# Patient Record
Sex: FEMALE | Race: WHITE | Employment: OTHER | URBAN - METROPOLITAN AREA
[De-identification: names, ages, dates, MRNs, and addresses within clinical notes are randomized per-mention and may not be internally consistent; named-entity substitution may affect disease eponyms.]

---

## 2016-12-15 PROCEDURE — 93225 XTRNL ECG REC<48 HRS REC: CPT | Performed by: NURSE PRACTITIONER

## 2017-01-17 ENCOUNTER — OFFICE VISIT (OUTPATIENT)
Dept: CARDIOLOGY CLINIC | Age: 82
End: 2017-01-17

## 2017-01-17 VITALS
SYSTOLIC BLOOD PRESSURE: 116 MMHG | DIASTOLIC BLOOD PRESSURE: 74 MMHG | HEIGHT: 60 IN | BODY MASS INDEX: 31.61 KG/M2 | WEIGHT: 161 LBS | OXYGEN SATURATION: 96 % | HEART RATE: 82 BPM

## 2017-01-17 DIAGNOSIS — R06.09 DOE (DYSPNEA ON EXERTION): Primary | ICD-10-CM

## 2017-01-17 DIAGNOSIS — R55 SYNCOPE, UNSPECIFIED SYNCOPE TYPE: ICD-10-CM

## 2017-01-17 DIAGNOSIS — R09.89 CAROTID BRUIT, UNSPECIFIED LATERALITY: ICD-10-CM

## 2017-01-17 DIAGNOSIS — R06.02 SOB (SHORTNESS OF BREATH): ICD-10-CM

## 2017-01-17 DIAGNOSIS — E78.00 PURE HYPERCHOLESTEROLEMIA: ICD-10-CM

## 2017-01-17 DIAGNOSIS — I10 ESSENTIAL HYPERTENSION: ICD-10-CM

## 2017-01-17 DIAGNOSIS — I35.0 MODERATE AORTIC STENOSIS: ICD-10-CM

## 2017-01-17 PROCEDURE — 1123F ACP DISCUSS/DSCN MKR DOCD: CPT | Performed by: INTERNAL MEDICINE

## 2017-01-17 PROCEDURE — 99215 OFFICE O/P EST HI 40 MIN: CPT | Performed by: INTERNAL MEDICINE

## 2017-01-17 PROCEDURE — G8400 PT W/DXA NO RESULTS DOC: HCPCS | Performed by: INTERNAL MEDICINE

## 2017-01-17 PROCEDURE — 1090F PRES/ABSN URINE INCON ASSESS: CPT | Performed by: INTERNAL MEDICINE

## 2017-01-17 PROCEDURE — 4040F PNEUMOC VAC/ADMIN/RCVD: CPT | Performed by: INTERNAL MEDICINE

## 2017-01-17 PROCEDURE — G8484 FLU IMMUNIZE NO ADMIN: HCPCS | Performed by: INTERNAL MEDICINE

## 2017-01-17 PROCEDURE — G8427 DOCREV CUR MEDS BY ELIG CLIN: HCPCS | Performed by: INTERNAL MEDICINE

## 2017-01-17 PROCEDURE — 1036F TOBACCO NON-USER: CPT | Performed by: INTERNAL MEDICINE

## 2017-01-17 PROCEDURE — G8419 CALC BMI OUT NRM PARAM NOF/U: HCPCS | Performed by: INTERNAL MEDICINE

## 2017-01-19 PROCEDURE — 93227 XTRNL ECG REC<48 HR R&I: CPT | Performed by: NURSE PRACTITIONER

## 2017-01-20 ENCOUNTER — HOSPITAL ENCOUNTER (OUTPATIENT)
Dept: VASCULAR LAB | Age: 82
Discharge: HOME | End: 2017-01-20
Attending: INTERNAL MEDICINE | Admitting: INTERNAL MEDICINE

## 2017-01-20 ENCOUNTER — TELEPHONE (OUTPATIENT)
Dept: CARDIOLOGY CLINIC | Age: 82
End: 2017-01-20

## 2017-01-20 DIAGNOSIS — R06.02 SOB (SHORTNESS OF BREATH): ICD-10-CM

## 2017-01-20 DIAGNOSIS — R06.02 SHORTNESS OF BREATH: ICD-10-CM

## 2017-01-20 PROCEDURE — 94727 GAS DIL/WSHOT DETER LNG VOL: CPT | Performed by: INTERNAL MEDICINE

## 2017-01-20 PROCEDURE — 94010 BREATHING CAPACITY TEST: CPT | Performed by: INTERNAL MEDICINE

## 2017-01-20 PROCEDURE — 94729 DIFFUSING CAPACITY: CPT | Performed by: INTERNAL MEDICINE

## 2017-01-27 DIAGNOSIS — R55 SYNCOPE, UNSPECIFIED SYNCOPE TYPE: ICD-10-CM

## 2017-01-30 DIAGNOSIS — R06.02 SOB (SHORTNESS OF BREATH): ICD-10-CM

## 2017-01-30 DIAGNOSIS — I10 ESSENTIAL HYPERTENSION: ICD-10-CM

## 2017-01-30 LAB
ANION GAP SERPL CALCULATED.3IONS-SCNC: 16 MMOL/L (ref 3–16)
BUN BLDV-MCNC: 18 MG/DL (ref 7–20)
CALCIUM SERPL-MCNC: 9.4 MG/DL (ref 8.3–10.6)
CHLORIDE BLD-SCNC: 98 MMOL/L (ref 99–110)
CO2: 26 MMOL/L (ref 21–32)
CREAT SERPL-MCNC: 0.8 MG/DL (ref 0.6–1.2)
GFR AFRICAN AMERICAN: >60
GFR NON-AFRICAN AMERICAN: >60
GLUCOSE BLD-MCNC: 108 MG/DL (ref 70–99)
HCT VFR BLD CALC: 39.3 % (ref 36–48)
HEMOGLOBIN: 13 G/DL (ref 12–16)
INR BLD: 1.06 (ref 0.85–1.16)
MCH RBC QN AUTO: 29.8 PG (ref 26–34)
MCHC RBC AUTO-ENTMCNC: 33 G/DL (ref 31–36)
MCV RBC AUTO: 90.3 FL (ref 80–100)
PDW BLD-RTO: 12.7 % (ref 12.4–15.4)
PLATELET # BLD: 220 K/UL (ref 135–450)
PMV BLD AUTO: 10.5 FL (ref 5–10.5)
POTASSIUM SERPL-SCNC: 4.6 MMOL/L (ref 3.5–5.1)
PROTHROMBIN TIME: 12.1 SEC (ref 9.8–13)
RBC # BLD: 4.35 M/UL (ref 4–5.2)
SODIUM BLD-SCNC: 140 MMOL/L (ref 136–145)
WBC # BLD: 8.9 K/UL (ref 4–11)

## 2017-02-06 ENCOUNTER — HOSPITAL ENCOUNTER (OUTPATIENT)
Dept: CARDIAC CATH/INVASIVE PROCEDURES | Age: 82
Discharge: HOME | End: 2017-02-06
Attending: INTERNAL MEDICINE | Admitting: INTERNAL MEDICINE

## 2017-02-06 VITALS — WEIGHT: 163 LBS | BODY MASS INDEX: 32 KG/M2 | HEIGHT: 60 IN

## 2017-02-06 DIAGNOSIS — I35.0 NONRHEUMATIC AORTIC VALVE STENOSIS: ICD-10-CM

## 2017-02-06 PROCEDURE — 99205 OFFICE O/P NEW HI 60 MIN: CPT | Performed by: THORACIC SURGERY (CARDIOTHORACIC VASCULAR SURGERY)

## 2017-02-06 PROCEDURE — 93458 L HRT ARTERY/VENTRICLE ANGIO: CPT | Performed by: INTERNAL MEDICINE

## 2017-02-06 RX ORDER — SODIUM CHLORIDE 9 MG/ML
INJECTION, SOLUTION INTRAVENOUS CONTINUOUS
Status: DISCONTINUED | OUTPATIENT
Start: 2017-02-06 | End: 2017-02-07 | Stop reason: HOSPADM

## 2017-02-06 RX ORDER — SODIUM CHLORIDE 0.9 % (FLUSH) 0.9 %
10 SYRINGE (ML) INJECTION PRN
Status: DISCONTINUED | OUTPATIENT
Start: 2017-02-06 | End: 2017-02-07 | Stop reason: HOSPADM

## 2017-02-06 RX ORDER — SODIUM CHLORIDE 0.9 % (FLUSH) 0.9 %
10 SYRINGE (ML) INJECTION PRN
Status: DISCONTINUED | OUTPATIENT
Start: 2017-02-06 | End: 2017-02-06 | Stop reason: ALTCHOICE

## 2017-02-06 RX ORDER — ACETAMINOPHEN 325 MG/1
650 TABLET ORAL EVERY 4 HOURS PRN
Status: DISCONTINUED | OUTPATIENT
Start: 2017-02-06 | End: 2017-02-07 | Stop reason: HOSPADM

## 2017-02-06 RX ORDER — ASPIRIN 325 MG
325 TABLET ORAL ONCE
Status: DISCONTINUED | OUTPATIENT
Start: 2017-02-06 | End: 2017-02-06 | Stop reason: ALTCHOICE

## 2017-02-06 RX ORDER — SODIUM CHLORIDE 0.9 % (FLUSH) 0.9 %
10 SYRINGE (ML) INJECTION EVERY 12 HOURS SCHEDULED
Status: DISCONTINUED | OUTPATIENT
Start: 2017-02-06 | End: 2017-02-07 | Stop reason: HOSPADM

## 2017-02-06 RX ORDER — ONDANSETRON 2 MG/ML
4 INJECTION INTRAMUSCULAR; INTRAVENOUS EVERY 6 HOURS PRN
Status: DISCONTINUED | OUTPATIENT
Start: 2017-02-06 | End: 2017-02-07 | Stop reason: HOSPADM

## 2017-02-06 RX ORDER — SODIUM CHLORIDE 0.9 % (FLUSH) 0.9 %
10 SYRINGE (ML) INJECTION EVERY 12 HOURS SCHEDULED
Status: DISCONTINUED | OUTPATIENT
Start: 2017-02-06 | End: 2017-02-06 | Stop reason: ALTCHOICE

## 2017-02-07 LAB
EKG ATRIAL RATE: 72 BPM
EKG DIAGNOSIS: NORMAL
EKG P AXIS: 42 DEGREES
EKG P-R INTERVAL: 158 MS
EKG Q-T INTERVAL: 418 MS
EKG QRS DURATION: 100 MS
EKG QTC CALCULATION (BAZETT): 457 MS
EKG R AXIS: 21 DEGREES
EKG T AXIS: 20 DEGREES
EKG VENTRICULAR RATE: 72 BPM

## 2017-02-07 RX ORDER — ROSUVASTATIN CALCIUM 10 MG/1
10 TABLET, COATED ORAL DAILY
Qty: 90 TABLET | Refills: 1 | Status: SHIPPED | OUTPATIENT
Start: 2017-02-07 | End: 2017-08-10 | Stop reason: SDUPTHER

## 2017-03-03 ENCOUNTER — SURG/PROC ORDERS (OUTPATIENT)
Dept: CARDIOTHORACIC SURGERY | Age: 82
End: 2017-03-03

## 2017-03-03 ENCOUNTER — OFFICE VISIT (OUTPATIENT)
Dept: CARDIOTHORACIC SURGERY | Age: 82
End: 2017-03-03

## 2017-03-03 VITALS
DIASTOLIC BLOOD PRESSURE: 70 MMHG | WEIGHT: 163.8 LBS | HEIGHT: 60 IN | OXYGEN SATURATION: 96 % | TEMPERATURE: 98.5 F | SYSTOLIC BLOOD PRESSURE: 136 MMHG | HEART RATE: 77 BPM | BODY MASS INDEX: 32.16 KG/M2

## 2017-03-03 DIAGNOSIS — I35.0 NONRHEUMATIC AORTIC VALVE STENOSIS: Primary | ICD-10-CM

## 2017-03-03 PROCEDURE — 99214 OFFICE O/P EST MOD 30 MIN: CPT | Performed by: THORACIC SURGERY (CARDIOTHORACIC VASCULAR SURGERY)

## 2017-03-03 RX ORDER — LISINOPRIL 40 MG/1
TABLET ORAL
Qty: 90 TABLET | Refills: 1 | Status: SHIPPED | OUTPATIENT
Start: 2017-03-03 | End: 2017-09-25 | Stop reason: SDUPTHER

## 2017-03-07 ENCOUNTER — TELEPHONE (OUTPATIENT)
Dept: CARDIOTHORACIC SURGERY | Age: 82
End: 2017-03-07

## 2017-03-07 ENCOUNTER — PAT TELEPHONE (OUTPATIENT)
Dept: PREADMISSION TESTING | Age: 82
End: 2017-03-07

## 2017-03-07 ENCOUNTER — HOSPITAL ENCOUNTER (OUTPATIENT)
Dept: PREADMISSION TESTING | Age: 82
Discharge: HOME | End: 2017-03-07
Attending: THORACIC SURGERY (CARDIOTHORACIC VASCULAR SURGERY) | Admitting: THORACIC SURGERY (CARDIOTHORACIC VASCULAR SURGERY)

## 2017-03-07 ENCOUNTER — HOSPITAL ENCOUNTER (OUTPATIENT)
Dept: CT IMAGING | Age: 82
Discharge: HOME | End: 2017-03-07
Attending: THORACIC SURGERY (CARDIOTHORACIC VASCULAR SURGERY) | Admitting: THORACIC SURGERY (CARDIOTHORACIC VASCULAR SURGERY)

## 2017-03-07 VITALS — WEIGHT: 163 LBS | HEIGHT: 60 IN | BODY MASS INDEX: 32 KG/M2

## 2017-03-07 VITALS — RESPIRATION RATE: 18 BRPM

## 2017-03-07 DIAGNOSIS — I35.0 NONRHEUMATIC AORTIC VALVE STENOSIS: ICD-10-CM

## 2017-03-07 DIAGNOSIS — Z01.810 PRE-OPERATIVE CARDIOVASCULAR EXAMINATION: ICD-10-CM

## 2017-03-07 LAB
ABO/RH: NORMAL
ALBUMIN SERPL-MCNC: 4.5 G/DL (ref 3.4–5)
ALP BLD-CCNC: 45 U/L (ref 40–129)
ALT SERPL-CCNC: 48 U/L (ref 10–40)
ANION GAP SERPL CALCULATED.3IONS-SCNC: 13 MMOL/L (ref 3–16)
ANTIBODY SCREEN: NORMAL
APTT: 27.2 SEC (ref 21–31.8)
AST SERPL-CCNC: 29 U/L (ref 15–37)
BASE EXCESS ARTERIAL: 1.5 MMOL/L (ref -3–3)
BASOPHILS ABSOLUTE: 0.1 K/UL (ref 0–0.2)
BASOPHILS RELATIVE PERCENT: 1.5 %
BILIRUB SERPL-MCNC: 0.5 MG/DL (ref 0–1)
BILIRUBIN DIRECT: <0.2 MG/DL (ref 0–0.3)
BILIRUBIN URINE: NEGATIVE
BILIRUBIN URINE: NEGATIVE
BILIRUBIN, INDIRECT: ABNORMAL MG/DL (ref 0–1)
BLOOD, URINE: NEGATIVE
BLOOD, URINE: NEGATIVE
BUN BLDV-MCNC: 27 MG/DL (ref 7–20)
CALCIUM SERPL-MCNC: 9.9 MG/DL (ref 8.3–10.6)
CARBOXYHEMOGLOBIN ARTERIAL: 1.6 % (ref 0–1.5)
CHLORIDE BLD-SCNC: 98 MMOL/L (ref 99–110)
CHOLESTEROL, TOTAL: 124 MG/DL (ref 0–199)
CLARITY: ABNORMAL
CLARITY: CLEAR
CO2: 27 MMOL/L (ref 21–32)
COLOR: YELLOW
COLOR: YELLOW
CREAT SERPL-MCNC: 1.1 MG/DL (ref 0.6–1.2)
EKG ATRIAL RATE: 67 BPM
EKG DIAGNOSIS: NORMAL
EKG P AXIS: 46 DEGREES
EKG P-R INTERVAL: 156 MS
EKG Q-T INTERVAL: 434 MS
EKG QRS DURATION: 100 MS
EKG QTC CALCULATION (BAZETT): 458 MS
EKG R AXIS: 33 DEGREES
EKG T AXIS: 30 DEGREES
EKG VENTRICULAR RATE: 67 BPM
EOSINOPHILS ABSOLUTE: 0.2 K/UL (ref 0–0.6)
EOSINOPHILS RELATIVE PERCENT: 2.4 %
EPITHELIAL CELLS, UA: 11 /HPF (ref 0–5)
ESTIMATED AVERAGE GLUCOSE: 105.4 MG/DL
GFR AFRICAN AMERICAN: 58
GFR NON-AFRICAN AMERICAN: 48
GLUCOSE BLD-MCNC: 110 MG/DL (ref 70–99)
GLUCOSE URINE: NEGATIVE MG/DL
GLUCOSE URINE: NEGATIVE MG/DL
HBA1C MFR BLD: 5.3 %
HCO3 ARTERIAL: 24.5 MMOL/L (ref 21–29)
HCT VFR BLD CALC: 40.5 % (ref 36–48)
HDLC SERPL-MCNC: 45 MG/DL (ref 40–60)
HEMOGLOBIN, ART, EXTENDED: 13.3 G/DL (ref 12–16)
HEMOGLOBIN: 13.5 G/DL (ref 12–16)
HYALINE CASTS: 2 /HPF (ref 0–8)
INR BLD: 1.12 (ref 0.85–1.15)
KETONES, URINE: NEGATIVE MG/DL
KETONES, URINE: NEGATIVE MG/DL
LDL CHOLESTEROL CALCULATED: 64 MG/DL
LEUKOCYTE ESTERASE, URINE: ABNORMAL
LEUKOCYTE ESTERASE, URINE: NEGATIVE
LYMPHOCYTES ABSOLUTE: 1.7 K/UL (ref 1–5.1)
LYMPHOCYTES RELATIVE PERCENT: 21.2 %
MCH RBC QN AUTO: 29.9 PG (ref 26–34)
MCHC RBC AUTO-ENTMCNC: 33.4 G/DL (ref 31–36)
MCV RBC AUTO: 89.5 FL (ref 80–100)
METHEMOGLOBIN ARTERIAL: 1.3 %
MICROSCOPIC EXAMINATION: NORMAL
MICROSCOPIC EXAMINATION: YES
MONOCYTES ABSOLUTE: 0.5 K/UL (ref 0–1.3)
MONOCYTES RELATIVE PERCENT: 6 %
NEUTROPHILS ABSOLUTE: 5.7 K/UL (ref 1.7–7.7)
NEUTROPHILS RELATIVE PERCENT: 68.9 %
NITRITE, URINE: NEGATIVE
NITRITE, URINE: NEGATIVE
O2 CONTENT ARTERIAL: 16 ML/DL
O2 SAT, ARTERIAL: 89.1 %
O2 THERAPY: ABNORMAL
PCO2 ARTERIAL: 35 MMHG (ref 35–45)
PDW BLD-RTO: 12.1 % (ref 12.4–15.4)
PH ARTERIAL: 7.46 (ref 7.35–7.45)
PH UA: 6
PH UA: 6
PLATELET # BLD: 210 K/UL (ref 135–450)
PMV BLD AUTO: 9.8 FL (ref 5–10.5)
PO2 ARTERIAL: 52.2 MMHG (ref 75–108)
POTASSIUM SERPL-SCNC: 4.1 MMOL/L (ref 3.5–5.1)
PROTEIN UA: NEGATIVE MG/DL
PROTEIN UA: NEGATIVE MG/DL
PROTHROMBIN TIME: 12.6 SEC (ref 9.6–13)
RBC # BLD: 4.53 M/UL (ref 4–5.2)
RBC UA: 2 /HPF (ref 0–4)
SODIUM BLD-SCNC: 138 MMOL/L (ref 136–145)
SPECIFIC GRAVITY UA: 1.01
SPECIFIC GRAVITY UA: 1.01
TCO2 ARTERIAL: 25.6 MMOL/L
TOTAL PROTEIN: 7.5 G/DL (ref 6.4–8.2)
TRIGL SERPL-MCNC: 75 MG/DL (ref 0–150)
URINE REFLEX TO CULTURE: NORMAL
URINE REFLEX TO CULTURE: YES
URINE TYPE: ABNORMAL
URINE TYPE: NORMAL
UROBILINOGEN, URINE: 0.2 E.U./DL
UROBILINOGEN, URINE: 0.2 E.U./DL
VLDLC SERPL CALC-MCNC: 15 MG/DL
WBC # BLD: 8.2 K/UL (ref 4–11)
WBC UA: 6 /HPF (ref 0–5)

## 2017-03-07 PROCEDURE — 93010 ELECTROCARDIOGRAM REPORT: CPT | Performed by: INTERNAL MEDICINE

## 2017-03-07 RX ORDER — SODIUM CHLORIDE 9 MG/ML
INJECTION, SOLUTION INTRAVENOUS ONCE
Status: CANCELLED | OUTPATIENT
Start: 2017-03-09

## 2017-03-07 RX ORDER — ESOMEPRAZOLE SODIUM 40 MG/5ML
40 INJECTION INTRAVENOUS ONCE
Status: CANCELLED | OUTPATIENT
Start: 2017-03-09

## 2017-03-07 RX ORDER — 0.9 % SODIUM CHLORIDE 0.9 %
250 INTRAVENOUS SOLUTION INTRAVENOUS ONCE
Status: DISCONTINUED | OUTPATIENT
Start: 2017-03-07 | End: 2017-03-07 | Stop reason: ALTCHOICE

## 2017-03-08 LAB — URINE CULTURE, ROUTINE: NORMAL

## 2017-03-10 ENCOUNTER — CARE COORDINATION (OUTPATIENT)
Dept: FAMILY MEDICINE CLINIC | Age: 82
End: 2017-03-10

## 2017-03-15 ENCOUNTER — CARE COORDINATION (OUTPATIENT)
Dept: FAMILY MEDICINE CLINIC | Age: 82
End: 2017-03-15

## 2017-03-24 ENCOUNTER — OFFICE VISIT (OUTPATIENT)
Dept: CARDIOTHORACIC SURGERY | Age: 82
End: 2017-03-24

## 2017-03-24 VITALS
HEIGHT: 60 IN | HEART RATE: 65 BPM | DIASTOLIC BLOOD PRESSURE: 64 MMHG | SYSTOLIC BLOOD PRESSURE: 128 MMHG | BODY MASS INDEX: 31.61 KG/M2 | OXYGEN SATURATION: 96 % | TEMPERATURE: 97.8 F | WEIGHT: 161 LBS

## 2017-03-24 DIAGNOSIS — Z09 POSTOP CHECK: Primary | ICD-10-CM

## 2017-03-24 PROCEDURE — 99024 POSTOP FOLLOW-UP VISIT: CPT | Performed by: THORACIC SURGERY (CARDIOTHORACIC VASCULAR SURGERY)

## 2017-04-07 ENCOUNTER — OFFICE VISIT (OUTPATIENT)
Dept: CARDIOTHORACIC SURGERY | Age: 82
End: 2017-04-07

## 2017-04-07 VITALS
SYSTOLIC BLOOD PRESSURE: 136 MMHG | TEMPERATURE: 98 F | HEIGHT: 60 IN | BODY MASS INDEX: 30.9 KG/M2 | WEIGHT: 157.4 LBS | OXYGEN SATURATION: 96 % | HEART RATE: 75 BPM | DIASTOLIC BLOOD PRESSURE: 82 MMHG

## 2017-04-07 DIAGNOSIS — Z09 POSTOP CHECK: Primary | ICD-10-CM

## 2017-04-07 PROCEDURE — 99024 POSTOP FOLLOW-UP VISIT: CPT | Performed by: THORACIC SURGERY (CARDIOTHORACIC VASCULAR SURGERY)

## 2017-04-21 ENCOUNTER — OFFICE VISIT (OUTPATIENT)
Dept: CARDIOLOGY CLINIC | Age: 82
End: 2017-04-21

## 2017-04-21 VITALS
BODY MASS INDEX: 30.82 KG/M2 | HEART RATE: 68 BPM | DIASTOLIC BLOOD PRESSURE: 74 MMHG | WEIGHT: 157 LBS | HEIGHT: 60 IN | OXYGEN SATURATION: 96 % | SYSTOLIC BLOOD PRESSURE: 118 MMHG

## 2017-04-21 DIAGNOSIS — I10 BENIGN ESSENTIAL HTN: ICD-10-CM

## 2017-04-21 DIAGNOSIS — Z95.2 S/P AVR: Primary | ICD-10-CM

## 2017-04-21 DIAGNOSIS — E78.00 PURE HYPERCHOLESTEROLEMIA: ICD-10-CM

## 2017-04-21 DIAGNOSIS — I65.23 BILATERAL CAROTID ARTERY STENOSIS: ICD-10-CM

## 2017-04-21 DIAGNOSIS — I35.0 NONRHEUMATIC AORTIC VALVE STENOSIS: ICD-10-CM

## 2017-04-21 PROCEDURE — 1036F TOBACCO NON-USER: CPT | Performed by: INTERNAL MEDICINE

## 2017-04-21 PROCEDURE — G8598 ASA/ANTIPLAT THER USED: HCPCS | Performed by: INTERNAL MEDICINE

## 2017-04-21 PROCEDURE — 1111F DSCHRG MED/CURRENT MED MERGE: CPT | Performed by: INTERNAL MEDICINE

## 2017-04-21 PROCEDURE — 1090F PRES/ABSN URINE INCON ASSESS: CPT | Performed by: INTERNAL MEDICINE

## 2017-04-21 PROCEDURE — 99214 OFFICE O/P EST MOD 30 MIN: CPT | Performed by: INTERNAL MEDICINE

## 2017-04-21 PROCEDURE — G8427 DOCREV CUR MEDS BY ELIG CLIN: HCPCS | Performed by: INTERNAL MEDICINE

## 2017-04-21 PROCEDURE — G8400 PT W/DXA NO RESULTS DOC: HCPCS | Performed by: INTERNAL MEDICINE

## 2017-04-21 PROCEDURE — 4040F PNEUMOC VAC/ADMIN/RCVD: CPT | Performed by: INTERNAL MEDICINE

## 2017-04-21 PROCEDURE — G8417 CALC BMI ABV UP PARAM F/U: HCPCS | Performed by: INTERNAL MEDICINE

## 2017-04-21 PROCEDURE — 1123F ACP DISCUSS/DSCN MKR DOCD: CPT | Performed by: INTERNAL MEDICINE

## 2017-04-24 ENCOUNTER — HOSPITAL ENCOUNTER (OUTPATIENT)
Dept: CARDIAC REHAB | Age: 82
Discharge: HOME | End: 2017-04-30
Admitting: INTERNAL MEDICINE

## 2017-05-11 ENCOUNTER — TELEPHONE (OUTPATIENT)
Dept: CARDIOTHORACIC SURGERY | Age: 82
End: 2017-05-11

## 2017-06-02 RX ORDER — VERAPAMIL HYDROCHLORIDE 360 MG/1
CAPSULE, DELAYED RELEASE PELLETS ORAL
Qty: 90 CAPSULE | Refills: 1 | OUTPATIENT
Start: 2017-06-02

## 2017-06-13 ENCOUNTER — OFFICE VISIT (OUTPATIENT)
Dept: FAMILY MEDICINE CLINIC | Age: 82
End: 2017-06-13

## 2017-06-13 VITALS
HEIGHT: 60 IN | BODY MASS INDEX: 31.49 KG/M2 | HEART RATE: 64 BPM | DIASTOLIC BLOOD PRESSURE: 70 MMHG | SYSTOLIC BLOOD PRESSURE: 130 MMHG | WEIGHT: 160.4 LBS | TEMPERATURE: 97.7 F

## 2017-06-13 DIAGNOSIS — E78.00 PURE HYPERCHOLESTEROLEMIA: Primary | ICD-10-CM

## 2017-06-13 DIAGNOSIS — Z13.820 OSTEOPOROSIS SCREENING: ICD-10-CM

## 2017-06-13 DIAGNOSIS — I10 BENIGN ESSENTIAL HTN: ICD-10-CM

## 2017-06-13 PROCEDURE — G8400 PT W/DXA NO RESULTS DOC: HCPCS | Performed by: FAMILY MEDICINE

## 2017-06-13 PROCEDURE — G8417 CALC BMI ABV UP PARAM F/U: HCPCS | Performed by: FAMILY MEDICINE

## 2017-06-13 PROCEDURE — 1123F ACP DISCUSS/DSCN MKR DOCD: CPT | Performed by: FAMILY MEDICINE

## 2017-06-13 PROCEDURE — 1036F TOBACCO NON-USER: CPT | Performed by: FAMILY MEDICINE

## 2017-06-13 PROCEDURE — 1090F PRES/ABSN URINE INCON ASSESS: CPT | Performed by: FAMILY MEDICINE

## 2017-06-13 PROCEDURE — 99213 OFFICE O/P EST LOW 20 MIN: CPT | Performed by: FAMILY MEDICINE

## 2017-06-13 PROCEDURE — 4040F PNEUMOC VAC/ADMIN/RCVD: CPT | Performed by: FAMILY MEDICINE

## 2017-06-13 PROCEDURE — G8598 ASA/ANTIPLAT THER USED: HCPCS | Performed by: FAMILY MEDICINE

## 2017-06-13 PROCEDURE — G8427 DOCREV CUR MEDS BY ELIG CLIN: HCPCS | Performed by: FAMILY MEDICINE

## 2017-06-13 RX ORDER — VERAPAMIL HYDROCHLORIDE 360 MG/1
360 CAPSULE, DELAYED RELEASE PELLETS ORAL NIGHTLY
Qty: 90 CAPSULE | Refills: 1 | Status: SHIPPED | OUTPATIENT
Start: 2017-06-13 | End: 2017-12-15 | Stop reason: SDUPTHER

## 2017-06-13 ASSESSMENT — ENCOUNTER SYMPTOMS
CHEST TIGHTNESS: 0
CONSTIPATION: 0
NAUSEA: 0
DIARRHEA: 0
ABDOMINAL PAIN: 0
VOMITING: 0
ABDOMINAL DISTENTION: 0
SHORTNESS OF BREATH: 0
BLOOD IN STOOL: 0
COUGH: 0

## 2017-06-26 RX ORDER — TRIAMTERENE AND HYDROCHLOROTHIAZIDE 75; 50 MG/1; MG/1
TABLET ORAL
Qty: 90 TABLET | Refills: 1 | Status: SHIPPED | OUTPATIENT
Start: 2017-06-26 | End: 2018-10-03 | Stop reason: SDUPTHER

## 2017-07-07 ENCOUNTER — HOSPITAL ENCOUNTER (OUTPATIENT)
Dept: WOMENS IMAGING | Age: 82
Discharge: HOME | End: 2017-07-07
Attending: FAMILY MEDICINE | Admitting: FAMILY MEDICINE

## 2017-07-07 DIAGNOSIS — Z13.820 ENCOUNTER FOR SCREENING FOR OSTEOPOROSIS: ICD-10-CM

## 2017-07-07 DIAGNOSIS — Z13.820 OSTEOPOROSIS SCREENING: ICD-10-CM

## 2017-08-01 ENCOUNTER — OFFICE VISIT (OUTPATIENT)
Dept: CARDIOLOGY CLINIC | Age: 82
End: 2017-08-01

## 2017-08-01 VITALS
OXYGEN SATURATION: 95 % | WEIGHT: 162.4 LBS | HEART RATE: 56 BPM | BODY MASS INDEX: 31.88 KG/M2 | HEIGHT: 60 IN | DIASTOLIC BLOOD PRESSURE: 70 MMHG | SYSTOLIC BLOOD PRESSURE: 124 MMHG

## 2017-08-01 DIAGNOSIS — E78.00 PURE HYPERCHOLESTEROLEMIA: ICD-10-CM

## 2017-08-01 DIAGNOSIS — I10 BENIGN ESSENTIAL HTN: ICD-10-CM

## 2017-08-01 DIAGNOSIS — I35.0 NONRHEUMATIC AORTIC VALVE STENOSIS: Primary | ICD-10-CM

## 2017-08-01 PROCEDURE — 99214 OFFICE O/P EST MOD 30 MIN: CPT | Performed by: INTERNAL MEDICINE

## 2017-08-01 PROCEDURE — G8417 CALC BMI ABV UP PARAM F/U: HCPCS | Performed by: INTERNAL MEDICINE

## 2017-08-01 PROCEDURE — 1123F ACP DISCUSS/DSCN MKR DOCD: CPT | Performed by: INTERNAL MEDICINE

## 2017-08-01 PROCEDURE — G8427 DOCREV CUR MEDS BY ELIG CLIN: HCPCS | Performed by: INTERNAL MEDICINE

## 2017-08-01 PROCEDURE — 1036F TOBACCO NON-USER: CPT | Performed by: INTERNAL MEDICINE

## 2017-08-01 PROCEDURE — 4040F PNEUMOC VAC/ADMIN/RCVD: CPT | Performed by: INTERNAL MEDICINE

## 2017-08-01 PROCEDURE — 1090F PRES/ABSN URINE INCON ASSESS: CPT | Performed by: INTERNAL MEDICINE

## 2017-08-01 PROCEDURE — G8399 PT W/DXA RESULTS DOCUMENT: HCPCS | Performed by: INTERNAL MEDICINE

## 2017-08-01 PROCEDURE — G8598 ASA/ANTIPLAT THER USED: HCPCS | Performed by: INTERNAL MEDICINE

## 2017-08-11 RX ORDER — ROSUVASTATIN CALCIUM 10 MG/1
10 TABLET, COATED ORAL DAILY
Qty: 90 TABLET | Refills: 1 | Status: SHIPPED | OUTPATIENT
Start: 2017-08-11 | End: 2018-03-06 | Stop reason: SDUPTHER

## 2017-08-18 RX ORDER — METOPROLOL TARTRATE 50 MG/1
50 TABLET, FILM COATED ORAL 2 TIMES DAILY
Qty: 180 TABLET | Refills: 3 | Status: SHIPPED | OUTPATIENT
Start: 2017-08-18 | End: 2017-08-22 | Stop reason: SDUPTHER

## 2017-08-22 RX ORDER — METOPROLOL TARTRATE 50 MG/1
50 TABLET, FILM COATED ORAL 2 TIMES DAILY
Qty: 180 TABLET | Refills: 3 | Status: SHIPPED | OUTPATIENT
Start: 2017-08-22 | End: 2018-09-01 | Stop reason: SDUPTHER

## 2018-02-02 ENCOUNTER — OFFICE VISIT (OUTPATIENT)
Dept: CARDIOLOGY CLINIC | Age: 83
End: 2018-02-02

## 2018-02-02 VITALS
DIASTOLIC BLOOD PRESSURE: 74 MMHG | WEIGHT: 168 LBS | BODY MASS INDEX: 32.98 KG/M2 | HEART RATE: 71 BPM | OXYGEN SATURATION: 96 % | HEIGHT: 60 IN | SYSTOLIC BLOOD PRESSURE: 138 MMHG

## 2018-02-02 DIAGNOSIS — R06.09 DOE (DYSPNEA ON EXERTION): ICD-10-CM

## 2018-02-02 DIAGNOSIS — I65.23 BILATERAL CAROTID ARTERY STENOSIS: ICD-10-CM

## 2018-02-02 DIAGNOSIS — E78.2 MIXED HYPERLIPIDEMIA: ICD-10-CM

## 2018-02-02 DIAGNOSIS — I10 ESSENTIAL HYPERTENSION: ICD-10-CM

## 2018-02-02 DIAGNOSIS — I35.0 NONRHEUMATIC AORTIC VALVE STENOSIS: ICD-10-CM

## 2018-02-02 DIAGNOSIS — R79.89 OTHER SPECIFIED ABNORMAL FINDINGS OF BLOOD CHEMISTRY: ICD-10-CM

## 2018-02-02 DIAGNOSIS — I10 BENIGN ESSENTIAL HTN: ICD-10-CM

## 2018-02-02 DIAGNOSIS — Z95.2 S/P AVR: Primary | ICD-10-CM

## 2018-02-02 PROCEDURE — G8417 CALC BMI ABV UP PARAM F/U: HCPCS | Performed by: INTERNAL MEDICINE

## 2018-02-02 PROCEDURE — G8484 FLU IMMUNIZE NO ADMIN: HCPCS | Performed by: INTERNAL MEDICINE

## 2018-02-02 PROCEDURE — G8399 PT W/DXA RESULTS DOCUMENT: HCPCS | Performed by: INTERNAL MEDICINE

## 2018-02-02 PROCEDURE — G8427 DOCREV CUR MEDS BY ELIG CLIN: HCPCS | Performed by: INTERNAL MEDICINE

## 2018-02-02 PROCEDURE — 4040F PNEUMOC VAC/ADMIN/RCVD: CPT | Performed by: INTERNAL MEDICINE

## 2018-02-02 PROCEDURE — G8599 NO ASA/ANTIPLAT THER USE RNG: HCPCS | Performed by: INTERNAL MEDICINE

## 2018-02-02 PROCEDURE — 99214 OFFICE O/P EST MOD 30 MIN: CPT | Performed by: INTERNAL MEDICINE

## 2018-02-02 PROCEDURE — 1123F ACP DISCUSS/DSCN MKR DOCD: CPT | Performed by: INTERNAL MEDICINE

## 2018-02-02 PROCEDURE — 1036F TOBACCO NON-USER: CPT | Performed by: INTERNAL MEDICINE

## 2018-02-02 PROCEDURE — 1090F PRES/ABSN URINE INCON ASSESS: CPT | Performed by: INTERNAL MEDICINE

## 2018-02-02 NOTE — PATIENT INSTRUCTIONS
Patient Education        Transthoracic Echocardiogram: About This Test  What is it? An echocardiogram (also called an echo) uses sound waves to make an image of your heart. A device called a transducer sends sound waves that echo off your heart and back to the transducer. These echoes are turned into moving pictures of your heart that can be seen on a video screen. In a transthoracic echocardiogram (TTE), the transducer is moved across your chest or belly. A TTE is the most common type of echocardiogram.  Why is this test done? This test is done to check your heart health. It's used for many reasons. Your doctor may do an echocardiogram to:  · Check a heart murmur. · Look for the cause of shortness of breath or unexplained chest pain or pressure. · Check how well your heart is pumping blood. · Check to see how well your heart valves are working. · Look for blood clots inside your heart. What happens during the test?  · You will remove your clothes above your waist. You may be given a cloth or paper covering to use during the test.  · You will lie on your back or on your left side on a bed or table. · You may receive medicine through a vein (intravenously, or IV). The IV can be used to give you a contrast material, which helps your doctor get good views of your heart. · Small pads or patches (electrodes) will be taped to your arms and legs to record your heart rate during the test.  · A small amount of gel will be rubbed on the side of your chest to help  the sound waves. · The transducer will be pressed firmly against your chest and moved slowly back and forth. It is usually moved to different areas on your chest or belly to get specific views of your heart. · You will be asked to do several things, such as hold very still, breathe in and out very slowly, hold your breath, or lie on your left side. This test usually takes 30 to 60 minutes.   What else should you know about the test?  · You

## 2018-02-02 NOTE — PROGRESS NOTES
regurgitation  The aortic valve appears to be mildly sclerotic   Peak aortic gradient is 33 mmHg. Mean gradient is 18 mmHg. ANAY is 1.3 cm2 c/w moderate AS  RV systolic pressure is normal.    ECHO 10/16/2015:  Left ventricle size is normal.  Asymmetric left ventricular hypertrophy is present. Overall left ventricular function is normal.  Ejection fraction is visually estimated to be 65 to70%. There is reversal of E/A inflow velocities across the mitral valve  suggesting impaired left ventricular relaxation. There is hyperdynamic LV systolic function. Mitral annular calcification is present. Calcification of the mitral valve noted. Moderate aortic stenosis is present. The aortic valve is thickened with a peak gradient of 48 mm Hg, and mean gradient of 27 mmHg . ECHO:6/3/16  Summary   Normal left ventricular size. Basal septal hypertrophy. Normal left   ventricular systolic function with an estimated ejection fraction of 65% .   Hyperdynamic left ventricle, no regional wall motion abnormalities.   Mitral annular calcification is present.   The mitral valve leaflets are slightly thickened with normal leaflet  mobility.   Trivial mitral regurgitation is present.   The aortic valve appears calcified and restricted.   The aortic valve area is calculated at 1.1 cm2 with a maximum pressure   gradient of 47 mmHg and a mean pressure gradient of 31 mmHg. This is c/w   moderate aortic stenosis.   No evidence of aortic valve regurgitation.   Tricuspid valve is structurally normal.   There is mild tricuspid regurgitation with RVSP estimated at 27 mmHg.   The pulmonic valve is normal in structure and function.   Trivial pulmonic regurgitation present. Carotid Duplex:6/3/16  Right Impression   The right internal carotid artery appears to have a 16-49% diameter reducing   stenosis based on velocity criteria.       The right vertebral artery demonstrates normal antegrade flow.    Left Impression   The left internal

## 2018-02-09 DIAGNOSIS — E78.2 MIXED HYPERLIPIDEMIA: ICD-10-CM

## 2018-02-09 DIAGNOSIS — R79.89 OTHER SPECIFIED ABNORMAL FINDINGS OF BLOOD CHEMISTRY: ICD-10-CM

## 2018-02-09 DIAGNOSIS — I10 ESSENTIAL HYPERTENSION: ICD-10-CM

## 2018-02-09 DIAGNOSIS — Z95.2 S/P AVR: ICD-10-CM

## 2018-02-09 DIAGNOSIS — I35.0 NONRHEUMATIC AORTIC VALVE STENOSIS: ICD-10-CM

## 2018-02-09 DIAGNOSIS — R06.09 DOE (DYSPNEA ON EXERTION): ICD-10-CM

## 2018-02-09 LAB
A/G RATIO: 1.7 (ref 1.1–2.2)
ALBUMIN SERPL-MCNC: 4.3 G/DL (ref 3.4–5)
ALP BLD-CCNC: 53 U/L (ref 40–129)
ALT SERPL-CCNC: 34 U/L (ref 10–40)
ANION GAP SERPL CALCULATED.3IONS-SCNC: 15 MMOL/L (ref 3–16)
AST SERPL-CCNC: 25 U/L (ref 15–37)
BILIRUB SERPL-MCNC: 0.5 MG/DL (ref 0–1)
BUN BLDV-MCNC: 14 MG/DL (ref 7–20)
CALCIUM SERPL-MCNC: 9.7 MG/DL (ref 8.3–10.6)
CHLORIDE BLD-SCNC: 100 MMOL/L (ref 99–110)
CO2: 26 MMOL/L (ref 21–32)
CREAT SERPL-MCNC: 0.8 MG/DL (ref 0.6–1.2)
GFR AFRICAN AMERICAN: >60
GFR NON-AFRICAN AMERICAN: >60
GLOBULIN: 2.5 G/DL
GLUCOSE BLD-MCNC: 118 MG/DL (ref 70–99)
HCT VFR BLD CALC: 42.3 % (ref 36–48)
HEMOGLOBIN: 13.7 G/DL (ref 12–16)
MCH RBC QN AUTO: 29.1 PG (ref 26–34)
MCHC RBC AUTO-ENTMCNC: 32.5 G/DL (ref 31–36)
MCV RBC AUTO: 89.4 FL (ref 80–100)
PDW BLD-RTO: 13.6 % (ref 12.4–15.4)
PLATELET # BLD: 194 K/UL (ref 135–450)
PMV BLD AUTO: 10.7 FL (ref 5–10.5)
POTASSIUM SERPL-SCNC: 3.7 MMOL/L (ref 3.5–5.1)
PRO-BNP: 524 PG/ML (ref 0–449)
RBC # BLD: 4.73 M/UL (ref 4–5.2)
SODIUM BLD-SCNC: 141 MMOL/L (ref 136–145)
TOTAL PROTEIN: 6.8 G/DL (ref 6.4–8.2)
TSH REFLEX: 1.79 UIU/ML (ref 0.27–4.2)
WBC # BLD: 8.2 K/UL (ref 4–11)

## 2018-02-10 LAB
ESTIMATED AVERAGE GLUCOSE: 102.5 MG/DL
HBA1C MFR BLD: 5.2 %

## 2018-02-23 ENCOUNTER — HOSPITAL ENCOUNTER (OUTPATIENT)
Dept: CARDIOLOGY | Age: 83
Discharge: HOME | End: 2018-02-23
Attending: INTERNAL MEDICINE | Admitting: INTERNAL MEDICINE

## 2018-02-23 DIAGNOSIS — I35.0 NONRHEUMATIC AORTIC VALVE STENOSIS: ICD-10-CM

## 2018-02-23 DIAGNOSIS — Z95.2 PRESENCE OF PROSTHETIC HEART VALVE: ICD-10-CM

## 2018-02-23 DIAGNOSIS — Z95.2 S/P AVR: ICD-10-CM

## 2018-02-23 LAB
LV EF: 63 %
LVEF MODALITY: NORMAL

## 2018-02-26 ENCOUNTER — NURSE ONLY (OUTPATIENT)
Dept: CARDIOLOGY CLINIC | Age: 83
End: 2018-02-26

## 2018-02-26 VITALS — SYSTOLIC BLOOD PRESSURE: 124 MMHG | DIASTOLIC BLOOD PRESSURE: 68 MMHG

## 2018-03-07 RX ORDER — LISINOPRIL 40 MG/1
TABLET ORAL
Qty: 90 TABLET | Refills: 1 | Status: SHIPPED | OUTPATIENT
Start: 2018-03-07 | End: 2018-10-03 | Stop reason: SDUPTHER

## 2018-03-09 RX ORDER — ROSUVASTATIN CALCIUM 10 MG/1
TABLET, COATED ORAL
Qty: 90 TABLET | Refills: 1 | Status: SHIPPED | OUTPATIENT
Start: 2018-03-09 | End: 2018-09-18 | Stop reason: SDUPTHER

## 2018-03-19 ENCOUNTER — TELEPHONE (OUTPATIENT)
Dept: FAMILY MEDICINE CLINIC | Age: 83
End: 2018-03-19

## 2018-03-29 ENCOUNTER — TELEPHONE (OUTPATIENT)
Dept: CARDIOLOGY CLINIC | Age: 83
End: 2018-03-29

## 2018-03-29 NOTE — TELEPHONE ENCOUNTER
Lilliam Lopez MA   Has she called around locally to see if anyone has it in stock or does our retail pharmacy have it in stock.

## 2018-03-30 RX ORDER — DILTIAZEM HYDROCHLORIDE 360 MG/1
360 CAPSULE, EXTENDED RELEASE ORAL DAILY
COMMUNITY
End: 2018-03-30 | Stop reason: SDUPTHER

## 2018-03-30 RX ORDER — DILTIAZEM HYDROCHLORIDE 360 MG/1
360 CAPSULE, EXTENDED RELEASE ORAL DAILY
Qty: 90 CAPSULE | Refills: 3 | Status: SHIPPED | OUTPATIENT
Start: 2018-03-30 | End: 2019-03-05 | Stop reason: SDUPTHER

## 2018-08-03 ENCOUNTER — OFFICE VISIT (OUTPATIENT)
Dept: CARDIOLOGY CLINIC | Age: 83
End: 2018-08-03

## 2018-08-03 VITALS
HEART RATE: 76 BPM | HEIGHT: 60 IN | SYSTOLIC BLOOD PRESSURE: 122 MMHG | DIASTOLIC BLOOD PRESSURE: 68 MMHG | BODY MASS INDEX: 33.18 KG/M2 | WEIGHT: 169 LBS | OXYGEN SATURATION: 95 %

## 2018-08-03 DIAGNOSIS — I65.23 BILATERAL CAROTID ARTERY STENOSIS: ICD-10-CM

## 2018-08-03 DIAGNOSIS — E78.2 MIXED HYPERLIPIDEMIA: ICD-10-CM

## 2018-08-03 DIAGNOSIS — Z95.2 S/P AVR: ICD-10-CM

## 2018-08-03 DIAGNOSIS — I35.0 NONRHEUMATIC AORTIC VALVE STENOSIS: Primary | ICD-10-CM

## 2018-08-03 DIAGNOSIS — I10 ESSENTIAL HYPERTENSION: ICD-10-CM

## 2018-08-03 PROCEDURE — 1123F ACP DISCUSS/DSCN MKR DOCD: CPT | Performed by: INTERNAL MEDICINE

## 2018-08-03 PROCEDURE — 1101F PT FALLS ASSESS-DOCD LE1/YR: CPT | Performed by: INTERNAL MEDICINE

## 2018-08-03 PROCEDURE — G8399 PT W/DXA RESULTS DOCUMENT: HCPCS | Performed by: INTERNAL MEDICINE

## 2018-08-03 PROCEDURE — 1090F PRES/ABSN URINE INCON ASSESS: CPT | Performed by: INTERNAL MEDICINE

## 2018-08-03 PROCEDURE — 4040F PNEUMOC VAC/ADMIN/RCVD: CPT | Performed by: INTERNAL MEDICINE

## 2018-08-03 PROCEDURE — 93000 ELECTROCARDIOGRAM COMPLETE: CPT | Performed by: INTERNAL MEDICINE

## 2018-08-03 PROCEDURE — 99214 OFFICE O/P EST MOD 30 MIN: CPT | Performed by: INTERNAL MEDICINE

## 2018-08-03 PROCEDURE — 1036F TOBACCO NON-USER: CPT | Performed by: INTERNAL MEDICINE

## 2018-08-03 PROCEDURE — G8427 DOCREV CUR MEDS BY ELIG CLIN: HCPCS | Performed by: INTERNAL MEDICINE

## 2018-08-03 PROCEDURE — G8599 NO ASA/ANTIPLAT THER USE RNG: HCPCS | Performed by: INTERNAL MEDICINE

## 2018-08-03 PROCEDURE — G8417 CALC BMI ABV UP PARAM F/U: HCPCS | Performed by: INTERNAL MEDICINE

## 2018-08-03 NOTE — PROGRESS NOTES
Dispense Refill    diltiazem (CARDIZEM CD) 360 MG extended release capsule Take 1 capsule by mouth daily 90 capsule 3    rosuvastatin (CRESTOR) 10 MG tablet TAKE 1 TABLET DAILY 90 tablet 1    lisinopril (PRINIVIL;ZESTRIL) 40 MG tablet TAKE 1 TABLET DAILY 90 tablet 1    metoprolol tartrate (LOPRESSOR) 50 MG tablet Take 1 tablet by mouth 2 times daily 180 tablet 3    triamterene-hydrochlorothiazide (MAXZIDE) 75-50 MG per tablet TAKE 1 TABLET DAILY 90 tablet 1    aspirin 325 MG EC tablet Take 1 tablet by mouth daily 30 tablet 3    Omega-3 Fatty Acids (FISH OIL) 1000 MG CAPS Take 1,000 mg by mouth nightly       multivitamin (THERAGRAN) per tablet Take 1 tablet by mouth daily. No current facility-administered medications for this visit. Review of Systems:    Constitutional: negative  Eyes: negative  Ears, nose, mouth, throat, and face: negative  Respiratory: negative  Cardiovascular: negative  Gastrointestinal: negative  Genitourinary:negative  Integument/breast: negative  Hematologic/lymphatic: negative  Musculoskeletal:negative  Neurological: negative  Behavioral/Psych: negative  Endocrine: negative  Allergic/Immunologic: negative           Physical Exam:   Vitals:    08/03/18 1458   BP: 122/68   Site: Right Arm   Position: Sitting   Pulse: 76   SpO2: 95%   Weight: 169 lb (76.7 kg)   Height: 5' (1.524 m)     Wt Readings from Last 3 Encounters:   08/03/18 169 lb (76.7 kg)   02/02/18 168 lb (76.2 kg)   08/01/17 162 lb 6.4 oz (73.7 kg)       Constitutional: She is oriented to person, place, and time. She appears well-developed and well-nourished. In no acute distress. Head: Normocephalic and atraumatic. Pupils equal and round. Neck: Neck supple. No JVP or carotid bruit appreciated. No mass and no thyromegaly present. No lymphadenopathy present. Cardiovascular: Normal rate. Normal heart sounds. Exam reveals no gallop and no friction rub.  Mild Systolic ejection murmur heard with normal mitral valve  suggesting impaired left ventricular relaxation. There is hyperdynamic LV systolic function. Mitral annular calcification is present. Calcification of the mitral valve noted. Moderate aortic stenosis is present. The aortic valve is thickened with a peak gradient of 48 mm Hg, and mean gradient of 27 mmHg . ECHO:6/3/16  Summary   Normal left ventricular size. Basal septal hypertrophy. Normal left   ventricular systolic function with an estimated ejection fraction of 65% .   Hyperdynamic left ventricle, no regional wall motion abnormalities.   Mitral annular calcification is present.   The mitral valve leaflets are slightly thickened with normal leaflet  mobility.   Trivial mitral regurgitation is present.   The aortic valve appears calcified and restricted.   The aortic valve area is calculated at 1.1 cm2 with a maximum pressure   gradient of 47 mmHg and a mean pressure gradient of 31 mmHg. This is c/w   moderate aortic stenosis.   No evidence of aortic valve regurgitation.   Tricuspid valve is structurally normal.   There is mild tricuspid regurgitation with RVSP estimated at 27 mmHg.   The pulmonic valve is normal in structure and function.   Trivial pulmonic regurgitation present. Carotid Duplex:6/3/16  Right Impression   The right internal carotid artery appears to have a 16-49% diameter reducing   stenosis based on velocity criteria.       The right vertebral artery demonstrates normal antegrade flow. Left Impression   The left internal carotid artery reveals a <50% diameter reducing stenosis.       The left vertebral artery demonstrates normal antegrade flow. Echo:12/15/16   Summary   Normal left ventricle size and vigorous systolic function with an estimated   ejection fraction of 65-70%. No regional wall motion abnormalities are seen.  Lyndall Sabal increased left ventricular wall thickness.  Diastolic filling   parameters suggests grade I diastolic dysfunction.   Normal right ventricular size and function.   Moderate aortic stenosis (peak velocity of 3.8 m/s and mean gradient of 38   mmHg).  Trivial mitral, tricuspid, and pulmonic regurgitation. Cardionet 30 day event monitor     [facility] with [facility] study 2/6/17  ANGIOGRAPHIC FINDINGS:  1. Left main coronary comes from the left coronary cusp, giving rise to left  anterior descending artery, left circumflex artery. They all are normal.   JUAN PABLO 3 flow. No significant stenosis seen either in the left main coronary  artery, left anterior descending artery, left circumflex artery. The patient  is left dominant. 2.  The right coronary artery was normal.  JUAN PABLO 3 flow. It is a small  nondominant right coronary. 3.  Ejection fraction of 60%.  SUMMARY:  Severe aortic valve stenosis with normal coronary arteries. Echo 2/23/18  Summary   Left ventricular cavity size is normal. There is mild to moderate concentric   left ventricular hypertrophy. Overall left ventricular systolic function   appears normal. Ejection fraction is visually estimated to be 60-65%. No   regional wall motion abnormalities are noted. Diastolic filling parameters   suggest grade I diastolic dysfunction.   Mitral annular calcification is present.   Mild calcification of the anterior leaflet of the mitral valve.   Trivial mitral regurgitation is present.   The left atrium is mildly dilated.   A bioprosthetic artificial aortic valve appears well seated with a maximum   velocity of 290m/s and a mean gradient of 21mmHg.   Mild tricuspid regurgitation.     Assessment/Plan:      -Aortic valve stenosis: s/p AVR bovine valve, tissue 3/9/17 per Dr. Radha Cho  Normal coronary arteries 2/6/17   mg daily (for tissue valve)   No abnormal bruising or bleeding   On Lopressor, Lisinopril, Verapamil and Crestor  Last echo Feb 2018    -Carotid bruit/stenosis:   No s/s TIA/CVA  Carotid Duplex 1/20/17  <50% bilateral stenosis, tortuous distal ICA, right, Curve distal ICA, left

## 2018-09-04 RX ORDER — METOPROLOL TARTRATE 50 MG/1
50 TABLET, FILM COATED ORAL 2 TIMES DAILY
Qty: 180 TABLET | Refills: 3 | Status: SHIPPED | OUTPATIENT
Start: 2018-09-04 | End: 2019-03-08 | Stop reason: ALTCHOICE

## 2018-09-19 RX ORDER — ROSUVASTATIN CALCIUM 10 MG/1
TABLET, COATED ORAL
Qty: 90 TABLET | Refills: 2 | Status: SHIPPED | OUTPATIENT
Start: 2018-09-19 | End: 2019-07-15 | Stop reason: SDUPTHER

## 2018-09-19 RX ORDER — LISINOPRIL 40 MG/1
TABLET ORAL
Qty: 90 TABLET | Refills: 1 | OUTPATIENT
Start: 2018-09-19

## 2018-09-24 ENCOUNTER — OFFICE VISIT (OUTPATIENT)
Dept: FAMILY MEDICINE CLINIC | Age: 83
End: 2018-09-24
Payer: MEDICARE

## 2018-09-24 VITALS
BODY MASS INDEX: 32.2 KG/M2 | HEART RATE: 76 BPM | HEIGHT: 60 IN | TEMPERATURE: 98.1 F | SYSTOLIC BLOOD PRESSURE: 138 MMHG | WEIGHT: 164 LBS | DIASTOLIC BLOOD PRESSURE: 88 MMHG

## 2018-09-24 DIAGNOSIS — E78.00 PURE HYPERCHOLESTEROLEMIA: ICD-10-CM

## 2018-09-24 DIAGNOSIS — I10 BENIGN ESSENTIAL HTN: Primary | ICD-10-CM

## 2018-09-24 DIAGNOSIS — I10 BENIGN ESSENTIAL HTN: ICD-10-CM

## 2018-09-24 DIAGNOSIS — Z23 NEEDS FLU SHOT: ICD-10-CM

## 2018-09-24 LAB
A/G RATIO: 1.8 (ref 1.1–2.2)
ALBUMIN SERPL-MCNC: 4.8 G/DL (ref 3.4–5)
ALP BLD-CCNC: 55 U/L (ref 40–129)
ALT SERPL-CCNC: 54 U/L (ref 10–40)
ANION GAP SERPL CALCULATED.3IONS-SCNC: 13 MMOL/L (ref 3–16)
AST SERPL-CCNC: 48 U/L (ref 15–37)
BILIRUB SERPL-MCNC: 0.4 MG/DL (ref 0–1)
BUN BLDV-MCNC: 18 MG/DL (ref 7–20)
CALCIUM SERPL-MCNC: 10 MG/DL (ref 8.3–10.6)
CHLORIDE BLD-SCNC: 105 MMOL/L (ref 99–110)
CHOLESTEROL, TOTAL: 134 MG/DL (ref 0–199)
CO2: 28 MMOL/L (ref 21–32)
CREAT SERPL-MCNC: 0.9 MG/DL (ref 0.6–1.2)
GFR AFRICAN AMERICAN: >60
GFR NON-AFRICAN AMERICAN: 60
GLOBULIN: 2.7 G/DL
GLUCOSE BLD-MCNC: 116 MG/DL (ref 70–99)
HDLC SERPL-MCNC: 41 MG/DL (ref 40–60)
LDL CHOLESTEROL CALCULATED: 74 MG/DL
POTASSIUM SERPL-SCNC: 4.1 MMOL/L (ref 3.5–5.1)
SODIUM BLD-SCNC: 146 MMOL/L (ref 136–145)
TOTAL PROTEIN: 7.5 G/DL (ref 6.4–8.2)
TRIGL SERPL-MCNC: 96 MG/DL (ref 0–150)
VLDLC SERPL CALC-MCNC: 19 MG/DL

## 2018-09-24 PROCEDURE — G8510 SCR DEP NEG, NO PLAN REQD: HCPCS | Performed by: FAMILY MEDICINE

## 2018-09-24 PROCEDURE — 1101F PT FALLS ASSESS-DOCD LE1/YR: CPT | Performed by: FAMILY MEDICINE

## 2018-09-24 PROCEDURE — G8599 NO ASA/ANTIPLAT THER USE RNG: HCPCS | Performed by: FAMILY MEDICINE

## 2018-09-24 PROCEDURE — G0008 ADMIN INFLUENZA VIRUS VAC: HCPCS | Performed by: FAMILY MEDICINE

## 2018-09-24 PROCEDURE — 90662 IIV NO PRSV INCREASED AG IM: CPT | Performed by: FAMILY MEDICINE

## 2018-09-24 PROCEDURE — G8417 CALC BMI ABV UP PARAM F/U: HCPCS | Performed by: FAMILY MEDICINE

## 2018-09-24 PROCEDURE — 4040F PNEUMOC VAC/ADMIN/RCVD: CPT | Performed by: FAMILY MEDICINE

## 2018-09-24 PROCEDURE — 1123F ACP DISCUSS/DSCN MKR DOCD: CPT | Performed by: FAMILY MEDICINE

## 2018-09-24 PROCEDURE — 99213 OFFICE O/P EST LOW 20 MIN: CPT | Performed by: FAMILY MEDICINE

## 2018-09-24 PROCEDURE — G8399 PT W/DXA RESULTS DOCUMENT: HCPCS | Performed by: FAMILY MEDICINE

## 2018-09-24 PROCEDURE — 1090F PRES/ABSN URINE INCON ASSESS: CPT | Performed by: FAMILY MEDICINE

## 2018-09-24 PROCEDURE — G8427 DOCREV CUR MEDS BY ELIG CLIN: HCPCS | Performed by: FAMILY MEDICINE

## 2018-09-24 PROCEDURE — 1036F TOBACCO NON-USER: CPT | Performed by: FAMILY MEDICINE

## 2018-09-24 ASSESSMENT — PATIENT HEALTH QUESTIONNAIRE - PHQ9
SUM OF ALL RESPONSES TO PHQ QUESTIONS 1-9: 0
SUM OF ALL RESPONSES TO PHQ9 QUESTIONS 1 & 2: 0
SUM OF ALL RESPONSES TO PHQ QUESTIONS 1-9: 0
1. LITTLE INTEREST OR PLEASURE IN DOING THINGS: 0
2. FEELING DOWN, DEPRESSED OR HOPELESS: 0

## 2018-09-24 ASSESSMENT — ENCOUNTER SYMPTOMS
VOMITING: 0
ABDOMINAL DISTENTION: 0
NAUSEA: 0
SHORTNESS OF BREATH: 0
CONSTIPATION: 0
DIARRHEA: 0
CHEST TIGHTNESS: 0
ABDOMINAL PAIN: 0
BLOOD IN STOOL: 0

## 2018-09-24 NOTE — PROGRESS NOTES
Subjective:      Patient ID: Clotilde Doll is a 80 y.o. female. Patient presents with:  Check-Up: lipids, hypertension - pt is fasting    sheis well and no c/o  She is on the same medds    YOB: 1935    Date of Visit:  9/24/2018     -- Adhesive Tape -- Itching and Dermatitis   -- Oxycontin (Oxycodone Hcl) -- Other (See Comments)    --  \"seeing things\"    Current Outpatient Prescriptions:  rosuvastatin (CRESTOR) 10 MG tablet, TAKE 1 TABLET DAILY, Disp: 90 tablet, Rfl: 2  metoprolol tartrate (LOPRESSOR) 50 MG tablet, TAKE 1 TABLET BY MOUTH 2 TIMES DAILY, Disp: 180 tablet, Rfl: 3  diltiazem (CARDIZEM CD) 360 MG extended release capsule, Take 1 capsule by mouth daily, Disp: 90 capsule, Rfl: 3  lisinopril (PRINIVIL;ZESTRIL) 40 MG tablet, TAKE 1 TABLET DAILY, Disp: 90 tablet, Rfl: 1  triamterene-hydrochlorothiazide (MAXZIDE) 75-50 MG per tablet, TAKE 1 TABLET DAILY, Disp: 90 tablet, Rfl: 1  aspirin 325 MG EC tablet, Take 1 tablet by mouth daily, Disp: 30 tablet, Rfl: 3  Omega-3 Fatty Acids (FISH OIL) 1000 MG CAPS, Take 1,000 mg by mouth nightly , Disp: , Rfl:   multivitamin (THERAGRAN) per tablet, Take 1 tablet by mouth daily. , Disp: , Rfl:     No current facility-administered medications for this visit.       ---------------------------               09/24/18                      0855         ---------------------------   BP:          138/88         Site:    Left Upper Arm     Position:     Sitting        Cuff Size:   Large Adult      Pulse:         76           Temp:   98.1 °F (36.7 °C)   TempSrc:      Oral          Weight: 164 lb (74.4 kg)    Height:   5' (1.524 m)     ---------------------------  Body mass index is 32.03 kg/m².      Wt Readings from Last 3 Encounters:  09/24/18 : 164 lb (74.4 kg)  08/03/18 : 169 lb (76.7 kg)  02/02/18 : 168 lb (76.2 kg)    BP Readings from Last 3 Encounters:  09/24/18 : 138/88  08/03/18 : 122/68  02/26/18 : 124/68          Review

## 2018-09-24 NOTE — PATIENT INSTRUCTIONS
be removed. If you wear loose-fitting shoes because of calluses or corns, you can lose your balance and fall. · Talk to your doctor if you have numbness in your feet. Preventing falls at home  · Remove raised doorway thresholds, throw rugs, and clutter. Repair loose carpet or raised areas in the floor. · Move furniture and electrical cords to keep them out of walking paths. · Use nonskid floor wax, and wipe up spills right away, especially on ceramic tile floors. · If you use a walker or cane, put rubber tips on it. If you use crutches, clean the bottoms of them regularly with an abrasive pad, such as steel wool. · Keep your house well lit, especially [place], and outside walkways. Use night-lights in areas such as hallways and bathrooms. Add extra light switches or use remote switches (such as switches that go on or off when you clap your hands) to make it easier to turn lights on if you have to get up during the night. · Install sturdy handrails on stairways. · Move items in your cabinets so that the things you use a lot are on the lower shelves (about waist level). · Keep a cordless phone and a flashlight with new batteries by your bed. If possible, put a phone in each of the main rooms of your house, or carry a cell phone in case you fall and cannot reach a phone. Or, you can wear a device around your neck or wrist. You push a button that sends a signal for help. · Wear low-heeled shoes that fit well and give your feet good support. Use footwear with nonskid soles. Check the heels and soles of your shoes for wear. Repair or replace worn heels or soles. · Do not wear socks without shoes on wood floors. · Walk on the grass when the sidewalks are slippery. If you live in an area that gets snow and ice in the winter, sprinkle salt on slippery steps and sidewalks.   Preventing falls in the bath  · Install grab bars and nonskid mats inside and outside your shower or tub and near the toilet and sinks.  · Use shower chairs and bath benches. · Use a hand-held shower head that will allow you to sit while showering. · Get into a tub or shower by putting the weaker leg in first. Get out of a tub or shower with your strong side first.  · Repair loose toilet seats and consider installing a raised toilet seat to make getting on and off the toilet easier. · Keep your bathroom door unlocked while you are in the shower. Where can you learn more? Go to https://chpepiceweb.[employer] Networks. org and sign in to your ClassBadges account. Enter [id number] [id number] [phone] in the [facility] box to learn more about \"Preventing Falls: Care Instructions. \"     If you do not have an account, please click on the \"Sign Up Now\" link. Current as of: May 12, 2017  Content Version: 11.7  © 0841-9926 [email or web address]. Care instructions adapted under license by [facility] ([facility]). If you have questions about a medical condition or this instruction, always ask your healthcare professional. [place] [address] any warranty or liability for your use of this information. Patient Education        Preventing Outdoor Falls: Care Instructions  Your Care Instructions    Worries about falls don't need to keep you indoors. Outdoor activities like walking have big benefits for your health. You will need to watch your step and learn a few safety measures. If you are worried about having a fall outdoors, ask your doctor about exercises, classes, or physical therapy that may help. You can learn ways to gain strength, flexibility, and balance. Ask if it might help to use a cane or walker. You can make your time outdoors safer with a few simple measures. Follow-up care is a key part of your treatment and safety. Be sure to make and go to all appointments, and call your doctor if you are having problems. It's also a good idea to know your test results and keep a list of the medicines you take.   How can you prevent falls

## 2018-09-26 DIAGNOSIS — R74.8 ELEVATED LIVER ENZYMES: Primary | ICD-10-CM

## 2018-10-03 ENCOUNTER — TELEPHONE (OUTPATIENT)
Dept: FAMILY MEDICINE CLINIC | Age: 83
End: 2018-10-03

## 2018-10-03 RX ORDER — LISINOPRIL 40 MG/1
40 TABLET ORAL DAILY
Qty: 90 TABLET | Refills: 1 | Status: SHIPPED | OUTPATIENT
Start: 2018-10-03 | End: 2018-10-03 | Stop reason: SDUPTHER

## 2018-10-03 RX ORDER — TRIAMTERENE AND HYDROCHLOROTHIAZIDE 75; 50 MG/1; MG/1
1 TABLET ORAL DAILY
Qty: 90 TABLET | Refills: 1 | Status: SHIPPED | OUTPATIENT
Start: 2018-10-03 | End: 2018-10-03 | Stop reason: SDUPTHER

## 2018-10-03 RX ORDER — TRIAMTERENE AND HYDROCHLOROTHIAZIDE 75; 50 MG/1; MG/1
1 TABLET ORAL DAILY
Qty: 30 TABLET | Refills: 0 | Status: SHIPPED | OUTPATIENT
Start: 2018-10-03 | End: 2020-03-06

## 2018-10-03 RX ORDER — LISINOPRIL 40 MG/1
40 TABLET ORAL DAILY
Qty: 30 TABLET | Refills: 0 | Status: SHIPPED | OUTPATIENT
Start: 2018-10-03 | End: 2018-11-05 | Stop reason: SDUPTHER

## 2018-10-12 DIAGNOSIS — R74.8 ELEVATED LIVER ENZYMES: ICD-10-CM

## 2018-10-12 LAB
ALBUMIN SERPL-MCNC: 4.6 G/DL (ref 3.4–5)
ALP BLD-CCNC: 59 U/L (ref 40–129)
ALT SERPL-CCNC: 43 U/L (ref 10–40)
AST SERPL-CCNC: 23 U/L (ref 15–37)
BILIRUB SERPL-MCNC: 0.5 MG/DL (ref 0–1)
BILIRUBIN DIRECT: <0.2 MG/DL (ref 0–0.3)
BILIRUBIN, INDIRECT: ABNORMAL MG/DL (ref 0–1)
TOTAL PROTEIN: 7.2 G/DL (ref 6.4–8.2)

## 2018-11-06 RX ORDER — LISINOPRIL 40 MG/1
TABLET ORAL
Qty: 30 TABLET | Refills: 5 | Status: SHIPPED | OUTPATIENT
Start: 2018-11-06 | End: 2019-03-04 | Stop reason: SDUPTHER

## 2019-01-22 ENCOUNTER — OFFICE VISIT (OUTPATIENT)
Dept: FAMILY MEDICINE CLINIC | Age: 84
End: 2019-01-22
Payer: MEDICARE

## 2019-01-22 VITALS
HEIGHT: 60 IN | HEART RATE: 76 BPM | TEMPERATURE: 98.3 F | WEIGHT: 166.8 LBS | DIASTOLIC BLOOD PRESSURE: 90 MMHG | BODY MASS INDEX: 32.75 KG/M2 | SYSTOLIC BLOOD PRESSURE: 170 MMHG

## 2019-01-22 DIAGNOSIS — H26.9 CATARACT OF BOTH EYES, UNSPECIFIED CATARACT TYPE: ICD-10-CM

## 2019-01-22 DIAGNOSIS — Z95.2 S/P AVR: ICD-10-CM

## 2019-01-22 DIAGNOSIS — R73.01 IMPAIRED FASTING GLUCOSE: ICD-10-CM

## 2019-01-22 DIAGNOSIS — Z01.818 PREOP EXAMINATION: Primary | ICD-10-CM

## 2019-01-22 DIAGNOSIS — I35.0 NONRHEUMATIC AORTIC VALVE STENOSIS: ICD-10-CM

## 2019-01-22 DIAGNOSIS — I10 BENIGN ESSENTIAL HTN: ICD-10-CM

## 2019-01-22 PROCEDURE — 4040F PNEUMOC VAC/ADMIN/RCVD: CPT | Performed by: FAMILY MEDICINE

## 2019-01-22 PROCEDURE — 99214 OFFICE O/P EST MOD 30 MIN: CPT | Performed by: FAMILY MEDICINE

## 2019-01-22 PROCEDURE — G8399 PT W/DXA RESULTS DOCUMENT: HCPCS | Performed by: FAMILY MEDICINE

## 2019-01-22 PROCEDURE — G8482 FLU IMMUNIZE ORDER/ADMIN: HCPCS | Performed by: FAMILY MEDICINE

## 2019-01-22 PROCEDURE — 1090F PRES/ABSN URINE INCON ASSESS: CPT | Performed by: FAMILY MEDICINE

## 2019-01-22 PROCEDURE — 1101F PT FALLS ASSESS-DOCD LE1/YR: CPT | Performed by: FAMILY MEDICINE

## 2019-01-22 PROCEDURE — 1036F TOBACCO NON-USER: CPT | Performed by: FAMILY MEDICINE

## 2019-01-22 PROCEDURE — G8427 DOCREV CUR MEDS BY ELIG CLIN: HCPCS | Performed by: FAMILY MEDICINE

## 2019-01-22 PROCEDURE — G8417 CALC BMI ABV UP PARAM F/U: HCPCS | Performed by: FAMILY MEDICINE

## 2019-01-22 PROCEDURE — 1123F ACP DISCUSS/DSCN MKR DOCD: CPT | Performed by: FAMILY MEDICINE

## 2019-01-22 ASSESSMENT — ENCOUNTER SYMPTOMS
DIARRHEA: 0
ABDOMINAL DISTENTION: 0
EYE PAIN: 0
SORE THROAT: 0
SHORTNESS OF BREATH: 0
BLOOD IN STOOL: 0
ABDOMINAL PAIN: 0
CHEST TIGHTNESS: 0
CONSTIPATION: 0
COUGH: 0
TROUBLE SWALLOWING: 0
VOICE CHANGE: 0
VOMITING: 0
NAUSEA: 0

## 2019-01-25 ENCOUNTER — OFFICE VISIT (OUTPATIENT)
Dept: FAMILY MEDICINE CLINIC | Age: 84
End: 2019-01-25
Payer: MEDICARE

## 2019-01-25 ENCOUNTER — TELEPHONE (OUTPATIENT)
Dept: FAMILY MEDICINE CLINIC | Age: 84
End: 2019-01-25

## 2019-01-25 VITALS
DIASTOLIC BLOOD PRESSURE: 84 MMHG | SYSTOLIC BLOOD PRESSURE: 164 MMHG | HEART RATE: 72 BPM | HEIGHT: 60 IN | TEMPERATURE: 97.7 F | WEIGHT: 164 LBS | BODY MASS INDEX: 32.2 KG/M2

## 2019-01-25 DIAGNOSIS — R73.01 IMPAIRED FASTING GLUCOSE: ICD-10-CM

## 2019-01-25 DIAGNOSIS — Z01.818 PREOP EXAMINATION: ICD-10-CM

## 2019-01-25 DIAGNOSIS — I10 BENIGN ESSENTIAL HTN: ICD-10-CM

## 2019-01-25 DIAGNOSIS — I10 BENIGN ESSENTIAL HTN: Primary | ICD-10-CM

## 2019-01-25 LAB
A/G RATIO: 1.4 (ref 1.1–2.2)
ALBUMIN SERPL-MCNC: 4.3 G/DL (ref 3.4–5)
ALP BLD-CCNC: 49 U/L (ref 40–129)
ALT SERPL-CCNC: 55 U/L (ref 10–40)
ANION GAP SERPL CALCULATED.3IONS-SCNC: 14 MMOL/L (ref 3–16)
AST SERPL-CCNC: 52 U/L (ref 15–37)
BILIRUB SERPL-MCNC: 0.5 MG/DL (ref 0–1)
BUN BLDV-MCNC: 15 MG/DL (ref 7–20)
CALCIUM SERPL-MCNC: 9.9 MG/DL (ref 8.3–10.6)
CHLORIDE BLD-SCNC: 100 MMOL/L (ref 99–110)
CHOLESTEROL, TOTAL: 122 MG/DL (ref 0–199)
CO2: 27 MMOL/L (ref 21–32)
CREAT SERPL-MCNC: 0.9 MG/DL (ref 0.6–1.2)
GFR AFRICAN AMERICAN: >60
GFR NON-AFRICAN AMERICAN: 60
GLOBULIN: 3 G/DL
GLUCOSE BLD-MCNC: 111 MG/DL (ref 70–99)
HDLC SERPL-MCNC: 37 MG/DL (ref 40–60)
LDL CHOLESTEROL CALCULATED: 65 MG/DL
POTASSIUM SERPL-SCNC: 4.5 MMOL/L (ref 3.5–5.1)
SODIUM BLD-SCNC: 141 MMOL/L (ref 136–145)
TOTAL PROTEIN: 7.3 G/DL (ref 6.4–8.2)
TRIGL SERPL-MCNC: 99 MG/DL (ref 0–150)
VLDLC SERPL CALC-MCNC: 20 MG/DL

## 2019-01-25 PROCEDURE — 1090F PRES/ABSN URINE INCON ASSESS: CPT | Performed by: FAMILY MEDICINE

## 2019-01-25 PROCEDURE — 1036F TOBACCO NON-USER: CPT | Performed by: FAMILY MEDICINE

## 2019-01-25 PROCEDURE — G8417 CALC BMI ABV UP PARAM F/U: HCPCS | Performed by: FAMILY MEDICINE

## 2019-01-25 PROCEDURE — 1123F ACP DISCUSS/DSCN MKR DOCD: CPT | Performed by: FAMILY MEDICINE

## 2019-01-25 PROCEDURE — 99212 OFFICE O/P EST SF 10 MIN: CPT | Performed by: FAMILY MEDICINE

## 2019-01-25 PROCEDURE — G8399 PT W/DXA RESULTS DOCUMENT: HCPCS | Performed by: FAMILY MEDICINE

## 2019-01-25 PROCEDURE — 4040F PNEUMOC VAC/ADMIN/RCVD: CPT | Performed by: FAMILY MEDICINE

## 2019-01-25 PROCEDURE — G8427 DOCREV CUR MEDS BY ELIG CLIN: HCPCS | Performed by: FAMILY MEDICINE

## 2019-01-25 PROCEDURE — G8482 FLU IMMUNIZE ORDER/ADMIN: HCPCS | Performed by: FAMILY MEDICINE

## 2019-01-25 PROCEDURE — 1101F PT FALLS ASSESS-DOCD LE1/YR: CPT | Performed by: FAMILY MEDICINE

## 2019-01-26 LAB
ESTIMATED AVERAGE GLUCOSE: 114 MG/DL
HBA1C MFR BLD: 5.6 %

## 2019-03-04 RX ORDER — LISINOPRIL 40 MG/1
TABLET ORAL
Qty: 30 TABLET | Refills: 3 | Status: SHIPPED | OUTPATIENT
Start: 2019-03-04 | End: 2019-06-04 | Stop reason: SDUPTHER

## 2019-03-06 RX ORDER — DILTIAZEM HYDROCHLORIDE 360 MG/1
CAPSULE, EXTENDED RELEASE ORAL
Qty: 90 CAPSULE | Refills: 3 | Status: SHIPPED | OUTPATIENT
Start: 2019-03-06 | End: 2020-01-13 | Stop reason: SDUPTHER

## 2019-03-08 ENCOUNTER — OFFICE VISIT (OUTPATIENT)
Dept: FAMILY MEDICINE CLINIC | Age: 84
End: 2019-03-08
Payer: MEDICARE

## 2019-03-08 VITALS
HEIGHT: 60 IN | WEIGHT: 165 LBS | TEMPERATURE: 97.7 F | BODY MASS INDEX: 32.39 KG/M2 | SYSTOLIC BLOOD PRESSURE: 140 MMHG | HEART RATE: 80 BPM | DIASTOLIC BLOOD PRESSURE: 74 MMHG

## 2019-03-08 DIAGNOSIS — R73.01 IMPAIRED FASTING GLUCOSE: ICD-10-CM

## 2019-03-08 DIAGNOSIS — I10 BENIGN ESSENTIAL HTN: Primary | ICD-10-CM

## 2019-03-08 DIAGNOSIS — E78.5 HYPERLIPIDEMIA, UNSPECIFIED HYPERLIPIDEMIA TYPE: ICD-10-CM

## 2019-03-08 PROCEDURE — 1101F PT FALLS ASSESS-DOCD LE1/YR: CPT | Performed by: FAMILY MEDICINE

## 2019-03-08 PROCEDURE — G8417 CALC BMI ABV UP PARAM F/U: HCPCS | Performed by: FAMILY MEDICINE

## 2019-03-08 PROCEDURE — 1090F PRES/ABSN URINE INCON ASSESS: CPT | Performed by: FAMILY MEDICINE

## 2019-03-08 PROCEDURE — G8427 DOCREV CUR MEDS BY ELIG CLIN: HCPCS | Performed by: FAMILY MEDICINE

## 2019-03-08 PROCEDURE — G8482 FLU IMMUNIZE ORDER/ADMIN: HCPCS | Performed by: FAMILY MEDICINE

## 2019-03-08 PROCEDURE — 1123F ACP DISCUSS/DSCN MKR DOCD: CPT | Performed by: FAMILY MEDICINE

## 2019-03-08 PROCEDURE — 99213 OFFICE O/P EST LOW 20 MIN: CPT | Performed by: FAMILY MEDICINE

## 2019-03-08 PROCEDURE — 4040F PNEUMOC VAC/ADMIN/RCVD: CPT | Performed by: FAMILY MEDICINE

## 2019-03-08 PROCEDURE — 1036F TOBACCO NON-USER: CPT | Performed by: FAMILY MEDICINE

## 2019-03-08 PROCEDURE — G8399 PT W/DXA RESULTS DOCUMENT: HCPCS | Performed by: FAMILY MEDICINE

## 2019-03-08 RX ORDER — METOPROLOL SUCCINATE 50 MG/1
1 TABLET, EXTENDED RELEASE ORAL
COMMUNITY
End: 2019-09-23 | Stop reason: SDUPTHER

## 2019-03-08 ASSESSMENT — ENCOUNTER SYMPTOMS
ABDOMINAL PAIN: 0
DIARRHEA: 0
CONSTIPATION: 0
VOMITING: 0
SHORTNESS OF BREATH: 0
BLOOD IN STOOL: 0

## 2019-03-08 ASSESSMENT — PATIENT HEALTH QUESTIONNAIRE - PHQ9
SUM OF ALL RESPONSES TO PHQ QUESTIONS 1-9: 0
2. FEELING DOWN, DEPRESSED OR HOPELESS: 0
1. LITTLE INTEREST OR PLEASURE IN DOING THINGS: 0
SUM OF ALL RESPONSES TO PHQ QUESTIONS 1-9: 0
SUM OF ALL RESPONSES TO PHQ9 QUESTIONS 1 & 2: 0

## 2019-06-04 RX ORDER — LISINOPRIL 40 MG/1
TABLET ORAL
Qty: 30 TABLET | Refills: 0 | Status: SHIPPED | OUTPATIENT
Start: 2019-06-04 | End: 2019-07-05 | Stop reason: SDUPTHER

## 2019-07-03 ENCOUNTER — TELEPHONE (OUTPATIENT)
Dept: FAMILY MEDICINE CLINIC | Age: 84
End: 2019-07-03

## 2019-07-05 RX ORDER — LISINOPRIL 40 MG/1
40 TABLET ORAL DAILY
Qty: 30 TABLET | Refills: 0 | Status: SHIPPED | OUTPATIENT
Start: 2019-07-05 | End: 2019-08-05 | Stop reason: SDUPTHER

## 2019-07-05 RX ORDER — LISINOPRIL 40 MG/1
40 TABLET ORAL DAILY
Qty: 90 TABLET | Refills: 1 | Status: SHIPPED | OUTPATIENT
Start: 2019-07-05 | End: 2019-08-06

## 2019-07-15 RX ORDER — ROSUVASTATIN CALCIUM 10 MG/1
TABLET, COATED ORAL
Qty: 90 TABLET | Refills: 2 | Status: SHIPPED | OUTPATIENT
Start: 2019-07-15 | End: 2019-09-26 | Stop reason: SDUPTHER

## 2019-08-05 RX ORDER — LISINOPRIL 40 MG/1
TABLET ORAL
Qty: 30 TABLET | Refills: 1 | OUTPATIENT
Start: 2019-08-05

## 2019-08-06 RX ORDER — LISINOPRIL 40 MG/1
TABLET ORAL
Qty: 30 TABLET | Refills: 3 | Status: SHIPPED | OUTPATIENT
Start: 2019-08-06 | End: 2020-01-13 | Stop reason: SDUPTHER

## 2019-08-20 RX ORDER — METOPROLOL TARTRATE 50 MG/1
50 TABLET, FILM COATED ORAL 2 TIMES DAILY
Qty: 180 TABLET | Refills: 3 | OUTPATIENT
Start: 2019-08-20

## 2019-09-24 RX ORDER — METOPROLOL SUCCINATE 50 MG/1
50 TABLET, EXTENDED RELEASE ORAL DAILY
Qty: 30 TABLET | Refills: 1 | Status: SHIPPED | OUTPATIENT
Start: 2019-09-24 | End: 2019-09-25 | Stop reason: SDUPTHER

## 2019-09-25 RX ORDER — METOPROLOL SUCCINATE 50 MG/1
50 TABLET, EXTENDED RELEASE ORAL DAILY
Qty: 30 TABLET | Refills: 1 | Status: SHIPPED | OUTPATIENT
Start: 2019-09-25 | End: 2020-07-09 | Stop reason: SDUPTHER

## 2019-09-26 RX ORDER — ROSUVASTATIN CALCIUM 10 MG/1
TABLET, COATED ORAL
Qty: 90 TABLET | Refills: 1 | Status: SHIPPED | OUTPATIENT
Start: 2019-09-26 | End: 2020-07-06

## 2019-10-07 RX ORDER — ROSUVASTATIN CALCIUM 10 MG/1
TABLET, COATED ORAL
Qty: 90 TABLET | Refills: 0 | Status: SHIPPED | OUTPATIENT
Start: 2019-10-07 | End: 2020-06-18

## 2020-01-12 RX ORDER — LISINOPRIL 40 MG/1
TABLET ORAL
Qty: 30 TABLET | Refills: 3 | OUTPATIENT
Start: 2020-01-12

## 2020-01-13 RX ORDER — LISINOPRIL 40 MG/1
TABLET ORAL
Qty: 30 TABLET | Refills: 0 | Status: SHIPPED | OUTPATIENT
Start: 2020-01-13 | End: 2020-03-06

## 2020-01-13 RX ORDER — DILTIAZEM HYDROCHLORIDE 360 MG/1
CAPSULE, EXTENDED RELEASE ORAL
Qty: 90 CAPSULE | Refills: 0 | Status: SHIPPED | OUTPATIENT
Start: 2020-01-13 | End: 2020-03-16

## 2020-03-16 RX ORDER — DILTIAZEM HYDROCHLORIDE 360 MG/1
CAPSULE, EXTENDED RELEASE ORAL
Qty: 30 CAPSULE | Refills: 0 | Status: SHIPPED | OUTPATIENT
Start: 2020-03-16 | End: 2020-07-21 | Stop reason: SDUPTHER

## 2020-05-28 ENCOUNTER — OFFICE VISIT (OUTPATIENT)
Dept: CARDIOLOGY CLINIC | Age: 85
End: 2020-05-28
Payer: MEDICARE

## 2020-05-28 VITALS
HEIGHT: 60 IN | BODY MASS INDEX: 31.22 KG/M2 | DIASTOLIC BLOOD PRESSURE: 80 MMHG | SYSTOLIC BLOOD PRESSURE: 124 MMHG | TEMPERATURE: 97.9 F | WEIGHT: 159 LBS | OXYGEN SATURATION: 97 % | HEART RATE: 98 BPM

## 2020-05-28 PROCEDURE — 1090F PRES/ABSN URINE INCON ASSESS: CPT | Performed by: INTERNAL MEDICINE

## 2020-05-28 PROCEDURE — 1036F TOBACCO NON-USER: CPT | Performed by: INTERNAL MEDICINE

## 2020-05-28 PROCEDURE — G8427 DOCREV CUR MEDS BY ELIG CLIN: HCPCS | Performed by: INTERNAL MEDICINE

## 2020-05-28 PROCEDURE — 4040F PNEUMOC VAC/ADMIN/RCVD: CPT | Performed by: INTERNAL MEDICINE

## 2020-05-28 PROCEDURE — 1123F ACP DISCUSS/DSCN MKR DOCD: CPT | Performed by: INTERNAL MEDICINE

## 2020-05-28 PROCEDURE — 99213 OFFICE O/P EST LOW 20 MIN: CPT | Performed by: INTERNAL MEDICINE

## 2020-05-28 PROCEDURE — G8417 CALC BMI ABV UP PARAM F/U: HCPCS | Performed by: INTERNAL MEDICINE

## 2020-05-28 PROCEDURE — G8399 PT W/DXA RESULTS DOCUMENT: HCPCS | Performed by: INTERNAL MEDICINE

## 2020-05-28 PROCEDURE — 93000 ELECTROCARDIOGRAM COMPLETE: CPT | Performed by: INTERNAL MEDICINE

## 2020-05-28 NOTE — PROGRESS NOTES
stenosis:   --s/p AVR bovine valve, tissue 3/9/17 per Dr. Marge Smart  --Normal coronary arteries 2/6/17  -asymptomatic  -staying active-Continues dancing at home during COVID-19 pandemic   - mg daily (for tissue valve)  -On Lopressor, Lisinopril, Verapamil and Crestor  -Last echo 2/2018, reviewed  -will repeat echo     Carotid bruit/stenosis:   -no s/s of TIA/CVA   -Carotid Duplex 1/20/17 <50% bilateral stenosis, tortuous distal ICA, right, Curve distal ICA, left   -continue medical therapy     Hypertension, essential:   -controlled on medical therapy   -On lisinopril, diltiazem, maxzide and lopressor   -Walking program     Hyperlipidemia, unspecified:   -Reviewed lipid profile from 1/25/19 wnl except low HDL  -remains on Crestor 10 mg daily    Routine labs with CMP, lipid and CBC    Return in follow up in 7-8 months     Thank you very much for allowing me to participate in the care of your patient. Please do not hesitate to contact me if you have any questions. Sincerely,    Gareth Hurt MD, MPH      [place] [address]  Ph: [phone]  Fax: [phone]    This note was scribed in the presence of Dr. Michelle Elliott MD by Westley Juárez RN. Physician Attestation:  The scribes documentation has been prepared under my direction and personally reviewed by me in its entirety. I confirm that the note above accurately reflects all work, treatment, procedures, and medical decision making performed by me.

## 2020-05-28 NOTE — PATIENT INSTRUCTIONS
safe  · Know your surroundings. Walk in a well-lighted, safe place. If it is dark, walk with a partner. Wear light-colored clothing. If you can, buy a vest or jacket that reflects light. · Carry a cell phone for emergencies. · Drink plenty of water. Take a water bottle with you when you walk. This is very important if it is hot out. · Be careful not to slip on wet or icy ground. You can buy \"grippers\" for your shoes to help keep you from slipping. · Pay attention to your walking surface. Use sidewalks and paths. · If you have breathing problems like asthma or COPD, ask your doctor when it is safe for you to walk outdoors. Cold, dry air, smog, pollen, or other things in the air could cause breathing problems. Where can you learn more? Go to https://Searchmetricspemiyaewteena.Cotap. org and sign in to your CoverItLive account. Enter R159 in the "[employer]" box to learn more about \"Walking for Exercise: Care Instructions. \"     If you do not have an account, please click on the \"Sign Up Now\" link. Current as of: January 16, 2020               Content Version: 12.5  © 9754-7143 [employer]ference. Care instructions adapted under license by [facility] ([facility]). If you have questions about a medical condition or this instruction, always ask your healthcare professional. [place] [address] any warranty or liability for your use of this information. Patient Education        Heart-Healthy Diet: Care Instructions  Your Care Instructions     A heart-healthy diet has lots of vegetables, fruits, nuts, beans, and whole grains, and is low in salt. It limits foods that are high in saturated fat, such as meats, cheeses, and fried foods. It may be hard to change your diet, but even small changes can lower your risk of heart attack and heart disease. Follow-up care is a key part of your treatment and safety.  Be sure to make and go to all appointments, and call your doctor if you are having Care Instructions     Too much sodium causes your body to hold on to extra water. This can raise your blood pressure and force your heart and kidneys to work harder. In very serious cases, this could cause you to be put in the hospital. It might even be life-threatening. By limiting sodium, you will feel better and lower your risk of serious problems. The most common source of sodium is salt. People get most of the salt in their diet from canned, prepared, and packaged foods. Fast food and restaurant meals also are very high in sodium. Your doctor will probably limit your sodium to less than 2,000 milligrams (mg) a day. This limit counts all the sodium in prepared and packaged foods and any salt you add to your food. Follow-up care is a key part of your treatment and safety. Be sure to make and go to all appointments, and call your doctor if you are having problems. It's also a good idea to know your test results and keep a list of the medicines you take. How can you care for yourself at home? Read food labels  · Read labels on cans and food packages. The labels tell you how much sodium is in each serving. Make sure that you look at the serving size. If you eat more than the serving size, you have eaten more sodium. · Food labels also tell you the Percent Daily Value for sodium. Choose products with low Percent Daily Values for sodium. · Be aware that sodium can come in forms other than salt, including monosodium glutamate (MSG), sodium citrate, and sodium bicarbonate (baking soda). MSG is often added to Asian food. When you eat out, you can sometimes ask for food without MSG or added salt. Buy low-sodium foods  · Buy foods that are labeled \"unsalted\" (no salt added), \"sodium-free\" (less than 5 mg of sodium per serving), or \"low-sodium\" (less than 140 mg of sodium per serving). Foods labeled \"reduced-sodium\" and \"light sodium\" may still have too much sodium.  Be sure to read the label to see how much sodium unless labeled sodium-free or low-sodium. Where can you learn more? Go to https://chpepiceweb.[employer]. org and sign in to your [employer] account. Enter D148 in the [facility] box to learn more about \"Low Sodium Diet (2,000 Milligram): Care Instructions. \"     If you do not have an account, please click on the \"Sign Up Now\" link. Current as of: August 22, 2019               Content Version: 12.5  © 5107-8890 Healthwise, Incorporated. Care instructions adapted under license by [facility] ([facility]). If you have questions about a medical condition or this instruction, always ask your healthcare professional. [address] any warranty or liability for your use of this information.

## 2020-06-01 ENCOUNTER — HOSPITAL ENCOUNTER (OUTPATIENT)
Dept: NON INVASIVE DIAGNOSTICS | Age: 85
Discharge: HOME | End: 2020-06-01
Payer: MEDICARE

## 2020-06-01 DIAGNOSIS — Z95.2 STATUS POST AORTIC VALVE REPLACEMENT: ICD-10-CM

## 2020-06-01 DIAGNOSIS — I65.23 BILATERAL CAROTID ARTERY STENOSIS: ICD-10-CM

## 2020-06-01 DIAGNOSIS — E78.5 HYPERLIPIDEMIA, UNSPECIFIED HYPERLIPIDEMIA TYPE: ICD-10-CM

## 2020-06-01 DIAGNOSIS — I35.0 NONRHEUMATIC AORTIC VALVE STENOSIS: ICD-10-CM

## 2020-06-01 DIAGNOSIS — I10 ESSENTIAL HYPERTENSION: ICD-10-CM

## 2020-06-01 LAB
A/G RATIO: 1.9 (ref 1.1–2.2)
ALBUMIN SERPL-MCNC: 5 G/DL (ref 3.4–5)
ALP BLD-CCNC: 55 U/L (ref 40–129)
ALT SERPL-CCNC: 35 U/L (ref 10–40)
ANION GAP SERPL CALCULATED.3IONS-SCNC: 13 MMOL/L (ref 3–16)
AST SERPL-CCNC: 34 U/L (ref 15–37)
BILIRUB SERPL-MCNC: 0.3 MG/DL (ref 0–1)
BUN BLDV-MCNC: 18 MG/DL (ref 7–20)
CALCIUM SERPL-MCNC: 10.2 MG/DL (ref 8.3–10.6)
CHLORIDE BLD-SCNC: 97 MMOL/L (ref 99–110)
CHOLESTEROL, TOTAL: 150 MG/DL (ref 0–199)
CO2: 27 MMOL/L (ref 21–32)
CREAT SERPL-MCNC: 0.9 MG/DL (ref 0.6–1.2)
GFR AFRICAN AMERICAN: >60
GFR NON-AFRICAN AMERICAN: 59
GLOBULIN: 2.6 G/DL
GLUCOSE BLD-MCNC: 111 MG/DL (ref 70–99)
HCT VFR BLD CALC: 43.3 % (ref 36–48)
HDLC SERPL-MCNC: 47 MG/DL (ref 40–60)
HEMOGLOBIN: 14.4 G/DL (ref 12–16)
LDL CHOLESTEROL CALCULATED: 81 MG/DL
LV EF: 68 %
LVEF MODALITY: NORMAL
MCH RBC QN AUTO: 30 PG (ref 26–34)
MCHC RBC AUTO-ENTMCNC: 33.3 G/DL (ref 31–36)
MCV RBC AUTO: 89.9 FL (ref 80–100)
PDW BLD-RTO: 12.8 % (ref 12.4–15.4)
PLATELET # BLD: 249 K/UL (ref 135–450)
PMV BLD AUTO: 10.9 FL (ref 5–10.5)
POTASSIUM SERPL-SCNC: 4.4 MMOL/L (ref 3.5–5.1)
RBC # BLD: 4.82 M/UL (ref 4–5.2)
SODIUM BLD-SCNC: 137 MMOL/L (ref 136–145)
TOTAL PROTEIN: 7.6 G/DL (ref 6.4–8.2)
TRIGL SERPL-MCNC: 108 MG/DL (ref 0–150)
VLDLC SERPL CALC-MCNC: 22 MG/DL
WBC # BLD: 10.7 K/UL (ref 4–11)

## 2020-06-01 PROCEDURE — 93306 TTE W/DOPPLER COMPLETE: CPT

## 2020-06-18 RX ORDER — ROSUVASTATIN CALCIUM 10 MG/1
TABLET, COATED ORAL
Qty: 90 TABLET | Refills: 0 | Status: SHIPPED | OUTPATIENT
Start: 2020-06-18 | End: 2020-08-18

## 2020-07-06 ENCOUNTER — OFFICE VISIT (OUTPATIENT)
Dept: FAMILY MEDICINE CLINIC | Age: 85
End: 2020-07-06
Payer: MEDICARE

## 2020-07-06 VITALS
HEART RATE: 88 BPM | TEMPERATURE: 97.9 F | HEIGHT: 60 IN | BODY MASS INDEX: 30.82 KG/M2 | WEIGHT: 157 LBS | DIASTOLIC BLOOD PRESSURE: 88 MMHG | SYSTOLIC BLOOD PRESSURE: 180 MMHG

## 2020-07-06 PROCEDURE — 3288F FALL RISK ASSESSMENT DOCD: CPT | Performed by: FAMILY MEDICINE

## 2020-07-06 PROCEDURE — G8417 CALC BMI ABV UP PARAM F/U: HCPCS | Performed by: FAMILY MEDICINE

## 2020-07-06 PROCEDURE — 4040F PNEUMOC VAC/ADMIN/RCVD: CPT | Performed by: FAMILY MEDICINE

## 2020-07-06 PROCEDURE — 1090F PRES/ABSN URINE INCON ASSESS: CPT | Performed by: FAMILY MEDICINE

## 2020-07-06 PROCEDURE — 99213 OFFICE O/P EST LOW 20 MIN: CPT | Performed by: FAMILY MEDICINE

## 2020-07-06 PROCEDURE — G8399 PT W/DXA RESULTS DOCUMENT: HCPCS | Performed by: FAMILY MEDICINE

## 2020-07-06 PROCEDURE — 1123F ACP DISCUSS/DSCN MKR DOCD: CPT | Performed by: FAMILY MEDICINE

## 2020-07-06 PROCEDURE — 1036F TOBACCO NON-USER: CPT | Performed by: FAMILY MEDICINE

## 2020-07-06 PROCEDURE — G8427 DOCREV CUR MEDS BY ELIG CLIN: HCPCS | Performed by: FAMILY MEDICINE

## 2020-07-06 PROCEDURE — G8510 SCR DEP NEG, NO PLAN REQD: HCPCS | Performed by: FAMILY MEDICINE

## 2020-07-06 ASSESSMENT — ENCOUNTER SYMPTOMS
SHORTNESS OF BREATH: 0
VOMITING: 0
CONSTIPATION: 0
BLOOD IN STOOL: 0
NAUSEA: 0
ABDOMINAL DISTENTION: 0
COUGH: 0
DIARRHEA: 0
ABDOMINAL PAIN: 0
CHEST TIGHTNESS: 0

## 2020-07-06 ASSESSMENT — PATIENT HEALTH QUESTIONNAIRE - PHQ9
SUM OF ALL RESPONSES TO PHQ9 QUESTIONS 1 & 2: 0
SUM OF ALL RESPONSES TO PHQ QUESTIONS 1-9: 0
2. FEELING DOWN, DEPRESSED OR HOPELESS: 0
1. LITTLE INTEREST OR PLEASURE IN DOING THINGS: 0
SUM OF ALL RESPONSES TO PHQ QUESTIONS 1-9: 0

## 2020-07-06 NOTE — PATIENT INSTRUCTIONS
Do take the medicines daily  See the medical assistant in 3 week to check the blood pressure  See me in 5 months

## 2020-07-06 NOTE — PROGRESS NOTES
Subjective:      Patient ID: Riley Hutchinson is a 80 y.o. female. Patient presents with: Follow-up: hypertension - pt is fasting    Here for check up   She is well and really no c/o  meds the same. But she tells me she is not taking the metoprolol     YOB: 1935    Date of Visit:  7/6/2020     -- Adhesive Tape -- Itching and Dermatitis   -- Oxycontin (Oxycodone Hcl) -- Other (See Comments)    --  \"seeing things\"    Current Outpatient Medications:  rosuvastatin (CRESTOR) 10 MG tablet, TAKE 1 TABLET BY MOUTH DAILY, Disp: 90 tablet, Rfl: 0  dilTIAZem (TIAZAC) 360 MG extended release capsule, TAKE 1 CAPSULE BY MOUTH EVERY DAY, Disp: 30 capsule, Rfl: 0  lisinopril (PRINIVIL;ZESTRIL) 40 MG tablet, TAKE 1 TABLET DAILY, Disp: 90 tablet, Rfl: 1  triamterene-hydrochlorothiazide (MAXZIDE) 75-50 MG per tablet, TAKE 1 TABLET DAILY, Disp: 90 tablet, Rfl: 1  metoprolol succinate (TOPROL XL) 50 MG extended release tablet, Take 1 tablet by mouth daily,  Not taking   aspirin 325 MG EC tablet, Take 1 tablet by mouth daily, Disp: 30 tablet, Rfl: 3  Omega-3 Fatty Acids (FISH OIL) 1000 MG CAPS, Take 1,000 mg by mouth nightly , Disp: , Rfl:   multivitamin (THERAGRAN) per tablet, Take 1 tablet by mouth daily.   , Disp: , Rfl:     No current facility-administered medications for this visit.       ---------------------------------------------------------               07/06/20 07/06/20 07/06/20                    0831            0832          0848       ---------------------------------------------------------   BP:        (!) 148/84      (!) 148/84    (!) 180/88     Site:    Left Upper Arm  Left Upper ArmLeft Upper Arm   Position:     Sitting        Sitting                     Cuff Size:  Medium Adult    Medium Adult  Medium Adult    Pulse:                                       88         Temp:   97.9 °F (36.6 °C)                               TempSrc:      Oral

## 2020-07-08 ENCOUNTER — TELEPHONE (OUTPATIENT)
Dept: FAMILY MEDICINE CLINIC | Age: 85
End: 2020-07-08

## 2020-07-08 NOTE — TELEPHONE ENCOUNTER
Patient needs a refill on metoprolol succinate 50 mg to [facility].     Please advise, [phone]

## 2020-07-09 RX ORDER — METOPROLOL SUCCINATE 50 MG/1
50 TABLET, EXTENDED RELEASE ORAL DAILY
Qty: 30 TABLET | Refills: 5 | Status: SHIPPED | OUTPATIENT
Start: 2020-07-09 | End: 2021-02-26

## 2020-07-21 RX ORDER — DILTIAZEM HYDROCHLORIDE 360 MG/1
CAPSULE, EXTENDED RELEASE ORAL
Qty: 90 CAPSULE | Refills: 3 | Status: SHIPPED | OUTPATIENT
Start: 2020-07-21 | End: 2020-07-23 | Stop reason: SDUPTHER

## 2020-07-23 RX ORDER — DILTIAZEM HYDROCHLORIDE 360 MG/1
CAPSULE, EXTENDED RELEASE ORAL
Qty: 90 CAPSULE | Refills: 3 | Status: SHIPPED | OUTPATIENT
Start: 2020-07-23 | End: 2021-03-08

## 2020-07-27 ENCOUNTER — NURSE ONLY (OUTPATIENT)
Dept: FAMILY MEDICINE CLINIC | Age: 85
End: 2020-07-27

## 2020-07-27 VITALS — SYSTOLIC BLOOD PRESSURE: 148 MMHG | DIASTOLIC BLOOD PRESSURE: 80 MMHG

## 2020-09-08 RX ORDER — LISINOPRIL 40 MG/1
TABLET ORAL
Qty: 90 TABLET | Refills: 1 | Status: SHIPPED | OUTPATIENT
Start: 2020-09-08 | End: 2021-03-08

## 2020-09-08 RX ORDER — TRIAMTERENE AND HYDROCHLOROTHIAZIDE 75; 50 MG/1; MG/1
TABLET ORAL
Qty: 90 TABLET | Refills: 1 | Status: SHIPPED | OUTPATIENT
Start: 2020-09-08 | End: 2021-03-08

## 2021-02-18 RX ORDER — ROSUVASTATIN CALCIUM 10 MG/1
TABLET, COATED ORAL
Qty: 90 TABLET | Refills: 1 | Status: SHIPPED | OUTPATIENT
Start: 2021-02-18 | End: 2021-08-18

## 2021-02-26 ENCOUNTER — OFFICE VISIT (OUTPATIENT)
Dept: CARDIOLOGY CLINIC | Age: 86
End: 2021-02-26
Payer: MEDICARE

## 2021-02-26 VITALS
HEART RATE: 92 BPM | HEIGHT: 60 IN | OXYGEN SATURATION: 97 % | DIASTOLIC BLOOD PRESSURE: 76 MMHG | BODY MASS INDEX: 31.18 KG/M2 | TEMPERATURE: 96.9 F | WEIGHT: 158.8 LBS | SYSTOLIC BLOOD PRESSURE: 128 MMHG

## 2021-02-26 DIAGNOSIS — E78.5 HYPERLIPIDEMIA, UNSPECIFIED HYPERLIPIDEMIA TYPE: ICD-10-CM

## 2021-02-26 DIAGNOSIS — I65.23 BILATERAL CAROTID ARTERY STENOSIS: ICD-10-CM

## 2021-02-26 DIAGNOSIS — Z95.2 STATUS POST AORTIC VALVE REPLACEMENT: Primary | ICD-10-CM

## 2021-02-26 DIAGNOSIS — I10 ESSENTIAL HYPERTENSION: ICD-10-CM

## 2021-02-26 PROCEDURE — 1090F PRES/ABSN URINE INCON ASSESS: CPT | Performed by: INTERNAL MEDICINE

## 2021-02-26 PROCEDURE — G8484 FLU IMMUNIZE NO ADMIN: HCPCS | Performed by: INTERNAL MEDICINE

## 2021-02-26 PROCEDURE — G8417 CALC BMI ABV UP PARAM F/U: HCPCS | Performed by: INTERNAL MEDICINE

## 2021-02-26 PROCEDURE — 99213 OFFICE O/P EST LOW 20 MIN: CPT | Performed by: INTERNAL MEDICINE

## 2021-02-26 PROCEDURE — 1036F TOBACCO NON-USER: CPT | Performed by: INTERNAL MEDICINE

## 2021-02-26 PROCEDURE — G8427 DOCREV CUR MEDS BY ELIG CLIN: HCPCS | Performed by: INTERNAL MEDICINE

## 2021-02-26 PROCEDURE — G8399 PT W/DXA RESULTS DOCUMENT: HCPCS | Performed by: INTERNAL MEDICINE

## 2021-02-26 PROCEDURE — 4040F PNEUMOC VAC/ADMIN/RCVD: CPT | Performed by: INTERNAL MEDICINE

## 2021-02-26 PROCEDURE — 1123F ACP DISCUSS/DSCN MKR DOCD: CPT | Performed by: INTERNAL MEDICINE

## 2021-02-26 NOTE — PATIENT INSTRUCTIONS
Patient Education        Heart-Healthy Diet: Care Instructions  Your Care Instructions     A heart-healthy diet has lots of vegetables, fruits, nuts, beans, and whole grains, and is low in salt. It limits foods that are high in saturated fat, such as meats, cheeses, and fried foods. It may be hard to change your diet, but even small changes can lower your risk of heart attack and heart disease. Follow-up care is a key part of your treatment and safety. Be sure to make and go to all appointments, and call your doctor if you are having problems. It's also a good idea to know your test results and keep a list of the medicines you take. How can you care for yourself at home? Watch your portions  · Learn what a serving is. A \"serving\" and a \"portion\" are not always the same thing. Make sure that you are not eating larger portions than are recommended. For example, a serving of pasta is ½ cup. A serving size of meat is 2 to 3 ounces. A 3-ounce serving is about the size of a deck of cards. Measure serving sizes until you are good at [place]" them. Keep in mind that restaurants often serve portions that are 2 or 3 times the size of one serving. · To keep your energy level up and keep you from feeling hungry, eat often but in smaller portions. · Eat only the number of calories you need to stay at a healthy weight. If you need to lose weight, eat fewer calories than your body burns (through exercise and other physical activity). Eat more fruits and vegetables  · Eat a variety of fruit and vegetables every day. Dark green, deep orange, red, or yellow fruits and vegetables are especially good for you. Examples include spinach, carrots, peaches, and berries. · Keep carrots, celery, and other veggies handy for snacks. Buy fruit that is in season and store it where you can see it so that you will be tempted to eat it. · Cook dishes that have a lot of veggies in them, such as stir-fries and soups. Limit saturated and trans fat  · Read food labels, and try to avoid saturated and trans fats. They increase your risk of heart disease. · Use olive or canola oil when you cook. · Bake, broil, grill, or steam foods instead of frying them. · Choose lean meats instead of high-fat meats such as hot dogs and sausages. Cut off all visible fat when you prepare meat. · Eat fish, skinless poultry, and meat alternatives such as soy products instead of high-fat meats. Soy products, such as tofu, may be especially good for your heart. · Choose low-fat or fat-free milk and dairy products. Eat foods high in fiber  · Eat a variety of grain products every day. Include whole-grain foods that have lots of fiber and nutrients. Examples of whole-grain foods include oats, whole wheat bread, and brown rice. · Buy whole-grain breads and cereals, instead of white bread or pastries. Limit salt and sodium  · Limit how much salt and sodium you eat to help lower your blood pressure. · Taste food before you salt it. Add only a little salt when you think you need it. With time, your taste buds will adjust to less salt. · Eat fewer snack items, fast foods, and other high-salt, processed foods. Check food labels for the amount of sodium in packaged foods. · Choose low-sodium versions of canned goods (such as soups, vegetables, and beans). Limit sugar  · Limit drinks and foods with added sugar. These include candy, desserts, and soda pop. Limit alcohol  · Limit alcohol to no more than 2 drinks a day for men and 1 drink a day for women. Too much alcohol can cause health problems. When should you call for help? Watch closely for changes in your health, and be sure to contact your doctor if:    · You would like help planning heart-healthy meals. Where can you learn more? Go to [email or web address] and sign in to your [employer] account. Enter V137 in the [employer] box to learn more about \"Heart-Healthy Diet: Care Instructions. \"     If you do not have an account, please click on the \"Sign Up Now\" link. Current as of: August 22, 2019               Content Version: 12.6  © 3315-1465 [employer]. Care instructions adapted under license by [facility] [facility]). If you have questions about a medical condition or this instruction, always ask your healthcare professional. [place] [address] any warranty or liability for your use of this information.

## 2021-02-26 NOTE — PROGRESS NOTES
[facility]      Cardiology Progress Note    Shelia Hayward  1935 February 26, 2021    CC: \"I am doing okay. \"     HPI:  The patient is 80 y.o. female with a past medical history significant for hypertension and hyperlipidemia, carotid stenosis, bilateral and heart murmur. She presents today in   follow up s/p s/p AVR, tissue 3/9/17 per Dr. Luz Marina Dubon. Today, she is here for routine follow up. She says that is getting lazy due to the pandemic. She cannot do her dancing anymore. She is trying to continue to exercise at home as much as she can. She has had her first covid vaccine. Patient denies exertional chest pain/pressure, dyspnea at rest, LUEVANO, PND, orthopnea, palpitations, lightheadedness, weight changes, changes in LE edema, and syncope. Past Medical History:   Diagnosis Date    Aortic stenosis     Arthritis     mild hip area    Cerebrovascular disease     Diaphragm, eventration     right    Fatty liver     on CT 2012    Hiatal hernia     on CT 2012    Hyperlipidemia     Hypertension     Syncope 2016    \"twice a year since puberty\"     Past Surgical History:   Procedure Laterality Date    AORTIC VALVE REPLACEMENT  03/09/2017    19mm [place] pericardial Magna Ease    PRE-MALIGNANT / BENIGN SKIN LESION EXCISION Right 2014    R scalp. cystadenoma. --done under local     Family History   Problem Relation Age of Onset    Diabetes Mother    Neeraj.Copas Cancer Mother         lung    Other Father         leukemia    Kidney Disease Brother     Parkinsonism Brother      Social History     Tobacco Use    Smoking status: Never Smoker    Smokeless tobacco: Never Used   Substance Use Topics    Alcohol use: Yes     Comment: socially once/week    Drug use: No     Comment: never       Allergies   Allergen Reactions    Adhesive Tape Itching and Dermatitis    Oxycontin [Oxycodone Hcl] Other (See Comments)     \"seeing things\"     Current Outpatient Medications   Medication Sig Dispense Refill  rosuvastatin (CRESTOR) 10 MG tablet TAKE 1 TABLET BY MOUTH EVERY DAY 90 tablet 1    lisinopril (PRINIVIL;ZESTRIL) 40 MG tablet TAKE 1 TABLET DAILY 90 tablet 1    triamterene-hydroCHLOROthiazide (MAXZIDE) 75-50 MG per tablet TAKE 1 TABLET DAILY 90 tablet 1    dilTIAZem (TIAZAC) 360 MG extended release capsule TAKE 1 CAPSULE BY MOUTH EVERY DAY 90 capsule 3    aspirin 325 MG EC tablet Take 1 tablet by mouth daily 30 tablet 3    Omega-3 Fatty Acids (FISH OIL) 1000 MG CAPS Take 1,000 mg by mouth nightly       multivitamin (THERAGRAN) per tablet Take 1 tablet by mouth daily. No current facility-administered medications for this visit. Review of Systems: all reviewed and refer to HPI   Constitutional: negative  Eyes: negative  Ears, nose, mouth, throat, and face: negative  Respiratory: negative  Cardiovascular: negative  Gastrointestinal: negative  Genitourinary:negative  Integument/breast: negative  Hematologic/lymphatic: negative  Musculoskeletal:negative  Neurological: negative  Behavioral/Psych: negative  Endocrine: negative  Allergic/Immunologic: negative      Physical Exam:   Vitals:    02/26/21 1009   BP: 128/76   Pulse: 92   Temp: 96.9 °F (36.1 °C)   SpO2: 97%   Weight: 158 lb 12.8 oz (72 kg)   Height: 5' (1.524 m)     Wt Readings from Last 3 Encounters:   02/26/21 158 lb 12.8 oz (72 kg)   07/06/20 157 lb (71.2 kg)   05/28/20 159 lb (72.1 kg)       Constitutional: She is oriented to person, place, and time. She appears well-developed and well-nourished. In no acute distress. Head: Normocephalic and atraumatic. Pupils equal and round. Neck: Neck supple. No JVP or carotid bruit appreciated. No mass and no thyromegaly present. No lymphadenopathy present. Cardiovascular: Normal rate. Normal heart sounds. Exam reveals no gallop and no friction rub. Mild Systolic ejection murmur heard with normal carotid upstroke. Midline sternal scar.      Pulmonary/Chest: Effort normal and breath inflow velocities across the mitral valve  suggesting impaired left ventricular relaxation. There is hyperdynamic LV systolic function. Mitral annular calcification is present. Calcification of the mitral valve noted. Moderate aortic stenosis is present. The aortic valve is thickened with a peak gradient of 48 mm Hg, and mean gradient of 27 mmHg . ECHO:6/3/16  Summary  Normal left ventricular size. Basal septal hypertrophy. Normal left  ventricular systolic function with an estimated ejection fraction of 65% . Hyperdynamic left ventricle, no regional wall motion abnormalities. Mitral annular calcification is present. The mitral valve leaflets are slightly thickened with normal leaflet  mobility. Trivial mitral regurgitation is present. The aortic valve appears calcified and restricted. The aortic valve area is calculated at 1.1 cm2 with a maximum pressure  gradient of 47 mmHg and a mean pressure gradient of 31 mmHg. This is c/w  moderate aortic stenosis. No evidence of aortic valve regurgitation. Tricuspid valve is structurally normal.  There is mild tricuspid regurgitation with RVSP estimated at 27 mmHg. The pulmonic valve is normal in structure and function. Trivial pulmonic regurgitation present. Carotid Duplex:6/3/16  Right Impression   The right internal carotid artery appears to have a 16-49% diameter reducing   stenosis based on velocity criteria.       The right vertebral artery demonstrates normal antegrade flow. Left Impression   The left internal carotid artery reveals a <50% diameter reducing stenosis.       The left vertebral artery demonstrates normal antegrade flow. Echo:12/15/16   Summary   Normal left ventricle size and vigorous systolic function with an estimated   ejection fraction of 65-70%. No regional wall motion abnormalities are seen.  Floydene Serum increased left ventricular wall thickness.  Diastolic filling   parameters suggests grade I diastolic dysfunction.   Normal right ventricular size and function.   Moderate aortic stenosis (peak velocity of 3.8 m/s and mean gradient of 38   mmHg).  Trivial mitral, tricuspid, and pulmonic regurgitation. Cardionet 30 day event monitor     [facility] with AV study 2/6/17  ANGIOGRAPHIC FINDINGS:  1. Left main coronary comes from the left coronary cusp, giving rise to left  anterior descending artery, left circumflex artery. They all are normal.   JUAN PABLO 3 flow. No significant stenosis seen either in the left main coronary  artery, left anterior descending artery, left circumflex artery. The patient  is left dominant. 2.  The right coronary artery was normal.  JUAN PABLO 3 flow. It is a small  nondominant right coronary. 3.  Ejection fraction of 60%.  SUMMARY:  Severe aortic valve stenosis with normal coronary arteries. Echo 2/23/18  Summary  Left ventricular cavity size is normal. There is mild to moderate concentric left ventricular hypertrophy. Overall left ventricular systolic function appears normal. Ejection fraction is visually estimated to be 60-65%. No regional wall motion abnormalities are noted. Diastolic filling parameters suggest grade I diastolic dysfunction. Mitral annular calcification is present. Mild calcification of the anterior leaflet of the mitral valve. Trivial mitral regurgitation is present. The left atrium is mildly dilated. A bioprosthetic artificial aortic valve appears well seated with a maximum velocity of 290m/s and a mean gradient of 21mmHg. Mild tricuspid regurgitation. ECHO 5/5/5037  Systolic pulmonary artery pressure (SPAP) is normal and estimated at 30 mmHg  (RA pressure 3 mmHg). Left ventricular size is decreased. There is moderate concentric left ventricular hypertrophy. Global left ventricular function is increased (hyperdynamic) with ejection fraction estimated from 65 % to 70 %. No segmental wall motion abnormalities noted.   Moderate to heavy calcification of the posterior leaflet of the mitral  valve. Mild calcification of the anterior leaflet of the mitral valve. mitral leaflet movement appear mildly restricted but no obvious mitral  stenosis noted. Trivial mitral regurgitation is present. Assessment/Plan: Aortic valve stenosis:   --s/p AVR bovine valve, tissue 3/9/17 per Dr. Lyudmila Carpenter  --Normal coronary arteries 2/6/17  staying active-Continues dancing at home during COVID-19 pandemic   Asymptomatic.  mg daily (for tissue valve). Repeat echocardiogram.     Carotid bruit/stenosis:   Asymptomatic. Continue Asa and statin therapy. Hypertension, essential:   Controlled. Continue current medical management. Repeat CMP. Hyperlipidemia, unspecified:   Continue statin therapy. Repeat lipid profile. Follow up in 6-7 months. Thank you very much for allowing me to participate in the care of your patient. Please do not hesitate to contact me if you have any questions. Sincerely,    Heather Cole MD, MPH      [facility], [address] [place] [address] [place]   [place], [place] [address]  Ph: [phone]  Fax: [phone]    This note was scribed in the presence of Dr Adriana Packer, by Yolanda Mattson RN  Physician Attestation:  The scribes documentation has been prepared under my direction and personally reviewed by me in its entirety. I confirm that the note above accurately reflects all work, treatment, procedures, and medical decision making performed by me.

## 2021-03-06 RX ORDER — LISINOPRIL 40 MG/1
TABLET ORAL
Qty: 30 TABLET | Refills: 0 | Status: CANCELLED | OUTPATIENT
Start: 2021-03-06

## 2021-03-08 RX ORDER — DILTIAZEM HYDROCHLORIDE 360 MG/1
CAPSULE, EXTENDED RELEASE ORAL
Qty: 90 CAPSULE | Refills: 0 | Status: SHIPPED | OUTPATIENT
Start: 2021-03-08 | End: 2021-09-22

## 2021-03-08 RX ORDER — LISINOPRIL 40 MG/1
TABLET ORAL
Qty: 90 TABLET | Refills: 1 | Status: SHIPPED | OUTPATIENT
Start: 2021-03-08 | End: 2021-09-21

## 2021-03-08 RX ORDER — TRIAMTERENE AND HYDROCHLOROTHIAZIDE 75; 50 MG/1; MG/1
TABLET ORAL
Qty: 90 TABLET | Refills: 1 | Status: SHIPPED | OUTPATIENT
Start: 2021-03-08 | End: 2021-05-25

## 2021-03-08 NOTE — TELEPHONE ENCOUNTER
Last OV: 02/26/2021  Next OV: 10/12/2021  Labs: cmp, lipid, cbc 06/01/2020  Last EKG (if needed):05/28/2020

## 2021-03-23 ENCOUNTER — OFFICE VISIT (OUTPATIENT)
Dept: FAMILY MEDICINE CLINIC | Age: 86
End: 2021-03-23
Payer: MEDICARE

## 2021-03-23 VITALS
HEART RATE: 76 BPM | DIASTOLIC BLOOD PRESSURE: 80 MMHG | TEMPERATURE: 97.8 F | WEIGHT: 156 LBS | HEIGHT: 60 IN | SYSTOLIC BLOOD PRESSURE: 150 MMHG | BODY MASS INDEX: 30.63 KG/M2

## 2021-03-23 DIAGNOSIS — I10 ESSENTIAL HYPERTENSION: ICD-10-CM

## 2021-03-23 DIAGNOSIS — E78.5 HYPERLIPIDEMIA, UNSPECIFIED HYPERLIPIDEMIA TYPE: ICD-10-CM

## 2021-03-23 DIAGNOSIS — I10 BENIGN ESSENTIAL HTN: Primary | ICD-10-CM

## 2021-03-23 DIAGNOSIS — J30.1 SEASONAL ALLERGIC RHINITIS DUE TO POLLEN: ICD-10-CM

## 2021-03-23 LAB
A/G RATIO: 1.8 (ref 1.1–2.2)
ALBUMIN SERPL-MCNC: 4.8 G/DL (ref 3.4–5)
ALP BLD-CCNC: 44 U/L (ref 40–129)
ALT SERPL-CCNC: 29 U/L (ref 10–40)
ANION GAP SERPL CALCULATED.3IONS-SCNC: 15 MMOL/L (ref 3–16)
AST SERPL-CCNC: 18 U/L (ref 15–37)
BILIRUB SERPL-MCNC: 0.3 MG/DL (ref 0–1)
BUN BLDV-MCNC: 28 MG/DL (ref 7–20)
CALCIUM SERPL-MCNC: 10.5 MG/DL (ref 8.3–10.6)
CHLORIDE BLD-SCNC: 100 MMOL/L (ref 99–110)
CHOLESTEROL, FASTING: 122 MG/DL (ref 0–199)
CO2: 26 MMOL/L (ref 21–32)
CREAT SERPL-MCNC: 1.1 MG/DL (ref 0.6–1.2)
GFR AFRICAN AMERICAN: 57
GFR NON-AFRICAN AMERICAN: 47
GLOBULIN: 2.7 G/DL
GLUCOSE BLD-MCNC: 102 MG/DL (ref 70–99)
HDLC SERPL-MCNC: 46 MG/DL (ref 40–60)
LDL CHOLESTEROL CALCULATED: 58 MG/DL
POTASSIUM SERPL-SCNC: 4.2 MMOL/L (ref 3.5–5.1)
SODIUM BLD-SCNC: 141 MMOL/L (ref 136–145)
TOTAL PROTEIN: 7.5 G/DL (ref 6.4–8.2)
TRIGLYCERIDE, FASTING: 88 MG/DL (ref 0–150)
VLDLC SERPL CALC-MCNC: 18 MG/DL

## 2021-03-23 PROCEDURE — G8417 CALC BMI ABV UP PARAM F/U: HCPCS | Performed by: FAMILY MEDICINE

## 2021-03-23 PROCEDURE — 99213 OFFICE O/P EST LOW 20 MIN: CPT | Performed by: FAMILY MEDICINE

## 2021-03-23 PROCEDURE — G8399 PT W/DXA RESULTS DOCUMENT: HCPCS | Performed by: FAMILY MEDICINE

## 2021-03-23 PROCEDURE — 1036F TOBACCO NON-USER: CPT | Performed by: FAMILY MEDICINE

## 2021-03-23 PROCEDURE — 1123F ACP DISCUSS/DSCN MKR DOCD: CPT | Performed by: FAMILY MEDICINE

## 2021-03-23 PROCEDURE — 4040F PNEUMOC VAC/ADMIN/RCVD: CPT | Performed by: FAMILY MEDICINE

## 2021-03-23 PROCEDURE — G8427 DOCREV CUR MEDS BY ELIG CLIN: HCPCS | Performed by: FAMILY MEDICINE

## 2021-03-23 PROCEDURE — 1090F PRES/ABSN URINE INCON ASSESS: CPT | Performed by: FAMILY MEDICINE

## 2021-03-23 PROCEDURE — G8484 FLU IMMUNIZE NO ADMIN: HCPCS | Performed by: FAMILY MEDICINE

## 2021-03-23 ASSESSMENT — PATIENT HEALTH QUESTIONNAIRE - PHQ9
1. LITTLE INTEREST OR PLEASURE IN DOING THINGS: 0
SUM OF ALL RESPONSES TO PHQ QUESTIONS 1-9: 0
SUM OF ALL RESPONSES TO PHQ9 QUESTIONS 1 & 2: 0
2. FEELING DOWN, DEPRESSED OR HOPELESS: 0

## 2021-03-23 ASSESSMENT — ENCOUNTER SYMPTOMS
NAUSEA: 0
BLOOD IN STOOL: 0
CHEST TIGHTNESS: 0
ABDOMINAL DISTENTION: 0
CONSTIPATION: 0
VOMITING: 0
ABDOMINAL PAIN: 0
COUGH: 0
SHORTNESS OF BREATH: 0
DIARRHEA: 0

## 2021-03-23 NOTE — PROGRESS NOTES
Subjective:      Patient ID: Clifford Velasquez is a 80 y.o. female. Chief Complaint   Patient presents with    Check-Up     hypertension, lipids - pt is fasting    Congestion     runny nose, itchy eyes x 1 week        Patient presents with:  Check-Up: hypertension, lipids - pt is fasting  Congestion: runny nose, itchy eyes x 1 week    Was in [facility] when the above started  Runny nose itchy eyes and pnd   No fever no cough   For a week and started when she was in [facility]    All else is well   She has no c/o  She did get the covid     She needs blood work    Family is well  They did have covid    YOB: 1935    Date of Visit:  3/23/2021     -- Adhesive Tape -- Itching and Dermatitis   -- Oxycontin (Oxycodone Hcl) -- Other (See Comments)    --  \"seeing things\"    Current Outpatient Medications:  dilTIAZem (TIAZAC) 360 MG extended release capsule, TAKE 1 CAPSULE DAILY (NEED APPOINTMENT FOR MORE       REFILLS), Disp: 90 capsule, Rfl: 0  lisinopril (PRINIVIL;ZESTRIL) 40 MG tablet, TAKE 1 TABLET DAILY, Disp: 90 tablet, Rfl: 1  triamterene-hydroCHLOROthiazide (MAXZIDE) 75-50 MG per tablet, TAKE 1 TABLET DAILY, Disp: 90 tablet, Rfl: 1  rosuvastatin (CRESTOR) 10 MG tablet, TAKE 1 TABLET BY MOUTH EVERY DAY, Disp: 90 tablet, Rfl: 1  aspirin 325 MG EC tablet, Take 1 tablet by mouth daily, Disp: 30 tablet, Rfl: 3  Omega-3 Fatty Acids (FISH OIL) 1000 MG CAPS, Take 1,000 mg by mouth nightly , Disp: , Rfl:   multivitamin (THERAGRAN) per tablet, Take 1 tablet by mouth daily.   , Disp: , Rfl:     No current facility-administered medications for this visit.       ---------------------------------------------------------               03/23/21 03/23/21 03/23/21                    0901            0902          0916       ---------------------------------------------------------   BP:        (!) 158/82      (!) 158/82    (!) 150/80     Site:    Left Upper Arm  Left Upper ArmLeft Upper Arm Position:     Sitting        Sitting                     Cuff Size:   Large Adult    Large Adult   Large Adult     Pulse:                                       76         Temp:   97.8 °F (36.6 °C)                               TempSrc:    Temporal                                    Weight: 156 lb (70.8 kg)                                Height:   5' (1.524 m)                                 ---------------------------------------------------------  Body mass index is 30.47 kg/m². Wt Readings from Last 3 Encounters:  03/23/21 : 156 lb (70.8 kg)  02/26/21 : 158 lb 12.8 oz (72 kg)  07/06/20 : 157 lb (71.2 kg)    BP Readings from Last 3 Encounters:  03/23/21 : (!) 150/80  02/26/21 : 128/76  07/27/20 : (!) 148/80            Review of Systems   Constitutional: Negative for appetite change, chills, fever and unexpected weight change. Respiratory: Negative for cough, chest tightness and shortness of breath. Cardiovascular: Negative for chest pain, palpitations and leg swelling. Gastrointestinal: Negative for abdominal distention, abdominal pain, blood in stool, constipation, diarrhea, nausea and vomiting. Neurological: Negative for headaches. Objective:   Physical Exam  Constitutional:       General: She is not in acute distress. Appearance: Normal appearance. She is well-developed. She is not ill-appearing or diaphoretic. HENT:      Head: Normocephalic and atraumatic. Nose: Nose normal.      Mouth/Throat:      Lips: Pink. Mouth: Mucous membranes are moist.      Pharynx: Oropharynx is clear. Eyes:      General: No scleral icterus. Neck:      Musculoskeletal: Neck supple. Thyroid: No thyroid mass or thyromegaly. Cardiovascular:      Rate and Rhythm: Normal rate and regular rhythm. Heart sounds: Murmur present. Systolic murmur present with a grade of 2/6. No friction rub. No gallop.        Comments: Soft serge   Pulmonary:      Effort: Pulmonary effort is normal. No tachypnea, accessory muscle usage or respiratory distress. Breath sounds: Normal breath sounds. No decreased breath sounds, wheezing, rhonchi or rales. Lymphadenopathy:      Cervical: No cervical adenopathy. Upper Body:      Right upper body: No supraclavicular adenopathy. Left upper body: No supraclavicular adenopathy. Skin:     General: Skin is warm and dry. Coloration: Skin is not pale. Neurological:      General: No focal deficit present. Mental Status: She is alert. Assessment:        Diagnosis Orders   1. Benign essential HTN     2. Hyperlipidemia, unspecified hyperlipidemia type     3. Seasonal allergic rhinitis due to pollen       Reprinted labs for her to get  She will use otc   Overall well   bp could be better. She did have a great bp at the cardiology office of 128/76.  She tells me rushed today   Since last visit she has seen her cardiology on 2/26/21   Note reviewed and stable and he made no change  Discussed the shingle vaccine   Can get this at the pharmacy      Plan:      Do get some fasting blood work   Take zyrtec over the counter for the allergies for the next 2-4 weeks  If need to you can also add over the counter flonase nasal spray once a day for 2-4 weeks  Continue the diet and the current medicine  See in 6 months           Ole Young MD

## 2021-03-23 NOTE — PATIENT INSTRUCTIONS
Do get some fasting blood work   Take zyrtec over the counter for the allergies for the next 2-4 weeks  If need to you can also add over the counter flonase nasal spray once a day for 2-4 weeks  Continue the diet and the current medicine  See in 6 months

## 2021-03-30 ENCOUNTER — HOSPITAL ENCOUNTER (OUTPATIENT)
Dept: NON INVASIVE DIAGNOSTICS | Age: 86
Discharge: HOME | End: 2021-03-30
Payer: MEDICARE

## 2021-03-30 DIAGNOSIS — Z95.2 STATUS POST AORTIC VALVE REPLACEMENT: ICD-10-CM

## 2021-03-30 LAB
LV EF: 68 %
LVEF MODALITY: NORMAL

## 2021-03-30 PROCEDURE — 93306 TTE W/DOPPLER COMPLETE: CPT

## 2021-05-14 ENCOUNTER — TELEPHONE (OUTPATIENT)
Dept: CARDIOLOGY CLINIC | Age: 86
End: 2021-05-14

## 2021-05-14 NOTE — TELEPHONE ENCOUNTER
Dr. Jennifer Dupree, please make preop recommendations. Pt is s/p bovine AVR with normal coronaries. Thanks.

## 2021-05-14 NOTE — TELEPHONE ENCOUNTER
Spoke to patient, gave instructions as indicated by Dr Miller Postin, letter faxed to Dr Daquan Wooten regarding holding the ASA

## 2021-05-14 NOTE — TELEPHONE ENCOUNTER
CARDIAC CLEARANCE REQUEST    What type of procedure are you having:  BILATERAL UPPER LIDS BLEPHAROPLASTY    Are you taking any blood thinners:  Aspirin 325 mg/ needs to hold 2 weeks prior    When is your procedure scheduled for: 6/7/21    What physician is performing your procedure: Sims Najjar, MD    Phone [phone]    Fax number to send the letter: [phone]

## 2021-05-24 ENCOUNTER — OFFICE VISIT (OUTPATIENT)
Dept: FAMILY MEDICINE CLINIC | Age: 86
End: 2021-05-24
Payer: MEDICARE

## 2021-05-24 VITALS
DIASTOLIC BLOOD PRESSURE: 74 MMHG | HEART RATE: 80 BPM | WEIGHT: 159 LBS | BODY MASS INDEX: 31.22 KG/M2 | SYSTOLIC BLOOD PRESSURE: 138 MMHG | TEMPERATURE: 98.1 F | HEIGHT: 60 IN

## 2021-05-24 DIAGNOSIS — Z01.818 PREOP EXAMINATION: Primary | ICD-10-CM

## 2021-05-24 DIAGNOSIS — Z95.2 S/P AVR: ICD-10-CM

## 2021-05-24 DIAGNOSIS — R73.01 IMPAIRED FASTING GLUCOSE: ICD-10-CM

## 2021-05-24 DIAGNOSIS — I10 BENIGN ESSENTIAL HTN: ICD-10-CM

## 2021-05-24 DIAGNOSIS — Z01.818 PREOP EXAMINATION: ICD-10-CM

## 2021-05-24 DIAGNOSIS — H02.403 PTOSIS OF BOTH EYELIDS: ICD-10-CM

## 2021-05-24 PROCEDURE — G8417 CALC BMI ABV UP PARAM F/U: HCPCS | Performed by: FAMILY MEDICINE

## 2021-05-24 PROCEDURE — 4040F PNEUMOC VAC/ADMIN/RCVD: CPT | Performed by: FAMILY MEDICINE

## 2021-05-24 PROCEDURE — 1036F TOBACCO NON-USER: CPT | Performed by: FAMILY MEDICINE

## 2021-05-24 PROCEDURE — G8427 DOCREV CUR MEDS BY ELIG CLIN: HCPCS | Performed by: FAMILY MEDICINE

## 2021-05-24 PROCEDURE — 99214 OFFICE O/P EST MOD 30 MIN: CPT | Performed by: FAMILY MEDICINE

## 2021-05-24 PROCEDURE — 1090F PRES/ABSN URINE INCON ASSESS: CPT | Performed by: FAMILY MEDICINE

## 2021-05-24 PROCEDURE — 1123F ACP DISCUSS/DSCN MKR DOCD: CPT | Performed by: FAMILY MEDICINE

## 2021-05-24 ASSESSMENT — ENCOUNTER SYMPTOMS
CONSTIPATION: 0
VOMITING: 0
CHEST TIGHTNESS: 0
VOICE CHANGE: 0
TROUBLE SWALLOWING: 0
DIARRHEA: 0
ABDOMINAL PAIN: 0
SORE THROAT: 0
SHORTNESS OF BREATH: 0
COUGH: 0
BLOOD IN STOOL: 0
ABDOMINAL DISTENTION: 0
NAUSEA: 0
EYE PAIN: 0

## 2021-05-24 NOTE — PATIENT INSTRUCTIONS
Take the diltiazem with just enough water to get the pill down as soon as you wake up on the morning of the surgery

## 2021-05-24 NOTE — PROGRESS NOTES
Subjective:      Patient ID: Modesta Moe is a 80 y.o. female. Chief Complaint   Patient presents with    Pre-op Exam     BILATERAL UPPER LIDS BLEPHAROPLASTY on 6-7-2021 by Dr. Best Knowles at [facility].        Patient presents with:  Pre-op Exam: BILATERAL UPPER LIDS BLEPHAROPLASTY on 6-7-2021 by Dr. Best Knowles at [facility].    She is here for the above procedure  She did get clearance from her cardiologist   She is well and no c/o  She had her covid vaccines    Allergies:   -- Adhesive Tape -- Itching and Dermatitis   -- Oxycontin (Oxycodone Hcl) -- Other (See Comments)    --  \"seeing things\"     height is 5' (1.524 m) and weight is 159 lb (72.1 kg). Her temporal temperature is 98.1 °F (36.7 °C). Her blood pressure is 138/74 and her pulse is 80. No tobacco history. ETOH 2-3 drinks a week.     Immunization History  Administered            Date(s) Administered    COVID-19, Moderna, PF, 100mcg/0.5mL                          02/11/2021 03/11/2021      Influenza             11/17/2011      Influenza Virus Vaccine                          09/11/2009 09/16/2010      Influenza, High Dose (Fluzone 65 yrs and older)                          01/10/2013  10/28/2013  10/22/2014                            11/28/2017  09/24/2018      Influenza, Kathlen Ing, IM, PF (6 mo and older Fluzone, Flulaval, Fluarix, and 3 yrs and older Afluria)                          12/15/2016      Pneumococcal Conjugate 13-valent ([id number])                          08/27/2015      Pneumococcal Polysaccharide ([id number])                          11/17/2011      Current Outpatient Medications:     dilTIAZem (TIAZAC) 360 MG extended release capsule, TAKE 1 CAPSULE DAILY (NEED APPOINTMENT FOR MORE       REFILLS)    lisinopril (PRINIVIL;ZESTRIL) 40 MG tablet, TAKE 1 TABLET DAILY    triamterene-hydroCHLOROthiazide (MAXZIDE) 75-50 MG per tablet, TAKE 1 TABLET DAILY    rosuvastatin (CRESTOR) 10 MG tablet, TAKE 1 TABLET BY MOUTH EVERY DAY appearance. She is well-developed. She is not ill-appearing or diaphoretic. HENT:      Head: Normocephalic and atraumatic. Right Ear: Tympanic membrane and ear canal normal.      Left Ear: Tympanic membrane and ear canal normal.      Nose: Nose normal.      Mouth/Throat:      Lips: Pink. Mouth: Mucous membranes are moist. No oral lesions. Pharynx: Oropharynx is clear. Uvula midline. Eyes:      General: No scleral icterus. Neck:      Thyroid: No thyroid mass or thyromegaly. Cardiovascular:      Rate and Rhythm: Normal rate and regular rhythm. Heart sounds: Murmur heard. Systolic murmur is present with a grade of 2/6. No friction rub. No gallop. Comments:   Trace edema at the ankles   Pulmonary:      Effort: Pulmonary effort is normal. No tachypnea, accessory muscle usage or respiratory distress. Breath sounds: Normal breath sounds. No decreased breath sounds, wheezing, rhonchi or rales. Abdominal:      General: Bowel sounds are normal. There is no distension or abdominal bruit. Palpations: Abdomen is soft. There is no hepatomegaly, splenomegaly, mass or pulsatile mass. Tenderness: There is no abdominal tenderness. There is no guarding. Musculoskeletal:      Cervical back: Neck supple. Lymphadenopathy:      Head:      Right side of head: No submental, submandibular, preauricular or posterior auricular adenopathy. Left side of head: No submental, submandibular, preauricular or posterior auricular adenopathy. Cervical: No cervical adenopathy. Upper Body:      Right upper body: No supraclavicular adenopathy. Left upper body: No supraclavicular adenopathy. Skin:     General: Skin is warm and dry. Coloration: Skin is not pale. Nails: There is no clubbing. Neurological:      General: No focal deficit present. Mental Status: She is alert.    Psychiatric:         Attention and Perception: Attention normal.         Mood and Affect: Mood normal.         Speech: Speech normal.         Assessment:        Diagnosis Orders   1. Preop examination  Basic Metabolic Panel   2. Ptosis of both eyelids     3. Benign essential HTN  Basic Metabolic Panel   4. S/P AVR     5.  Impaired fasting glucose         Ok for the surgery   Reviewed the cardiac note for the preop   Recent cardiac echo on 3/30/21       Plan:      Take the diltiazem with just enough water to get the pill down as soon as you wake up on the morning of the surgery         Virgil Schirmer, MD

## 2021-05-25 DIAGNOSIS — E87.8 ELECTROLYTE ABNORMALITY: Primary | ICD-10-CM

## 2021-05-25 LAB
ANION GAP SERPL CALCULATED.3IONS-SCNC: 15 MMOL/L (ref 3–16)
BUN BLDV-MCNC: 27 MG/DL (ref 7–20)
CALCIUM SERPL-MCNC: 9.8 MG/DL (ref 8.3–10.6)
CHLORIDE BLD-SCNC: 93 MMOL/L (ref 99–110)
CO2: 25 MMOL/L (ref 21–32)
CREAT SERPL-MCNC: 1.4 MG/DL (ref 0.6–1.2)
GFR AFRICAN AMERICAN: 43
GFR NON-AFRICAN AMERICAN: 36
GLUCOSE BLD-MCNC: 104 MG/DL (ref 70–99)
POTASSIUM SERPL-SCNC: 4 MMOL/L (ref 3.5–5.1)
SODIUM BLD-SCNC: 133 MMOL/L (ref 136–145)

## 2021-06-01 DIAGNOSIS — E87.8 ELECTROLYTE ABNORMALITY: ICD-10-CM

## 2021-06-01 LAB
ANION GAP SERPL CALCULATED.3IONS-SCNC: 14 MMOL/L (ref 3–16)
BUN BLDV-MCNC: 15 MG/DL (ref 7–20)
CALCIUM SERPL-MCNC: 10.1 MG/DL (ref 8.3–10.6)
CHLORIDE BLD-SCNC: 95 MMOL/L (ref 99–110)
CO2: 26 MMOL/L (ref 21–32)
CREAT SERPL-MCNC: 1 MG/DL (ref 0.6–1.2)
GFR AFRICAN AMERICAN: >60
GFR NON-AFRICAN AMERICAN: 53
GLUCOSE BLD-MCNC: 96 MG/DL (ref 70–99)
POTASSIUM SERPL-SCNC: 4.2 MMOL/L (ref 3.5–5.1)
SODIUM BLD-SCNC: 135 MMOL/L (ref 136–145)

## 2021-06-01 RX ORDER — CETIRIZINE HYDROCHLORIDE 10 MG/1
10 TABLET ORAL DAILY PRN
COMMUNITY

## 2021-06-01 NOTE — PROGRESS NOTES
Preoperative Screening for Elective Surgery/Invasive Procedures While COVID-19 present in the community     Have you tested positive or have been told to self-isolate for COVID-19 like symptoms within the past 28 days? no   Do you currently have any of the following symptoms? no  o Fever >100.0 F or 99.9 F in immunocompromised patients? no  o New onset cough, shortness of breath or difficulty breathing? no  o New onset sore throat, myalgia (muscle aches and pains), headache, loss of taste/smell or diarrhea? no   Have you had a potential exposure to COVID-19 within the past 14 days by:  o Close contact with a confirmed case? no  o Close contact with a healthcare worker,  or essential infrastructure worker (grocery store, [employer], gas station, public utilities or transportation)? Yes md offices   o Do you reside in a congregate setting such as; skilled nursing facility, adult home, correctional facility, homeless shelter or other institutional setting? no  o Have you had recent travel to a known COVID-19 hotspot? no    Indicate if the patient has a positive screen by answering yes to one or more of the above questions. Patients who test positive or screen positive prior to surgery or on the day of surgery should be evaluated in conjunction with the surgeon/proceduralist/anesthesiologist to determine the urgency of the procedure. Received COVID 19 vaccine over 14 days ago.

## 2021-06-01 NOTE — PROGRESS NOTES
[address]  time__0640__________        Surgery time___0810_________    Take the following medications with a sip of water: per md instruction     Do not eat or drink anything after 12:00 midnight prior to your surgery. This includes water chewing gum, mints and ice chips. You may brush your teeth and gargle the morning of your surgery, but do not swallow the water     Please see your family doctor/pediatrician for a history and physical and/or concerning medications. H&P 5/24/21 MILTON Dwyer    Bring any test results/reports from your physicians office. If you are under the care of a heart doctor or specialist doctor, please be aware that you may be asked to them for clearance    You may be asked to stop blood thinners such as Coumadin, Plavix, Fragmin, Lovenox, etc., or any anti-inflammatories such as:  Aspirin, Ibuprofen, Advil, Naproxen prior to your surgery. We also ask that you stop any OTC medications such as fish oil, vitamin E, glucosamine, garlic, Multivitamins, COQ 10, etc.    We ask that you do not smoke 24 hours prior to surgery  We ask that you do not  drink any alcoholic beverages 24 hours prior to surgery     You must make arrangements for a responsible adult to take you home after your surgery. For your safety you will not be allowed to leave alone or drive yourself home. Your surgery will be cancelled if you do not have a ride home. Also for your safety, it is strongly suggested that someone stay with you the first 24 hours after your surgery. A parent or legal guardian must accompany a child scheduled for surgery and plan to stay at the hospital until the child is discharged. Please do not bring other children with you. For your comfort, please wear simple loose fitting clothing to the hospital.  Please do not bring valuables. Do not wear any make-up or nail polish on your fingers or toes.  Wear short sleeve button down shirt or loose fitting shirt. Bring eye drops or antibiotic ointment. For your safety, please do not wear any jewelry or body piercing's on the day of surgery. All jewelry must be removed. If you have dentures, they will be removed before going to operating room. For your convenience, we will provide you with a container. If you wear contact lenses or glasses, they will be removed, please bring a case for them. If you have a living will and a durable power of  for healthcare, please bring in a copy. As part of our patient safety program to minimize surgical site infections, we ask you to do the following:    · Please notify your surgeon if you develop any illness between         now and the  day of your surgery. · This includes a cough, cold, fever, sore throat, nausea,         or vomiting, and diarrhea, etc.  ·  Please notify your surgeon if you experience dizziness, shortness         of breath or blurred vision between now and the time of your surgery. Do not shave your operative site 96 hours prior to surgery. For face and neck surgery, men may use an electric razor 48 hours   prior to surgery. You may shower the night before surgery or the morning of   your surgery with an antibacterial soap. You will need to bring a photo ID and insurance card    [facility] has an onsite pharmacy, would you like to utilize our pharmacy     If you will be staying overnight and use a C-pap machine, please bring   your C-pap to hospital     Our goal is to provide you with excellent care, therefore, visitors will be limited to two(2) in the room at a time so that we may focus on providing this care for you. Please contact pre-admission testing if you have any further questions. [facility] phone number:  [phone]  Please note these are generalized instructions for all surgical cases, you may be provided with more specific instructions according to your surgery.

## 2021-06-04 ENCOUNTER — ANESTHESIA EVENT (OUTPATIENT)
Dept: SURGERY | Age: 86
End: 2021-06-04
Payer: MEDICARE

## 2021-06-04 NOTE — PROGRESS NOTES
Dr. Blank Camara reviewed patient's labs stated she was okay for surgery with Dr. Ramsey Singh on 6/7/21.

## 2021-06-04 NOTE — PROGRESS NOTES
[address]        Pre-Op Phone Call:     Patient Name: Maddi Carpio     Telephone Information:   Mobile [phone]     Home phone:  [phone]    Surgery Time:    8:10 AM     Arrival Time:  9123     Left extended Message:  NA     Message left with:     Recent change in health status: no     Advised of transportation/ policy:  Yes     NPO policy reviewed:  Yes pt     Advised to take morning heart/blood pressure medications with sips of water morning of surgery? Yes     Instructed to bring eye drops, photo identification, and insurance card day of surgery? Yes     Advised to wear short sleeved button down shirt (no T-shirt underneath):  Yes     Advised not to wear jewelry, hairpins, or pantyhose day of surgery? Yes     Advised not to wear make-up and to wash face day of surgery?   Yes    Remarks:        Electronically signed by:  Keiry Betancourt RN at 6/4/2021 1:45 PM

## 2021-06-07 ENCOUNTER — ANESTHESIA (OUTPATIENT)
Dept: SURGERY | Age: 86
End: 2021-06-07
Payer: MEDICARE

## 2021-06-07 ENCOUNTER — HOSPITAL ENCOUNTER (OUTPATIENT)
Age: 86
Setting detail: OUTPATIENT SURGERY
Discharge: HOME | End: 2021-06-07
Attending: OPHTHALMOLOGY | Admitting: OPHTHALMOLOGY
Payer: MEDICARE

## 2021-06-07 VITALS
OXYGEN SATURATION: 95 % | WEIGHT: 159 LBS | RESPIRATION RATE: 14 BRPM | TEMPERATURE: 97.6 F | BODY MASS INDEX: 31.22 KG/M2 | DIASTOLIC BLOOD PRESSURE: 62 MMHG | SYSTOLIC BLOOD PRESSURE: 186 MMHG | HEART RATE: 83 BPM | HEIGHT: 60 IN

## 2021-06-07 VITALS
OXYGEN SATURATION: 94 % | RESPIRATION RATE: 16 BRPM | SYSTOLIC BLOOD PRESSURE: 127 MMHG | DIASTOLIC BLOOD PRESSURE: 55 MMHG

## 2021-06-07 PROCEDURE — 3600000002 HC SURGERY LEVEL 2 BASE: Performed by: OPHTHALMOLOGY

## 2021-06-07 PROCEDURE — 3700000001 HC ADD 15 MINUTES (ANESTHESIA): Performed by: OPHTHALMOLOGY

## 2021-06-07 PROCEDURE — 2500000003 HC RX 250 WO HCPCS: Performed by: OPHTHALMOLOGY

## 2021-06-07 PROCEDURE — 2580000003 HC RX 258: Performed by: ANESTHESIOLOGY

## 2021-06-07 PROCEDURE — 3700000000 HC ANESTHESIA ATTENDED CARE: Performed by: OPHTHALMOLOGY

## 2021-06-07 PROCEDURE — 7100000011 HC PHASE II RECOVERY - ADDTL 15 MIN: Performed by: OPHTHALMOLOGY

## 2021-06-07 PROCEDURE — 7100000010 HC PHASE II RECOVERY - FIRST 15 MIN: Performed by: OPHTHALMOLOGY

## 2021-06-07 PROCEDURE — 6370000000 HC RX 637 (ALT 250 FOR IP): Performed by: OPHTHALMOLOGY

## 2021-06-07 PROCEDURE — 2709999900 HC NON-CHARGEABLE SUPPLY: Performed by: OPHTHALMOLOGY

## 2021-06-07 PROCEDURE — 6360000002 HC RX W HCPCS: Performed by: NURSE ANESTHETIST, CERTIFIED REGISTERED

## 2021-06-07 PROCEDURE — 3600000012 HC SURGERY LEVEL 2 ADDTL 15MIN: Performed by: OPHTHALMOLOGY

## 2021-06-07 RX ORDER — SODIUM CHLORIDE 9 MG/ML
INJECTION, SOLUTION INTRAVENOUS CONTINUOUS
Status: DISCONTINUED | OUTPATIENT
Start: 2021-06-07 | End: 2021-06-07 | Stop reason: HOSPADM

## 2021-06-07 RX ORDER — ERYTHROMYCIN 5 MG/G
OINTMENT OPHTHALMIC
Status: COMPLETED | OUTPATIENT
Start: 2021-06-07 | End: 2021-06-07

## 2021-06-07 RX ORDER — SODIUM CHLORIDE 9 MG/ML
25 INJECTION, SOLUTION INTRAVENOUS PRN
Status: DISCONTINUED | OUTPATIENT
Start: 2021-06-07 | End: 2021-06-07 | Stop reason: HOSPADM

## 2021-06-07 RX ORDER — SODIUM CHLORIDE 0.9 % (FLUSH) 0.9 %
10 SYRINGE (ML) INJECTION PRN
Status: DISCONTINUED | OUTPATIENT
Start: 2021-06-07 | End: 2021-06-07 | Stop reason: HOSPADM

## 2021-06-07 RX ORDER — FENTANYL CITRATE 50 UG/ML
INJECTION, SOLUTION INTRAMUSCULAR; INTRAVENOUS PRN
Status: DISCONTINUED | OUTPATIENT
Start: 2021-06-07 | End: 2021-06-07 | Stop reason: SDUPTHER

## 2021-06-07 RX ORDER — SODIUM CHLORIDE 0.9 % (FLUSH) 0.9 %
10 SYRINGE (ML) INJECTION EVERY 12 HOURS SCHEDULED
Status: DISCONTINUED | OUTPATIENT
Start: 2021-06-07 | End: 2021-06-07 | Stop reason: HOSPADM

## 2021-06-07 RX ORDER — PROPOFOL 10 MG/ML
INJECTION, EMULSION INTRAVENOUS PRN
Status: DISCONTINUED | OUTPATIENT
Start: 2021-06-07 | End: 2021-06-07 | Stop reason: SDUPTHER

## 2021-06-07 RX ORDER — PROMETHAZINE HYDROCHLORIDE 25 MG/ML
6.25 INJECTION, SOLUTION INTRAMUSCULAR; INTRAVENOUS
Status: DISCONTINUED | OUTPATIENT
Start: 2021-06-07 | End: 2021-06-07 | Stop reason: HOSPADM

## 2021-06-07 RX ORDER — TETRACAINE HYDROCHLORIDE 5 MG/ML
SOLUTION OPHTHALMIC
Status: COMPLETED | OUTPATIENT
Start: 2021-06-07 | End: 2021-06-07

## 2021-06-07 RX ORDER — ONDANSETRON 2 MG/ML
4 INJECTION INTRAMUSCULAR; INTRAVENOUS
Status: DISCONTINUED | OUTPATIENT
Start: 2021-06-07 | End: 2021-06-07 | Stop reason: HOSPADM

## 2021-06-07 RX ORDER — MIDAZOLAM HYDROCHLORIDE 1 MG/ML
INJECTION INTRAMUSCULAR; INTRAVENOUS PRN
Status: DISCONTINUED | OUTPATIENT
Start: 2021-06-07 | End: 2021-06-07 | Stop reason: SDUPTHER

## 2021-06-07 RX ORDER — LABETALOL HYDROCHLORIDE 5 MG/ML
5 INJECTION, SOLUTION INTRAVENOUS EVERY 10 MIN PRN
Status: DISCONTINUED | OUTPATIENT
Start: 2021-06-07 | End: 2021-06-07 | Stop reason: HOSPADM

## 2021-06-07 RX ADMIN — MIDAZOLAM 0.5 MG: 1 INJECTION INTRAMUSCULAR; INTRAVENOUS at 08:13

## 2021-06-07 RX ADMIN — FENTANYL CITRATE 25 MCG: 50 INJECTION INTRAMUSCULAR; INTRAVENOUS at 08:23

## 2021-06-07 RX ADMIN — PROPOFOL 10 MG: 10 INJECTION, EMULSION INTRAVENOUS at 08:24

## 2021-06-07 RX ADMIN — PROPOFOL 40 MG: 10 INJECTION, EMULSION INTRAVENOUS at 08:17

## 2021-06-07 RX ADMIN — SODIUM CHLORIDE: 9 INJECTION, SOLUTION INTRAVENOUS at 08:09

## 2021-06-07 RX ADMIN — FENTANYL CITRATE 25 MCG: 50 INJECTION INTRAMUSCULAR; INTRAVENOUS at 08:13

## 2021-06-07 RX ADMIN — MIDAZOLAM 0.5 MG: 1 INJECTION INTRAMUSCULAR; INTRAVENOUS at 08:23

## 2021-06-07 RX ADMIN — SODIUM CHLORIDE: 9 INJECTION, SOLUTION INTRAVENOUS at 07:08

## 2021-06-07 ASSESSMENT — PAIN - FUNCTIONAL ASSESSMENT: PAIN_FUNCTIONAL_ASSESSMENT: 0-10

## 2021-06-07 ASSESSMENT — PAIN SCALES - GENERAL: PAINLEVEL_OUTOF10: 0

## 2021-06-07 NOTE — OP NOTE
Title of Operation:  Bilateral upper lid functional blepharoplasty, CPT code 44440-18  Indications for Surgery:  80year-old female who presented visually significant bilateral upper lid dermatochalasis, for which medically necessary blepharoplasty was indicated. After benefits, risks and alternatives were discussed, the patient elected to proceed with surgical repair. Preoperative Diagnosis:  Bilateral upper lid dermatochalasis  Postoperative Diagnosis:  Bilateral upper lid dermatochalasis  Anesthesia:   Monitored anesthesia care (MAC) and Local.  Specimen (Bacteriological, Pathological or other):  None  Prosthetic Device/Implant:  None  Surgeon:  Greyson Andrade MD  Assistant:  None  Estimated blood loss:  Less than 5mL  Surgeons Narrative: The patient was greeted in the preoperative area. The correct place for surgery was identified and marked. The patient was taken back to the operating room and placed in supine position. Time-out for safety was held. The upper eyelid crease and an ellipse of upper lid skin were measured and marked on each side with a marking pen. Intravenous sedation was administered. A 50:50 mix of 2% lidocaine with 1:100,000 epinephrine and 0.75% marcaine was injected in these areas for local anesthesia. The patient was then prepped and draped in the usual sterile fashion. The right upper lid was addressed first. The skin was incised with a #15 blade. The strip of excess skin was removed with Anthony scissors. Careful hemostasis was achieved with bipolar cautery. The orbital septum was incised, the preaponeurotic fat pads were identified and trimmed as needed, with a clamp/cut/cautery technique. The skin was then closed with a running 7-0 Prolene suture. The exact same procedure was performed in the left upper lid. Antibiotic ointment was applied in the eyes and on the incision sites. The patient tolerated the procedure very well. There were no complications.

## 2021-06-07 NOTE — ANESTHESIA POSTPROCEDURE EVALUATION
[facility] Department of Anesthesiology  Post-Anesthesia Note       Name:  Usman Ramires                                  Age:  80 y.o. MRN:  [id number]     Last Vitals & Oxygen Saturation: BP (!) 186/62   Pulse 83   Temp 97.6 °F (36.4 °C) (Temporal)   Resp 14   Ht 5' (1.524 m)   Wt 159 lb (72.1 kg)   SpO2 95%   BMI 31.05 kg/m²   Patient Vitals for the past 4 hrs:   BP Temp Temp src Pulse Resp SpO2 Height Weight   06/07/21 0852 (!) 186/62   83 14 95 %     06/07/21 0847 (!) 156/71 97.6 °F (36.4 °C) Temporal 79 11 93 %     06/07/21 0658 (!) 178/87   87       06/07/21 0656 (!) 178/85 97.6 °F (36.4 °C) Temporal 87 15 97 % 5' (1.524 m) 159 lb (72.1 kg)       Level of consciousness:  Awake, alert    Respiratory: Respirations easy, no distress. Stable. Cardiovascular: Hemodynamically stable. Hydration: Adequate. PONV: Adequately managed. Post-op pain: Adequately controlled. Post-op assessment: Tolerated anesthetic well without complication. Complications:  None.     Ericka Multani MD  June 7, 2021   9:31 AM

## 2021-06-07 NOTE — ANESTHESIA PRE PROCEDURE
dilTIAZem (TIAZAC) 360 MG extended release capsule TAKE 1 CAPSULE DAILY (NEED APPOINTMENT FOR MORE       REFILLS) 90 capsule 0    lisinopril (PRINIVIL;ZESTRIL) 40 MG tablet TAKE 1 TABLET DAILY 90 tablet 1    rosuvastatin (CRESTOR) 10 MG tablet TAKE 1 TABLET BY MOUTH EVERY DAY 90 tablet 1    aspirin 325 MG EC tablet Take 1 tablet by mouth daily 30 tablet 3    Omega-3 Fatty Acids (FISH OIL) 1000 MG CAPS Take 1,000 mg by mouth nightly        multivitamin (THERAGRAN) per tablet Take 1 tablet by mouth daily         Current Facility-Administered Medications   Medication Dose Route Frequency Provider Last Rate Last Admin    0.9 % sodium chloride infusion   Intravenous Continuous Kirsten Dawson  mL/hr at 21 New Bag at 21    sodium chloride flush 0.9 % injection 10 mL  10 mL Intravenous 2 times per day Kirsten Dawson MD        sodium chloride flush 0.9 % injection 10 mL  10 mL Intravenous PRN Kirsten Dawson MD        0.9 % sodium chloride infusion  25 mL Intravenous PRN Kirsten Dawson MD         Vital Signs (Current)   Vitals:    21   BP: (!) 178/87   Pulse: 87   Resp:    Temp:    SpO2:      Vital Signs Statistics (for past 48 hrs)     Temp  Av.6 °F (36.4 °C)  Min: 97.6 °F (36.4 °C)   Min taken time: 21  Max: 97.6 °F (36.4 °C)   Max taken time: 21  Pulse  Av  Min: 87   Min taken time: 21  Max: 80   Max taken time: 21  Resp  Avg: 15  Min: 13   Min taken time: 21  Max: 13   Max taken time: 21  BP  Min: 178/87   Min taken time: 21  Max: 178/87   Max taken time: 21  SpO2  Av %  Min: 97 %   Min taken time: 21  Max: 97 %   Max taken time: 21    BP Readings from Last 3 Encounters:   21 (!) 178/87   21 138/74   03/23/21 (!) 150/80     BMI  Body mass index is 31.05 kg/m².   Estimated body mass index is 31.05 kg/m² as calculated from the following:    Height as of this encounter: 5' (1.524 m). Weight as of this encounter: 159 lb (72.1 kg). CBC   Lab Results   Component Value Date    WBC 10.7 06/01/2020    RBC 4.82 06/01/2020    HGB 14.4 06/01/2020    HCT 43.3 06/01/2020    MCV 89.9 06/01/2020    RDW 12.8 06/01/2020     06/01/2020     CMP    Lab Results   Component Value Date     06/01/2021    K 4.2 06/01/2021    CL 95 06/01/2021    CO2 26 06/01/2021    BUN 15 06/01/2021    CREATININE 1.0 06/01/2021    GFRAA >60 06/01/2021    GFRAA >60 02/05/2013    AGRATIO 1.8 03/23/2021    LABGLOM 53 06/01/2021    GLUCOSE 96 06/01/2021    PROT 7.5 03/23/2021    PROT 7.8 07/20/2012    CALCIUM 10.1 06/01/2021    BILITOT 0.3 03/23/2021    ALKPHOS 44 03/23/2021    AST 18 03/23/2021    ALT 29 03/23/2021     BMP    Lab Results   Component Value Date     06/01/2021    K 4.2 06/01/2021    CL 95 06/01/2021    CO2 26 06/01/2021    BUN 15 06/01/2021    CREATININE 1.0 06/01/2021    CALCIUM 10.1 06/01/2021    GFRAA >60 06/01/2021    GFRAA >60 02/05/2013    LABGLOM 53 06/01/2021    GLUCOSE 96 06/01/2021     POCGlucose  No results for input(s): GLUCOSE in the last 72 hours.    Coags    Lab Results   Component Value Date    PROTIME 13.5 04/15/2017    INR 1.19 04/15/2017    APTT 44.9 02/67/3000     HCG (If Applicable) No results found for: Jacolyn Shark, HCG, HCGQUANT   ABGs   Lab Results   Component Value Date    PHART 7.302 03/09/2017    PO2ART 152 03/09/2017    CNX5RFO 44 03/09/2017    LOS6OZR 21.9 03/09/2017    BEART -5 03/09/2017    I9DVXSOU 99 03/09/2017      Type & Screen (If Applicable)  No results found for: LABABO, LABRH                         BMI: Wt Readings from Last 3 Encounters:       NPO Status:   Date of last liquid consumption: 06/07/21   Time of last liquid consumption: 0615   Date of last solid food consumption: 06/06/21      Time of last solid consumption: 1700       Anesthesia Evaluation  Patient summary reviewed no history of anesthetic complications:   Airway: Mallampati: III  TM distance: >3 FB   Neck ROM: full   Dental:    (+) caps      Pulmonary:Negative Pulmonary ROS and normal exam                               Cardiovascular:  Exercise tolerance: good (>4 METS),   (+) hypertension (ef 65):, valvular problems/murmurs (s/p AVR ): AS,       ECG reviewed  Rhythm: regular  Rate: normal  Echocardiogram reviewed         Beta Blocker:  Dose within 24 Hrs         Neuro/Psych:   (+) neuromuscular disease:,             GI/Hepatic/Renal:   (+) hiatal hernia, liver disease (JARRETT):,           Endo/Other: Negative Endo/Other ROS                    Abdominal:   (+) obese,         Vascular: negative vascular ROS. Anesthesia Plan      MAC     ASA 3       Induction: intravenous. Anesthetic plan and risks discussed with patient. Plan discussed with CRNA. This pre-anesthesia assessment may be used as a history and physical.    DOS STAFF ADDENDUM:    Pt seen and examined, chart reviewed (including anesthesia, drug and allergy history). No interval changes to history and physical examination. Anesthetic plan, risks, benefits, alternatives, and personnel involved discussed with patient. Questions and concerns addressed. Patient(family) verbalized an understanding and agrees to proceed.       Delia Ricardo MD  June 7, 2021  7:45 AM

## 2021-06-07 NOTE — H&P
Date of Surgery Update: Lincoln Hedrick was seen, history and physical examination reviewed, and patient examined by me today. There have been no significant clinical changes since the completion of the previous history and physical. The surgical site was confirmed by the patient and me. The risk, benefits, and alternatives of the proposed procedure have been explained to the patient (or appropriate guardian) and understanding verbalized. All questions answered. Patient wishes to proceed.     Electronically signed by: Unruly Ohara MD,6/7/2021,7:27 AM

## 2021-07-13 ENCOUNTER — OFFICE VISIT (OUTPATIENT)
Dept: FAMILY MEDICINE CLINIC | Age: 86
End: 2021-07-13
Payer: MEDICARE

## 2021-07-13 VITALS
WEIGHT: 155 LBS | TEMPERATURE: 98.6 F | HEIGHT: 60 IN | BODY MASS INDEX: 30.43 KG/M2 | HEART RATE: 72 BPM | SYSTOLIC BLOOD PRESSURE: 138 MMHG | DIASTOLIC BLOOD PRESSURE: 88 MMHG

## 2021-07-13 DIAGNOSIS — I10 HYPERTENSION, ESSENTIAL, BENIGN: Primary | ICD-10-CM

## 2021-07-13 PROCEDURE — 1123F ACP DISCUSS/DSCN MKR DOCD: CPT | Performed by: FAMILY MEDICINE

## 2021-07-13 PROCEDURE — G8417 CALC BMI ABV UP PARAM F/U: HCPCS | Performed by: FAMILY MEDICINE

## 2021-07-13 PROCEDURE — 1090F PRES/ABSN URINE INCON ASSESS: CPT | Performed by: FAMILY MEDICINE

## 2021-07-13 PROCEDURE — G8427 DOCREV CUR MEDS BY ELIG CLIN: HCPCS | Performed by: FAMILY MEDICINE

## 2021-07-13 PROCEDURE — 4040F PNEUMOC VAC/ADMIN/RCVD: CPT | Performed by: FAMILY MEDICINE

## 2021-07-13 PROCEDURE — 1036F TOBACCO NON-USER: CPT | Performed by: FAMILY MEDICINE

## 2021-07-13 PROCEDURE — 99212 OFFICE O/P EST SF 10 MIN: CPT | Performed by: FAMILY MEDICINE

## 2021-07-13 ASSESSMENT — PATIENT HEALTH QUESTIONNAIRE - PHQ9
SUM OF ALL RESPONSES TO PHQ QUESTIONS 1-9: 0
1. LITTLE INTEREST OR PLEASURE IN DOING THINGS: 0
SUM OF ALL RESPONSES TO PHQ QUESTIONS 1-9: 0
SUM OF ALL RESPONSES TO PHQ9 QUESTIONS 1 & 2: 0
SUM OF ALL RESPONSES TO PHQ QUESTIONS 1-9: 0
2. FEELING DOWN, DEPRESSED OR HOPELESS: 0

## 2021-07-13 NOTE — PROGRESS NOTES
3 Encounters:  07/13/21 : 138/88  06/07/21 : (!) 127/55  06/07/21 : (!) 186/62                    Review of Systems    Objective:   Physical Exam  Constitutional:       General: She is not in acute distress. Appearance: Normal appearance. She is well-developed. She is not ill-appearing or diaphoretic. Cardiovascular:      Rate and Rhythm: Normal rate and regular rhythm. Heart sounds: Murmur heard. Systolic murmur is present with a grade of 2/6. No friction rub. No gallop. Comments: No edema  Pulmonary:      Effort: Pulmonary effort is normal. No tachypnea, accessory muscle usage or respiratory distress. Breath sounds: Normal breath sounds. No decreased breath sounds, wheezing, rhonchi or rales. Lymphadenopathy:      Cervical: No cervical adenopathy. Upper Body:      Right upper body: No supraclavicular adenopathy. Left upper body: No supraclavicular adenopathy. Skin:     General: Skin is warm and dry. Coloration: Skin is not pale. Neurological:      Mental Status: She is alert. Assessment:        Diagnosis Orders   1.  Hypertension, essential, benign         Doing well off the diuretic  Her labs essentially came to nml      Plan:      See me first week of November for a check         Chano Jay MD

## 2021-08-18 RX ORDER — ROSUVASTATIN CALCIUM 10 MG/1
TABLET, COATED ORAL
Qty: 90 TABLET | Refills: 1 | Status: SHIPPED | OUTPATIENT
Start: 2021-08-18 | End: 2022-05-19

## 2021-09-21 RX ORDER — LISINOPRIL 40 MG/1
TABLET ORAL
Qty: 90 TABLET | Refills: 1 | Status: SHIPPED | OUTPATIENT
Start: 2021-09-21 | End: 2022-03-04

## 2021-09-22 RX ORDER — DILTIAZEM HYDROCHLORIDE 360 MG/1
CAPSULE, EXTENDED RELEASE ORAL
Qty: 90 CAPSULE | Refills: 0 | Status: SHIPPED | OUTPATIENT
Start: 2021-09-22 | End: 2021-12-13

## 2021-10-07 NOTE — PROGRESS NOTES
[address]      Cardiology Progress Note    Humaira Otoole  1935 October 12, 2021    CC: \"I am doing okay. \"     HPI:  The patient is 80 y.o. female with a past medical history significant for hypertension and hyperlipidemia, carotid stenosis, bilateral and heart murmur. She presents today in follow up s/p s/p AVR, tissue 3/9/17 per Dr. Farhana Delcid. Today, she is here for follow up. She says that she is feeling well. She did have the Covid vaccine. She is due to see her PCP soon and will get her labs completed at that time. Patient denies exertional chest pain/pressure, dyspnea at rest, LUEVANO, PND, orthopnea, palpitations, lightheadedness, weight changes, changes in LE edema, and syncope. Patient reports compliance to her medications. Past Medical History:   Diagnosis Date    Aortic stenosis     Arthritis     mild hip area    Cerebrovascular disease     Diaphragm, eventration     right    Fatty liver     on CT 2012    Hiatal hernia     on CT 2012    Hyperlipidemia     Hypertension     Syncope 2016    \"twice a year since puberty\"     Past Surgical History:   Procedure Laterality Date    AORTIC VALVE REPLACEMENT  03/09/2017    19mm [place] pericardial Magna Ease    BLEPHAROPLASTY Bilateral 6/7/2021    BILATERAL UPPER LIDS BLEPHAROPLASTY performed by Jo Ann Radford MD at [address] Bilateral 01/2019    PRE-MALIGNANT / BENIGN SKIN LESION EXCISION Right 2014    R scalp. cystadenoma. --done under local     Family History   Problem Relation Age of Onset    Diabetes Mother     Cancer Mother         lung    Other Father         leukemia    Kidney Disease Brother     Parkinsonism Brother      Social History     Tobacco Use    Smoking status: Never Smoker    Smokeless tobacco: Never Used   Vaping Use    Vaping Use: Never used   Substance Use Topics    Alcohol use: Yes     Comment: socially once/week    Drug use: No     Comment: never       Allergies Allergen Reactions    Adhesive Tape Itching and Dermatitis    Oxycontin [Oxycodone Hcl] Other (See Comments)     \"seeing things\"     Current Outpatient Medications   Medication Sig Dispense Refill    dilTIAZem (TIAZAC) 360 MG extended release capsule TAKE 1 CAPSULE DAILY (NEED APPOINTMENT FOR MORE       REFILLS) 90 capsule 0    lisinopril (PRINIVIL;ZESTRIL) 40 MG tablet TAKE 1 TABLET DAILY 90 tablet 1    rosuvastatin (CRESTOR) 10 MG tablet TAKE 1 TABLET BY MOUTH EVERY DAY 90 tablet 1    cetirizine (ZYRTEC) 10 MG tablet Take 10 mg by mouth daily as needed for Allergies      aspirin 325 MG EC tablet Take 1 tablet by mouth daily 30 tablet 3    Omega-3 Fatty Acids (FISH OIL) 1000 MG CAPS Take 1,000 mg by mouth nightly        multivitamin (THERAGRAN) per tablet Take 1 tablet by mouth daily         No current facility-administered medications for this visit. Review of Systems: all reviewed and refer to HPI   Constitutional: negative  Eyes: negative  Ears, nose, mouth, throat, and face: negative  Respiratory: negative  Cardiovascular: negative  Gastrointestinal: negative  Genitourinary:negative  Integument/breast: negative  Hematologic/lymphatic: negative  Musculoskeletal:negative  Neurological: negative  Behavioral/Psych: negative  Endocrine: negative  Allergic/Immunologic: negative      Physical Exam:   Vitals:    10/12/21 1242   BP: 136/88   Pulse: 88   SpO2: 96%   Weight: 155 lb (70.3 kg)   Height: 5' (1.524 m)     Wt Readings from Last 3 Encounters:   10/12/21 155 lb (70.3 kg)   07/13/21 155 lb (70.3 kg)   06/07/21 159 lb (72.1 kg)       Constitutional: She is oriented to person, place, and time. She appears well-developed and well-nourished. In no acute distress. Head: Normocephalic and atraumatic. Pupils equal and round. Neck: Neck supple. No JVP or carotid bruit appreciated. No mass and no thyromegaly present. No lymphadenopathy present. Cardiovascular: Normal rate. Normal heart sounds. Exam reveals no gallop and no friction rub. Mild Systolic ejection murmur heard with normal carotid upstroke. Midline sternal scar. Pulmonary/Chest: Effort normal and breath sounds normal. No respiratory distress. She has no wheezes, rhonchi or rales. Abdominal: Soft, non-tender. Bowel sounds are normal. She exhibits no organomegaly, mass or bruit. Extremities: No edema. No cyanosis, or clubbing. Pulses are 2+ radial/dorsalis pedis/posterior tibial/carotid bilaterally. Neurological: Awake  alert and orientedNo gross cranial nerve deficit. Coordination normal.     Skin: Skin is warm and dry. There is no rash or diaphoresis. Psychiatric: She has a normal mood and affect. Her speech is normal and behavior is normal.     Lab Review:   Lab Results   Component Value Date    TRIG 108 06/01/2020    HDL 46 03/23/2021    HDL 37 11/02/2011    LDLCALC 58 03/23/2021    LABVLDL 18 03/23/2021      Lab Results   Component Value Date    HGB 14.4 06/01/2020    HCT 43.3 06/01/2020      Lab Results   Component Value Date     06/01/2021     Lab Results   Component Value Date    K 4.2 06/01/2021     Lab Results   Component Value Date    BUN 15 06/01/2021    CREATININE 1.0 06/01/2021    No components found for: HGBA1C  Lab Results   Component Value Date    TSH 2.32 07/20/2012       EKG Interpretation:   8/3/18 SB with ST/T wave abnormalities   5/28/20  Sinus  Rhythm with old anteroseptal infarct and nonspecific ST depression  -Nondiagnostic.   10/12/2021 Normal sinus rhythm     Image Review: all reviewed     ECHO 1/8/2013: The LV is normal in size  The LV is hyperdynamic  LVEF is 65-70%  There is moderate mitral annular calcification  There is trace mitral regurgitation  There is mild tricuspid regurgitation  The aortic valve appears to be mildly sclerotic   Peak aortic gradient is 33 mmHg. Mean gradient is 18 mmHg.   ANAY is 1.3 cm2 c/w moderate AS  RV systolic pressure is normal.    ECHO 10/16/2015:  Left ventricle size is normal.  Asymmetric left ventricular hypertrophy is present. Overall left ventricular function is normal.  Ejection fraction is visually estimated to be 65 to70%. There is reversal of E/A inflow velocities across the mitral valve  suggesting impaired left ventricular relaxation. There is hyperdynamic LV systolic function. Mitral annular calcification is present. Calcification of the mitral valve noted. Moderate aortic stenosis is present. The aortic valve is thickened with a peak gradient of 48 mm Hg, and mean gradient of 27 mmHg . ECHO:6/3/16  Summary  Normal left ventricular size. Basal septal hypertrophy. Normal left  ventricular systolic function with an estimated ejection fraction of 65% . Hyperdynamic left ventricle, no regional wall motion abnormalities. Mitral annular calcification is present. The mitral valve leaflets are slightly thickened with normal leaflet  mobility. Trivial mitral regurgitation is present. The aortic valve appears calcified and restricted. The aortic valve area is calculated at 1.1 cm2 with a maximum pressure  gradient of 47 mmHg and a mean pressure gradient of 31 mmHg. This is c/w  moderate aortic stenosis. No evidence of aortic valve regurgitation. Tricuspid valve is structurally normal.  There is mild tricuspid regurgitation with RVSP estimated at 27 mmHg. The pulmonic valve is normal in structure and function. Trivial pulmonic regurgitation present. Carotid Duplex:6/3/16  Right Impression   The right internal carotid artery appears to have a 16-49% diameter reducing   stenosis based on velocity criteria.       The right vertebral artery demonstrates normal antegrade flow. Left Impression   The left internal carotid artery reveals a <50% diameter reducing stenosis.       The left vertebral artery demonstrates normal antegrade flow.      Echo:12/15/16  Summary  Normal left ventricle size and vigorous systolic function with an estimated  ejection fraction of 65-70%. No regional wall motion abnormalities are seen. Mildly increased left ventricular wall thickness. Diastolic filling  parameters suggests grade I diastolic dysfunction. Normal right ventricular size and function. Moderate aortic stenosis (peak velocity of 3.8 m/s and mean gradient of 38  mmHg). Trivial mitral, tricuspid, and pulmonic regurgitation. Cardionet 30 day event monitor     [facility] with AV study 2/6/17  ANGIOGRAPHIC FINDINGS:  1. Left main coronary comes from the left coronary cusp, giving rise to left  anterior descending artery, left circumflex artery. They all are normal.   JUAN PABLO 3 flow. No significant stenosis seen either in the left main coronary  artery, left anterior descending artery, left circumflex artery. The patient  is left dominant. 2.  The right coronary artery was normal.  JUAN PABLO 3 flow. It is a small  nondominant right coronary. 3.  Ejection fraction of 60%.  SUMMARY:  Severe aortic valve stenosis with normal coronary arteries. Echo 2/23/18  Summary  Left ventricular cavity size is normal. There is mild to moderate concentric left ventricular hypertrophy. Overall left ventricular systolic function appears normal. Ejection fraction is visually estimated to be 60-65%. No regional wall motion abnormalities are noted. Diastolic filling parameters suggest grade I diastolic dysfunction. Mitral annular calcification is present. Mild calcification of the anterior leaflet of the mitral valve. Trivial mitral regurgitation is present. The left atrium is mildly dilated. A bioprosthetic artificial aortic valve appears well seated with a maximum velocity of 290m/s and a mean gradient of 21mmHg. Mild tricuspid regurgitation. ECHO 2/9/0744  Systolic pulmonary artery pressure (SPAP) is normal and estimated at 30 mmHg  (RA pressure 3 mmHg). Left ventricular size is decreased. There is moderate concentric left ventricular hypertrophy.   Global left ventricular function is increased (hyperdynamic) with ejection fraction estimated from 65 % to 70 %. No segmental wall motion abnormalities noted. Moderate to heavy calcification of the posterior leaflet of the mitral  valve. Mild calcification of the anterior leaflet of the mitral valve. mitral leaflet movement appear mildly restricted but no obvious mitral  stenosis noted. Trivial mitral regurgitation is present. ECHO 3/30/2021  There is mild concentric left ventricular hypertrophy. Ejection fraction is visually estimated to be 65-70%. Diastolic filling parameters suggest grade II diastolic dysfunction. Moderate calcification of the posterior leaflet of the mitral valve. Mild thickening of anterior leaflet of mitral valve. Mild mitral regurgitation. No evidence of mitral stenosis. The left atrium is moderately dilated. 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 The right ventricle is mildly dilated. Right ventricular systolic function is normal.    Assessment/Plan: Aortic valve stenosis:   --s/p AVR bovine valve, tissue 3/9/17 per Dr. Grabiel Wynne  --Normal coronary arteries 2/6/17  staying active-Continues dancing at home during COVID-19 pandemic   Asymptomatic.  mg daily (for tissue valve). Repeat echocardiogram.     Carotid bruit/stenosis:   Asymptomatic. Continue Asa and statin therapy. Hypertension, essential:   Controlled. Continue current medical management. Hyperlipidemia, unspecified:   Continue statin therapy. Follow up in 1 year. Thank you very much for allowing me to participate in the care of your patient. Please do not hesitate to contact me if you have any questions.     Sincerely,    Jahaira Titus MD, MPH      [facility], [address] [place], [place] [address]Aleksandra Abran Ojeda [facility]  Ph: [phone]  Fax: [phone]    This note was scribed in the presence of Dr Eulogio Mcadams, by Boy Crane RN. Physician Attestation:  The scribes documentation has been prepared under my direction and personally reviewed by me in its entirety. I confirm that the note above accurately reflects all work, treatment, procedures, and medical decision making performed by me.

## 2021-10-12 ENCOUNTER — OFFICE VISIT (OUTPATIENT)
Dept: CARDIOLOGY CLINIC | Age: 86
End: 2021-10-12
Payer: MEDICARE

## 2021-10-12 ENCOUNTER — TELEPHONE (OUTPATIENT)
Dept: CARDIOLOGY CLINIC | Age: 86
End: 2021-10-12

## 2021-10-12 VITALS
HEIGHT: 60 IN | OXYGEN SATURATION: 96 % | WEIGHT: 155 LBS | DIASTOLIC BLOOD PRESSURE: 88 MMHG | SYSTOLIC BLOOD PRESSURE: 136 MMHG | BODY MASS INDEX: 30.43 KG/M2 | HEART RATE: 88 BPM

## 2021-10-12 DIAGNOSIS — Z95.2 STATUS POST AORTIC VALVE REPLACEMENT: Primary | ICD-10-CM

## 2021-10-12 DIAGNOSIS — E78.2 MIXED HYPERLIPIDEMIA: ICD-10-CM

## 2021-10-12 DIAGNOSIS — I10 ESSENTIAL HYPERTENSION: ICD-10-CM

## 2021-10-12 DIAGNOSIS — I65.23 BILATERAL CAROTID ARTERY STENOSIS: ICD-10-CM

## 2021-10-12 PROCEDURE — 1090F PRES/ABSN URINE INCON ASSESS: CPT | Performed by: INTERNAL MEDICINE

## 2021-10-12 PROCEDURE — 4040F PNEUMOC VAC/ADMIN/RCVD: CPT | Performed by: INTERNAL MEDICINE

## 2021-10-12 PROCEDURE — 99213 OFFICE O/P EST LOW 20 MIN: CPT | Performed by: INTERNAL MEDICINE

## 2021-10-12 PROCEDURE — 93000 ELECTROCARDIOGRAM COMPLETE: CPT | Performed by: INTERNAL MEDICINE

## 2021-10-12 PROCEDURE — 1036F TOBACCO NON-USER: CPT | Performed by: INTERNAL MEDICINE

## 2021-10-12 PROCEDURE — G8484 FLU IMMUNIZE NO ADMIN: HCPCS | Performed by: INTERNAL MEDICINE

## 2021-10-12 PROCEDURE — 1123F ACP DISCUSS/DSCN MKR DOCD: CPT | Performed by: INTERNAL MEDICINE

## 2021-10-12 PROCEDURE — G8417 CALC BMI ABV UP PARAM F/U: HCPCS | Performed by: INTERNAL MEDICINE

## 2021-10-12 PROCEDURE — G8427 DOCREV CUR MEDS BY ELIG CLIN: HCPCS | Performed by: INTERNAL MEDICINE

## 2021-10-12 NOTE — PATIENT INSTRUCTIONS
Patient Education        High Blood Pressure: Care Instructions  Overview     It's normal for blood pressure to go up and down throughout the day. But if it stays up, you have high blood pressure. Another name for high blood pressure is hypertension. Despite what a lot of people think, high blood pressure usually doesn't cause headaches or make you feel dizzy or lightheaded. It usually has no symptoms. But it does increase your risk of stroke, heart attack, and other problems. You and your doctor will talk about your risks of these problems based on your blood pressure. Your doctor will give you a goal for your blood pressure. Your goal will be based on your health and your age. Lifestyle changes, such as eating healthy and being active, are always important to help lower blood pressure. You might also take medicine to reach your blood pressure goal.  Follow-up care is a key part of your treatment and safety. Be sure to make and go to all appointments, and call your doctor if you are having problems. It's also a good idea to know your test results and keep a list of the medicines you take. How can you care for yourself at home? Medical treatment  · If you stop taking your medicine, your blood pressure will go back up. You may take one or more types of medicine to lower your blood pressure. Be safe with medicines. Take your medicine exactly as prescribed. Call your doctor if you think you are having a problem with your medicine. · Talk to your doctor before you start taking aspirin every day. Aspirin can help certain people lower their risk of a heart attack or stroke. But taking aspirin isn't right for everyone, because it can cause serious bleeding. · See your doctor regularly. You may need to see the doctor more often at first or until your blood pressure comes down.   · If you are taking blood pressure medicine, talk to your doctor before you take decongestants or anti-inflammatory medicine, such as ibuprofen. Some of these medicines can raise blood pressure. · Learn how to check your blood pressure at home. Lifestyle changes  · Stay at a healthy weight. This is especially important if you put on weight around the waist. Losing even 10 pounds can help you lower your blood pressure. · If your doctor recommends it, get more exercise. Walking is a good choice. Bit by bit, increase the amount you walk every day. Try for at least 30 minutes on most days of the week. You also may want to swim, bike, or do other activities. · Avoid or limit alcohol. Talk to your doctor about whether you can drink any alcohol. · Try to limit how much sodium you eat to less than 2,300 milligrams (mg) a day. Your doctor may ask you to try to eat less than 1,500 mg a day. · Eat plenty of fruits (such as bananas and oranges), vegetables, legumes, whole grains, and low-fat dairy products. · Lower the amount of saturated fat in your diet. Saturated fat is found in animal products such as milk, cheese, and meat. Limiting these foods may help you lose weight and also lower your risk for heart disease. · Do not smoke. Smoking increases your risk for heart attack and stroke. If you need help quitting, talk to your doctor about stop-smoking programs and medicines. These can increase your chances of quitting for good. When should you call for help? Call 911  anytime you think you may need emergency care. This may mean having symptoms that suggest that your blood pressure is causing a serious heart or blood vessel problem. Your blood pressure may be over 180/120. For example, call 911 if:    · You have symptoms of a heart attack. These may include:  ? Chest pain or pressure, or a strange feeling in the chest.  ? Sweating. ? Shortness of breath. ? Nausea or vomiting. ? Pain, pressure, or a strange feeling in the back, neck, jaw, or upper belly or in one or both shoulders or arms. ? Lightheadedness or sudden weakness.   ? A fast or

## 2021-10-13 NOTE — TELEPHONE ENCOUNTER
[facility] called back this afternoon. I have her ECHO scheduled for Wednesday 3/16 at 10:00 at [facility].

## 2021-12-16 RX ORDER — DILTIAZEM HYDROCHLORIDE 360 MG/1
CAPSULE, EXTENDED RELEASE ORAL
Qty: 90 CAPSULE | Refills: 2 | Status: SHIPPED | OUTPATIENT
Start: 2021-12-16 | End: 2021-12-21 | Stop reason: SDUPTHER

## 2021-12-21 RX ORDER — DILTIAZEM HYDROCHLORIDE 360 MG/1
CAPSULE, EXTENDED RELEASE ORAL
Qty: 90 CAPSULE | Refills: 1 | Status: SHIPPED | OUTPATIENT
Start: 2021-12-21 | End: 2022-09-27

## 2021-12-21 NOTE — TELEPHONE ENCOUNTER
----- Message from Tru Mcmahon sent at 12/21/2021  4:15 PM EST -----  Subject: Refill Request    QUESTIONS  Name of Medication? dilTIAZem (TIAZAC) 360 MG extended release capsule  Patient-reported dosage and instructions? 1 capsule daily  How many days do you have left? 4  Preferred Pharmacy? [id number]  Pharmacy phone number (if available)? [phone]  ---------------------------------------------------------------------------  --------------  Jonathan SANDY  What is the best way for the office to contact you? OK to leave message on   voicemail  Preferred Call Back Phone Number?  [phone]

## 2021-12-28 ENCOUNTER — OFFICE VISIT (OUTPATIENT)
Dept: FAMILY MEDICINE CLINIC | Age: 86
End: 2021-12-28
Payer: MEDICARE

## 2021-12-28 VITALS
HEART RATE: 80 BPM | TEMPERATURE: 97.8 F | DIASTOLIC BLOOD PRESSURE: 80 MMHG | HEIGHT: 60 IN | WEIGHT: 155 LBS | SYSTOLIC BLOOD PRESSURE: 160 MMHG | BODY MASS INDEX: 30.43 KG/M2

## 2021-12-28 DIAGNOSIS — E78.5 HYPERLIPIDEMIA, UNSPECIFIED HYPERLIPIDEMIA TYPE: ICD-10-CM

## 2021-12-28 DIAGNOSIS — I10 BENIGN ESSENTIAL HTN: ICD-10-CM

## 2021-12-28 DIAGNOSIS — I10 BENIGN ESSENTIAL HTN: Primary | ICD-10-CM

## 2021-12-28 LAB
A/G RATIO: 1.6 (ref 1.1–2.2)
ALBUMIN SERPL-MCNC: 4.5 G/DL (ref 3.4–5)
ALP BLD-CCNC: 63 U/L (ref 40–129)
ALT SERPL-CCNC: 21 U/L (ref 10–40)
ANION GAP SERPL CALCULATED.3IONS-SCNC: 14 MMOL/L (ref 3–16)
AST SERPL-CCNC: 16 U/L (ref 15–37)
BILIRUB SERPL-MCNC: 0.3 MG/DL (ref 0–1)
BUN BLDV-MCNC: 20 MG/DL (ref 7–20)
CALCIUM SERPL-MCNC: 9.7 MG/DL (ref 8.3–10.6)
CHLORIDE BLD-SCNC: 103 MMOL/L (ref 99–110)
CHOLESTEROL, TOTAL: 118 MG/DL (ref 0–199)
CO2: 24 MMOL/L (ref 21–32)
CREAT SERPL-MCNC: 0.9 MG/DL (ref 0.6–1.2)
GFR AFRICAN AMERICAN: >60
GFR NON-AFRICAN AMERICAN: 59
GLUCOSE BLD-MCNC: 109 MG/DL (ref 70–99)
HDLC SERPL-MCNC: 42 MG/DL (ref 40–60)
LDL CHOLESTEROL CALCULATED: 57 MG/DL
POTASSIUM SERPL-SCNC: 4.1 MMOL/L (ref 3.5–5.1)
SODIUM BLD-SCNC: 141 MMOL/L (ref 136–145)
TOTAL PROTEIN: 7.3 G/DL (ref 6.4–8.2)
TRIGL SERPL-MCNC: 94 MG/DL (ref 0–150)
VLDLC SERPL CALC-MCNC: 19 MG/DL

## 2021-12-28 PROCEDURE — 1036F TOBACCO NON-USER: CPT | Performed by: FAMILY MEDICINE

## 2021-12-28 PROCEDURE — G8484 FLU IMMUNIZE NO ADMIN: HCPCS | Performed by: FAMILY MEDICINE

## 2021-12-28 PROCEDURE — 1090F PRES/ABSN URINE INCON ASSESS: CPT | Performed by: FAMILY MEDICINE

## 2021-12-28 PROCEDURE — 4040F PNEUMOC VAC/ADMIN/RCVD: CPT | Performed by: FAMILY MEDICINE

## 2021-12-28 PROCEDURE — 99214 OFFICE O/P EST MOD 30 MIN: CPT | Performed by: FAMILY MEDICINE

## 2021-12-28 PROCEDURE — 1123F ACP DISCUSS/DSCN MKR DOCD: CPT | Performed by: FAMILY MEDICINE

## 2021-12-28 PROCEDURE — G8417 CALC BMI ABV UP PARAM F/U: HCPCS | Performed by: FAMILY MEDICINE

## 2021-12-28 PROCEDURE — G8427 DOCREV CUR MEDS BY ELIG CLIN: HCPCS | Performed by: FAMILY MEDICINE

## 2021-12-28 RX ORDER — HYDROCHLOROTHIAZIDE 12.5 MG/1
12.5 CAPSULE, GELATIN COATED ORAL EVERY MORNING
Qty: 90 CAPSULE | Refills: 1 | Status: SHIPPED | OUTPATIENT
Start: 2021-12-28 | End: 2022-01-31

## 2021-12-28 ASSESSMENT — ENCOUNTER SYMPTOMS
VOMITING: 0
ABDOMINAL DISTENTION: 0
CONSTIPATION: 0
CHEST TIGHTNESS: 0
BLOOD IN STOOL: 0
NAUSEA: 0
DIARRHEA: 0
ABDOMINAL PAIN: 0
SHORTNESS OF BREATH: 0

## 2021-12-28 NOTE — PROGRESS NOTES
body: No supraclavicular adenopathy. Skin:     General: Skin is warm and dry. Coloration: Skin is not pale. Neurological:      Mental Status: She is alert. Assessment:        Diagnosis Orders   1. Benign essential HTN  Comprehensive Metabolic Panel    Lipid Panel   2. Hyperlipidemia, unspecified hyperlipidemia type  Comprehensive Metabolic Panel    Lipid Panel     Doing well  bp could be under better control     Since last visit   Cardiology on 10/12/21. Stable.  Reviewed  Did review a prior ct she had done and likely incidental findings  No hx of tobacco   Orders Placed This Encounter   Medications    hydroCHLOROthiazide (MICROZIDE) 12.5 MG capsule     Sig: Take 1 capsule by mouth every morning     Dispense:  90 capsule     Refill:  1           Plan:      Stay with a low fat diet   Continue the medicines  I am re-adding the water pill but at a smaller dose   See us in 2 months         Davie Huffman MD

## 2021-12-29 DIAGNOSIS — R73.09 ELEVATED GLUCOSE: Primary | ICD-10-CM

## 2022-01-04 DIAGNOSIS — R73.09 ELEVATED GLUCOSE: ICD-10-CM

## 2022-01-05 LAB
ESTIMATED AVERAGE GLUCOSE: 119.8 MG/DL
HBA1C MFR BLD: 5.8 %

## 2022-01-31 ENCOUNTER — TELEPHONE (OUTPATIENT)
Dept: FAMILY MEDICINE CLINIC | Age: 87
End: 2022-01-31

## 2022-01-31 RX ORDER — HYDROCHLOROTHIAZIDE 12.5 MG/1
12.5 CAPSULE, GELATIN COATED ORAL EVERY MORNING
Qty: 90 CAPSULE | Refills: 1 | Status: SHIPPED | OUTPATIENT
Start: 2022-01-31 | End: 2022-07-07

## 2022-01-31 NOTE — TELEPHONE ENCOUNTER
Patient states she has never received hydrochlorothiazide 12.5 gm from [facility] when she was last here 12/28/21. She received an email from them awhile ago that they were reaching out to our office for more information.         Please advise, [phone]

## 2022-03-04 RX ORDER — LISINOPRIL 40 MG/1
TABLET ORAL
Qty: 90 TABLET | Refills: 1 | Status: SHIPPED | OUTPATIENT
Start: 2022-03-04 | End: 2022-07-17

## 2022-03-04 RX ORDER — TRIAMTERENE AND HYDROCHLOROTHIAZIDE 75; 50 MG/1; MG/1
TABLET ORAL
Qty: 90 TABLET | Refills: 1 | Status: SHIPPED | OUTPATIENT
Start: 2022-03-04 | End: 2022-07-17

## 2022-03-16 ENCOUNTER — HOSPITAL ENCOUNTER (OUTPATIENT)
Dept: CARDIOLOGY | Age: 87
Discharge: HOME | End: 2022-03-16
Payer: MEDICARE

## 2022-03-16 DIAGNOSIS — Z95.2 STATUS POST AORTIC VALVE REPLACEMENT: ICD-10-CM

## 2022-03-16 LAB
LV EF: 65 %
LVEF MODALITY: NORMAL

## 2022-03-16 PROCEDURE — 93306 TTE W/DOPPLER COMPLETE: CPT

## 2022-05-19 RX ORDER — ROSUVASTATIN CALCIUM 10 MG/1
TABLET, COATED ORAL
Qty: 90 TABLET | Refills: 1 | Status: SHIPPED | OUTPATIENT
Start: 2022-05-19 | End: 2022-09-02

## 2022-07-07 RX ORDER — HYDROCHLOROTHIAZIDE 12.5 MG/1
CAPSULE, GELATIN COATED ORAL
Qty: 90 CAPSULE | Refills: 1 | Status: SHIPPED | OUTPATIENT
Start: 2022-07-07

## 2022-07-17 RX ORDER — TRIAMTERENE AND HYDROCHLOROTHIAZIDE 75; 50 MG/1; MG/1
TABLET ORAL
Qty: 90 TABLET | Refills: 1 | Status: SHIPPED | OUTPATIENT
Start: 2022-07-17 | End: 2022-11-03 | Stop reason: SDUPTHER

## 2022-07-17 RX ORDER — LISINOPRIL 40 MG/1
TABLET ORAL
Qty: 90 TABLET | Refills: 1 | Status: SHIPPED | OUTPATIENT
Start: 2022-07-17

## 2022-09-02 RX ORDER — ROSUVASTATIN CALCIUM 10 MG/1
TABLET, COATED ORAL
Qty: 90 TABLET | Refills: 1 | Status: SHIPPED | OUTPATIENT
Start: 2022-09-02

## 2022-09-27 RX ORDER — DILTIAZEM HYDROCHLORIDE 360 MG/1
CAPSULE, EXTENDED RELEASE ORAL
Qty: 90 CAPSULE | Refills: 2 | Status: SHIPPED | OUTPATIENT
Start: 2022-09-27

## 2022-11-02 NOTE — TELEPHONE ENCOUNTER
Spoke with pt on the phone she is scheduled for a check up 12-29-22 and would like her triamterene-hydroCHLOROthiazide (MAXZIDE) 75-50 MG per tablet refilled.

## 2022-11-03 RX ORDER — TRIAMTERENE AND HYDROCHLOROTHIAZIDE 75; 50 MG/1; MG/1
1 TABLET ORAL DAILY
Qty: 90 TABLET | Refills: 0 | Status: SHIPPED | OUTPATIENT
Start: 2022-11-03

## 2022-12-05 RX ORDER — TRIAMTERENE AND HYDROCHLOROTHIAZIDE 75; 50 MG/1; MG/1
1 TABLET ORAL DAILY
Qty: 90 TABLET | Refills: 0 | OUTPATIENT
Start: 2022-12-05

## 2022-12-05 NOTE — TELEPHONE ENCOUNTER
Pt said that she has been w/out this medication for about a month now , I told her that we sent a rx to mail order for a 90 day supply on Nov. 3rd., pt never received it. Told her to call [facility]. Pt said that she has been feeling fine, he bp is good but if Dr. Cecille Holcomb wants to call in a 30 day supply to [facility] in [place] then she will go get them.

## 2022-12-29 ENCOUNTER — OFFICE VISIT (OUTPATIENT)
Dept: FAMILY MEDICINE CLINIC | Age: 87
End: 2022-12-29

## 2022-12-29 VITALS
TEMPERATURE: 97.3 F | HEIGHT: 60 IN | HEART RATE: 88 BPM | BODY MASS INDEX: 30.43 KG/M2 | WEIGHT: 155 LBS | SYSTOLIC BLOOD PRESSURE: 128 MMHG | DIASTOLIC BLOOD PRESSURE: 82 MMHG

## 2022-12-29 DIAGNOSIS — I10 BENIGN ESSENTIAL HTN: Primary | ICD-10-CM

## 2022-12-29 DIAGNOSIS — R73.01 IMPAIRED FASTING GLUCOSE: ICD-10-CM

## 2022-12-29 DIAGNOSIS — I10 BENIGN ESSENTIAL HTN: ICD-10-CM

## 2022-12-29 DIAGNOSIS — E78.5 HYPERLIPIDEMIA, UNSPECIFIED HYPERLIPIDEMIA TYPE: ICD-10-CM

## 2022-12-29 LAB
A/G RATIO: 1.8 (ref 1.1–2.2)
ALBUMIN SERPL-MCNC: 4.6 G/DL (ref 3.4–5)
ALP BLD-CCNC: 49 U/L (ref 40–129)
ALT SERPL-CCNC: 27 U/L (ref 10–40)
ANION GAP SERPL CALCULATED.3IONS-SCNC: 14 MMOL/L (ref 3–16)
AST SERPL-CCNC: 20 U/L (ref 15–37)
BILIRUB SERPL-MCNC: 0.4 MG/DL (ref 0–1)
BUN BLDV-MCNC: 22 MG/DL (ref 7–20)
CALCIUM SERPL-MCNC: 10.5 MG/DL (ref 8.3–10.6)
CHLORIDE BLD-SCNC: 94 MMOL/L (ref 99–110)
CHOLESTEROL, TOTAL: 132 MG/DL (ref 0–199)
CO2: 24 MMOL/L (ref 21–32)
CREAT SERPL-MCNC: 0.9 MG/DL (ref 0.6–1.2)
GFR SERPL CREATININE-BSD FRML MDRD: >60 ML/MIN/{1.73_M2}
GLUCOSE BLD-MCNC: 109 MG/DL (ref 70–99)
HDLC SERPL-MCNC: 40 MG/DL (ref 40–60)
LDL CHOLESTEROL CALCULATED: 67 MG/DL
POTASSIUM SERPL-SCNC: 3.9 MMOL/L (ref 3.5–5.1)
SODIUM BLD-SCNC: 132 MMOL/L (ref 136–145)
TOTAL PROTEIN: 7.1 G/DL (ref 6.4–8.2)
TRIGL SERPL-MCNC: 125 MG/DL (ref 0–150)
VLDLC SERPL CALC-MCNC: 25 MG/DL

## 2022-12-29 SDOH — ECONOMIC STABILITY: FOOD INSECURITY: WITHIN THE PAST 12 MONTHS, YOU WORRIED THAT YOUR FOOD WOULD RUN OUT BEFORE YOU GOT MONEY TO BUY MORE.: NEVER TRUE

## 2022-12-29 SDOH — ECONOMIC STABILITY: FOOD INSECURITY: WITHIN THE PAST 12 MONTHS, THE FOOD YOU BOUGHT JUST DIDN'T LAST AND YOU DIDN'T HAVE MONEY TO GET MORE.: NEVER TRUE

## 2022-12-29 ASSESSMENT — ENCOUNTER SYMPTOMS
VOMITING: 0
CHEST TIGHTNESS: 0
NAUSEA: 0
ABDOMINAL DISTENTION: 0
SHORTNESS OF BREATH: 0
CONSTIPATION: 0
BLOOD IN STOOL: 0
ABDOMINAL PAIN: 0
DIARRHEA: 0

## 2022-12-29 ASSESSMENT — PATIENT HEALTH QUESTIONNAIRE - PHQ9
1. LITTLE INTEREST OR PLEASURE IN DOING THINGS: 0
SUM OF ALL RESPONSES TO PHQ QUESTIONS 1-9: 0
SUM OF ALL RESPONSES TO PHQ9 QUESTIONS 1 & 2: 0
2. FEELING DOWN, DEPRESSED OR HOPELESS: 0
SUM OF ALL RESPONSES TO PHQ QUESTIONS 1-9: 0

## 2022-12-29 ASSESSMENT — SOCIAL DETERMINANTS OF HEALTH (SDOH): HOW HARD IS IT FOR YOU TO PAY FOR THE VERY BASICS LIKE FOOD, HOUSING, MEDICAL CARE, AND HEATING?: NOT HARD AT ALL

## 2022-12-29 NOTE — PATIENT INSTRUCTIONS
Continue the medicines  It is ok to stay off the triamterene medication  Consider the shingle vaccine  See us in about 6 months

## 2022-12-30 DIAGNOSIS — E87.8 ELECTROLYTE ABNORMALITY: Primary | ICD-10-CM

## 2022-12-30 LAB
ESTIMATED AVERAGE GLUCOSE: 114 MG/DL
HBA1C MFR BLD: 5.6 %

## 2023-01-05 RX ORDER — LISINOPRIL 40 MG/1
TABLET ORAL
Qty: 90 TABLET | Refills: 1 | Status: SHIPPED | OUTPATIENT
Start: 2023-01-05

## 2023-01-10 ENCOUNTER — TELEPHONE (OUTPATIENT)
Dept: CARDIOLOGY CLINIC | Age: 88
End: 2023-01-10

## 2023-01-10 RX ORDER — HYDROCHLOROTHIAZIDE 12.5 MG/1
CAPSULE, GELATIN COATED ORAL
Qty: 90 CAPSULE | Refills: 1 | Status: SHIPPED | OUTPATIENT
Start: 2023-01-10

## 2023-01-10 NOTE — TELEPHONE ENCOUNTER
Edgardom shannan Fritz to call the office back to schedule follow up with Dr. Mary Colbert in [place] first available.

## 2023-04-21 ENCOUNTER — OFFICE VISIT (OUTPATIENT)
Dept: CARDIOLOGY CLINIC | Age: 88
End: 2023-04-21
Payer: MEDICARE

## 2023-04-21 VITALS
OXYGEN SATURATION: 97 % | SYSTOLIC BLOOD PRESSURE: 120 MMHG | HEART RATE: 93 BPM | HEIGHT: 60 IN | WEIGHT: 156 LBS | DIASTOLIC BLOOD PRESSURE: 72 MMHG | BODY MASS INDEX: 30.63 KG/M2

## 2023-04-21 DIAGNOSIS — E78.2 MIXED HYPERLIPIDEMIA: ICD-10-CM

## 2023-04-21 DIAGNOSIS — I35.0 NONRHEUMATIC AORTIC (VALVE) STENOSIS: Primary | ICD-10-CM

## 2023-04-21 DIAGNOSIS — R53.83 OTHER FATIGUE: ICD-10-CM

## 2023-04-21 DIAGNOSIS — I10 ESSENTIAL HYPERTENSION: ICD-10-CM

## 2023-04-21 DIAGNOSIS — I65.23 BILATERAL CAROTID ARTERY STENOSIS: ICD-10-CM

## 2023-04-21 PROCEDURE — 1123F ACP DISCUSS/DSCN MKR DOCD: CPT | Performed by: INTERNAL MEDICINE

## 2023-04-21 PROCEDURE — 93000 ELECTROCARDIOGRAM COMPLETE: CPT | Performed by: INTERNAL MEDICINE

## 2023-04-21 PROCEDURE — 99214 OFFICE O/P EST MOD 30 MIN: CPT | Performed by: INTERNAL MEDICINE

## 2023-04-21 NOTE — PROGRESS NOTES
[address]      Cardiology Progress Note    Eleanor Moon  1935 April 21, 2023    CC: \"I have no energy. \"     HPI:  The patient is 80 y.o. female with a past medical history significant for hypertension and hyperlipidemia, carotid stenosis, bilateral and heart murmur. She presents today in follow up s/p s/p AVR, tissue 3/9/17 per Dr. Ash Mensah. Today, she is here for follow up. She has not had any energy lately. She is unsure why. She is trying to stay as active as she can. She is going to see his PCP. Patient denies exertional chest pain/pressure, dyspnea at rest, LUEVANO, PND, orthopnea, palpitations, lightheadedness, weight changes, changes in LE edema, and syncope. Past Medical History:   Diagnosis Date    Aortic stenosis     Arthritis     mild hip area    Cerebrovascular disease     Diaphragm, eventration     right    Fatty liver     on CT 2012    Hiatal hernia     on CT 2012    Hyperlipidemia     Hypertension     Syncope 2016    \"twice a year since puberty\"     Past Surgical History:   Procedure Laterality Date    AORTIC VALVE REPLACEMENT  03/09/2017    19mm [place] pericardial Magna Ease    BLEPHAROPLASTY Bilateral 6/7/2021    BILATERAL UPPER LIDS BLEPHAROPLASTY performed by Sawyer Riddle MD at [address] Bilateral 01/2019    PRE-MALIGNANT / BENIGN SKIN LESION EXCISION Right 2014    R scalp. cystadenoma. --done under local     Family History   Problem Relation Age of Onset    Diabetes Mother     Cancer Mother         lung    Other Father         leukemia    Kidney Disease Brother     Parkinsonism Brother      Social History     Tobacco Use    Smoking status: Never    Smokeless tobacco: Never   Vaping Use    Vaping Use: Never used   Substance Use Topics    Alcohol use: Yes     Comment: socially once/week    Drug use: No     Comment: never       Allergies   Allergen Reactions    Adhesive Tape Itching and Dermatitis    Oxycontin [Oxycodone Hcl] Other (See

## 2023-04-29 ENCOUNTER — APPOINTMENT (OUTPATIENT)
Dept: GENERAL RADIOLOGY | Age: 88
DRG: 872 | End: 2023-04-29
Payer: MEDICARE

## 2023-04-29 ENCOUNTER — APPOINTMENT (OUTPATIENT)
Dept: CT IMAGING | Age: 88
DRG: 872 | End: 2023-04-29
Payer: MEDICARE

## 2023-04-29 ENCOUNTER — HOSPITAL ENCOUNTER (INPATIENT)
Age: 88
LOS: 5 days | Discharge: HOME | DRG: 872 | End: 2023-05-04
Attending: EMERGENCY MEDICINE | Admitting: INTERNAL MEDICINE
Payer: MEDICARE

## 2023-04-29 DIAGNOSIS — R50.9 FEVER IN ADULT: ICD-10-CM

## 2023-04-29 DIAGNOSIS — N17.9 AKI (ACUTE KIDNEY INJURY) (HCC): ICD-10-CM

## 2023-04-29 DIAGNOSIS — R55 SYNCOPE AND COLLAPSE: Primary | ICD-10-CM

## 2023-04-29 DIAGNOSIS — N39.0 URINARY TRACT INFECTION IN FEMALE: ICD-10-CM

## 2023-04-29 DIAGNOSIS — E87.1 HYPONATREMIA: ICD-10-CM

## 2023-04-29 PROBLEM — A41.9 SEPSIS (HCC): Status: ACTIVE | Noted: 2023-04-29

## 2023-04-29 LAB
ABO + RH BLD: NORMAL
ALBUMIN SERPL-MCNC: 3 G/DL (ref 3.4–5)
ALBUMIN SERPL-MCNC: 3.6 G/DL (ref 3.4–5)
ALBUMIN/GLOB SERPL: 1.2 {RATIO} (ref 1.1–2.2)
ALBUMIN/GLOB SERPL: 1.2 {RATIO} (ref 1.1–2.2)
ALP SERPL-CCNC: 48 U/L (ref 40–129)
ALP SERPL-CCNC: 68 U/L (ref 40–129)
ALT SERPL-CCNC: 14 U/L (ref 10–40)
ALT SERPL-CCNC: 17 U/L (ref 10–40)
ANION GAP SERPL CALCULATED.3IONS-SCNC: 15 MMOL/L (ref 3–16)
ANION GAP SERPL CALCULATED.3IONS-SCNC: 15 MMOL/L (ref 3–16)
AST SERPL-CCNC: 24 U/L (ref 15–37)
AST SERPL-CCNC: 26 U/L (ref 15–37)
BACTERIA URNS QL MICRO: ABNORMAL /HPF
BASOPHILS # BLD: 0 K/UL (ref 0–0.2)
BASOPHILS NFR BLD: 0.1 %
BILIRUB SERPL-MCNC: 0.4 MG/DL (ref 0–1)
BILIRUB SERPL-MCNC: 0.5 MG/DL (ref 0–1)
BILIRUB UR QL STRIP.AUTO: NEGATIVE
BLD GP AB SCN SERPL QL: NORMAL
BUN SERPL-MCNC: 50 MG/DL (ref 7–20)
BUN SERPL-MCNC: 51 MG/DL (ref 7–20)
CALCIUM SERPL-MCNC: 8 MG/DL (ref 8.3–10.6)
CALCIUM SERPL-MCNC: 9.3 MG/DL (ref 8.3–10.6)
CHLORIDE SERPL-SCNC: 89 MMOL/L (ref 99–110)
CHLORIDE SERPL-SCNC: 93 MMOL/L (ref 99–110)
CK SERPL-CCNC: 520 U/L (ref 26–192)
CK SERPL-CCNC: 536 U/L (ref 26–192)
CLARITY UR: ABNORMAL
CO2 SERPL-SCNC: 19 MMOL/L (ref 21–32)
CO2 SERPL-SCNC: 21 MMOL/L (ref 21–32)
COLOR UR: YELLOW
CREAT SERPL-MCNC: 2.5 MG/DL (ref 0.6–1.2)
CREAT SERPL-MCNC: 2.5 MG/DL (ref 0.6–1.2)
DEPRECATED RDW RBC AUTO: 13.1 % (ref 12.4–15.4)
EOSINOPHIL # BLD: 0 K/UL (ref 0–0.6)
EOSINOPHIL NFR BLD: 0 %
EPI CELLS #/AREA URNS AUTO: 9 /HPF (ref 0–5)
FLUAV RNA UPPER RESP QL NAA+PROBE: NEGATIVE
FLUBV AG NPH QL: NEGATIVE
GFR SERPLBLD CREATININE-BSD FMLA CKD-EPI: 18 ML/MIN/{1.73_M2}
GFR SERPLBLD CREATININE-BSD FMLA CKD-EPI: 18 ML/MIN/{1.73_M2}
GLUCOSE BLD-MCNC: 127 MG/DL (ref 70–99)
GLUCOSE SERPL-MCNC: 116 MG/DL (ref 70–99)
GLUCOSE SERPL-MCNC: 155 MG/DL (ref 70–99)
GLUCOSE UR STRIP.AUTO-MCNC: NEGATIVE MG/DL
HCT VFR BLD AUTO: 36.1 % (ref 36–48)
HGB BLD-MCNC: 12 G/DL (ref 12–16)
HGB UR QL STRIP.AUTO: ABNORMAL
HYALINE CASTS #/AREA URNS AUTO: 5 /LPF (ref 0–8)
KETONES UR STRIP.AUTO-MCNC: NEGATIVE MG/DL
LACTATE BLDV-SCNC: 1.6 MMOL/L (ref 0.4–1.9)
LACTATE BLDV-SCNC: 2.3 MMOL/L (ref 0.4–1.9)
LEUKOCYTE ESTERASE UR QL STRIP.AUTO: ABNORMAL
LYMPHOCYTES # BLD: 0.9 K/UL (ref 1–5.1)
LYMPHOCYTES NFR BLD: 4.3 %
MAGNESIUM SERPL-MCNC: 2.1 MG/DL (ref 1.8–2.4)
MAGNESIUM SERPL-MCNC: 2.2 MG/DL (ref 1.8–2.4)
MCH RBC QN AUTO: 28.5 PG (ref 26–34)
MCHC RBC AUTO-ENTMCNC: 33.2 G/DL (ref 31–36)
MCV RBC AUTO: 85.6 FL (ref 80–100)
MONOCYTES # BLD: 0.9 K/UL (ref 0–1.3)
MONOCYTES NFR BLD: 4.2 %
NEUTROPHILS # BLD: 20.2 K/UL (ref 1.7–7.7)
NEUTROPHILS NFR BLD: 91.4 %
NITRITE UR QL STRIP.AUTO: POSITIVE
PERFORMED ON: ABNORMAL
PH UR STRIP.AUTO: 5.5 [PH] (ref 5–8)
PHOSPHATE SERPL-MCNC: 3.4 MG/DL (ref 2.5–4.9)
PLATELET # BLD AUTO: 181 K/UL (ref 135–450)
PMV BLD AUTO: 9.9 FL (ref 5–10.5)
POTASSIUM SERPL-SCNC: 3.1 MMOL/L (ref 3.5–5.1)
POTASSIUM SERPL-SCNC: 4.1 MMOL/L (ref 3.5–5.1)
PROCALCITONIN SERPL IA-MCNC: 16.79 NG/ML (ref 0–0.15)
PROCALCITONIN SERPL IA-MCNC: 16.85 NG/ML (ref 0–0.15)
PROT SERPL-MCNC: 5.6 G/DL (ref 6.4–8.2)
PROT SERPL-MCNC: 6.7 G/DL (ref 6.4–8.2)
PROT UR STRIP.AUTO-MCNC: 100 MG/DL
RBC # BLD AUTO: 4.21 M/UL (ref 4–5.2)
RBC CLUMPS #/AREA URNS AUTO: 23 /HPF (ref 0–4)
SARS-COV-2 RDRP RESP QL NAA+PROBE: NOT DETECTED
SODIUM SERPL-SCNC: 125 MMOL/L (ref 136–145)
SODIUM SERPL-SCNC: 127 MMOL/L (ref 136–145)
SP GR UR STRIP.AUTO: 1.01 (ref 1–1.03)
TROPONIN, HIGH SENSITIVITY: 51 NG/L (ref 0–14)
TROPONIN, HIGH SENSITIVITY: 55 NG/L (ref 0–14)
UA DIPSTICK W REFLEX MICRO PNL UR: YES
URN SPEC COLLECT METH UR: ABNORMAL
UROBILINOGEN UR STRIP-ACNC: 0.2 E.U./DL
WBC # BLD AUTO: 22.2 K/UL (ref 4–11)
WBC #/AREA URNS AUTO: 124 /HPF (ref 0–5)

## 2023-04-29 PROCEDURE — 86901 BLOOD TYPING SEROLOGIC RH(D): CPT

## 2023-04-29 PROCEDURE — 94150 VITAL CAPACITY TEST: CPT

## 2023-04-29 PROCEDURE — 2500000003 HC RX 250 WO HCPCS: Performed by: INTERNAL MEDICINE

## 2023-04-29 PROCEDURE — 87040 BLOOD CULTURE FOR BACTERIA: CPT

## 2023-04-29 PROCEDURE — 81001 URINALYSIS AUTO W/SCOPE: CPT

## 2023-04-29 PROCEDURE — 83735 ASSAY OF MAGNESIUM: CPT

## 2023-04-29 PROCEDURE — 70450 CT HEAD/BRAIN W/O DYE: CPT

## 2023-04-29 PROCEDURE — 99285 EMERGENCY DEPT VISIT HI MDM: CPT

## 2023-04-29 PROCEDURE — 6370000000 HC RX 637 (ALT 250 FOR IP): Performed by: INTERNAL MEDICINE

## 2023-04-29 PROCEDURE — 87186 SC STD MICRODIL/AGAR DIL: CPT

## 2023-04-29 PROCEDURE — 71045 X-RAY EXAM CHEST 1 VIEW: CPT

## 2023-04-29 PROCEDURE — 2700000000 HC OXYGEN THERAPY PER DAY

## 2023-04-29 PROCEDURE — 72170 X-RAY EXAM OF PELVIS: CPT

## 2023-04-29 PROCEDURE — 87150 DNA/RNA AMPLIFIED PROBE: CPT

## 2023-04-29 PROCEDURE — 84145 PROCALCITONIN (PCT): CPT

## 2023-04-29 PROCEDURE — 86850 RBC ANTIBODY SCREEN: CPT

## 2023-04-29 PROCEDURE — 72125 CT NECK SPINE W/O DYE: CPT

## 2023-04-29 PROCEDURE — 86900 BLOOD TYPING SEROLOGIC ABO: CPT

## 2023-04-29 PROCEDURE — 6370000000 HC RX 637 (ALT 250 FOR IP): Performed by: EMERGENCY MEDICINE

## 2023-04-29 PROCEDURE — 87804 INFLUENZA ASSAY W/OPTIC: CPT

## 2023-04-29 PROCEDURE — 85025 COMPLETE CBC W/AUTO DIFF WBC: CPT

## 2023-04-29 PROCEDURE — 94761 N-INVAS EAR/PLS OXIMETRY MLT: CPT

## 2023-04-29 PROCEDURE — 2060000000 HC ICU INTERMEDIATE R&B

## 2023-04-29 PROCEDURE — 36415 COLL VENOUS BLD VENIPUNCTURE: CPT

## 2023-04-29 PROCEDURE — 2580000003 HC RX 258: Performed by: INTERNAL MEDICINE

## 2023-04-29 PROCEDURE — 84484 ASSAY OF TROPONIN QUANT: CPT

## 2023-04-29 PROCEDURE — 96361 HYDRATE IV INFUSION ADD-ON: CPT

## 2023-04-29 PROCEDURE — 6360000002 HC RX W HCPCS: Performed by: INTERNAL MEDICINE

## 2023-04-29 PROCEDURE — 82550 ASSAY OF CK (CPK): CPT

## 2023-04-29 PROCEDURE — 96365 THER/PROPH/DIAG IV INF INIT: CPT

## 2023-04-29 PROCEDURE — 87635 SARS-COV-2 COVID-19 AMP PRB: CPT

## 2023-04-29 PROCEDURE — 83605 ASSAY OF LACTIC ACID: CPT

## 2023-04-29 PROCEDURE — 2580000003 HC RX 258: Performed by: EMERGENCY MEDICINE

## 2023-04-29 PROCEDURE — 80053 COMPREHEN METABOLIC PANEL: CPT

## 2023-04-29 PROCEDURE — 84100 ASSAY OF PHOSPHORUS: CPT

## 2023-04-29 PROCEDURE — 93005 ELECTROCARDIOGRAM TRACING: CPT | Performed by: EMERGENCY MEDICINE

## 2023-04-29 PROCEDURE — 6360000002 HC RX W HCPCS: Performed by: EMERGENCY MEDICINE

## 2023-04-29 PROCEDURE — 94640 AIRWAY INHALATION TREATMENT: CPT

## 2023-04-29 RX ORDER — ACETAMINOPHEN 500 MG
1000 TABLET ORAL ONCE
Status: COMPLETED | OUTPATIENT
Start: 2023-04-29 | End: 2023-04-29

## 2023-04-29 RX ORDER — METRONIDAZOLE 500 MG/100ML
500 INJECTION, SOLUTION INTRAVENOUS EVERY 8 HOURS
Status: DISCONTINUED | OUTPATIENT
Start: 2023-04-29 | End: 2023-04-30 | Stop reason: ALTCHOICE

## 2023-04-29 RX ORDER — IPRATROPIUM BROMIDE AND ALBUTEROL SULFATE 2.5; .5 MG/3ML; MG/3ML
1 SOLUTION RESPIRATORY (INHALATION) ONCE
Status: COMPLETED | OUTPATIENT
Start: 2023-04-29 | End: 2023-04-29

## 2023-04-29 RX ORDER — SODIUM CHLORIDE 0.9 % (FLUSH) 0.9 %
5-40 SYRINGE (ML) INJECTION EVERY 12 HOURS SCHEDULED
Status: DISCONTINUED | OUTPATIENT
Start: 2023-04-29 | End: 2023-05-04 | Stop reason: HOSPADM

## 2023-04-29 RX ORDER — SODIUM CHLORIDE 0.9 % (FLUSH) 0.9 %
5-40 SYRINGE (ML) INJECTION PRN
Status: DISCONTINUED | OUTPATIENT
Start: 2023-04-29 | End: 2023-05-04 | Stop reason: HOSPADM

## 2023-04-29 RX ORDER — SODIUM CHLORIDE 9 MG/ML
INJECTION, SOLUTION INTRAVENOUS PRN
Status: DISCONTINUED | OUTPATIENT
Start: 2023-04-29 | End: 2023-05-04 | Stop reason: HOSPADM

## 2023-04-29 RX ORDER — ACETAMINOPHEN 650 MG/1
650 SUPPOSITORY RECTAL EVERY 6 HOURS PRN
Status: DISCONTINUED | OUTPATIENT
Start: 2023-04-29 | End: 2023-05-04 | Stop reason: HOSPADM

## 2023-04-29 RX ORDER — ROSUVASTATIN CALCIUM 10 MG/1
10 TABLET, COATED ORAL NIGHTLY
Status: DISCONTINUED | OUTPATIENT
Start: 2023-04-29 | End: 2023-05-04 | Stop reason: HOSPADM

## 2023-04-29 RX ORDER — 0.9 % SODIUM CHLORIDE 0.9 %
1000 INTRAVENOUS SOLUTION INTRAVENOUS ONCE
Status: COMPLETED | OUTPATIENT
Start: 2023-04-29 | End: 2023-04-29

## 2023-04-29 RX ORDER — DOPAMINE HYDROCHLORIDE 160 MG/100ML
1-20 INJECTION, SOLUTION INTRAVENOUS CONTINUOUS
Status: DISCONTINUED | OUTPATIENT
Start: 2023-04-29 | End: 2023-05-03

## 2023-04-29 RX ORDER — SODIUM CHLORIDE, SODIUM LACTATE, POTASSIUM CHLORIDE, AND CALCIUM CHLORIDE .6; .31; .03; .02 G/100ML; G/100ML; G/100ML; G/100ML
1000 INJECTION, SOLUTION INTRAVENOUS ONCE
Status: COMPLETED | OUTPATIENT
Start: 2023-04-29 | End: 2023-04-29

## 2023-04-29 RX ORDER — ACETAMINOPHEN 325 MG/1
650 TABLET ORAL EVERY 6 HOURS PRN
Status: DISCONTINUED | OUTPATIENT
Start: 2023-04-29 | End: 2023-05-04 | Stop reason: HOSPADM

## 2023-04-29 RX ORDER — HEPARIN SODIUM 5000 [USP'U]/ML
5000 INJECTION, SOLUTION INTRAVENOUS; SUBCUTANEOUS EVERY 8 HOURS SCHEDULED
Status: DISCONTINUED | OUTPATIENT
Start: 2023-04-29 | End: 2023-05-04 | Stop reason: HOSPADM

## 2023-04-29 RX ORDER — METRONIDAZOLE 500 MG/1
500 TABLET ORAL ONCE
Status: COMPLETED | OUTPATIENT
Start: 2023-04-29 | End: 2023-04-29

## 2023-04-29 RX ORDER — SODIUM CHLORIDE, SODIUM LACTATE, POTASSIUM CHLORIDE, AND CALCIUM CHLORIDE .6; .31; .03; .02 G/100ML; G/100ML; G/100ML; G/100ML
30 INJECTION, SOLUTION INTRAVENOUS ONCE
Status: COMPLETED | OUTPATIENT
Start: 2023-04-29 | End: 2023-04-29

## 2023-04-29 RX ORDER — SODIUM CHLORIDE 9 MG/ML
INJECTION, SOLUTION INTRAVENOUS CONTINUOUS
Status: DISCONTINUED | OUTPATIENT
Start: 2023-04-29 | End: 2023-05-01

## 2023-04-29 RX ORDER — ALBUTEROL SULFATE 2.5 MG/3ML
2.5 SOLUTION RESPIRATORY (INHALATION) ONCE
Status: DISCONTINUED | OUTPATIENT
Start: 2023-04-29 | End: 2023-04-29

## 2023-04-29 RX ORDER — MIDODRINE HYDROCHLORIDE 5 MG/1
5 TABLET ORAL
Status: DISCONTINUED | OUTPATIENT
Start: 2023-04-29 | End: 2023-05-01

## 2023-04-29 RX ADMIN — DOPAMINE HYDROCHLORIDE IN DEXTROSE 5 MCG/KG/MIN: 1.6 INJECTION, SOLUTION INTRAVENOUS at 19:59

## 2023-04-29 RX ADMIN — ASPIRIN 325 MG: 325 TABLET, COATED ORAL at 18:38

## 2023-04-29 RX ADMIN — SODIUM CHLORIDE, POTASSIUM CHLORIDE, SODIUM LACTATE AND CALCIUM CHLORIDE 1000 ML: 600; 310; 30; 20 INJECTION, SOLUTION INTRAVENOUS at 12:08

## 2023-04-29 RX ADMIN — HEPARIN SODIUM 5000 UNITS: 5000 INJECTION INTRAVENOUS; SUBCUTANEOUS at 22:23

## 2023-04-29 RX ADMIN — METRONIDAZOLE 500 MG: 500 TABLET ORAL at 12:37

## 2023-04-29 RX ADMIN — SODIUM CHLORIDE 1000 ML: 9 INJECTION, SOLUTION INTRAVENOUS at 13:27

## 2023-04-29 RX ADMIN — CEFEPIME 2000 MG: 2 INJECTION, POWDER, FOR SOLUTION INTRAVENOUS at 12:34

## 2023-04-29 RX ADMIN — IPRATROPIUM BROMIDE AND ALBUTEROL SULFATE 1 AMPULE: .5; 2.5 SOLUTION RESPIRATORY (INHALATION) at 15:08

## 2023-04-29 RX ADMIN — CEFTRIAXONE 1000 MG: 1 INJECTION, POWDER, FOR SOLUTION INTRAMUSCULAR; INTRAVENOUS at 18:39

## 2023-04-29 RX ADMIN — SODIUM CHLORIDE, POTASSIUM CHLORIDE, SODIUM LACTATE AND CALCIUM CHLORIDE 500 ML: 600; 310; 30; 20 INJECTION, SOLUTION INTRAVENOUS at 18:20

## 2023-04-29 RX ADMIN — SODIUM CHLORIDE: 9 INJECTION, SOLUTION INTRAVENOUS at 18:18

## 2023-04-29 RX ADMIN — IPRATROPIUM BROMIDE AND ALBUTEROL SULFATE 1 AMPULE: .5; 2.5 SOLUTION RESPIRATORY (INHALATION) at 18:16

## 2023-04-29 RX ADMIN — METRONIDAZOLE 500 MG: 500 INJECTION, SOLUTION INTRAVENOUS at 22:29

## 2023-04-29 RX ADMIN — ROSUVASTATIN CALCIUM 10 MG: 10 TABLET, FILM COATED ORAL at 22:23

## 2023-04-29 RX ADMIN — MIDODRINE HYDROCHLORIDE 5 MG: 5 TABLET ORAL at 18:08

## 2023-04-29 RX ADMIN — ACETAMINOPHEN 1000 MG: 500 TABLET ORAL at 14:40

## 2023-04-29 ASSESSMENT — ENCOUNTER SYMPTOMS
VOMITING: 0
DIARRHEA: 1
ABDOMINAL PAIN: 0
COUGH: 0
NAUSEA: 1
SHORTNESS OF BREATH: 0

## 2023-04-29 ASSESSMENT — PAIN - FUNCTIONAL ASSESSMENT: PAIN_FUNCTIONAL_ASSESSMENT: NONE - DENIES PAIN

## 2023-04-29 ASSESSMENT — PAIN SCALES - GENERAL: PAINLEVEL_OUTOF10: 0

## 2023-04-30 PROBLEM — E87.1 HYPONATREMIA: Status: ACTIVE | Noted: 2023-04-30

## 2023-04-30 PROBLEM — N39.0 URINARY TRACT INFECTION IN FEMALE: Status: ACTIVE | Noted: 2023-04-30

## 2023-04-30 LAB
ANION GAP SERPL CALCULATED.3IONS-SCNC: 16 MMOL/L (ref 3–16)
BASOPHILS # BLD: 0 K/UL (ref 0–0.2)
BASOPHILS NFR BLD: 0.1 %
BUN SERPL-MCNC: 43 MG/DL (ref 7–20)
CALCIUM SERPL-MCNC: 7.9 MG/DL (ref 8.3–10.6)
CHLORIDE SERPL-SCNC: 92 MMOL/L (ref 99–110)
CO2 SERPL-SCNC: 18 MMOL/L (ref 21–32)
CREAT SERPL-MCNC: 1.8 MG/DL (ref 0.6–1.2)
DEPRECATED RDW RBC AUTO: 13.1 % (ref 12.4–15.4)
EKG ATRIAL RATE: 95 BPM
EKG DIAGNOSIS: NORMAL
EKG P AXIS: 48 DEGREES
EKG P-R INTERVAL: 160 MS
EKG Q-T INTERVAL: 368 MS
EKG QRS DURATION: 100 MS
EKG QTC CALCULATION (BAZETT): 462 MS
EKG R AXIS: 71 DEGREES
EKG T AXIS: 4 DEGREES
EKG VENTRICULAR RATE: 95 BPM
EOSINOPHIL # BLD: 0 K/UL (ref 0–0.6)
EOSINOPHIL NFR BLD: 0.1 %
GFR SERPLBLD CREATININE-BSD FMLA CKD-EPI: 27 ML/MIN/{1.73_M2}
GLUCOSE BLD-MCNC: 110 MG/DL (ref 70–99)
GLUCOSE BLD-MCNC: 126 MG/DL (ref 70–99)
GLUCOSE BLD-MCNC: 135 MG/DL (ref 70–99)
GLUCOSE BLD-MCNC: 146 MG/DL (ref 70–99)
GLUCOSE SERPL-MCNC: 134 MG/DL (ref 70–99)
HCT VFR BLD AUTO: 34.3 % (ref 36–48)
HGB BLD-MCNC: 11.6 G/DL (ref 12–16)
LYMPHOCYTES # BLD: 0.5 K/UL (ref 1–5.1)
LYMPHOCYTES NFR BLD: 2.2 %
MCH RBC QN AUTO: 28.5 PG (ref 26–34)
MCHC RBC AUTO-ENTMCNC: 33.7 G/DL (ref 31–36)
MCV RBC AUTO: 84.5 FL (ref 80–100)
MONOCYTES # BLD: 0.6 K/UL (ref 0–1.3)
MONOCYTES NFR BLD: 2.7 %
NEUTROPHILS # BLD: 22 K/UL (ref 1.7–7.7)
NEUTROPHILS NFR BLD: 94.9 %
OSMOLALITY UR: 303 MOSM/KG (ref 390–1070)
PERFORMED ON: ABNORMAL
PLATELET # BLD AUTO: 175 K/UL (ref 135–450)
PMV BLD AUTO: 10.2 FL (ref 5–10.5)
POTASSIUM SERPL-SCNC: 3.5 MMOL/L (ref 3.5–5.1)
RBC # BLD AUTO: 4.06 M/UL (ref 4–5.2)
REPORT: NORMAL
SODIUM SERPL-SCNC: 126 MMOL/L (ref 136–145)
SODIUM SERPL-SCNC: 126 MMOL/L (ref 136–145)
SODIUM UR-SCNC: 35 MMOL/L
URATE SERPL-MCNC: 8.8 MG/DL (ref 2.6–6)
WBC # BLD AUTO: 23.1 K/UL (ref 4–11)

## 2023-04-30 PROCEDURE — 84300 ASSAY OF URINE SODIUM: CPT

## 2023-04-30 PROCEDURE — 2580000003 HC RX 258: Performed by: INTERNAL MEDICINE

## 2023-04-30 PROCEDURE — 80048 BASIC METABOLIC PNL TOTAL CA: CPT

## 2023-04-30 PROCEDURE — 94761 N-INVAS EAR/PLS OXIMETRY MLT: CPT

## 2023-04-30 PROCEDURE — 36415 COLL VENOUS BLD VENIPUNCTURE: CPT

## 2023-04-30 PROCEDURE — 87086 URINE CULTURE/COLONY COUNT: CPT

## 2023-04-30 PROCEDURE — 85025 COMPLETE CBC W/AUTO DIFF WBC: CPT

## 2023-04-30 PROCEDURE — 93010 ELECTROCARDIOGRAM REPORT: CPT | Performed by: INTERNAL MEDICINE

## 2023-04-30 PROCEDURE — 99223 1ST HOSP IP/OBS HIGH 75: CPT | Performed by: INTERNAL MEDICINE

## 2023-04-30 PROCEDURE — 84550 ASSAY OF BLOOD/URIC ACID: CPT

## 2023-04-30 PROCEDURE — 83935 ASSAY OF URINE OSMOLALITY: CPT

## 2023-04-30 PROCEDURE — 84295 ASSAY OF SERUM SODIUM: CPT

## 2023-04-30 PROCEDURE — 6360000002 HC RX W HCPCS: Performed by: INTERNAL MEDICINE

## 2023-04-30 PROCEDURE — 2060000000 HC ICU INTERMEDIATE R&B

## 2023-04-30 PROCEDURE — 6370000000 HC RX 637 (ALT 250 FOR IP): Performed by: INTERNAL MEDICINE

## 2023-04-30 PROCEDURE — 2500000003 HC RX 250 WO HCPCS: Performed by: INTERNAL MEDICINE

## 2023-04-30 PROCEDURE — 87040 BLOOD CULTURE FOR BACTERIA: CPT

## 2023-04-30 RX ORDER — TRIAMTERENE AND HYDROCHLOROTHIAZIDE 75; 50 MG/1; MG/1
1 TABLET ORAL DAILY
Status: ON HOLD | COMMUNITY
End: 2023-05-04 | Stop reason: HOSPADM

## 2023-04-30 RX ADMIN — ROSUVASTATIN CALCIUM 10 MG: 10 TABLET, FILM COATED ORAL at 21:08

## 2023-04-30 RX ADMIN — ACETAMINOPHEN 650 MG: 325 TABLET ORAL at 04:14

## 2023-04-30 RX ADMIN — ACETAMINOPHEN 650 MG: 325 TABLET ORAL at 21:18

## 2023-04-30 RX ADMIN — HEPARIN SODIUM 5000 UNITS: 5000 INJECTION INTRAVENOUS; SUBCUTANEOUS at 04:14

## 2023-04-30 RX ADMIN — CEFTRIAXONE SODIUM 2000 MG: 2 INJECTION, POWDER, FOR SOLUTION INTRAMUSCULAR; INTRAVENOUS at 06:17

## 2023-04-30 RX ADMIN — HEPARIN SODIUM 5000 UNITS: 5000 INJECTION INTRAVENOUS; SUBCUTANEOUS at 21:08

## 2023-04-30 RX ADMIN — SODIUM CHLORIDE: 9 INJECTION, SOLUTION INTRAVENOUS at 04:13

## 2023-04-30 RX ADMIN — HEPARIN SODIUM 5000 UNITS: 5000 INJECTION INTRAVENOUS; SUBCUTANEOUS at 15:08

## 2023-04-30 RX ADMIN — ASPIRIN 325 MG: 325 TABLET, COATED ORAL at 08:34

## 2023-04-30 RX ADMIN — METRONIDAZOLE 500 MG: 500 INJECTION, SOLUTION INTRAVENOUS at 04:14

## 2023-04-30 ASSESSMENT — PAIN DESCRIPTION - LOCATION: LOCATION: HEAD

## 2023-04-30 ASSESSMENT — PAIN DESCRIPTION - ORIENTATION: ORIENTATION: RIGHT;LEFT

## 2023-04-30 ASSESSMENT — PAIN - FUNCTIONAL ASSESSMENT: PAIN_FUNCTIONAL_ASSESSMENT: ACTIVITIES ARE NOT PREVENTED

## 2023-04-30 ASSESSMENT — PAIN DESCRIPTION - DESCRIPTORS: DESCRIPTORS: DISCOMFORT

## 2023-04-30 ASSESSMENT — PAIN SCALES - GENERAL: PAINLEVEL_OUTOF10: 3

## 2023-05-01 ENCOUNTER — APPOINTMENT (OUTPATIENT)
Dept: ULTRASOUND IMAGING | Age: 88
DRG: 872 | End: 2023-05-01
Payer: MEDICARE

## 2023-05-01 LAB
ANION GAP SERPL CALCULATED.3IONS-SCNC: 17 MMOL/L (ref 3–16)
BACTERIA UR CULT: NORMAL
BASOPHILS # BLD: 0 K/UL (ref 0–0.2)
BASOPHILS NFR BLD: 0.1 %
BUN SERPL-MCNC: 38 MG/DL (ref 7–20)
C DIFF TOX A+B STL QL IA: NORMAL
CALCIUM SERPL-MCNC: 7.9 MG/DL (ref 8.3–10.6)
CHLORIDE SERPL-SCNC: 96 MMOL/L (ref 99–110)
CO2 SERPL-SCNC: 17 MMOL/L (ref 21–32)
CORTIS AM PEAK SERPL-MCNC: 30.9 UG/DL (ref 4.3–22.4)
CREAT SERPL-MCNC: 1.7 MG/DL (ref 0.6–1.2)
DEPRECATED RDW RBC AUTO: 13.4 % (ref 12.4–15.4)
EOSINOPHIL # BLD: 0.3 K/UL (ref 0–0.6)
EOSINOPHIL NFR BLD: 1.4 %
GFR SERPLBLD CREATININE-BSD FMLA CKD-EPI: 29 ML/MIN/{1.73_M2}
GLUCOSE BLD-MCNC: 95 MG/DL (ref 70–99)
GLUCOSE BLD-MCNC: 97 MG/DL (ref 70–99)
GLUCOSE SERPL-MCNC: 93 MG/DL (ref 70–99)
HCT VFR BLD AUTO: 33.7 % (ref 36–48)
HGB BLD-MCNC: 11.2 G/DL (ref 12–16)
LV EF: 60 %
LVEF MODALITY: NORMAL
LYMPHOCYTES # BLD: 0.6 K/UL (ref 1–5.1)
LYMPHOCYTES NFR BLD: 3 %
MCH RBC QN AUTO: 28.1 PG (ref 26–34)
MCHC RBC AUTO-ENTMCNC: 33.3 G/DL (ref 31–36)
MCV RBC AUTO: 84.5 FL (ref 80–100)
MONOCYTES # BLD: 0.7 K/UL (ref 0–1.3)
MONOCYTES NFR BLD: 3.9 %
NEUTROPHILS # BLD: 17 K/UL (ref 1.7–7.7)
NEUTROPHILS NFR BLD: 91.6 %
OSMOLALITY UR: 361 MOSM/KG (ref 390–1070)
PERFORMED ON: NORMAL
PERFORMED ON: NORMAL
PLATELET # BLD AUTO: 175 K/UL (ref 135–450)
PMV BLD AUTO: 10.2 FL (ref 5–10.5)
POTASSIUM SERPL-SCNC: 2.9 MMOL/L (ref 3.5–5.1)
POTASSIUM SERPL-SCNC: 3 MMOL/L (ref 3.5–5.1)
RBC # BLD AUTO: 3.99 M/UL (ref 4–5.2)
SODIUM SERPL-SCNC: 128 MMOL/L (ref 136–145)
SODIUM SERPL-SCNC: 130 MMOL/L (ref 136–145)
SODIUM UR-SCNC: 30 MMOL/L
WBC # BLD AUTO: 18.6 K/UL (ref 4–11)

## 2023-05-01 PROCEDURE — 6360000002 HC RX W HCPCS: Performed by: NURSE PRACTITIONER

## 2023-05-01 PROCEDURE — 6370000000 HC RX 637 (ALT 250 FOR IP): Performed by: INTERNAL MEDICINE

## 2023-05-01 PROCEDURE — 83935 ASSAY OF URINE OSMOLALITY: CPT

## 2023-05-01 PROCEDURE — 2580000003 HC RX 258: Performed by: INTERNAL MEDICINE

## 2023-05-01 PROCEDURE — 6360000002 HC RX W HCPCS: Performed by: INTERNAL MEDICINE

## 2023-05-01 PROCEDURE — 36415 COLL VENOUS BLD VENIPUNCTURE: CPT

## 2023-05-01 PROCEDURE — 84132 ASSAY OF SERUM POTASSIUM: CPT

## 2023-05-01 PROCEDURE — 93306 TTE W/DOPPLER COMPLETE: CPT

## 2023-05-01 PROCEDURE — 80048 BASIC METABOLIC PNL TOTAL CA: CPT

## 2023-05-01 PROCEDURE — 99233 SBSQ HOSP IP/OBS HIGH 50: CPT | Performed by: STUDENT IN AN ORGANIZED HEALTH CARE EDUCATION/TRAINING PROGRAM

## 2023-05-01 PROCEDURE — 97530 THERAPEUTIC ACTIVITIES: CPT | Performed by: PHYSICAL THERAPIST

## 2023-05-01 PROCEDURE — 97166 OT EVAL MOD COMPLEX 45 MIN: CPT

## 2023-05-01 PROCEDURE — 87324 CLOSTRIDIUM AG IA: CPT

## 2023-05-01 PROCEDURE — 97162 PT EVAL MOD COMPLEX 30 MIN: CPT | Performed by: PHYSICAL THERAPIST

## 2023-05-01 PROCEDURE — 92610 EVALUATE SWALLOWING FUNCTION: CPT

## 2023-05-01 PROCEDURE — 87449 NOS EACH ORGANISM AG IA: CPT

## 2023-05-01 PROCEDURE — 97530 THERAPEUTIC ACTIVITIES: CPT

## 2023-05-01 PROCEDURE — 84295 ASSAY OF SERUM SODIUM: CPT

## 2023-05-01 PROCEDURE — 84300 ASSAY OF URINE SODIUM: CPT

## 2023-05-01 PROCEDURE — 6370000000 HC RX 637 (ALT 250 FOR IP): Performed by: STUDENT IN AN ORGANIZED HEALTH CARE EDUCATION/TRAINING PROGRAM

## 2023-05-01 PROCEDURE — 97535 SELF CARE MNGMENT TRAINING: CPT

## 2023-05-01 PROCEDURE — 97116 GAIT TRAINING THERAPY: CPT | Performed by: PHYSICAL THERAPIST

## 2023-05-01 PROCEDURE — 76770 US EXAM ABDO BACK WALL COMP: CPT

## 2023-05-01 PROCEDURE — 82533 TOTAL CORTISOL: CPT

## 2023-05-01 PROCEDURE — 2060000000 HC ICU INTERMEDIATE R&B

## 2023-05-01 PROCEDURE — 94760 N-INVAS EAR/PLS OXIMETRY 1: CPT

## 2023-05-01 PROCEDURE — 85025 COMPLETE CBC W/AUTO DIFF WBC: CPT

## 2023-05-01 RX ORDER — POTASSIUM CHLORIDE 750 MG/1
40 TABLET, FILM COATED, EXTENDED RELEASE ORAL 3 TIMES DAILY
Status: DISCONTINUED | OUTPATIENT
Start: 2023-05-01 | End: 2023-05-02

## 2023-05-01 RX ORDER — TRISODIUM CITRATE DIHYDRATE AND CITRIC ACID MONOHYDRATE 500; 334 MG/5ML; MG/5ML
30 SOLUTION ORAL ONCE
Status: COMPLETED | OUTPATIENT
Start: 2023-05-01 | End: 2023-05-01

## 2023-05-01 RX ORDER — HYDRALAZINE HYDROCHLORIDE 20 MG/ML
5 INJECTION INTRAMUSCULAR; INTRAVENOUS ONCE
Status: COMPLETED | OUTPATIENT
Start: 2023-05-01 | End: 2023-05-01

## 2023-05-01 RX ORDER — CETIRIZINE HYDROCHLORIDE 10 MG/1
5 TABLET ORAL DAILY
Status: DISCONTINUED | OUTPATIENT
Start: 2023-05-01 | End: 2023-05-04 | Stop reason: HOSPADM

## 2023-05-01 RX ORDER — SODIUM CHLORIDE AND POTASSIUM CHLORIDE 150; 450 MG/100ML; MG/100ML
INJECTION, SOLUTION INTRAVENOUS CONTINUOUS
Status: DISCONTINUED | OUTPATIENT
Start: 2023-05-01 | End: 2023-05-01

## 2023-05-01 RX ADMIN — ASPIRIN 325 MG: 325 TABLET, COATED ORAL at 09:18

## 2023-05-01 RX ADMIN — Medication 10 ML: at 09:25

## 2023-05-01 RX ADMIN — HEPARIN SODIUM 5000 UNITS: 5000 INJECTION INTRAVENOUS; SUBCUTANEOUS at 20:27

## 2023-05-01 RX ADMIN — CETIRIZINE HYDROCHLORIDE 5 MG: 10 TABLET, FILM COATED ORAL at 09:18

## 2023-05-01 RX ADMIN — POTASSIUM CHLORIDE: 2 INJECTION, SOLUTION, CONCENTRATE INTRAVENOUS at 12:57

## 2023-05-01 RX ADMIN — HEPARIN SODIUM 5000 UNITS: 5000 INJECTION INTRAVENOUS; SUBCUTANEOUS at 05:25

## 2023-05-01 RX ADMIN — HEPARIN SODIUM 5000 UNITS: 5000 INJECTION INTRAVENOUS; SUBCUTANEOUS at 14:00

## 2023-05-01 RX ADMIN — ROSUVASTATIN CALCIUM 10 MG: 10 TABLET, FILM COATED ORAL at 20:27

## 2023-05-01 RX ADMIN — POTASSIUM CHLORIDE 40 MEQ: 750 TABLET, FILM COATED, EXTENDED RELEASE ORAL at 09:18

## 2023-05-01 RX ADMIN — SODIUM CITRATE AND CITRIC ACID MONOHYDRATE 30 ML: 500; 334 SOLUTION ORAL at 12:52

## 2023-05-01 RX ADMIN — CEFTRIAXONE SODIUM 2000 MG: 2 INJECTION, POWDER, FOR SOLUTION INTRAMUSCULAR; INTRAVENOUS at 05:29

## 2023-05-01 RX ADMIN — Medication 10 ML: at 20:28

## 2023-05-01 RX ADMIN — HYDRALAZINE HYDROCHLORIDE 5 MG: 20 INJECTION INTRAMUSCULAR; INTRAVENOUS at 22:42

## 2023-05-01 RX ADMIN — POTASSIUM CHLORIDE 40 MEQ: 750 TABLET, FILM COATED, EXTENDED RELEASE ORAL at 15:22

## 2023-05-01 RX ADMIN — POTASSIUM CHLORIDE 40 MEQ: 750 TABLET, FILM COATED, EXTENDED RELEASE ORAL at 20:27

## 2023-05-01 NOTE — CARE COORDINATION
Case Management Assessment  Initial Evaluation    Date/Time of Evaluation: 5/1/2023 2:59 PM  Assessment Completed by: KARLEE Chanel    If patient is discharged prior to next notation, then this note serves as note for discharge by case management. Case Management Notes: met with pt at bedside with sons Aftab Castañeda and Tanmay Cain and dtr Diane Sulema and PO Whiteside- to Aftab Castañeda. They are very supportive and helpful to pt if needed but she is very independent at home, uses no DME, drives, and takes care of self. Pt reports she is not homebound and plans to return to driving and going to [employer] daily. Therapy recommended home care and home care list was provided to family for review. Lif[facility] resources info provided as well. Will need to f/u at time of dc to see if home care is needed or not. [facility] is out of network with insurance    Patient Name: Rosi Guillen                   YOB: 1935  Diagnosis: Syncope and collapse [R55]  Hyponatremia [E87.1]  LYNDA (acute kidney injury) ([facility] [address] [N17.9]  Fever in adult [R50.9]  Sepsis ([facility] [address] [A41.9]  Urinary tract infection in female [N39.0]                   Date / Time: 4/29/2023  9:48 AM    Patient Admission Status: Inpatient   Readmission Risk (Low < 19, Mod (19-27), High > 27): Readmission Risk Score: 12.4    Current PCP: Tucker Godoy MD  PCP verified by CM? Yes    Chart Reviewed: Yes      History Provided by: Patient  Patient Orientation: Alert and Oriented    Patient Cognition: Alert    Hospitalization in the last 30 days (Readmission):  No    If yes, Readmission Assessment in  Navigator will be completed.     Advance Directives:      Code Status: DNR-CCA   Patient's Primary Decision Maker is: Named in [address] [place] [address]    [facility] - [phone]    Primary Decision Maker: Jessica Samson - Child - [phone]    Discharge Planning:    Patient lives with: Alone Type of Home: Apartment  Primary Care Giver: Self  Patient Support

## 2023-05-02 ENCOUNTER — APPOINTMENT (OUTPATIENT)
Dept: GENERAL RADIOLOGY | Age: 88
DRG: 872 | End: 2023-05-02
Payer: MEDICARE

## 2023-05-02 ENCOUNTER — HOSPITAL ENCOUNTER (INPATIENT)
Dept: CARDIAC CATH/INVASIVE PROCEDURES | Age: 88
Discharge: HOME | DRG: 872 | End: 2023-05-02
Payer: MEDICARE

## 2023-05-02 LAB
ANION GAP SERPL CALCULATED.3IONS-SCNC: 11 MMOL/L (ref 3–16)
BACTERIA BLD CULT ORG #2: ABNORMAL
BACTERIA BLD CULT ORG #2: ABNORMAL
BASOPHILS # BLD: 0 K/UL (ref 0–0.2)
BASOPHILS NFR BLD: 0.2 %
BUN SERPL-MCNC: 38 MG/DL (ref 7–20)
CALCIUM SERPL-MCNC: 7.9 MG/DL (ref 8.3–10.6)
CHLORIDE SERPL-SCNC: 101 MMOL/L (ref 99–110)
CO2 SERPL-SCNC: 19 MMOL/L (ref 21–32)
CREAT SERPL-MCNC: 1.5 MG/DL (ref 0.6–1.2)
DEPRECATED RDW RBC AUTO: 13.4 % (ref 12.4–15.4)
EOSINOPHIL # BLD: 0.2 K/UL (ref 0–0.6)
EOSINOPHIL NFR BLD: 1.6 %
GFR SERPLBLD CREATININE-BSD FMLA CKD-EPI: 33 ML/MIN/{1.73_M2}
GLUCOSE SERPL-MCNC: 101 MG/DL (ref 70–99)
HCT VFR BLD AUTO: 32.9 % (ref 36–48)
HGB BLD-MCNC: 10.6 G/DL (ref 12–16)
LYMPHOCYTES # BLD: 1.1 K/UL (ref 1–5.1)
LYMPHOCYTES NFR BLD: 7.1 %
MCH RBC QN AUTO: 27.4 PG (ref 26–34)
MCHC RBC AUTO-ENTMCNC: 32.3 G/DL (ref 31–36)
MCV RBC AUTO: 84.9 FL (ref 80–100)
MONOCYTES # BLD: 0.9 K/UL (ref 0–1.3)
MONOCYTES NFR BLD: 6.1 %
NEUTROPHILS # BLD: 12.9 K/UL (ref 1.7–7.7)
NEUTROPHILS NFR BLD: 85 %
ORGANISM: ABNORMAL
ORGANISM: ABNORMAL
PLATELET # BLD AUTO: 199 K/UL (ref 135–450)
PMV BLD AUTO: 10.1 FL (ref 5–10.5)
POTASSIUM SERPL-SCNC: 4.1 MMOL/L (ref 3.5–5.1)
RBC # BLD AUTO: 3.88 M/UL (ref 4–5.2)
SODIUM SERPL-SCNC: 131 MMOL/L (ref 136–145)
WBC # BLD AUTO: 15.2 K/UL (ref 4–11)

## 2023-05-02 PROCEDURE — 74230 X-RAY XM SWLNG FUNCJ C+: CPT

## 2023-05-02 PROCEDURE — 36415 COLL VENOUS BLD VENIPUNCTURE: CPT

## 2023-05-02 PROCEDURE — 97116 GAIT TRAINING THERAPY: CPT | Performed by: PHYSICAL THERAPIST

## 2023-05-02 PROCEDURE — 99152 MOD SED SAME PHYS/QHP 5/>YRS: CPT | Performed by: STUDENT IN AN ORGANIZED HEALTH CARE EDUCATION/TRAINING PROGRAM

## 2023-05-02 PROCEDURE — 6360000002 HC RX W HCPCS

## 2023-05-02 PROCEDURE — 6370000000 HC RX 637 (ALT 250 FOR IP): Performed by: INTERNAL MEDICINE

## 2023-05-02 PROCEDURE — 99233 SBSQ HOSP IP/OBS HIGH 50: CPT | Performed by: STUDENT IN AN ORGANIZED HEALTH CARE EDUCATION/TRAINING PROGRAM

## 2023-05-02 PROCEDURE — 6360000002 HC RX W HCPCS: Performed by: INTERNAL MEDICINE

## 2023-05-02 PROCEDURE — 2580000003 HC RX 258: Performed by: INTERNAL MEDICINE

## 2023-05-02 PROCEDURE — 93325 DOPPLER ECHO COLOR FLOW MAPG: CPT

## 2023-05-02 PROCEDURE — 2580000003 HC RX 258

## 2023-05-02 PROCEDURE — 85025 COMPLETE CBC W/AUTO DIFF WBC: CPT

## 2023-05-02 PROCEDURE — 80048 BASIC METABOLIC PNL TOTAL CA: CPT

## 2023-05-02 PROCEDURE — 97535 SELF CARE MNGMENT TRAINING: CPT

## 2023-05-02 PROCEDURE — 93312 ECHO TRANSESOPHAGEAL: CPT | Performed by: STUDENT IN AN ORGANIZED HEALTH CARE EDUCATION/TRAINING PROGRAM

## 2023-05-02 PROCEDURE — 93312 ECHO TRANSESOPHAGEAL: CPT

## 2023-05-02 PROCEDURE — 97530 THERAPEUTIC ACTIVITIES: CPT | Performed by: PHYSICAL THERAPIST

## 2023-05-02 PROCEDURE — 93321 DOPPLER ECHO F-UP/LMTD STD: CPT

## 2023-05-02 PROCEDURE — B24BZZ4 ULTRASONOGRAPHY OF HEART WITH AORTA, TRANSESOPHAGEAL: ICD-10-PCS | Performed by: STUDENT IN AN ORGANIZED HEALTH CARE EDUCATION/TRAINING PROGRAM

## 2023-05-02 PROCEDURE — 92611 MOTION FLUOROSCOPY/SWALLOW: CPT

## 2023-05-02 PROCEDURE — 6370000000 HC RX 637 (ALT 250 FOR IP)

## 2023-05-02 PROCEDURE — 2060000000 HC ICU INTERMEDIATE R&B

## 2023-05-02 PROCEDURE — 97530 THERAPEUTIC ACTIVITIES: CPT

## 2023-05-02 PROCEDURE — 99152 MOD SED SAME PHYS/QHP 5/>YRS: CPT

## 2023-05-02 PROCEDURE — 97129 THER IVNTJ 1ST 15 MIN: CPT

## 2023-05-02 RX ADMIN — CEFTRIAXONE SODIUM 2000 MG: 2 INJECTION, POWDER, FOR SOLUTION INTRAMUSCULAR; INTRAVENOUS at 05:55

## 2023-05-02 RX ADMIN — HEPARIN SODIUM 5000 UNITS: 5000 INJECTION INTRAVENOUS; SUBCUTANEOUS at 05:59

## 2023-05-02 RX ADMIN — HEPARIN SODIUM 5000 UNITS: 5000 INJECTION INTRAVENOUS; SUBCUTANEOUS at 20:39

## 2023-05-02 RX ADMIN — HEPARIN SODIUM 5000 UNITS: 5000 INJECTION INTRAVENOUS; SUBCUTANEOUS at 14:00

## 2023-05-02 RX ADMIN — POTASSIUM CHLORIDE: 2 INJECTION, SOLUTION, CONCENTRATE INTRAVENOUS at 00:45

## 2023-05-02 RX ADMIN — ROSUVASTATIN CALCIUM 10 MG: 10 TABLET, FILM COATED ORAL at 20:38

## 2023-05-02 RX ADMIN — Medication 10 ML: at 20:39

## 2023-05-02 RX ADMIN — ACETAMINOPHEN 650 MG: 325 TABLET ORAL at 00:40

## 2023-05-02 NOTE — CARE COORDINATION
[facility] [place].    Referral received from  to follow for home care services. I will follow for needs, and speak with patient to verify demos.     Denia Dalal RN, BSN CTN  [facility] [phone]

## 2023-05-02 NOTE — CONSULTS
GASTROENTEROLOGY INPATIENT CONSULTATION          IDENTIFYING DATA/REASON FOR CONSULTATION   PATIENT:  Trevor Sanders  MRN:  [id number]  ADMIT DATE: 4/29/2023  TIME OF EVALUATION: 5/2/2023 8:35 AM  HOSPITAL STAY:   LOS: 3 days     REASON FOR CONSULTATION:  dysphagia    HISTORY OF PRESENT ILLNESS   Trevor Sanders is a 80 y.o. female with a PMH of HTN, HLD, bioprosthetic AVR who presented on 4/29/2023 with recurrent falls, diarrhea, burning micturition. She was admitted with UTI sepsis, E coli bacteremia, LYNDA, hyponatremia. GI consulted regarding dysphagia. Patient describes intermittently feeling food getting stuck in her upper esophagus. It occurs when she is anxious. She often has to vomit the food back up. It has been occurring for years. She denies history of heartburn symptoms. She has never had an EGD procedure. She has been evaluated by speech therapy and bedside swallow eval noted mild oral stage and pharyngeal stage dysphagia. She is scheduled for modified barium swallow study today. PAST MEDICAL, SURGICAL, FAMILY, and SOCIAL HISTORY     Past Medical History:   Diagnosis Date    Aortic stenosis     Cerebrovascular disease     Class 1 obesity     Hepatic steatosis     Hiatal hernia     Hyperlipidemia     Hypertension     Osteoarthritis      Past Surgical History:   Procedure Laterality Date    AORTIC VALVE REPLACEMENT  03/09/2017    19mm [place] pericardial Magna Ease    BLEPHAROPLASTY Bilateral 6/7/2021    BILATERAL UPPER LIDS BLEPHAROPLASTY performed by Richard Lees MD at [address] [place] [address] Av Bilateral 01/2019    PRE-MALIGNANT / BENIGN SKIN LESION EXCISION Right 2014    R scalp. cystadenoma. --done under local     Family History   Problem Relation Age of Onset    Diabetes Mother     Cancer Mother         lung    Other Father         leukemia    Kidney Disease Brother     Parkinsonism Brother      Social History     Socioeconomic History    Marital status:       Spouse

## 2023-05-02 NOTE — CARE COORDINATION
05/02/23 1317   IMM Letter   IMM Letter given to Patient/Family/Significant other/Guardian/POA/by: Provided to patient at bedside by Altagracia Tee RN. Education provided to patient, patient reported no questions and verbalized understanding. Patient aware of 4 hours allotted time to determine if they choose to pursue Medicare appeal process.    IMM Letter date given: 05/02/23   IMM Letter time given: 1225     #[phone]  Electronically signed by Altagracia Tee RN on 5/2/2023 at 1:17 PM

## 2023-05-02 NOTE — PROCEDURES
Patient: Henrry Nuñez   :   1935    A pre-sedation re-evaluation was performed immediately prior to beginning of  the procedure. Procedure: ERA    Moderate Sedation:  Start time: 1030  Stop time: 1100  3 mg versed   75 mcg fentanyl   An independent trained observer pushed medications at my direction. We monitored the patient's level of consciousness and vital signs/physiologic status throughout the procedure duration (see start and start times above).  ]    Complications: None  Estimated Blood Loss: none  Specimens: Were not obtained    Preliminary ERA findings:  No evidence of significant prosthetic aortic valve stenosis  No evidence of valvular vegetation  No evidence of TIM thrombus    Gillian Salgado MD

## 2023-05-02 NOTE — PROCEDURES
INSTRUMENTAL SWALLOW REPORT  MODIFIED BARIUM SWALLOW    NAME: Mynor Gerardo   : 1935  MRN: [id number]       Date of Eval: 2023     Ordering Physician: Joseph Donsi  Radiologist: Kelley Rangel     Referring Diagnosis(es): Referring Diagnosis: arpan-pharyngeal dysphagia    Past Medical History:  has a past medical history of Aortic stenosis, Cerebrovascular disease, Class 1 obesity, Hepatic steatosis, Hiatal hernia, Hyperlipidemia, Hypertension, and Osteoarthritis. Past Surgical History:  has a past surgical history that includes pre-malignant / benign skin lesion excision (Right, ); Aortic valve replacement (2017); Cataract removal (Bilateral, 2019); and blepharoplasty (Bilateral, 2021). Chart Review:   H&P:   80 y.o. female with pmh of ischemic stroke, obesity, hepatic steatosis, hyperlipidemia, hypertension, aortic stenosis status post AVR bovine valve in  was brought to the family to the ER due to recurrent falls and ongoing diarrhea, burning micturition. Patient reports she had diarrhea started 3 days prior to presentation, stool is yellow watery nonbloody 4-6 times a day. Does not remember eating outside food. Reports having poor p.o. intake since then. Yesterday at [facility], patient fell on the floor when she tried to stand up from a chair and hit her face on the floor. No documented seizure-like activity or loss of consciousness. She had another episode at home when she stood up from chair and landed on the floor. She was too weak to stand up, EMS was called and patient was transferred to the hospital.  Patient also reports having urinary frequency and dysuria for last few days. He was seen by the cardiologist last week who wanted to do an echo. Patient denies any chest pain, respiratory symptoms or extremity weakness.      Current Diet Level (prior to evaluation): Regular texture diet  Thin liquids    Respiratory Status:   [x]Room Air              []O2 via nasal cannula

## 2023-05-03 LAB
ANION GAP SERPL CALCULATED.3IONS-SCNC: 11 MMOL/L (ref 3–16)
BACTERIA BLD CULT: ABNORMAL
BASOPHILS # BLD: 0 K/UL (ref 0–0.2)
BASOPHILS NFR BLD: 0.3 %
BUN SERPL-MCNC: 27 MG/DL (ref 7–20)
CALCIUM SERPL-MCNC: 8.1 MG/DL (ref 8.3–10.6)
CHLORIDE SERPL-SCNC: 103 MMOL/L (ref 99–110)
CO2 SERPL-SCNC: 17 MMOL/L (ref 21–32)
CREAT SERPL-MCNC: 1.2 MG/DL (ref 0.6–1.2)
DEPRECATED RDW RBC AUTO: 13.6 % (ref 12.4–15.4)
EOSINOPHIL # BLD: 0.5 K/UL (ref 0–0.6)
EOSINOPHIL NFR BLD: 3.6 %
GFR SERPLBLD CREATININE-BSD FMLA CKD-EPI: 44 ML/MIN/{1.73_M2}
GLUCOSE SERPL-MCNC: 93 MG/DL (ref 70–99)
HCT VFR BLD AUTO: 32 % (ref 36–48)
HGB BLD-MCNC: 10.6 G/DL (ref 12–16)
LYMPHOCYTES # BLD: 1.4 K/UL (ref 1–5.1)
LYMPHOCYTES NFR BLD: 10.2 %
MCH RBC QN AUTO: 28.4 PG (ref 26–34)
MCHC RBC AUTO-ENTMCNC: 33.1 G/DL (ref 31–36)
MCV RBC AUTO: 85.9 FL (ref 80–100)
MONOCYTES # BLD: 1.2 K/UL (ref 0–1.3)
MONOCYTES NFR BLD: 8.6 %
NEUTROPHILS # BLD: 10.6 K/UL (ref 1.7–7.7)
NEUTROPHILS NFR BLD: 77.3 %
ORGANISM: ABNORMAL
PLATELET # BLD AUTO: 212 K/UL (ref 135–450)
PMV BLD AUTO: 9.8 FL (ref 5–10.5)
POTASSIUM SERPL-SCNC: 4.1 MMOL/L (ref 3.5–5.1)
RBC # BLD AUTO: 3.72 M/UL (ref 4–5.2)
SODIUM SERPL-SCNC: 131 MMOL/L (ref 136–145)
WBC # BLD AUTO: 13.8 K/UL (ref 4–11)

## 2023-05-03 PROCEDURE — 6370000000 HC RX 637 (ALT 250 FOR IP): Performed by: INTERNAL MEDICINE

## 2023-05-03 PROCEDURE — 6360000002 HC RX W HCPCS: Performed by: INTERNAL MEDICINE

## 2023-05-03 PROCEDURE — 2580000003 HC RX 258: Performed by: INTERNAL MEDICINE

## 2023-05-03 PROCEDURE — 6370000000 HC RX 637 (ALT 250 FOR IP): Performed by: STUDENT IN AN ORGANIZED HEALTH CARE EDUCATION/TRAINING PROGRAM

## 2023-05-03 PROCEDURE — 92526 ORAL FUNCTION THERAPY: CPT

## 2023-05-03 PROCEDURE — 97535 SELF CARE MNGMENT TRAINING: CPT

## 2023-05-03 PROCEDURE — 97530 THERAPEUTIC ACTIVITIES: CPT

## 2023-05-03 PROCEDURE — 85025 COMPLETE CBC W/AUTO DIFF WBC: CPT

## 2023-05-03 PROCEDURE — 2060000000 HC ICU INTERMEDIATE R&B

## 2023-05-03 PROCEDURE — 97129 THER IVNTJ 1ST 15 MIN: CPT

## 2023-05-03 PROCEDURE — 36415 COLL VENOUS BLD VENIPUNCTURE: CPT

## 2023-05-03 PROCEDURE — 80048 BASIC METABOLIC PNL TOTAL CA: CPT

## 2023-05-03 PROCEDURE — 94760 N-INVAS EAR/PLS OXIMETRY 1: CPT

## 2023-05-03 RX ORDER — AMLODIPINE BESYLATE 10 MG/1
10 TABLET ORAL DAILY
Status: DISCONTINUED | OUTPATIENT
Start: 2023-05-03 | End: 2023-05-04 | Stop reason: HOSPADM

## 2023-05-03 RX ORDER — SODIUM BICARBONATE 650 MG/1
650 TABLET ORAL 3 TIMES DAILY
Status: DISCONTINUED | OUTPATIENT
Start: 2023-05-03 | End: 2023-05-04 | Stop reason: HOSPADM

## 2023-05-03 RX ORDER — PANTOPRAZOLE SODIUM 40 MG/1
40 TABLET, DELAYED RELEASE ORAL
Status: DISCONTINUED | OUTPATIENT
Start: 2023-05-03 | End: 2023-05-04 | Stop reason: HOSPADM

## 2023-05-03 RX ADMIN — Medication 10 ML: at 20:53

## 2023-05-03 RX ADMIN — PANTOPRAZOLE SODIUM 40 MG: 40 TABLET, DELAYED RELEASE ORAL at 13:47

## 2023-05-03 RX ADMIN — SODIUM BICARBONATE 650 MG: 650 TABLET ORAL at 13:47

## 2023-05-03 RX ADMIN — SODIUM BICARBONATE 650 MG: 650 TABLET ORAL at 20:52

## 2023-05-03 RX ADMIN — HEPARIN SODIUM 5000 UNITS: 5000 INJECTION INTRAVENOUS; SUBCUTANEOUS at 06:14

## 2023-05-03 RX ADMIN — AMLODIPINE BESYLATE 10 MG: 10 TABLET ORAL at 13:47

## 2023-05-03 RX ADMIN — HEPARIN SODIUM 5000 UNITS: 5000 INJECTION INTRAVENOUS; SUBCUTANEOUS at 13:49

## 2023-05-03 RX ADMIN — Medication 10 ML: at 08:11

## 2023-05-03 RX ADMIN — CEFTRIAXONE SODIUM 2000 MG: 2 INJECTION, POWDER, FOR SOLUTION INTRAMUSCULAR; INTRAVENOUS at 06:24

## 2023-05-03 RX ADMIN — HEPARIN SODIUM 5000 UNITS: 5000 INJECTION INTRAVENOUS; SUBCUTANEOUS at 20:52

## 2023-05-03 RX ADMIN — ASPIRIN 325 MG: 325 TABLET, COATED ORAL at 08:10

## 2023-05-03 RX ADMIN — ROSUVASTATIN CALCIUM 10 MG: 10 TABLET, FILM COATED ORAL at 20:52

## 2023-05-03 RX ADMIN — CETIRIZINE HYDROCHLORIDE 5 MG: 10 TABLET, FILM COATED ORAL at 08:10

## 2023-05-03 ASSESSMENT — PAIN SCALES - GENERAL
PAINLEVEL_OUTOF10: 0

## 2023-05-04 VITALS
WEIGHT: 161.6 LBS | DIASTOLIC BLOOD PRESSURE: 67 MMHG | HEART RATE: 87 BPM | SYSTOLIC BLOOD PRESSURE: 133 MMHG | RESPIRATION RATE: 19 BRPM | OXYGEN SATURATION: 96 % | HEIGHT: 60 IN | TEMPERATURE: 98.8 F | BODY MASS INDEX: 31.73 KG/M2

## 2023-05-04 LAB
ANION GAP SERPL CALCULATED.3IONS-SCNC: 7 MMOL/L (ref 3–16)
BACTERIA BLD CULT ORG #2: NORMAL
BACTERIA BLD CULT: NORMAL
BASOPHILS # BLD: 0 K/UL (ref 0–0.2)
BASOPHILS NFR BLD: 0 %
BUN SERPL-MCNC: 21 MG/DL (ref 7–20)
CALCIUM SERPL-MCNC: 8.2 MG/DL (ref 8.3–10.6)
CHLORIDE SERPL-SCNC: 102 MMOL/L (ref 99–110)
CO2 SERPL-SCNC: 24 MMOL/L (ref 21–32)
CREAT SERPL-MCNC: 1 MG/DL (ref 0.6–1.2)
DEPRECATED RDW RBC AUTO: 13.4 % (ref 12.4–15.4)
EOSINOPHIL # BLD: 0.5 K/UL (ref 0–0.6)
EOSINOPHIL NFR BLD: 4 %
GFR SERPLBLD CREATININE-BSD FMLA CKD-EPI: 54 ML/MIN/{1.73_M2}
GLUCOSE SERPL-MCNC: 115 MG/DL (ref 70–99)
HCT VFR BLD AUTO: 31.9 % (ref 36–48)
HGB BLD-MCNC: 10.6 G/DL (ref 12–16)
LYMPHOCYTES # BLD: 1.1 K/UL (ref 1–5.1)
LYMPHOCYTES NFR BLD: 8 %
MCH RBC QN AUTO: 28.7 PG (ref 26–34)
MCHC RBC AUTO-ENTMCNC: 33.4 G/DL (ref 31–36)
MCV RBC AUTO: 86 FL (ref 80–100)
MONOCYTES # BLD: 0.7 K/UL (ref 0–1.3)
MONOCYTES NFR BLD: 5 %
NEUTROPHILS # BLD: 11.3 K/UL (ref 1.7–7.7)
NEUTROPHILS NFR BLD: 76 %
NEUTS BAND NFR BLD MANUAL: 7 % (ref 0–7)
PLATELET # BLD AUTO: 243 K/UL (ref 135–450)
PLATELET BLD QL SMEAR: ADEQUATE
PMV BLD AUTO: 9.3 FL (ref 5–10.5)
POTASSIUM SERPL-SCNC: 3.9 MMOL/L (ref 3.5–5.1)
RBC # BLD AUTO: 3.71 M/UL (ref 4–5.2)
RBC MORPH BLD: NORMAL
SLIDE REVIEW: ABNORMAL
SODIUM SERPL-SCNC: 133 MMOL/L (ref 136–145)
WBC # BLD AUTO: 13.6 K/UL (ref 4–11)

## 2023-05-04 PROCEDURE — 94760 N-INVAS EAR/PLS OXIMETRY 1: CPT

## 2023-05-04 PROCEDURE — 6360000002 HC RX W HCPCS: Performed by: INTERNAL MEDICINE

## 2023-05-04 PROCEDURE — 36415 COLL VENOUS BLD VENIPUNCTURE: CPT

## 2023-05-04 PROCEDURE — 2580000003 HC RX 258: Performed by: INTERNAL MEDICINE

## 2023-05-04 PROCEDURE — 6370000000 HC RX 637 (ALT 250 FOR IP): Performed by: INTERNAL MEDICINE

## 2023-05-04 PROCEDURE — 97116 GAIT TRAINING THERAPY: CPT | Performed by: PHYSICAL THERAPIST

## 2023-05-04 PROCEDURE — 85025 COMPLETE CBC W/AUTO DIFF WBC: CPT

## 2023-05-04 PROCEDURE — 92526 ORAL FUNCTION THERAPY: CPT

## 2023-05-04 PROCEDURE — 80048 BASIC METABOLIC PNL TOTAL CA: CPT

## 2023-05-04 PROCEDURE — 97129 THER IVNTJ 1ST 15 MIN: CPT

## 2023-05-04 PROCEDURE — 6370000000 HC RX 637 (ALT 250 FOR IP): Performed by: STUDENT IN AN ORGANIZED HEALTH CARE EDUCATION/TRAINING PROGRAM

## 2023-05-04 PROCEDURE — 6370000000 HC RX 637 (ALT 250 FOR IP): Performed by: FAMILY MEDICINE

## 2023-05-04 PROCEDURE — 97530 THERAPEUTIC ACTIVITIES: CPT | Performed by: PHYSICAL THERAPIST

## 2023-05-04 RX ORDER — FUROSEMIDE 20 MG/1
20 TABLET ORAL DAILY PRN
Qty: 60 TABLET | Refills: 0 | Status: SHIPPED | OUTPATIENT
Start: 2023-05-04

## 2023-05-04 RX ORDER — CIPROFLOXACIN 500 MG/1
500 TABLET, FILM COATED ORAL EVERY 12 HOURS SCHEDULED
Qty: 20 TABLET | Refills: 0 | Status: SHIPPED | OUTPATIENT
Start: 2023-05-04 | End: 2023-05-14

## 2023-05-04 RX ORDER — CIPROFLOXACIN 500 MG/1
500 TABLET, FILM COATED ORAL EVERY 12 HOURS SCHEDULED
Status: DISCONTINUED | OUTPATIENT
Start: 2023-05-04 | End: 2023-05-04

## 2023-05-04 RX ORDER — CIPROFLOXACIN 500 MG/1
500 TABLET, FILM COATED ORAL EVERY 12 HOURS SCHEDULED
Status: DISCONTINUED | OUTPATIENT
Start: 2023-05-04 | End: 2023-05-04 | Stop reason: HOSPADM

## 2023-05-04 RX ORDER — PANTOPRAZOLE SODIUM 40 MG/1
40 TABLET, DELAYED RELEASE ORAL
Qty: 30 TABLET | Refills: 3 | Status: SHIPPED | OUTPATIENT
Start: 2023-05-05

## 2023-05-04 RX ORDER — SODIUM BICARBONATE 650 MG/1
650 TABLET ORAL 3 TIMES DAILY
Qty: 90 TABLET | Refills: 0 | Status: SHIPPED | OUTPATIENT
Start: 2023-05-04

## 2023-05-04 RX ORDER — GREEN TEA/HOODIA GORDONII 315-12.5MG
1 CAPSULE ORAL 2 TIMES DAILY
Qty: 60 TABLET | Refills: 0 | Status: SHIPPED | OUTPATIENT
Start: 2023-05-04 | End: 2023-06-03

## 2023-05-04 RX ADMIN — PANTOPRAZOLE SODIUM 40 MG: 40 TABLET, DELAYED RELEASE ORAL at 06:33

## 2023-05-04 RX ADMIN — CETIRIZINE HYDROCHLORIDE 5 MG: 10 TABLET, FILM COATED ORAL at 08:12

## 2023-05-04 RX ADMIN — HEPARIN SODIUM 5000 UNITS: 5000 INJECTION INTRAVENOUS; SUBCUTANEOUS at 06:32

## 2023-05-04 RX ADMIN — SODIUM BICARBONATE 650 MG: 650 TABLET ORAL at 08:13

## 2023-05-04 RX ADMIN — ASPIRIN 325 MG: 325 TABLET, COATED ORAL at 08:13

## 2023-05-04 RX ADMIN — Medication 10 ML: at 08:11

## 2023-05-04 RX ADMIN — CEFTRIAXONE SODIUM 2000 MG: 2 INJECTION, POWDER, FOR SOLUTION INTRAMUSCULAR; INTRAVENOUS at 06:35

## 2023-05-04 RX ADMIN — SODIUM BICARBONATE 650 MG: 650 TABLET ORAL at 13:44

## 2023-05-04 RX ADMIN — AMLODIPINE BESYLATE 10 MG: 10 TABLET ORAL at 08:13

## 2023-05-04 RX ADMIN — CIPROFLOXACIN 500 MG: 500 TABLET, FILM COATED ORAL at 13:44

## 2023-05-04 ASSESSMENT — PAIN SCALES - GENERAL: PAINLEVEL_OUTOF10: 0

## 2023-05-04 NOTE — DISCHARGE INSTRUCTIONS
Communication from Nephrology: You were seen by the kidney doctor for LYNDA and low sodium in the blood. Stop hydrochlorothiazide. Continue lisinopril. Check BP at home , goal 140/90 or less. Please call to make an appointment with Da Cherry MD in 2-4 weeks.      [facility]  [id number] [id number]  Phone: [phone]  Fax: [phone]

## 2023-05-04 NOTE — PROGRESS NOTES
[address] [place] [address] Nephrology   [facility]. [employer]  [phone]  Nephrology Note          Patient ID: Cong Esquivel  Referring/ Physician: Omaira Chavez MD      HPI/Summary:   Cong Esquivel is being seen by nephrology for LYNDA and hyponatremia. This is a 79 yo lady with h/o HTN, storke, heaptic steatosis, HLD, HTN, aortic stenosis with history of AVR. She passed out and hit her head at home on Friday. She was not feeling dizziness or lightheaded preceding the fall. No chest pain or SOB. Says she does not usually get leg swelling but has some now. Did have some dysuria. No hematuria or frothy urine. Has been taking two aleve twice daily for Uri symptoms this past week. Reports that she had diarrhea on Friday and also was taking HCTZ prior to admission as well as lisinopril. Her PO intake had been less on Friday. Interval Hx:   Seen and examined at bedside. She is awake and alert and sitting up in the chair. No complaints     /75 > 147/72  94% on room air   UO not recorded. Na 131 > 133  Bicarb 17 > 24  BUN 27 > 21  Cr 1.2 > 1      Plan:   - hyponatremia improved again. - cont sodium bicarb 650 mg TID for 30 days at dc- ordered. - Cr  at baseline.   - no volume overload on exam.   - resume lisinopril 40 mg daily at dc - ordered. - will not continue amlodipine at dc- ordered   - ok with dc, follow up 2-4 weeks. Med changes discussed with patient and family. Asked her to check BP at home since HCTZ/triamterene discontinued. #Hyponatremia   Acute on chronic   Here with bacteremia, UTI sepsis and syncope  On HCTZ prior to admission and had diarrhea as well. On NSAIDs as well. Na 125 on admission > 127 > 126   Na 132 in December 2022  Urinalysis 1.013 specific gravity   UA showed pyuria and + nitrite 100 mg /dl albumin so consistent with UTI. #LYNDA  Here with UTI and possible dehydration. Was also on ACEi, HCTZ and NSAIDS prior to admission. Cr 1.8 at time of consult.    
[address] [place] [address] Nephrology   [facility]. [facility]  [phone]  Nephrology Note          Patient ID: Cindy Alejandre  Referring/ Physician: Whitney Rodriguez MD      HPI/Summary:   Cindy Alejandre is being seen by nephrology for LYNDA and hyponatremia. This is a 81 yo lady with h/o HTN, storke, heaptic steatosis, HLD, HTN, aortic stenosis with history of AVR. She passed out and hit her head at home on Friday. She was not feeling dizziness or lightheaded preceding the fall. No chest pain or SOB. Says she does not usually get leg swelling but has some now. Did have some dysuria. No hematuria or frothy urine. Has been taking two aleve twice daily for Uri symptoms this past week. Reports that she had diarrhea on Friday and also was taking HCTZ prior to admission as well as lisinopril. Her PO intake had been less on Friday. Interval Hx:   Seen and examined on the elevator while in transport to [place]  She has no complaints  No chest pain or SOB  ERA was done to bacteremia and bioprosthetic valve. ERA was normal.     On room air   /68  UO not recorded. Na 131 K 4.1 bicarb 19   BUN 38 Cr 1.5   Ca 7.9    1/2 NS 40 meq at 75 cc/hr       Plan:   - hyponatremia improved. - hypokalemia resolved. - Cr trending down slowly. - no volume overload on exam.   - will dc IVFs     #Hyponatremia   Acute on chronic   Here with bacteremia, UTI sepsis and syncope  On HCTZ prior to admission and had diarrhea as well. On NSAIDs as well. Na 125 on admission > 127 > 126   Na 132 in December 2022  Urinalysis 1.013 specific gravity   UA showed pyuria and + nitrite 100 mg /dl albumin so consistent with UTI. #LYNDA  Here with UTI and possible dehydration. Was also on ACEi, HCTZ and NSAIDS prior to admission. Cr 1.8 at time of consult.    Urinalysis 1.013 specific gravity   UA showed pyuria and + nitrite 100 mg /dl albumin so consistent with UTI.        #UTI with bacteremia  Blood cxs 4/29 + E coli   Presenting with sepsis
[facility] Nephrology   [facility]. [facility]  [phone]  Nephrology Note          Patient ID: Enrique Deleon  Referring/ Physician: Juanita Maciel MD      HPI/Summary:   Enrique Deleon is being seen by nephrology for LYNDA and hyponatremia. This is a 81 yo lady with h/o HTN, storke, heaptic steatosis, HLD, HTN, aortic stenosis with history of AVR. She passed out and hit her head at home on Friday. She was not feeling dizziness or lightheaded preceding the fall. No chest pain or SOB. Says she does not usually get leg swelling but has some now. Did have some dysuria. No hematuria or frothy urine. Has been taking two aleve twice daily for Uri symptoms this past week. Reports that she had diarrhea on Friday and also was taking HCTZ prior to admission as well as lisinopril. Her PO intake had been less on Friday. Interval Hx:   Seen and examined at bedside. Awake and alert   Has been having ongoing diarrhea. Daughter at bedside. She is concerned about patient's swallowing and regurgitating food sometimes. /78  95% on room air    cc    Na 130 K 2.9   Bicarb 17   BUN 38 Cr 1.7  Ca 7.9      Plan:   - hyponatremia improved. - hypokalemia due to diarrhea and poor PO intake. Replacement was already ordered. Change IVFs to 1/2 NS with 40 meq KCL at 75 cc/hr   - bicitra 30 ml once for acidosis   - Cr trending down slowly. - no volume overload on exam.   - due to daughter's concerns about swallowing, ordered SLP    #Hyponatremia   Acute on chronic   Here with bacteremia, UTI sepsis and syncope  On HCTZ prior to admission and had diarrhea as well. On NSAIDs as well. Na 125 on admission > 127 > 126   Na 132 in December 2022  Urinalysis 1.013 specific gravity   UA showed pyuria and + nitrite 100 mg /dl albumin so consistent with UTI. #LYNDA  Here with UTI and possible dehydration. Was also on ACEi, HCTZ and NSAIDS prior to admission. Cr 1.8 at time of consult.       Urinalysis 1.013
[address] [place] [address] Nephrology   [facility]. [facility]  [phone]  Nephrology Note          Patient ID: Maribel Hensley  Referring/ Physician: Delgado Brand MD      HPI/Summary:   Maribel Hensley is being seen by nephrology for LYNDA and hyponatremia. This is a 79 yo lady with h/o HTN, storke, heaptic steatosis, HLD, HTN, aortic stenosis with history of AVR. She passed out and hit her head at home on Friday. She was not feeling dizziness or lightheaded preceding the fall. No chest pain or SOB. Says she does not usually get leg swelling but has some now. Did have some dysuria. No hematuria or frothy urine. Has been taking two aleve twice daily for Uri symptoms this past week. Reports that she had diarrhea on Friday and also was taking HCTZ prior to admission as well as lisinopril. Her PO intake had been less on Friday. Interval Hx:   Seen and examined at bedside. She is awake and alert and sitting up in the chair. Eating lunch, no complaints   Still having diarrhea. /75  98% on room air   UO not recorded. Na 131 k 4.1   Bicarb 17   BUN 27   Cr 1.2      Plan:   - hyponatremia stable today   - add sodium bicarb 650 mg TID. - Cr trending down   - no volume overload on exam.   - add amlodipine 10 mg daily for HTN  - if renal function stable tomorrow can discharge and resume lisinopril but hold HCTZ at dc    #Hyponatremia   Acute on chronic   Here with bacteremia, UTI sepsis and syncope  On HCTZ prior to admission and had diarrhea as well. On NSAIDs as well. Na 125 on admission > 127 > 126   Na 132 in December 2022  Urinalysis 1.013 specific gravity   UA showed pyuria and + nitrite 100 mg /dl albumin so consistent with UTI. #LYNDA  Here with UTI and possible dehydration. Was also on ACEi, HCTZ and NSAIDS prior to admission. Cr 1.8 at time of consult.    Urinalysis 1.013 specific gravity   UA showed pyuria and + nitrite 100 mg /dl albumin so consistent with UTI.        #UTI with
At 2015 pt B/P was 180/72. Notified hosp on call and received order for hydralazine X1.  /64 and at midnight B/P was 145/80. At midnight, pt temp 101.1. Gave PRN tylenol. Will monitor closely.
Call GI    Swallowing evaluation completed and reviewed  The patient stated that she will be discharged tomorrow  My office will call her to schedule an elective EGD with esophageal dilatation as an outpatient
Discharge instructions reviewed with pt and daughter. Spoke with them about medication changes, follow up appointments, Lasix-swelling. Both verbalized understanding. Pt awaiting wheelchair transport for discharge. Pt will  medications at the retail pharmacy.  Electronically signed by Mia Beckett RN on 5/4/2023 at 2:35 PM
Facility/Department: [address] PROGRESSIVE CARE  Initial Assessment  DYSPHAGIA BEDSIDE SWALLOW EVALUATION     Patient: Billye Sicard   : 1935   MRN: [id number]      Evaluation Date: 2023   Admitting Diagnosis: Syncope and collapse [R55]  Hyponatremia [E87.1]  LYNDA (acute kidney injury) ([facility] [N17.9]  Fever in adult [R50.9]  Sepsis ([facility] [A41.9]  Urinary tract infection in female [N39.0]  Pain: Did not state                                                       Chart Review:   H&P:   80 y.o. female with pmh of ischemic stroke, obesity, hepatic steatosis, hyperlipidemia, hypertension, aortic stenosis status post AVR bovine valve in 2017 was brought to the family to the ER due to recurrent falls and ongoing diarrhea, burning micturition. Patient reports she had diarrhea started 3 days prior to presentation, stool is yellow watery nonbloody 4-6 times a day. Does not remember eating outside food. Reports having poor p.o. intake since then. Yesterday at [facility], patient fell on the floor when she tried to stand up from a chair and hit her face on the floor. No documented seizure-like activity or loss of consciousness. She had another episode at home when she stood up from chair and landed on the floor. She was too weak to stand up, EMS was called and patient was transferred to the hospital.  Patient also reports having urinary frequency and dysuria for last few days. He was seen by the cardiologist last week who wanted to do an echo. Patient denies any chest pain, respiratory symptoms or extremity weakness. Current Diet Level (prior to evaluation): Regular texture diet  Thin liquids    Respiratory Status:   [x]Room Air   []O2 via nasal cannula   []Other:    Imaging:  Chest X-ray: 2023  FINDINGS:  The heart size is within normal limits. The pulmonary vasculature is also  within normal limits. No acute infiltrates are seen. No pneumothoraces are  noted. There are postsurgical changes of median
Hospitalist Progress Note      PCP: Vaughn Bruner MD    Date of Admission: 4/29/2023    Chief Complaint: recurrent falls, burning micturition, diarrhea. Hospital Course:     80 y.o. female with pmh of ischemic stroke, obesity, hepatic steatosis, hyperlipidemia, hypertension, aortic stenosis status post AVR bovine valve in 2017 was brought to the family to the ER due to recurrent falls and ongoing diarrhea, burning micturition. Patient reports she had diarrhea started 3 days prior to presentation, stool is yellow watery nonbloody 4-6 times a day. Does not remember eating outside food. Reports having poor p.o. intake since then. Yesterday at [facility], patient fell on the floor when she tried to stand up from a chair and hit her face on the floor. No documented seizure-like activity or loss of consciousness. She had another episode at home when she stood up from chair and landed on the floor. She was too weak to stand up, EMS was called and patient was transferred to the hospital.  Patient also reports having urinary frequency and dysuria for last few days. He was seen by the cardiologist last week who wanted to do an echo. Patient denies any chest pain, respiratory symptoms or extremity weakness. Admission course :  - Patient is febrile on presentation, blood cultures positive for E. coli. - was  found to have hyponatremia and LYNDA ( nephrology consulted). - ERA 5/3 with No evidence of significant prosthetic aortic valve stenosis  - dysphagia noted, GI consulted,  barium swallow done. - patient is planned for EGD, inpatinet vs outpatient. Subjective:  Very pleasant, denies any complaints this morning. Family at bedside, updated.      Medications:  Reviewed    Infusion Medications    sodium chloride       Scheduled Medications    cetirizine  5 mg Oral Daily    cefTRIAXone (ROCEPHIN) IV  2,000 mg IntraVENous Q24H    aspirin  325 mg Oral Daily    rosuvastatin  10 mg Oral Nightly    sodium
[facility]   Speech Therapy  Daily Dysphagia Treatment Note        Hemal Friend  AGE: 80 y.o. GENDER: female  : 1935  [id number]  EPISODE DATE:  2023  Patient Active Problem List   Diagnosis    Hyperlipidemia    Impaired fasting glucose    Lung nodule    Heart murmur, systolic    Fatty liver disease, nonalcoholic    Syncope and collapse    Abnormal EKG    Neoplasm of uncertain behavior    Benign essential HTN    Shortness of breath    Nonrheumatic aortic valve stenosis    History of aortic valve replacement with bioprosthetic valve    Sepsis ([address]    Hyponatremia    Urinary tract infection in female     Allergies   Allergen Reactions    Adhesive Tape Itching and Dermatitis    Oxycontin [Oxycodone Hcl] Other (See Comments)     \"seeing things\"     Treatment Diagnosis: Dysphagia     Chart review:   H&P:   80 y.o. female with pmh of ischemic stroke, obesity, hepatic steatosis, hyperlipidemia, hypertension, aortic stenosis status post AVR bovine valve in 2017 was brought to the family to the ER due to recurrent falls and ongoing diarrhea, burning micturition. Patient reports she had diarrhea started 3 days prior to presentation, stool is yellow watery nonbloody 4-6 times a day. Does not remember eating outside food. Reports having poor p.o. intake since then. Yesterday at [facility], patient fell on the floor when she tried to stand up from a chair and hit her face on the floor. No documented seizure-like activity or loss of consciousness. She had another episode at home when she stood up from chair and landed on the floor. She was too weak to stand up, EMS was called and patient was transferred to the hospital.  Patient also reports having urinary frequency and dysuria for last few days. He was seen by the cardiologist last week who wanted to do an echo. Patient denies any chest pain, respiratory symptoms or extremity weakness.      Current Diet Level (prior to evaluation): Regular
Occupational Therapy  Facility/Department: [address] PROGRESSIVE CARE  Occupational Daily Treatment Note      Name: Meagan Tolentino  : 1935  MRN: [id number]  Date of Service: 2023    Discharge Recommendations:  Home with assist PRN, Home with Home health OT, 2-3 sessions per week, S Level 1          Patient Diagnosis(es): The primary encounter diagnosis was Syncope and collapse. Diagnoses of Fever in adult, LYNDA (acute kidney injury) ([address] Hyponatremia, and Urinary tract infection in female were also pertinent to this visit. Past Medical History:  has a past medical history of Aortic stenosis, Cerebrovascular disease, Class 1 obesity, Hepatic steatosis, Hiatal hernia, Hyperlipidemia, Hypertension, and Osteoarthritis. Past Surgical History:  has a past surgical history that includes pre-malignant / benign skin lesion excision (Right, ); Aortic valve replacement (2017); Cataract removal (Bilateral, 2019); and blepharoplasty (Bilateral, 2021). Meagan Tolentino scored a 19/24 on the AM-PAC ADL Inpatient form. Current research shows that an AM-PAC score of 18 or greater is typically associated with a discharge to the patient's home setting. Based on the patient's AM-PAC score, and their current ADL deficits, it is recommended that the patient have 2-3 sessions per week of Occupational Therapy at d/c to increase the patient's independence. At this time, this patient demonstrates the endurance and safety to discharge home with home (home vs OP services) and a follow up treatment frequency of 2-3x/wk. Please see assessment section for further patient specific details. If patient discharges prior to next session this note will serve as a discharge summary. Please see below for the latest assessment towards goals.      HOME HEALTH CARE: LEVEL 1 STANDARD     -Initial home health evaluation to occur within 24-48 hours, in patient home    -Home health agency to establish plan of care for patient
Occupational Therapy  Facility/Department: [address] PROGRESSIVE CARE  Occupational Therapy Initial Assessment    Name: Hemal Friend  : 1935  MRN: [id number]  Date of Service: 2023    Discharge Recommendations:  Home with assist PRN  OT Equipment Recommendations  Equipment Needed: No     Hemal Friend scored a 19/24 on the AM-[facility] ADL Inpatient form. At this time, no further OT is recommended upon discharge due to family support available. Recommend patient returns to prior setting with prior services. Patient Diagnosis(es): The primary encounter diagnosis was Syncope and collapse. Diagnoses of Fever in adult, LYNDA (acute kidney injury) ([facility] [address] Hyponatremia, and Urinary tract infection in female were also pertinent to this visit. Past Medical History:  has a past medical history of Aortic stenosis, Cerebrovascular disease, Class 1 obesity, Hepatic steatosis, Hiatal hernia, Hyperlipidemia, Hypertension, and Osteoarthritis. Past Surgical History:  has a past surgical history that includes pre-malignant / benign skin lesion excision (Right, ); Aortic valve replacement (2017); Cataract removal (Bilateral, 2019); and blepharoplasty (Bilateral, 2021). Assessment   Performance deficits / Impairments: Decreased functional mobility ; Decreased safe awareness;Decreased balance;Decreased ADL status; Decreased cognition;Decreased endurance;Decreased high-level IADLs;Decreased strength  Assessment: 79 y/o female admitted 2023 with Sepsis,  E. coli bacteremia,  Acute cystitis, hyponatremia, diarrhea and syncopal episode PTA. PTA pt lives at home alone and was independent with ADLs and functional mobility without AD. Today, pt required CGA for transfers and functional mobility around room/bathroom without AD. Pt completed functional mobility in brady with RW to simulate household distances with SBA. Pt with functional UE ROM for self care and requries CGA for ADLs.  Pt will benefit from
[facility]   Speech Therapy  Daily Dysphagia Treatment Note        Cindy Alejandre  AGE: 80 y.o. GENDER: female  : 1935  [id number]  EPISODE DATE:  2023  Patient Active Problem List   Diagnosis    Hyperlipidemia    Impaired fasting glucose    Lung nodule    Heart murmur, systolic    Fatty liver disease, nonalcoholic    Syncope and collapse    Abnormal EKG    Neoplasm of uncertain behavior    Benign essential HTN    Shortness of breath    Nonrheumatic aortic valve stenosis    History of aortic valve replacement with bioprosthetic valve    Sepsis ([address]    Hyponatremia    Urinary tract infection in female     Allergies   Allergen Reactions    Adhesive Tape Itching and Dermatitis    Oxycontin [Oxycodone Hcl] Other (See Comments)     \"seeing things\"     Treatment Diagnosis: Dysphagia     Chart review:   H&P:   80 y.o. female with pmh of ischemic stroke, obesity, hepatic steatosis, hyperlipidemia, hypertension, aortic stenosis status post AVR bovine valve in 2017 was brought to the family to the ER due to recurrent falls and ongoing diarrhea, burning micturition. Patient reports she had diarrhea started 3 days prior to presentation, stool is yellow watery nonbloody 4-6 times a day. Does not remember eating outside food. Reports having poor p.o. intake since then. Yesterday at [employer], patient fell on the floor when she tried to stand up from a chair and hit her face on the floor. No documented seizure-like activity or loss of consciousness. She had another episode at home when she stood up from chair and landed on the floor. She was too weak to stand up, EMS was called and patient was transferred to the hospital.  Patient also reports having urinary frequency and dysuria for last few days. He was seen by the cardiologist last week who wanted to do an echo. Patient denies any chest pain, respiratory symptoms or extremity weakness.      Current Diet Level (prior to evaluation): Regular
Physical Therapy  Facility/Department: [facility] PROGRESSIVE CARE  Physical Therapy Initial Assessment    Name: Merlene Leggett  : 1935  MRN: [id number]  Date of Service: 2023    Discharge Recommendations:  24 hour supervision or assist, Home with Home health PT (daughter will initially stay with her)   PT Equipment Recommendations  Equipment Needed: No      Merlene Legegtt scored a 19/24 on the AM-PAC short mobility form. Current research shows that an AM-PAC score of 18 or greater is typically associated with a discharge to the patient's home setting. Based on the patient's AM-PAC score and their current functional mobility deficits, it is recommended that the patient have 2-3 sessions per week of Physical Therapy at d/c to increase the patient's independence. At this time, this patient demonstrates the endurance and safety to discharge home with Home PT and a follow up treatment frequency of 2-3x/wk. Please see assessment section for further patient specific details. If patient discharges prior to next session this note will serve as a discharge summary. Please see below for the latest assessment towards goals. Patient Diagnosis(es): The primary encounter diagnosis was Syncope and collapse. Diagnoses of Fever in adult, LYNDA (acute kidney injury) ([facility] [address] Hyponatremia, and Urinary tract infection in female were also pertinent to this visit. Past Medical History:  has a past medical history of Aortic stenosis, Cerebrovascular disease, Class 1 obesity, Hepatic steatosis, Hiatal hernia, Hyperlipidemia, Hypertension, and Osteoarthritis. Past Surgical History:  has a past surgical history that includes pre-malignant / benign skin lesion excision (Right, ); Aortic valve replacement (2017); Cataract removal (Bilateral, 2019); and blepharoplasty (Bilateral, 2021).     Assessment   Body Structures, Functions, Activity Limitations Requiring Skilled Therapeutic Intervention: Decreased functional
Physical Therapy  Facility/Department: [facility] PROGRESSIVE CARE  Physical Therapy Treatment Note    Name: Enrique Deleon  : 1935  MRN: [id number]  Date of Service: 2023    Discharge Recommendations:  24 hour supervision or assist, Home with Home health PT (pt may not want home PT)      Enrique Deelon scored a 19/24 on the AM-PAC short mobility form. Current research shows that an AM-PAC score of 18 or greater is typically associated with a discharge to the patient's home setting. Based on the patient's AM-PAC score and their current functional mobility deficits, it is recommended that the patient have 2-3 sessions per week of Physical Therapy at d/c to increase the patient's independence. At this time, this patient demonstrates the endurance and safety to discharge home with Home PT and a follow up treatment frequency of 2-3x/wk if pt agreeable. Please see assessment section for further patient specific details. If patient discharges prior to next session this note will serve as a discharge summary. Please see below for the latest assessment towards goals. Patient Diagnosis(es): The primary encounter diagnosis was Syncope and collapse. Diagnoses of Fever in adult, LYNDA (acute kidney injury) ([address] Hyponatremia, and Urinary tract infection in female were also pertinent to this visit. Past Medical History:  has a past medical history of Aortic stenosis, Cerebrovascular disease, Class 1 obesity, Hepatic steatosis, Hiatal hernia, Hyperlipidemia, Hypertension, and Osteoarthritis. Past Surgical History:  has a past surgical history that includes pre-malignant / benign skin lesion excision (Right, ); Aortic valve replacement (2017); Cataract removal (Bilateral, 2019); and blepharoplasty (Bilateral, 2021).     Assessment   Body Structures, Functions, Activity Limitations Requiring Skilled Therapeutic Intervention: Decreased functional mobility   Assessment: pt is an 79 yo female who was
SPEECH LANGUAGE PATHOLOGY    Evaluation attempted. Patient unavailable out of room at diagnostics. ST to re-attempt as schedule permits unless otherwise notified. If SLP eval/tx is deemed no longer indicated as medical work-up progresses, please discontinue SLP eval/tx order. Thank you.   Bettie Farris MA CCC-SLP #[id number]  Speech-Language Pathologist  05/01/23  12:33pm
Shift Summary    Admitting Dx:  Syncopal Episode - UTI     Shift Events:  Patient remains LUEVANO with a dry cough   Legs trace pitting edema     Vitals:  Vitals:    05/04/23 0000 05/04/23 0200 05/04/23 0340 05/04/23 0400   BP:   (!) 152/75    Pulse: 93 93 91 90   Resp:   22    Temp:   99.3 °F (37.4 °C)    TempSrc:   Oral    SpO2:   92%    Weight:   161 lb 9.6 oz (73.3 kg)    Height:             Tele:  SR    IV/Line:  L Hand PIV   R AC PIV     Drains/Mooney:  N/A     Neuro:   A&Ox4     I/O:   I/O last 3 completed shifts:   In: 240 [P.O.:240]  Out: -   I/O this shift:  In: 240 [P.O.:240]  Out: -
Tracie 81    Admit Date: 2023    PCP: Stacey Carbajal MD                  : 1935  MRN: [id number]    Subjective: Interval History:   Patient seen and examined. Clinical notes reviewed. Telemetry reviewed. No new complaint today. No major events overnight. Reports that she is feeling better, stools seems to be not as loose  Has not passed out again but not really ambulating    Review of System:  Pertinent positive and negatives are in the HPI and interval above, the rest are negative. Data:   Scheduled Meds:   potassium chloride  40 mEq Oral TID    cetirizine  5 mg Oral Daily    cefTRIAXone (ROCEPHIN) IV  2,000 mg IntraVENous Q24H    aspirin  325 mg Oral Daily    rosuvastatin  10 mg Oral Nightly    sodium chloride flush  5-40 mL IntraVENous 2 times per day    heparin (porcine)  5,000 Units SubCUTAneous 3 times per day    [Held by provider] midodrine  5 mg Oral TID WC     PRN Meds:sodium chloride flush, sodium chloride, acetaminophen **OR** acetaminophen  I/O last 3 completed shifts: In: 120 [P.O.:120]  Out: 725 [Urine:725]  No intake/output data recorded. No intake or output data in the 24 hours ending 23 1604        Objective:     Vitals: BP (!) 145/78   Pulse (!) 102   Temp 98.6 °F (37 °C) (Oral)   Resp 19   Ht 5' (1.524 m)   Wt 158 lb 15.2 oz (72.1 kg)   SpO2 97%   BMI 31.04 kg/m²     Physical Exam  Vitals and nursing note reviewed. Constitutional:       Appearance: Normal appearance. HENT:      Head: Normocephalic and atraumatic. Eyes:      Pupils: Pupils are equal, round, and reactive to light. Cardiovascular:      Rate and Rhythm: Normal rate and regular rhythm. Heart sounds: Murmur (3/6 RAMANA) heard. Pulmonary:      Effort: Pulmonary effort is normal. No respiratory distress. Abdominal:      General: Abdomen is flat. There is no distension. Musculoskeletal:      Right lower leg: No edema. Left lower leg: No edema.    Skin:     General: Skin
times per day    heparin (porcine)  5,000 Units SubCUTAneous 3 times per day    [Held by provider] midodrine  5 mg Oral TID WC     PRN Meds: sodium chloride flush, sodium chloride, acetaminophen **OR** acetaminophen      Intake/Output Summary (Last 24 hours) at 5/1/2023 1326  Last data filed at 4/30/2023 1539  Gross per 24 hour   Intake 120 ml   Output --   Net 120 ml         Physical Exam Performed:    BP (!) 145/78   Pulse (!) 102   Temp 98.6 °F (37 °C) (Oral)   Resp 19   Ht 5' (1.524 m)   Wt 158 lb 15.2 oz (72.1 kg)   SpO2 97%   BMI 31.04 kg/m²     General appearance: No apparent distress, appears stated age and cooperative. HEENT: Pupils equal, round, and reactive to light. Conjunctivae/corneas clear. Neck: Supple, with full range of motion. No jugular venous distention. Trachea midline. Respiratory:  Normal respiratory effort. Clear to auscultation, bilaterally without Rales/Wheezes/Rhonchi. Cardiovascular: Regular rate and rhythm with normal S1/S2 without murmurs, rubs or gallops. Abdomen: Soft, non-tender, non-distended with normal bowel sounds. Musculoskeletal: No clubbing, cyanosis or edema bilaterally. Full range of motion without deformity. Skin: Skin color, texture, turgor normal.  No rashes or lesions. Neurologic:  Neurovascularly intact without any focal sensory/motor deficits.  Cranial nerves: II-XII intact, grossly non-focal.  Psychiatric: Alert and oriented, thought content appropriate, normal insight  Capillary Refill: Brisk, 3 seconds, normal   Peripheral Pulses: +2 palpable, equal bilaterally       Labs:   Recent Labs     04/29/23  1029 04/30/23  0929 05/01/23  0642   WBC 22.2* 23.1* 18.6*   HGB 12.0 11.6* 11.2*   HCT 36.1 34.3* 33.7*    175 175       Recent Labs     04/29/23  1029 04/29/23  1823 04/30/23  0929 04/30/23  2241 05/01/23  0642   * 127* 126* 126* 130*   K 4.1 3.1* 3.5  --  2.9*   CL 89* 93* 92*  --  96*   CO2 21 19* 18*  --  17*   BUN 51* 50* 43*  --  38*
< > = values in this interval not displayed. Recent Labs     04/29/23 1823   AST 26   ALT 17   BILITOT 0.4   ALKPHOS 48       No results for input(s): INR in the last 72 hours. Recent Labs     04/29/23  1203 04/29/23 1823   CKTOTAL  --  536*   TROPHS 51*  --          Urinalysis:      Lab Results   Component Value Date/Time    NITRU POSITIVE 04/29/2023 02:25 PM    WBCUA 124 04/29/2023 02:25 PM    BACTERIA 2+ 04/29/2023 02:25 PM    RBCUA 23 04/29/2023 02:25 PM    BLOODU MODERATE 04/29/2023 02:25 PM    SPECGRAV 1.013 04/29/2023 02:25 PM    GLUCOSEU Negative 04/29/2023 02:25 PM       Radiology:  US RENAL COMPLETE   Final Result   Unremarkable ultrasound of the kidneys and urinary bladder. RECOMMENDATIONS:   Unavailable         CT HEAD WO CONTRAST   Final Result   No acute intracranial process. Left periorbital soft tissue swelling. RECOMMENDATIONS:   Unavailable         CT CERVICAL SPINE WO CONTRAST   Final Result   No acute abnormality of the cervical spine. Multilevel degenerative changes         XR CHEST PORTABLE   Final Result   1. No active pulmonary disease. XR PELVIS (1-2 VIEWS)   Final Result   No definite fracture. If patient has persistent symptoms related to the   hips, dedicated hip radiographic series would be recommended. FL MODIFIED BARIUM SWALLOW W VIDEO    (Results Pending)       IP CONSULT TO CARDIOLOGY  IP CONSULT TO NEPHROLOGY  IP CONSULT TO GI    Assessment/Plan:    Active Hospital Problems    Diagnosis     Hyponatremia [E87.1]     Urinary tract infection in female [N39.0]     Sepsis ([facility] [address] [A41.9]     History of aortic valve replacement with bioprosthetic valve [Z95.3]      Sepsis  E. coli bacteremia  Acute cystitis  Patient presented to emergency with generalized weakness, patient reported burning micturition as well as generalized weakness, had an episode of near syncope Subsequently sent to emergency.      Blood culture positive for E. coli, UA with
MD  Subjective  Subjective: Patient seated in recliner chair upon arrival to room. Patient agreeable to therapy. No reports of pain  General Comment  Comments: Per RN ok for therapy     Social/Functional History  Social/Functional History  Lives With: Alone  Type of Home: Condo  Home Layout: One level  Home Access: Level entry  Bathroom Shower/Tub: Walk-in shower  Bathroom Toilet: Standard  Bathroom Equipment: Grab bars in shower, Built-in shower seat  Home Equipment: Greg Anger, Nilesh Sofy, Isabella Cowden, 4 wheeled  Has the patient had two or more falls in the past year or any fall with injury in the past year?: No  ADL Assistance: Independent  Homemaking Assistance: Independent  Ambulation Assistance: Independent (no AD)  Transfer Assistance: Independent  Active : Yes  Mode of Transportation: Car  Additional Comments: Pt has very supportive family to provide initial 24/7 assist at discharge. Objective      Safety Devices  Type of Devices: Call light within reach; Left in chair;Nurse notified;Gait belt  Balance  Sitting: Intact  Standing: With support (stood with supervision for static standing)  Toilet Transfers  Toilet - Technique: Ambulating  Equipment Used: Grab bars  Toilet Transfer: Stand by assistance  Toilet Transfers Comments: grab bar support     ADL  Grooming: Stand by assistance  Grooming Skilled Clinical Factors: standing in front of sink to wash hands  Toileting: Supervision  Toileting Skilled Clinical Factors: seated for chandan care, Supervision for balance        Bed mobility  Supine to Sit: Unable to assess  Sit to Supine: Unable to assess  Bed Mobility Comments: up in chair at start and end of session  Transfers  Sit to stand: Stand by assistance  Stand to sit: Stand by assistance  Transfer Comments: SBA for sit<>stand from recliner chair. Functional mobiltiy without Rw with close SBA slow steady gait with no overt LOB noted.                         Education Given To: Patient  Education
transfers MI  Short Term Goal 3: amb 100-200' with or without AD MI  Patient Goals   Patient Goals : to return home       Education  Patient Education  Education Given To: Patient  Education Provided Comments: reviewed call light and not getting up without assist  Education Method: Verbal  Education Outcome: Verbalized understanding      Therapy Time   Individual Concurrent Group Co-treatment   Time In 0940         Time Out 1018         Minutes 38                 RIVER CAMPOS PT     Electronically signed by RIVER CAMPOS PT on 5/4/2023 at 10:18 AM
artificial aortic valve appears well seated with a maximum velocity of 290m/s and a mean gradient of 21mmHg. Mild tricuspid regurgitation. ECHO 2/7/9220  Systolic pulmonary artery pressure (SPAP) is normal and estimated at 30 mmHg  (RA pressure 3 mmHg). Left ventricular size is decreased. There is moderate concentric left ventricular hypertrophy. Global left ventricular function is increased (hyperdynamic) with ejection fraction estimated from 65 % to 70 %. No segmental wall motion abnormalities noted. Moderate to heavy calcification of the posterior leaflet of the mitral  valve. Mild calcification of the anterior leaflet of the mitral valve. mitral leaflet movement appear mildly restricted but no obvious mitral  stenosis noted. Trivial mitral regurgitation is present. ECHO 3/30/2021  There is mild concentric left ventricular hypertrophy. Ejection fraction is visually estimated to be 65-70%. Diastolic filling parameters suggest grade II diastolic dysfunction. Moderate calcification of the posterior leaflet of the mitral valve. Mild thickening of anterior leaflet of mitral valve. Mild mitral regurgitation. No evidence of mitral stenosis. The left atrium is moderately dilated. 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 The right ventricle is mildly dilated. Right ventricular systolic function is normal.     ECHO 3/16/2022  Concentric left ventricular hypertrophy. The left ventricle appears normal in size. Normal left ventricular systolic function with an estimated ejection  fraction of 65% . Diastolic filling parameters suggest grade I diastolic dysfunction. Mitral annular calcification is present. Mitral valve leaflets appear moderately thickened. Mild mitral regurgitation. The left atrium is moderately dilated.   A 19mm Lunsford pericardial Magna ease bioprosthetic aortic

## 2023-05-04 NOTE — DISCHARGE INSTR - COC
Continuity of Care Form    Patient Name: Enrique Deleon   :  1935  MRN:  [id number]    Admit date:  2023  Discharge date:  ***    Code Status Order: DNR-CCA   Advance Directives:     Admitting Physician:  Vida Shields MD  PCP: Venetta Gaucher, MD    Discharging Nurse: [facility] Unit/Room#: L9X-6637/5126-01  Discharging Unit Phone Number: ***    Emergency Contact:   Extended Emergency Contact Information  Primary Emergency Contact: Fabi [address] Phone: [phone]  Mobile Phone: [phone]  Relation: Child  Secondary Emergency Contact: Jessica Samson [address] Phone: [phone]  Mobile Phone: [phone]  Relation: Child    Past Surgical History:  Past Surgical History:   Procedure Laterality Date    AORTIC VALVE REPLACEMENT  2017    19mm Lunsford pericardial Magna Ease    BLEPHAROPLASTY Bilateral 2021    BILATERAL UPPER LIDS BLEPHAROPLASTY performed by Addy Bowen MD at [address] [place] [address] Bilateral 2019    PRE-MALIGNANT / BENIGN SKIN LESION EXCISION Right 2014    R scalp. cystadenoma. --done under local       Immunization History:   Immunization History   Administered Date(s) Administered    COVID-19, MODERNA BLUE border, Primary or Immunocompromised, (age 12y+), IM, 100 mcg/0.5mL 2021, 2021    COVID-19, MODERNA Booster BLUE border, (age 18y+), IM, 50mcg/0.25mL 2021    Influenza 2011    Influenza Virus Vaccine 2009, 2010    Influenza, FLUARIX, FLULAVAL, FLUZONE (age 10 mo+) AND AFLURIA, (age 1 y+), PF, 0.5mL 12/15/2016    Influenza, High Dose (Fluzone 65 yrs and older) 01/10/2013, 10/28/2013, 10/22/2014, 2017, 2018    Pneumococcal, PCV-13, PREVNAR 15, (age 6w+), IM, 0.5mL 2015    Pneumococcal, PPSV23, PNEUMOVAX 23, (age 2y+), SC/IM, 0.5mL 2011       Active Problems:  Patient Active Problem List   Diagnosis Code    Hyperlipidemia E78.5

## 2023-05-04 NOTE — CARE COORDINATION
Case Management Discharge Note          Date / Time of Note: 5/4/2023 1:21 PM                  Patient Name: Maribel Hensley   YOB: 1935  Diagnosis: Syncope and collapse [R55]  Hyponatremia [E87.1]  LYNDA (acute kidney injury) ([facility] [address] [N17.9]  Fever in adult [R50.9]  Sepsis ([address] [A41.9]  Urinary tract infection in female [N39.0]   Date / Time: 4/29/2023  9:48 AM    Financial:  Payor: Jose Klein / Plan: Gamal Vivas PPO / Product Type: Medicare /      Pharmacy:    [facility], [address] [place] [address],[address] [phone] - F [address] [address]  Phone: [phone] Fax: [phone]    [facility] [address] [place], [place] [address] [phone] - F [phone]  [address] [place] [address] [address]  Phone: [phone] Fax: [address] [facility],[place] [address] [phone] - f [phone]  [address] [place] [address]  Phone: [phone] Fax: [phone]      Assistance purchasing medications?: Potential Assistance Purchasing Medications: No  Assistance provided by Case Management: None at this time    DISCHARGE Disposition: Home- No Services Needed    Transportation:  Transportation PLAN for discharge: family   Mode of Transport: Private Car    IMM Completed:   Yes, Case management has presented and reviewed IMM letter #2. IMM Letter given to Patient/Family/Significant other/Guardian/POA/by[de-identified] Provided to patient at bedside by Jed Petersen RN. Education provided to patient, patient reported no questions and verbalized understanding. Patient aware of 4 hours allotted time to determine if they choose to pursue Medicare appeal process. IMM Letter date given[de-identified] 05/02/23  IMM Letter time given[de-identified] 1444. Patient and/or family/POA verbalized understanding of their medicare rights and appeal process if needed.  Patient and/or

## 2023-05-05 ENCOUNTER — CARE COORDINATION (OUTPATIENT)
Dept: CASE MANAGEMENT | Age: 88
End: 2023-05-05

## 2023-05-05 DIAGNOSIS — R55 SYNCOPE AND COLLAPSE: Primary | ICD-10-CM

## 2023-05-05 PROCEDURE — 1111F DSCHRG MED/CURRENT MED MERGE: CPT

## 2023-05-05 NOTE — CARE COORDINATION
[facility] Care [facility] Initial Follow Up Call    Call within 2 business days of discharge: Yes    Patient Current Location:  [address] [place] [address] Transition Nurse contacted the patient by telephone to perform post hospital discharge assessment. Verified name and  with patient as identifiers. Provided introduction to self, and explanation of the Care Transition Nurse role. Patient: Tariq Leonard Patient : 1935   MRN: [id number]  Reason for Admission: Fall  Discharge Date: 23 RARS: Readmission Risk Score: 11      Last Discharge  [facility]       Date Complaint Diagnosis Description Type Department Provider    23 Loss of Consciousness; Dizziness; Fall Syncope and collapse . .. ED to Hosp-Admission (Discharged) (ADMITTED) Michelle Meyers MD; Lawrence Coyne .. Was this an external facility discharge? No Discharge Facility: [facility] to be reviewed by the provider   Additional needs identified to be addressed with provider: No                 Method of communication with provider: none. Initial attempt at CT discharge phone call. Angelina Tapia states she is doing \"better. \"  She states she has not yet checked her B/P at home , but is aware that they would like her to do that. She will begin monitoring. She states she has placed calls to GI & Nephrology to schedule follow up appointment. She states she will schedule with the PCP after that. She is awaiting return calls. Angelina Tapia states her daughter will provide her transportation. Angelina Tapia states she has some edema in her ankles - she is elevating them as much as possible. She denies any falls since returning home. Angelina Valverdes states she continues to have some diarrhea but feels it is related to the antibiotics. She states she is urinating without difficulty. Denies any pain, odor, discoloration, or sediment with her urine. Angelina Tapia denies any fever at this time. She states she is tolerating food & fluids - denies any N/V.  Angelina Tapia denies any needs at

## 2023-05-05 NOTE — DISCHARGE SUMMARY
semi-solid barium preparations were used to assess swallowing function. FLUOROSCOPY DOSE AND TYPE: Reference air kerma of 9.98 mGy. COMPARISON: None HISTORY: ORDERING SYSTEM PROVIDED HISTORY: assess swallow TECHNOLOGIST PROVIDED HISTORY: Reason for exam:->assess swallow Recent coughing spells, frothy secretions when swallowing. FINDINGS: With the patient in the seated right lateral position, the patient swallowed multiple consistency foods in succession while under videofluoroscopic surveillance. The patient swallowed thin liquids, nectar thickened liquids, purees, soft solids, and solid foods. There was a normal oral phase of swallowing throughout the examination. There was mild flash laryngeal penetration without tracheal aspiration when swallowing thin liquids. There was no evidence of laryngeal penetration or tracheal aspiration when swallowing nectar thickened liquids, purees, soft solids, and solid foods. Finally, at the conclusion of the study, the patient was placed in the AP position, and swallowed both thin liquids and purees, without evidence of asymmetry or stenosis. There was moderate to severe gastroesophageal reflux noted. 1. Flash laryngeal penetration without tracheal aspiration of thin liquids. 2. No evidence of laryngeal penetration or tracheal aspiration of nectar thickened liquids, purees, soft solids, and solids 3. Incidental moderate to severe gastroesophageal reflux. Please see separate speech pathology report for full discussion of findings and recommendations. CBC:   Recent Labs     05/03/23  0645 05/04/23  0505   WBC 13.8* 13.6*   HGB 10.6* 10.6*    243     BMP:    Recent Labs     05/03/23  0645 05/04/23  0505   * 133*   K 4.1 3.9    102   CO2 17* 24   BUN 27* 21*   CREATININE 1.2 1.0   GLUCOSE 93 115*     Hepatic: No results for input(s): AST, ALT, ALB, BILITOT, ALKPHOS in the last 72 hours.   Lipids:   Lab Results   Component Value Date/Time    CHOL 132

## 2023-05-10 RX ORDER — ROSUVASTATIN CALCIUM 10 MG/1
TABLET, COATED ORAL
Qty: 90 TABLET | Refills: 1 | Status: SHIPPED | OUTPATIENT
Start: 2023-05-10

## 2023-05-12 ENCOUNTER — CARE COORDINATION (OUTPATIENT)
Dept: CASE MANAGEMENT | Age: 88
End: 2023-05-12

## 2023-05-12 NOTE — CARE COORDINATION
[facility] Care Transitions Follow Up Call    Patient Current Location:  [facility] Transition Nurse contacted the patient by telephone to follow up after admission on 2023. Verified name and  with patient as identifiers. Patient: Kevin Sharp  Patient : 1935   MRN: [id number]  Reason for Admission: fall  Discharge Date: 23 RARS: Readmission Risk Score: 11      Needs to be reviewed by the provider   Additional needs identified to be addressed with provider: No               Method of communication with provider: none. Abraham Lim states she is doing \"fine. \" She states she took a walk outside today. Abraham Lim states she sees her PCP on Monday and has a nephrology follow up scheduled. Abraham Lim states her B/P today = 141/91. She states the edema in her ankles is going down. Abraham Issabelkis denies any falls. She states her stool remains \"loose. \" Abraham Lim states she is urinating without any difficulty. She denies any fever at this time. Abraham Raphael states she is tolerating food & fluids - denies any N/V. She denies any needs at this time. Follow Up  Future Appointments   Date Time Provider Luisa Paulino   5/15/2023  4:20 PM Mookie Hart MD [facility] [place] [facility]   2023  9:20 AM Mookie Hart MD [facility] [place] [facility]   2023 10:00 AM SCHEDULE, [address] [facility] [place] [facility]          Care Transitions Subsequent and Final Call    Subsequent and Final Calls  Do you have any ongoing symptoms?: No  Have your medications changed?: No  Do you have any questions related to your medications?: No  Do you currently have any active services?: No  Do you have any needs or concerns that I can assist you with?: No  Identified Barriers: None  Care Transitions Interventions  Other Interventions:             Care Transition Nurse provided contact information for future needs. Plan for next call:   Fall & B/P    Yusra Epperson RN BSN  Care Transition Nurse  [phone]

## 2023-05-15 ENCOUNTER — OFFICE VISIT (OUTPATIENT)
Dept: FAMILY MEDICINE CLINIC | Age: 88
End: 2023-05-15

## 2023-05-15 VITALS
SYSTOLIC BLOOD PRESSURE: 148 MMHG | DIASTOLIC BLOOD PRESSURE: 90 MMHG | HEIGHT: 60 IN | BODY MASS INDEX: 30.19 KG/M2 | HEART RATE: 84 BPM | TEMPERATURE: 97.3 F | WEIGHT: 153.8 LBS

## 2023-05-15 DIAGNOSIS — I10 BENIGN ESSENTIAL HTN: ICD-10-CM

## 2023-05-15 DIAGNOSIS — Z09 HOSPITAL DISCHARGE FOLLOW-UP: Primary | ICD-10-CM

## 2023-05-15 DIAGNOSIS — E87.1 HYPONATREMIA: ICD-10-CM

## 2023-05-15 RX ORDER — FUROSEMIDE 20 MG/1
20 TABLET ORAL 2 TIMES DAILY
Qty: 60 TABLET | Refills: 0
Start: 2023-05-15

## 2023-05-15 SDOH — ECONOMIC STABILITY: FOOD INSECURITY: WITHIN THE PAST 12 MONTHS, THE FOOD YOU BOUGHT JUST DIDN'T LAST AND YOU DIDN'T HAVE MONEY TO GET MORE.: NEVER TRUE

## 2023-05-15 SDOH — ECONOMIC STABILITY: HOUSING INSECURITY
IN THE LAST 12 MONTHS, WAS THERE A TIME WHEN YOU DID NOT HAVE A STEADY PLACE TO SLEEP OR SLEPT IN A SHELTER (INCLUDING NOW)?: NO

## 2023-05-15 SDOH — ECONOMIC STABILITY: FOOD INSECURITY: WITHIN THE PAST 12 MONTHS, YOU WORRIED THAT YOUR FOOD WOULD RUN OUT BEFORE YOU GOT MONEY TO BUY MORE.: NEVER TRUE

## 2023-05-15 SDOH — ECONOMIC STABILITY: INCOME INSECURITY: HOW HARD IS IT FOR YOU TO PAY FOR THE VERY BASICS LIKE FOOD, HOUSING, MEDICAL CARE, AND HEATING?: NOT HARD AT ALL

## 2023-05-15 ASSESSMENT — PATIENT HEALTH QUESTIONNAIRE - PHQ9
SUM OF ALL RESPONSES TO PHQ QUESTIONS 1-9: 0
SUM OF ALL RESPONSES TO PHQ QUESTIONS 1-9: 0
SUM OF ALL RESPONSES TO PHQ9 QUESTIONS 1 & 2: 0
2. FEELING DOWN, DEPRESSED OR HOPELESS: 0
1. LITTLE INTEREST OR PLEASURE IN DOING THINGS: 0
SUM OF ALL RESPONSES TO PHQ QUESTIONS 1-9: 0
SUM OF ALL RESPONSES TO PHQ QUESTIONS 1-9: 0

## 2023-05-15 ASSESSMENT — ENCOUNTER SYMPTOMS: SHORTNESS OF BREATH: 0

## 2023-05-15 NOTE — PROGRESS NOTES
Left Upper Arm     Left Upper ArmLeft Upper Arm   Position:        Sitting            Sitting                     Cuff Size:      Medium Adult       Medium Adult  Large Adult     Pulse:                                              84         Temp:      97.3 °F (36.3 °C)                                   TempSrc:        Temporal                                       Weight: 153 lb 12.8 oz (69.8 kg)                               Height:       5' (1.524 m)                                    ----------------------------------------------------------------  Body mass index is 30.04 kg/m². Wt Readings from Last 3 Encounters:  05/15/23 : 153 lb 12.8 oz (69.8 kg)  05/04/23 : 161 lb 9.6 oz (73.3 kg)  04/21/23 : 156 lb (70.8 kg)    BP Readings from Last 3 Encounters:  05/15/23 : (!) 148/90  05/04/23 : 133/67  04/21/23 : 120/72              Review of Systems   Constitutional:  Negative for chills and fever. Respiratory:  Negative for shortness of breath. Cardiovascular:  Negative for chest pain. Genitourinary:  Negative for difficulty urinating, dysuria and hematuria. Objective:   Physical Exam  Constitutional:       General: She is not in acute distress. Appearance: Normal appearance. She is well-developed. She is not ill-appearing or diaphoretic. Cardiovascular:      Rate and Rhythm: Normal rate and regular rhythm. Heart sounds: Normal heart sounds. No murmur heard. No friction rub. No gallop. Comments: She has 1+ edema at the ankles   Pulmonary:      Effort: Pulmonary effort is normal. No tachypnea, accessory muscle usage or respiratory distress. Breath sounds: Normal breath sounds. No decreased breath sounds, wheezing, rhonchi or rales. Lymphadenopathy:      Cervical: No cervical adenopathy. Upper Body:      Right upper body: No supraclavicular adenopathy. Left upper body: No supraclavicular adenopathy.    Skin:     General: Skin

## 2023-05-16 DIAGNOSIS — E87.1 HYPONATREMIA: ICD-10-CM

## 2023-05-16 DIAGNOSIS — I10 BENIGN ESSENTIAL HTN: ICD-10-CM

## 2023-05-16 LAB
ANION GAP SERPL CALCULATED.3IONS-SCNC: 15 MMOL/L (ref 3–16)
BUN SERPL-MCNC: 13 MG/DL (ref 7–20)
CALCIUM SERPL-MCNC: 9.3 MG/DL (ref 8.3–10.6)
CHLORIDE SERPL-SCNC: 97 MMOL/L (ref 99–110)
CO2 SERPL-SCNC: 27 MMOL/L (ref 21–32)
CREAT SERPL-MCNC: 1 MG/DL (ref 0.6–1.2)
GFR SERPLBLD CREATININE-BSD FMLA CKD-EPI: 54 ML/MIN/{1.73_M2}
GLUCOSE SERPL-MCNC: 113 MG/DL (ref 70–99)
POTASSIUM SERPL-SCNC: 3.2 MMOL/L (ref 3.5–5.1)
SODIUM SERPL-SCNC: 139 MMOL/L (ref 136–145)

## 2023-05-17 RX ORDER — POTASSIUM CHLORIDE 750 MG/1
10 TABLET, EXTENDED RELEASE ORAL DAILY
Qty: 30 TABLET | Refills: 3 | Status: SHIPPED | OUTPATIENT
Start: 2023-05-17

## 2023-05-18 ENCOUNTER — CARE COORDINATION (OUTPATIENT)
Dept: CASE MANAGEMENT | Age: 88
End: 2023-05-18

## 2023-05-18 NOTE — CARE COORDINATION
[facility] Care Transitions Follow Up Call    Patient Current Location: [address] Transition Nurse contacted the patient by telephone to follow up after admission on 2023. Verified name and  with patient as identifiers. Patient: Reginia Schwab  Patient : 1935   MRN: [id number]  Reason for Admission: fall  Discharge Date: 23 RARS: Readmission Risk Score: 11      Needs to be reviewed by the provider   Additional needs identified to be addressed with provider: No               Method of communication with provider: none. Spoke briefly with Sarina Daugherty. She states she is in the car and on the way to an MD appointment. Requests a call back at a later date and/or time. CT team will follow. Follow Up  Future Appointments   Date Time Provider Luisa Paulino   2023  8:00 AM Aydin Funez MD [facility]   2023 11:00 AM SCHEDULE, [facility] - [facility]       I   Care Transitions Subsequent and Final Call    Subsequent and Final Calls  Care Transitions Interventions  Other Interventions:             Care Transition Nurse provided contact information for future needs.    Plan for next call:  fall & B/P     [facility] Transition Nurse  [phone]

## 2023-05-22 ENCOUNTER — ANESTHESIA EVENT (OUTPATIENT)
Dept: ENDOSCOPY | Age: 88
End: 2023-05-22
Payer: MEDICARE

## 2023-05-24 ENCOUNTER — HOSPITAL ENCOUNTER (OUTPATIENT)
Age: 88
Setting detail: OUTPATIENT SURGERY
Discharge: HOME | End: 2023-05-24
Attending: INTERNAL MEDICINE | Admitting: INTERNAL MEDICINE
Payer: MEDICARE

## 2023-05-24 ENCOUNTER — ANESTHESIA (OUTPATIENT)
Dept: ENDOSCOPY | Age: 88
End: 2023-05-24
Payer: MEDICARE

## 2023-05-24 VITALS
WEIGHT: 152.12 LBS | HEIGHT: 60 IN | TEMPERATURE: 97.2 F | HEART RATE: 76 BPM | RESPIRATION RATE: 14 BRPM | BODY MASS INDEX: 29.86 KG/M2 | OXYGEN SATURATION: 94 % | SYSTOLIC BLOOD PRESSURE: 163 MMHG | DIASTOLIC BLOOD PRESSURE: 73 MMHG

## 2023-05-24 PROCEDURE — 2500000003 HC RX 250 WO HCPCS

## 2023-05-24 PROCEDURE — 2580000003 HC RX 258: Performed by: STUDENT IN AN ORGANIZED HEALTH CARE EDUCATION/TRAINING PROGRAM

## 2023-05-24 PROCEDURE — 2709999900 HC NON-CHARGEABLE SUPPLY: Performed by: INTERNAL MEDICINE

## 2023-05-24 PROCEDURE — 6360000002 HC RX W HCPCS

## 2023-05-24 PROCEDURE — 7100000000 HC PACU RECOVERY - FIRST 15 MIN: Performed by: INTERNAL MEDICINE

## 2023-05-24 PROCEDURE — C1726 CATH, BAL DIL, NON-VASCULAR: HCPCS | Performed by: INTERNAL MEDICINE

## 2023-05-24 PROCEDURE — 7100000001 HC PACU RECOVERY - ADDTL 15 MIN: Performed by: INTERNAL MEDICINE

## 2023-05-24 PROCEDURE — 7100000010 HC PHASE II RECOVERY - FIRST 15 MIN: Performed by: INTERNAL MEDICINE

## 2023-05-24 PROCEDURE — 3700000000 HC ANESTHESIA ATTENDED CARE: Performed by: INTERNAL MEDICINE

## 2023-05-24 PROCEDURE — 3609017700 HC EGD DILATION GASTRIC/DUODENAL STRICTURE: Performed by: INTERNAL MEDICINE

## 2023-05-24 PROCEDURE — 7100000011 HC PHASE II RECOVERY - ADDTL 15 MIN: Performed by: INTERNAL MEDICINE

## 2023-05-24 PROCEDURE — 3700000001 HC ADD 15 MINUTES (ANESTHESIA): Performed by: INTERNAL MEDICINE

## 2023-05-24 RX ORDER — SODIUM CHLORIDE 9 MG/ML
INJECTION, SOLUTION INTRAVENOUS PRN
Status: DISCONTINUED | OUTPATIENT
Start: 2023-05-24 | End: 2023-05-24 | Stop reason: HOSPADM

## 2023-05-24 RX ORDER — SODIUM CHLORIDE 0.9 % (FLUSH) 0.9 %
5-40 SYRINGE (ML) INJECTION PRN
Status: DISCONTINUED | OUTPATIENT
Start: 2023-05-24 | End: 2023-05-24 | Stop reason: HOSPADM

## 2023-05-24 RX ORDER — SODIUM CHLORIDE 0.9 % (FLUSH) 0.9 %
5-40 SYRINGE (ML) INJECTION EVERY 12 HOURS SCHEDULED
Status: DISCONTINUED | OUTPATIENT
Start: 2023-05-24 | End: 2023-05-24 | Stop reason: HOSPADM

## 2023-05-24 RX ORDER — PROPOFOL 10 MG/ML
INJECTION, EMULSION INTRAVENOUS CONTINUOUS PRN
Status: DISCONTINUED | OUTPATIENT
Start: 2023-05-24 | End: 2023-05-24 | Stop reason: SDUPTHER

## 2023-05-24 RX ORDER — ONDANSETRON 2 MG/ML
4 INJECTION INTRAMUSCULAR; INTRAVENOUS
Status: DISCONTINUED | OUTPATIENT
Start: 2023-05-24 | End: 2023-05-24 | Stop reason: HOSPADM

## 2023-05-24 RX ORDER — LIDOCAINE HYDROCHLORIDE 20 MG/ML
INJECTION, SOLUTION EPIDURAL; INFILTRATION; INTRACAUDAL; PERINEURAL PRN
Status: DISCONTINUED | OUTPATIENT
Start: 2023-05-24 | End: 2023-05-24 | Stop reason: SDUPTHER

## 2023-05-24 RX ADMIN — SODIUM CHLORIDE: 9 INJECTION, SOLUTION INTRAVENOUS at 09:03

## 2023-05-24 RX ADMIN — PROPOFOL 150 MCG/KG/MIN: 10 INJECTION, EMULSION INTRAVENOUS at 09:08

## 2023-05-24 RX ADMIN — LIDOCAINE HYDROCHLORIDE 100 MG: 20 INJECTION, SOLUTION EPIDURAL; INFILTRATION; INTRACAUDAL; PERINEURAL at 09:08

## 2023-05-24 ASSESSMENT — PAIN SCALES - GENERAL
PAINLEVEL_OUTOF10: 0
PAINLEVEL_OUTOF10: 0

## 2023-05-24 ASSESSMENT — PAIN - FUNCTIONAL ASSESSMENT: PAIN_FUNCTIONAL_ASSESSMENT: 0-10

## 2023-05-24 NOTE — OP NOTE
Operative Note      Patient: Ashanti Dominguez  YOB: 1935  MRN: [id number]    Date of Procedure: 5/24/2023    Pre-Op Diagnosis Codes:      * Dysphagia, unspecified type [R13.10]    Post-Op Diagnosis: Same       Procedure(s):  EGD DILATION BALLOON to 18mm    Surgeon(s):  Von Mendieta MD    Assistant:   * No surgical staff found *    Anesthesia: Monitor Anesthesia Care    Estimated Blood Loss (mL): None    Complications: None    Specimens:   * No specimens in log *    Implants:  * No implants in log *      Drains:   [REMOVED] External Urinary Catheter (Removed)   Site Assessment Clean,dry & intact 04/30/23 2058   Placement Repositioned 04/30/23 2058   Securement Method Securing device (Describe) 04/30/23 2058   Catheter Care Suction Canister/Tubing changed 04/30/23 2058   Perineal Care Yes 04/30/23 2058   Suction 40 mmgHg continuous 04/30/23 2058   Urine Color Yellow 04/30/23 2058   Urine Appearance Clear 04/30/23 2058   Output (mL) 600 mL 04/30/23 0701       Findings: See dictated report      Detailed Description of Procedure:   See dictated report    Electronically signed by Von Mendieta MD on 5/24/2023 at 9:23 AM

## 2023-05-24 NOTE — ANESTHESIA POSTPROCEDURE EVALUATION
[facility] Department of Anesthesiology  Post-Anesthesia Note       Name:  Hemal Friend                                  Age:  80 y.o. MRN:  [id number]     Last Vitals & Oxygen Saturation: BP (!) 163/73   Pulse 76   Temp 97.2 °F (36.2 °C) (Temporal)   Resp 14   Ht 5' (1.524 m)   Wt 152 lb 1.9 oz (69 kg)   SpO2 94%   BMI 29.71 kg/m²   Patient Vitals for the past 4 hrs:   BP Temp Temp src Pulse Resp SpO2 Height Weight   05/24/23 0945 (!) 163/73 97.2 °F (36.2 °C) Temporal 76 14 94 % -- --   05/24/23 0935 (!) 144/66 97.4 °F (36.3 °C) -- 83 22 94 % -- --   05/24/23 0930 132/62 -- -- 84 22 93 % -- --   05/24/23 0925 (!) 108/49 -- -- 79 19 94 % -- --   05/24/23 0923 (!) 118/49 97.8 °F (36.6 °C) -- 76 18 94 % -- --   05/24/23 0813 (!) 185/84 98 °F (36.7 °C) Oral 94 16 96 % 5' (1.524 m) 152 lb 1.9 oz (69 kg)       Level of consciousness:  Awake, alert    Respiratory: Respirations easy, no distress. Stable. Cardiovascular: Hemodynamically stable. Hydration: Adequate. PONV: Adequately managed. Post-op pain: Adequately controlled. Post-op assessment: Tolerated anesthetic well without complication. Complications:  None.     Rachelle Merchant MD  May 24, 2023   10:18 AM

## 2023-05-24 NOTE — PROCEDURES
[address]                                 PROCEDURE NOTE    PATIENT NAME: Alex Robles                        :        1935  MED REC NO:   [id number]                          ROOM:  ACCOUNT NO:   [de-identified]                           ADMIT DATE: 2023  PROVIDER:     Anna Horton MD    DATE OF PROCEDURE:  2023    REFERRING PROVIDER:  Marley Lacey MD    PATIENT HISTORY:  An 78-year-old female, outpatient. OPERATION PERFORMED:  EGD. SURGEON:  Herbert Horton MD    INSTRUMENT USED:  Olympus GIF-H190. ANESTHESIA:  The patient was premedicated with Diprivan intravenously as  administered by the anesthesiology service. INDICATIONS:  The patient has presented with longstanding intermittent  solid food dysphagia. PROCEDURE:  The endoscope was inserted into the esophagus without  difficulty. The esophagus was extremely tortuous. The mucosa, however,  was free of inflammatory or metaplastic change. The Z-line was located  at 37 cm, above a 2 to 3 cm hiatal hernia. There was an incomplete  Schatzki's ring at the Z-line. The endoscope passed through this area  with no resistance. The stomach, duodenal bulb and descending duodenum  were normal.  The endoscope was withdrawn back into the esophagus and  the Microvasive CRE balloon-tipped catheter was inserted and positioned  across the ring. It was inflated to an 18 mm diameter for 30 seconds on  three separate occasions. ESTIMATED BLOOD LOSS:  None. IMPRESSION:  1. An incomplete Schatzki's ring which was dilated. 2.  A 2 to 3 cm hiatal hernia. PLAN:  The patient can undergo esophageal dilatation as needed in the  future, health permitting. HERBERT Elizabeth MD    D: 2023 9:39:44       T: 2023 9:43:40     [id number]  Job#: [id number]     Doc#: [id number]    CC:  Anna Aaron MD

## 2023-05-24 NOTE — DISCHARGE INSTRUCTIONS
[facility] Discharge Instructions   EGD (Esophagogastroduodenoscopy)    NAME:  Toribio Zaldivar  YOB: 1935  MEDICAL RECORD NUMBER:  [id number]  DATE:  5/24/2023      After receiving Propofol (Diprivan) for Moderate Sedation:    Do not drive or operate any machinery until tomorrow  Do not sign any legal documents or make any critical decisions  Do not drink alcoholic beverages for 24 hours  Plan to spend a few hours resting before resuming your normal routine  Possible side effects are light headedness and sedation    You may resume your usual diet at home    Resume all your daily medications    Call your physician if any of the following occur: If you have any difficulty breathing: CALL 911  Neck swelling  Difficulty swallowing  Excessive pain or abdominal distention  Fever, chills, nausea or vomiting    If you are unable to reach your physician or symptoms worsen, proceed to the nearest Emergency Room    You may use warm salt water gargles, lozenges or Chloraseptic spray as needed for your sore throat. Biopsy Obtained: NO    Recommendations: Repeat as needed for recurrent dysphagia    For questions or concerns please contact your GI physician's 24 hour call center at [phone].

## 2023-05-24 NOTE — PROGRESS NOTES
Pt to phase 2 from pacu. Pt a/o x4, denies pain or nausea at this time, pt assisted into chair at bedside, given po snack. Pts daughter called to bedside, VSS. Updated on plan of care.

## 2023-05-24 NOTE — ANESTHESIA PRE PROCEDURE
as of this encounter: 5' (1.524 m). Weight as of this encounter: 152 lb 1.9 oz (69 kg).     CBC   Lab Results   Component Value Date/Time    WBC 13.6 05/04/2023 05:05 AM    RBC 3.71 05/04/2023 05:05 AM    HGB 10.6 05/04/2023 05:05 AM    HCT 31.9 05/04/2023 05:05 AM    MCV 86.0 05/04/2023 05:05 AM    RDW 13.4 05/04/2023 05:05 AM     05/04/2023 05:05 AM     CMP    Lab Results   Component Value Date/Time     05/22/2023 09:58 AM    K 3.6 05/22/2023 09:58 AM    K 3.1 04/29/2023 06:23 PM     05/22/2023 09:58 AM    CO2 27 05/22/2023 09:58 AM    BUN 13 05/22/2023 09:58 AM    CREATININE 1.2 05/22/2023 09:58 AM    GFRAA >60 12/28/2021 10:02 AM    GFRAA >60 02/05/2013 09:00 AM    AGRATIO 1.2 04/29/2023 06:23 PM    LABGLOM 44 05/22/2023 09:58 AM    GLUCOSE 115 05/22/2023 09:58 AM    PROT 5.6 04/29/2023 06:23 PM    PROT 7.8 07/20/2012 09:21 AM    CALCIUM 9.9 05/22/2023 09:58 AM    BILITOT 0.4 04/29/2023 06:23 PM    ALKPHOS 48 04/29/2023 06:23 PM    AST 26 04/29/2023 06:23 PM    ALT 17 04/29/2023 06:23 PM     BMP    Lab Results   Component Value Date/Time     05/22/2023 09:58 AM    K 3.6 05/22/2023 09:58 AM    K 3.1 04/29/2023 06:23 PM     05/22/2023 09:58 AM    CO2 27 05/22/2023 09:58 AM    BUN 13 05/22/2023 09:58 AM    CREATININE 1.2 05/22/2023 09:58 AM    CALCIUM 9.9 05/22/2023 09:58 AM    GFRAA >60 12/28/2021 10:02 AM    GFRAA >60 02/05/2013 09:00 AM    LABGLOM 44 05/22/2023 09:58 AM    GLUCOSE 115 05/22/2023 09:58 AM     POCGlucose  Recent Labs     05/22/23  0958   GLUCOSE 115*      Coags    Lab Results   Component Value Date/Time    PROTIME 13.5 04/15/2017 09:49 PM    INR 1.19 04/15/2017 09:49 PM    APTT 44.9 03/09/2017 11:24 AM     HCG (If Applicable) No results found for: PREGTESTUR, PREGSERUM, HCG, HCGQUANT   ABGs   Lab Results   Component Value Date/Time    PHART 7.302 03/09/2017 12:40 PM    PO2ART 152 03/09/2017 12:40 PM    DWU2UPD 44 03/09/2017 12:40 PM    CTZ3GUP 21.9 03/09/2017 12:40

## 2023-05-24 NOTE — H&P
[facility]   Pre-operative History and Physical    Patient: Pepito Alvarez  : 1935  Acct#:         HISTORY OF PRESENT ILLNESS:    The patient is a 80 y.o. female  who presents with dysphagia  Past Medical History:        Diagnosis Date    Aortic stenosis     Cerebrovascular disease     Class 1 obesity     Hepatic steatosis     Hiatal hernia     Hyperlipidemia     Hypertension     Osteoarthritis      Past Surgical History:        Procedure Laterality Date    AORTIC VALVE REPLACEMENT  2017    19mm [place] pericardial Magna Ease    BLEPHAROPLASTY Bilateral 2021    BILATERAL UPPER LIDS BLEPHAROPLASTY performed by Juliette Tejada MD at [address] Bilateral 2019    PRE-MALIGNANT / BENIGN SKIN LESION EXCISION Right 2014    R scalp. cystadenoma. --done under local     Medications prior to admission:   Prior to Admission medications    Medication Sig Start Date End Date Taking?  Authorizing Provider   potassium chloride (KLOR-CON M) 10 MEQ extended release tablet Take 1 tablet by mouth daily 23   Nevin Vernon MD   furosemide (LASIX) 20 MG tablet Take 1 tablet by mouth 2 times daily 5/15/23   Nevin Vernon MD   rosuvastatin (CRESTOR) 10 MG tablet TAKE 1 TABLET BY MOUTH EVERY DAY 5/10/23   Nevin Vernon MD   pantoprazole (PROTONIX) 40 MG tablet Take 1 tablet by mouth every morning (before breakfast) 23   Juanita Mohamud MD   Probiotic Acidophilus [facility]) TABS Take 1 tablet by mouth 2 times daily 5/4/23 6/3/23  Juanita Mohamud MD   Coenzyme Q10 (COQ-10 PO) Take 1 capsule by mouth at bedtime    Historical Provider, MD   lisinopril (PRINIVIL;ZESTRIL) 40 MG tablet TAKE 1 TABLET DAILY 23   Nevin Vernon MD   GLUCOSAMINE-CHONDROITIN PO Take 1 tablet by mouth in the morning and at bedtime    Historical Provider, MD   aspirin 325 MG EC tablet Take 1 tablet by mouth daily 3/13/17   MAICOL Granger - CNP   multivitamin [facility]) per tablet Take 1 tablet by mouth daily

## 2023-05-24 NOTE — PROGRESS NOTES
Pt tolerating po snack, states is ready to go. Discharge discussed with patient and daughter, verbalized understanding. IV removed. Pt getting dressed. Taken to car via wheelchair. Being driven home by daughter.

## 2023-05-24 NOTE — BRIEF OP NOTE
Brief Postoperative Note    Trevor Sanders  YOB: 1935  [id number]    Pre-operative Diagnosis: Dysphagia    Post-operative Diagnosis: Same    Procedure: EGD    Anesthesia: MAC    Surgeons/Assistants: Charla Frias MD    Estimated Blood Loss: None    Complications: None    Specimens: Was Not Obtained    Findings: See dictated report    Electronically signed by Charla Frias MD on 5/24/2023 at 9:22 AM

## 2023-05-24 NOTE — PROGRESS NOTES
Patient admitted to PACU # 7 from OR at 7831 post EGD Dilation balloon to 18mm per MD Horton. Attached to PACU monitoring system and report received from anesthesia provider. Patient was reported to be hemodynamically stable during procedure. Patient drowsy on admission and denied pain.

## 2023-05-24 NOTE — PROGRESS NOTES
PACU Transfer to [facility]    Vitals:    05/24/23 0935   BP: (!) 144/66   Pulse: 83   Resp: 22   Temp: 97.4 °F (36.3 °C)   SpO2: 94%         Intake/Output Summary (Last 24 hours) at 5/24/2023 0940  Last data filed at 5/24/2023 9714  Gross per 24 hour   Intake 500 ml   Output --   Net 500 ml       Pain assessment:  none  Pain Level: 0    Patient transferred to care of [facility] RN.    5/24/2023 9:40 AM

## 2023-05-25 ENCOUNTER — CARE COORDINATION (OUTPATIENT)
Dept: CASE MANAGEMENT | Age: 88
End: 2023-05-25

## 2023-05-25 NOTE — CARE COORDINATION
[facility] Care Transitions Follow Up Call        Patient: Cong Esquivel  Patient : 1935   MRN: [id number]  Reason for Admission: Fall  Discharge Date: 23 RARS: Readmission Risk Score: 11      Needs to be reviewed by the provider   Additional needs identified to be addressed with provider: No               Method of communication with provider: none. Unable to reach patient for CT follow up phone call. Left message. Contact info provided. Requested return call to CTN.       Follow Up  Future Appointments   Date Time Provider Luisa Paulino   2023  8:00 AM Mikki Chu MD [facility]   2023 11:00 AM SCHEDULE, [address] Transitions Subsequent and Final Call    Subsequent and Final Calls  Care Transitions Interventions  Other Interventions:              Yeyo Springer RN BSN  Care Transition Nurse  [phone]

## 2023-06-02 ENCOUNTER — CARE COORDINATION (OUTPATIENT)
Dept: CASE MANAGEMENT | Age: 88
End: 2023-06-02

## 2023-06-02 NOTE — CARE COORDINATION
[facility] Care Transitions Follow Up Call      Patient: Darío Delvalle  Patient : 1935   MRN: [id number]  Reason for Admission: S/P fall  Discharge Date: 23 RARS: Readmission Risk Score: 11      Needs to be reviewed by the provider   Additional needs identified to be addressed with provider: No               Method of communication with provider: none. Final attempt at CT follow up phone call. Unable to reach patient. Left message. Informed [facility] this would be the final CTN outreach.     Follow Up  Future Appointments   Date Time Provider Luisa Paulino   2023  8:00 AM [facility]   2023 11:00 AM SCHEDULE,  [facility] Transitions Subsequent and Final Call    Subsequent and Final Calls  Care Transitions Interventions  Other Interventions:              Chao Varma RN BSN  Care Transition Nurse  [phone]

## 2023-06-08 RX ORDER — POTASSIUM CHLORIDE 750 MG/1
TABLET, EXTENDED RELEASE ORAL
Qty: 30 TABLET | Refills: 3 | Status: SHIPPED | OUTPATIENT
Start: 2023-06-08

## 2023-06-25 RX ORDER — POTASSIUM CHLORIDE 750 MG/1
TABLET, EXTENDED RELEASE ORAL
Qty: 90 TABLET | Refills: 1 | Status: SHIPPED | OUTPATIENT
Start: 2023-06-25

## 2023-07-25 ENCOUNTER — OFFICE VISIT (OUTPATIENT)
Dept: FAMILY MEDICINE CLINIC | Age: 88
End: 2023-07-25

## 2023-07-25 VITALS
WEIGHT: 148.8 LBS | SYSTOLIC BLOOD PRESSURE: 128 MMHG | DIASTOLIC BLOOD PRESSURE: 86 MMHG | HEART RATE: 76 BPM | TEMPERATURE: 97.9 F | BODY MASS INDEX: 29.21 KG/M2 | HEIGHT: 60 IN

## 2023-07-25 VITALS
SYSTOLIC BLOOD PRESSURE: 128 MMHG | HEART RATE: 75 BPM | TEMPERATURE: 97.9 F | HEIGHT: 60 IN | BODY MASS INDEX: 29.21 KG/M2 | DIASTOLIC BLOOD PRESSURE: 86 MMHG | WEIGHT: 148.8 LBS | OXYGEN SATURATION: 96 %

## 2023-07-25 DIAGNOSIS — E78.5 HYPERLIPIDEMIA, UNSPECIFIED HYPERLIPIDEMIA TYPE: ICD-10-CM

## 2023-07-25 DIAGNOSIS — Z00.00 INITIAL MEDICARE ANNUAL WELLNESS VISIT: Primary | ICD-10-CM

## 2023-07-25 DIAGNOSIS — I10 BENIGN ESSENTIAL HTN: ICD-10-CM

## 2023-07-25 DIAGNOSIS — D64.9 ANEMIA, UNSPECIFIED TYPE: Primary | ICD-10-CM

## 2023-07-25 DIAGNOSIS — D64.9 ANEMIA, UNSPECIFIED TYPE: ICD-10-CM

## 2023-07-25 LAB
BASOPHILS # BLD: 0.2 K/UL (ref 0–0.2)
BASOPHILS NFR BLD: 2.3 %
CHOLEST SERPL-MCNC: 130 MG/DL (ref 0–199)
DEPRECATED RDW RBC AUTO: 14.7 % (ref 12.4–15.4)
EOSINOPHIL # BLD: 0.3 K/UL (ref 0–0.6)
EOSINOPHIL NFR BLD: 3.8 %
FOLATE SERPL-MCNC: >20 NG/ML (ref 4.78–24.2)
HCT VFR BLD AUTO: 38.4 % (ref 36–48)
HDLC SERPL-MCNC: 42 MG/DL (ref 40–60)
HGB BLD-MCNC: 12.9 G/DL (ref 12–16)
IRON SATN MFR SERPL: 36 % (ref 15–50)
IRON SERPL-MCNC: 119 UG/DL (ref 37–145)
LDLC SERPL CALC-MCNC: 67 MG/DL
LYMPHOCYTES # BLD: 1.8 K/UL (ref 1–5.1)
LYMPHOCYTES NFR BLD: 23.8 %
MCH RBC QN AUTO: 28 PG (ref 26–34)
MCHC RBC AUTO-ENTMCNC: 33.7 G/DL (ref 31–36)
MCV RBC AUTO: 83.2 FL (ref 80–100)
MONOCYTES # BLD: 0.3 K/UL (ref 0–1.3)
MONOCYTES NFR BLD: 4.6 %
NEUTROPHILS # BLD: 4.9 K/UL (ref 1.7–7.7)
NEUTROPHILS NFR BLD: 65.5 %
PLATELET # BLD AUTO: 209 K/UL (ref 135–450)
PMV BLD AUTO: 10.2 FL (ref 5–10.5)
RBC # BLD AUTO: 4.62 M/UL (ref 4–5.2)
TIBC SERPL-MCNC: 331 UG/DL (ref 260–445)
TRIGL SERPL-MCNC: 103 MG/DL (ref 0–150)
TSH SERPL DL<=0.005 MIU/L-ACNC: 2.21 UIU/ML (ref 0.27–4.2)
VIT B12 SERPL-MCNC: 551 PG/ML (ref 211–911)
VLDLC SERPL CALC-MCNC: 21 MG/DL
WBC # BLD AUTO: 7.4 K/UL (ref 4–11)

## 2023-07-25 RX ORDER — CETIRIZINE HYDROCHLORIDE 10 MG/1
5 TABLET ORAL DAILY
COMMUNITY
Start: 2023-05-01

## 2023-07-25 RX ORDER — AMLODIPINE BESYLATE 10 MG/1
10 TABLET ORAL DAILY
COMMUNITY
Start: 2023-05-03

## 2023-07-25 RX ORDER — SPIRONOLACTONE 25 MG/1
25 TABLET ORAL DAILY
COMMUNITY
Start: 2023-06-26

## 2023-07-25 ASSESSMENT — PATIENT HEALTH QUESTIONNAIRE - PHQ9
SUM OF ALL RESPONSES TO PHQ QUESTIONS 1-9: 0
1. LITTLE INTEREST OR PLEASURE IN DOING THINGS: 0
2. FEELING DOWN, DEPRESSED OR HOPELESS: 0
SUM OF ALL RESPONSES TO PHQ QUESTIONS 1-9: 0
SUM OF ALL RESPONSES TO PHQ QUESTIONS 1-9: 0
SUM OF ALL RESPONSES TO PHQ9 QUESTIONS 1 & 2: 0
SUM OF ALL RESPONSES TO PHQ QUESTIONS 1-9: 0

## 2023-07-25 ASSESSMENT — LIFESTYLE VARIABLES
HOW MANY STANDARD DRINKS CONTAINING ALCOHOL DO YOU HAVE ON A TYPICAL DAY: 1 OR 2
HOW OFTEN DO YOU HAVE A DRINK CONTAINING ALCOHOL: 2-4 TIMES A MONTH

## 2023-07-25 NOTE — PATIENT INSTRUCTIONS
Personalized Preventive Plan for Maik Stroud - 7/25/2023  Medicare offers a range of preventive health benefits. Some of the tests and screenings are paid in full while other may be subject to a deductible, co-insurance, and/or copay. Some of these benefits include a comprehensive review of your medical history including lifestyle, illnesses that may run in your family, and various assessments and screenings as appropriate. After reviewing your medical record and screening and assessments performed today your provider may have ordered immunizations, labs, imaging, and/or referrals for you. A list of these orders (if applicable) as well as your Preventive Care list are included within your After Visit Summary for your review. Other Preventive Recommendations:    A preventive eye exam performed by an eye specialist is recommended every 1-2 years to screen for glaucoma; cataracts, macular degeneration, and other eye disorders. A preventive dental visit is recommended every 6 months. Try to get at least 150 minutes of exercise per week or 10,000 steps per day on a pedometer . Order or download the FREE \"Exercise & Physical Activity: Your Everyday Guide\" from [employer]. Call 6-303.791.8108 or search [employer] online. You need 2371-1465 mg of calcium and 9757-6434 IU of vitamin D per day. It is possible to meet your calcium requirement with diet alone, but a vitamin D supplement is usually necessary to meet this goal.  When exposed to the sun, use a sunscreen that protects against both UVA and UVB radiation with an SPF of 30 or greater. Reapply every 2 to 3 hours or after sweating, drying off with a towel, or swimming. Always wear a seat belt when traveling in a car. Always wear a helmet when riding a bicycle or motorcycle.

## 2023-07-25 NOTE — PATIENT INSTRUCTIONS
Do contact dr Rachel Barnes on the medicines you should be taking because the record from her offices do not match the list you bring in   See in 4 month

## 2023-08-02 ENCOUNTER — TELEPHONE (OUTPATIENT)
Dept: PHARMACY | Facility: CLINIC | Age: 88
End: 2023-08-02

## 2023-08-02 NOTE — TELEPHONE ENCOUNTER
through [address] [place] [address] [address] Does she want all her lisinopril send to local [facility] as it seems lisinopril is only mail order medication? Last Visit: 7/25/23  Next Visit: 7/26/24    For Pharmacy Admin Tracking Only    Program: Lisbeth in place:  No  Gap Closed?: No   Time Spent (min): [address] [facility] [facility].   [facility] free: [phone]

## 2023-09-22 ENCOUNTER — HOSPITAL ENCOUNTER (EMERGENCY)
Age: 88
Discharge: HOME | End: 2023-09-22
Payer: MEDICARE

## 2023-09-22 ENCOUNTER — APPOINTMENT (OUTPATIENT)
Dept: GENERAL RADIOLOGY | Age: 88
End: 2023-09-22
Payer: MEDICARE

## 2023-09-22 VITALS
BODY MASS INDEX: 29.09 KG/M2 | DIASTOLIC BLOOD PRESSURE: 71 MMHG | WEIGHT: 148.15 LBS | HEIGHT: 60 IN | HEART RATE: 65 BPM | SYSTOLIC BLOOD PRESSURE: 211 MMHG | TEMPERATURE: 97.8 F | OXYGEN SATURATION: 98 % | RESPIRATION RATE: 13 BRPM

## 2023-09-22 DIAGNOSIS — N30.00 ACUTE CYSTITIS WITHOUT HEMATURIA: ICD-10-CM

## 2023-09-22 DIAGNOSIS — S32.000A COMPRESSION FRACTURE OF LUMBAR VERTEBRA, UNSPECIFIED LUMBAR VERTEBRAL LEVEL, INITIAL ENCOUNTER (HCC): ICD-10-CM

## 2023-09-22 DIAGNOSIS — S22.000A COMPRESSION FRACTURE OF THORACIC VERTEBRA, UNSPECIFIED THORACIC VERTEBRAL LEVEL, INITIAL ENCOUNTER (HCC): Primary | ICD-10-CM

## 2023-09-22 LAB
BACTERIA URNS QL MICRO: ABNORMAL /HPF
BILIRUB UR QL STRIP.AUTO: NEGATIVE
CLARITY UR: CLEAR
COLOR UR: YELLOW
EPI CELLS #/AREA URNS AUTO: 0 /HPF (ref 0–5)
GLUCOSE UR STRIP.AUTO-MCNC: NEGATIVE MG/DL
HGB UR QL STRIP.AUTO: NEGATIVE
HYALINE CASTS #/AREA URNS AUTO: 0 /LPF (ref 0–8)
KETONES UR STRIP.AUTO-MCNC: NEGATIVE MG/DL
LEUKOCYTE ESTERASE UR QL STRIP.AUTO: ABNORMAL
NITRITE UR QL STRIP.AUTO: NEGATIVE
PH UR STRIP.AUTO: 6.5 [PH] (ref 5–8)
PROT UR STRIP.AUTO-MCNC: NEGATIVE MG/DL
RBC CLUMPS #/AREA URNS AUTO: 1 /HPF (ref 0–4)
SP GR UR STRIP.AUTO: 1.01 (ref 1–1.03)
UA COMPLETE W REFLEX CULTURE PNL UR: YES
UA DIPSTICK W REFLEX MICRO PNL UR: YES
URN SPEC COLLECT METH UR: ABNORMAL
UROBILINOGEN UR STRIP-ACNC: 0.2 E.U./DL
WBC #/AREA URNS AUTO: 18 /HPF (ref 0–5)

## 2023-09-22 PROCEDURE — 73502 X-RAY EXAM HIP UNI 2-3 VIEWS: CPT

## 2023-09-22 PROCEDURE — 81001 URINALYSIS AUTO W/SCOPE: CPT

## 2023-09-22 PROCEDURE — 6370000000 HC RX 637 (ALT 250 FOR IP): Performed by: PHYSICIAN ASSISTANT

## 2023-09-22 PROCEDURE — 72100 X-RAY EXAM L-S SPINE 2/3 VWS: CPT

## 2023-09-22 PROCEDURE — 93005 ELECTROCARDIOGRAM TRACING: CPT | Performed by: PHYSICIAN ASSISTANT

## 2023-09-22 PROCEDURE — 87086 URINE CULTURE/COLONY COUNT: CPT

## 2023-09-22 PROCEDURE — 87186 SC STD MICRODIL/AGAR DIL: CPT

## 2023-09-22 PROCEDURE — 87077 CULTURE AEROBIC IDENTIFY: CPT

## 2023-09-22 PROCEDURE — 99284 EMERGENCY DEPT VISIT MOD MDM: CPT

## 2023-09-22 RX ORDER — HYDROCODONE BITARTRATE AND ACETAMINOPHEN 5; 325 MG/1; MG/1
1 TABLET ORAL ONCE
Status: COMPLETED | OUTPATIENT
Start: 2023-09-22 | End: 2023-09-22

## 2023-09-22 RX ORDER — CEFUROXIME AXETIL 250 MG/1
250 TABLET ORAL 2 TIMES DAILY
Qty: 14 TABLET | Refills: 0 | Status: SHIPPED | OUTPATIENT
Start: 2023-09-22 | End: 2023-09-29

## 2023-09-22 RX ORDER — CEFUROXIME AXETIL 250 MG/1
250 TABLET ORAL ONCE
Status: COMPLETED | OUTPATIENT
Start: 2023-09-22 | End: 2023-09-22

## 2023-09-22 RX ORDER — HYDROCODONE BITARTRATE AND ACETAMINOPHEN 5; 325 MG/1; MG/1
1 TABLET ORAL EVERY 6 HOURS PRN
Qty: 20 TABLET | Refills: 0 | Status: SHIPPED | OUTPATIENT
Start: 2023-09-22 | End: 2023-09-27

## 2023-09-22 RX ORDER — CLONIDINE HYDROCHLORIDE 0.1 MG/1
0.1 TABLET ORAL ONCE
Status: COMPLETED | OUTPATIENT
Start: 2023-09-22 | End: 2023-09-22

## 2023-09-22 RX ADMIN — HYDROCODONE BITARTRATE AND ACETAMINOPHEN 1 TABLET: 5; 325 TABLET ORAL at 19:44

## 2023-09-22 RX ADMIN — HYDROCODONE BITARTRATE AND ACETAMINOPHEN 1 TABLET: 5; 325 TABLET ORAL at 22:38

## 2023-09-22 RX ADMIN — CEFUROXIME AXETIL 250 MG: 250 TABLET ORAL at 21:59

## 2023-09-22 RX ADMIN — CLONIDINE HYDROCHLORIDE 0.1 MG: 0.1 TABLET ORAL at 19:44

## 2023-09-22 ASSESSMENT — ENCOUNTER SYMPTOMS
NAUSEA: 0
CHEST TIGHTNESS: 0
BACK PAIN: 1
ABDOMINAL PAIN: 0
SHORTNESS OF BREATH: 0
VOMITING: 0
DIARRHEA: 0

## 2023-09-22 ASSESSMENT — PAIN SCALES - GENERAL
PAINLEVEL_OUTOF10: 7
PAINLEVEL_OUTOF10: 5
PAINLEVEL_OUTOF10: 7

## 2023-09-22 ASSESSMENT — PAIN - FUNCTIONAL ASSESSMENT: PAIN_FUNCTIONAL_ASSESSMENT: 0-10

## 2023-09-22 ASSESSMENT — LIFESTYLE VARIABLES
HOW OFTEN DO YOU HAVE A DRINK CONTAINING ALCOHOL: MONTHLY OR LESS
HOW MANY STANDARD DRINKS CONTAINING ALCOHOL DO YOU HAVE ON A TYPICAL DAY: 1 OR 2

## 2023-09-22 NOTE — ED TRIAGE NOTES
Pt arrived from home via ems for a c/c of right hip pain. Right hip pain. No deformity. Able to walk yesterday and today. Colleen Wynne last night-830pm. Pain is getting worse. Pt does take asa. VS noted and stable except bp (209/61). Patient A&Ox4. Respirations easy and unlabored. Skin warm and dry and appropriate for ethnicity. No acute distress noted at this time. Patient placed on continuous telemetry and pulse ox upon arrival to room.

## 2023-09-22 NOTE — ED PROVIDER NOTES
[address] [facility] [address] [address]        Pt Name: Saima Gee  MRN: [id number]  [address] [facility] 1935  Date of evaluation: 9/22/2023  Provider: Dianna Arboleda PA-C  PCP: Dwain Rai MD  Note Started: 7:19 PM EDT 9/22/23      CECILE. I have evaluated this patient. CHIEF COMPLAINT       Chief Complaint   Patient presents with    Hip Pain     Right hip pain. No deformity. Able to walk yesterday and today. [facility] [place] last night-830pm. Pain is getting worse. Pt does take asa. HISTORY OF PRESENT ILLNESS: 1 or more Elements     History from : Patient    Limitations to history : None    Saima Gee is a 80 y.o. female who presents to the emergency department today stating at 8:30 PM yesterday she was pulling a weed from her garden when she accidentally fell backwards. She states she fell into a bush. She denies hitting her head or losing consciousness. She denies neck injury. She states she twisted while trying to get up and pulled something in her right low back/buttock area. She states she is able to walk with minimal difficulty but has significant pain with certain movements. She denies changes in bowel or bladder habits, incontinence, saddle paresthesia, numbness, tingling or weakness in her extremities. Nursing Notes were all reviewed and agreed with or any disagreements were addressed in the HPI. REVIEW OF SYSTEMS :      Review of Systems   Constitutional:  Negative for chills and fever. Respiratory:  Negative for chest tightness and shortness of breath. Cardiovascular:  Negative for chest pain. Gastrointestinal:  Negative for abdominal pain, diarrhea, nausea and vomiting. Genitourinary:  Negative for dysuria. Musculoskeletal:  Positive for arthralgias and back pain. Negative for neck pain and neck stiffness. Neurological:  Negative for dizziness, light-headedness, numbness and headaches.    All other systems reviewed and are

## 2023-09-23 NOTE — ED NOTES
Pt ambulated to restroom with assistance. Tolerated well. PA notified of pt ambulation status.        William Lewis RN  09/22/23 4649

## 2023-09-23 NOTE — ED NOTES
D/C: Order noted for d/c. Pt confirmed d/c paperwork  have correct name. Discharge and education instructions reviewed with patient. Teach-back successful. Pt verbalized understanding and signed d/c papers. Pt denied questions at this time. No acute distress noted. Patient instructed to follow-up as noted - return to emergency department if symptoms worsen. Patient verbalized understanding. Discharged per EDMD with discharge instructions. Pt discharged to private vehicle. Patient stable upon departure. Thanked patient for choosing [facility] for care.           Georgie Valenzuela RN  09/22/23 3953

## 2023-09-24 LAB
BACTERIA UR CULT: ABNORMAL
ORGANISM: ABNORMAL

## 2023-09-25 ENCOUNTER — CARE COORDINATION (OUTPATIENT)
Dept: CARE COORDINATION | Age: 88
End: 2023-09-25

## 2023-09-25 LAB
BACTERIA UR CULT: ABNORMAL
EKG ATRIAL RATE: 60 BPM
EKG DIAGNOSIS: NORMAL
EKG P AXIS: 10 DEGREES
EKG P-R INTERVAL: 162 MS
EKG Q-T INTERVAL: 452 MS
EKG QRS DURATION: 96 MS
EKG QTC CALCULATION (BAZETT): 452 MS
EKG R AXIS: -7 DEGREES
EKG T AXIS: 10 DEGREES
EKG VENTRICULAR RATE: 60 BPM
ORGANISM: ABNORMAL

## 2023-09-25 PROCEDURE — 93010 ELECTROCARDIOGRAM REPORT: CPT | Performed by: INTERNAL MEDICINE

## 2023-09-25 NOTE — CARE COORDINATION
Ambulatory Care Coordination Note  2023    Patient Current Location:  Home: [address] [place] [address] [address] [address]    ACM contacted the patient by telephone. Verified name and  with patient as identifiers. Provided introduction to self, and explanation of the ACM role. ACM: Bobby Zamora RN    Challenges to be reviewed by the provider   Additional needs identified to be addressed with provider: No  Follows up with ortho 23 for Rhip pain from fall 23               Method of communication with provider: phone. Spoke to patient for initial cc screening from ED discharge. Patient reports she was outside pulling weeds when she lost her balance and fell under the bush, had R hip pain and went for evaluation. Patient is dc'd and follows up with orthopedics 23. Patient states her daughter is on her way to stay with her from [place]. Estee Tafoya does live with patient for assistance as well. Grab bars and tub mat present in home. Well lit, reduced clutter. Patient uses a walker as needed for gait. No financial issues reported at this time. No additional questions, concerns. No reported symptoms other than the right hip pain at this time. Plan:  POC to pcp   Fu ortho 23  Do sdoh/send edu htn    Offered patient enrollment in the Remote Patient Monitoring (RPM) program for in-home monitoring: Patient is not eligible for RPM program.    Ambulatory Care Coordination Assessment    Care Coordination Protocol  Week 1 - Initial Assessment     Do you have all of your prescriptions and are they filled?: Yes  Barriers to medication adherence: None  Are you able to afford your medications?: Yes  How often do you have trouble taking your medications the way you have been told to take them?: I always take them as prescribed. Do you have Home O2 Therapy?: No      Ability to seek help/take action for Emergent Urgent situations i.e. fire, crime, inclement weather or health crisis. Lizy Gonzales

## 2023-09-26 ENCOUNTER — OFFICE VISIT (OUTPATIENT)
Dept: ORTHOPEDIC SURGERY | Age: 88
End: 2023-09-26
Payer: MEDICARE

## 2023-09-26 VITALS — WEIGHT: 148 LBS | BODY MASS INDEX: 27.94 KG/M2 | HEIGHT: 61 IN

## 2023-09-26 DIAGNOSIS — M43.16 SPONDYLOLISTHESIS AT L4-L5 LEVEL: Primary | ICD-10-CM
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
## 2023-09-26 DIAGNOSIS — M53.3 SACRAL BACK PAIN: ICD-10-CM

## 2023-09-26 DIAGNOSIS — S32.000A COMPRESSION FRACTURE OF LUMBAR VERTEBRA, UNSPECIFIED LUMBAR VERTEBRAL LEVEL, INITIAL ENCOUNTER (HCC): ICD-10-CM

## 2023-09-26 PROCEDURE — 1123F ACP DISCUSS/DSCN MKR DOCD: CPT | Performed by: PHYSICIAN ASSISTANT

## 2023-09-26 PROCEDURE — 99204 OFFICE O/P NEW MOD 45 MIN: CPT | Performed by: PHYSICIAN ASSISTANT
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
## 2023-09-27 NOTE — PROGRESS NOTES
Subjective:      Patient ID: Tonya Alcantara is a 80 y.o. female who is here for an initial evaluation of low back pain, sacral pain and right leg pain. She states she was in the garden pulling weeds when she fell backwards into a bush and landed on her buttocks. Injury was on 9/21/2023. Since the fall she has had significant mid and low back pain and right leg pain. She was initially seen in the ER on 9/22/2023 and x-rays of the lumbar spine at that time showed vertebral compression fractures of T11, T12, L1 and L2. She has been using a walker to assist with ambulation. Pain Scale 5-7/10 VAS. Pain is worse with ambulation. Pain improves with rest.   Previous treatments have included Norco for pain. Review of Systems:  I have reviewed the clinically relevant past medical history, medications, allergies, family history, social history, and 13 point Review of Systems from the patient's recent history form & documented any details relevant to today's presenting complaints in the history above. The patient's self-reported past medical history, medications, allergies, family history, social history, and Review of Systems form from today's date have been scanned into the chart under the \"Media\" tab. Review of symptoms was reviewed and is significant for back pain and negative for recent weight loss, fatigue, chills, visual disturbances, blood in stool or urine.       Past Medical History:   Diagnosis Date    Aortic stenosis     Cerebrovascular disease     Class 1 obesity     Hepatic steatosis     Hiatal hernia     Hyperlipidemia     Hypertension     Osteoarthritis        Family History   Problem Relation Age of Onset    Diabetes Mother     Cancer Mother         lung    Other Father         leukemia    Kidney Disease Brother     Parkinsonism Brother        Past Surgical History:   Procedure Laterality Date    AORTIC VALVE REPLACEMENT  03/09/2017    19mm [place] pericardial Magna Ease    BLEPHAROPLASTY

## 2023-10-06 ENCOUNTER — HOSPITAL ENCOUNTER (OUTPATIENT)
Dept: MRI IMAGING | Age: 88
Discharge: HOME | End: 2023-10-06
Payer: MEDICARE

## 2023-10-06 DIAGNOSIS — M43.16 SPONDYLOLISTHESIS AT L4-L5 LEVEL: ICD-10-CM

## 2023-10-06 DIAGNOSIS — M53.3 SACRAL BACK PAIN: ICD-10-CM

## 2023-10-06 DIAGNOSIS — S32.000A COMPRESSION FRACTURE OF LUMBAR VERTEBRA, UNSPECIFIED LUMBAR VERTEBRAL LEVEL, INITIAL ENCOUNTER (HCC): ICD-10-CM
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
## 2023-10-06 PROCEDURE — 72195 MRI PELVIS W/O DYE: CPT

## 2023-10-06 PROCEDURE — 72148 MRI LUMBAR SPINE W/O DYE: CPT

## 2023-10-12 RX ORDER — LISINOPRIL 40 MG/1
TABLET ORAL
Qty: 90 TABLET | Refills: 1 | Status: SHIPPED | OUTPATIENT
Start: 2023-10-12

## 2023-10-19 ENCOUNTER — OFFICE VISIT (OUTPATIENT)
Dept: ORTHOPEDIC SURGERY | Age: 88
End: 2023-10-19
Payer: MEDICARE

## 2023-10-19 ENCOUNTER — TELEPHONE (OUTPATIENT)
Dept: ORTHOPEDIC SURGERY | Age: 88
End: 2023-10-19

## 2023-10-19 VITALS — BODY MASS INDEX: 27.94 KG/M2 | WEIGHT: 148 LBS | RESPIRATION RATE: 18 BRPM | HEIGHT: 61 IN

## 2023-10-19 DIAGNOSIS — M43.16 SPONDYLOLISTHESIS AT L4-L5 LEVEL: Primary | ICD-10-CM

## 2023-10-19 PROCEDURE — 99213 OFFICE O/P EST LOW 20 MIN: CPT | Performed by: PHYSICIAN ASSISTANT

## 2023-10-19 PROCEDURE — 1123F ACP DISCUSS/DSCN MKR DOCD: CPT | Performed by: PHYSICIAN ASSISTANT

## 2023-10-19 NOTE — PROGRESS NOTES
Subjective:      Patient ID: Jennifer Leggett is a 80 y.o. female who is here for follow up evaluation of low back pain, sacral pain and right leg pain which began after gardening/pulling weeds and falling backwards into a bush and landed on her buttocks. This injury occurred on 9/21/2023. Since that time she reports mild improvement of her low back pain and leg pain. She did undergo lumbar spine and pelvis MRI to evaluate for occult fractures. She is here today with her daughter to review those test results. Review Of Systems:   Review of symptoms was reviewed and is significant for back pain and negative for recent weight loss, fatigue, chills, visual disturbances, blood in stool or urine. Past Medical History:   Diagnosis Date    Aortic stenosis     Cerebrovascular disease     Class 1 obesity     Hepatic steatosis     Hiatal hernia     Hyperlipidemia     Hypertension     Osteoarthritis        Family History   Problem Relation Age of Onset    Diabetes Mother     Cancer Mother         lung    Other Father         leukemia    Kidney Disease Brother     Parkinsonism Brother        Past Surgical History:   Procedure Laterality Date    AORTIC VALVE REPLACEMENT  03/09/2017    19mm Lunsford pericardial Magna Ease    BLEPHAROPLASTY Bilateral 6/7/2021    BILATERAL UPPER LIDS BLEPHAROPLASTY performed by Gerson Shaw MD at [address] [facility] Bilateral 01/2019    PRE-MALIGNANT / BENIGN SKIN LESION EXCISION Right 2014    R scalp. cystadenoma. --done under local    UPPER GASTROINTESTINAL ENDOSCOPY N/A 5/24/2023    EGD DILATION BALLOON TO 18mm performed by Kelly Guardado MD at [address] [facility] History     Occupational History    Not on file   Tobacco Use    Smoking status: Never    Smokeless tobacco: Never   Vaping Use    Vaping Use: Never used   Substance and Sexual Activity    Alcohol use: Yes     Comment: socially once/week    Drug use: No     Comment: never    Sexual activity: Not

## 2023-10-19 NOTE — TELEPHONE ENCOUNTER
General Question     Subject: HANDICAP PLACARD TO [facility]  Patient and /or Facility Request: Beverly Spain  Contact Number: [phone]    PT'S DAUGHTER CALLED TO INQUIRE OF HANDICAP PLACARD, SHE WOULD LIKE TO HAVE A PLACARD FORWARDED TO LOCAL [facility].      PLEASE ADVISE

## 2023-10-24 ENCOUNTER — CARE COORDINATION (OUTPATIENT)
Dept: CARE COORDINATION | Age: 88
End: 2023-10-24

## 2023-10-24 NOTE — CARE COORDINATION
Ambulatory Care Coordination Note  10/24/2023        Attempted to contact patient for follow up transition call. Left voicemail message to return call with an update on condition since discharge. Contact information provided. Will continue to follow up.       Lab Results       None                 Goals Addressed    None         Future Appointments   Date Time Provider [address]   7/26/2024 10:30 AM SCHEDULE, [place]    and   General Assessment             Trisha Moody LPN  Care Coordinator  [phone]
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
## 2023-11-06 RX ORDER — ROSUVASTATIN CALCIUM 10 MG/1
TABLET, COATED ORAL
Qty: 90 TABLET | Refills: 1 | Status: SHIPPED | OUTPATIENT
Start: 2023-11-06

## 2023-11-14 ENCOUNTER — HOSPITAL ENCOUNTER (INPATIENT)
Age: 88
LOS: 11 days | Discharge: HOME | End: 2023-11-25
Attending: EMERGENCY MEDICINE | Admitting: STUDENT IN AN ORGANIZED HEALTH CARE EDUCATION/TRAINING PROGRAM
Payer: MEDICARE

## 2023-11-14 ENCOUNTER — CARE COORDINATION (OUTPATIENT)
Dept: CARE COORDINATION | Age: 88
End: 2023-11-14

## 2023-11-14 ENCOUNTER — APPOINTMENT (OUTPATIENT)
Dept: CT IMAGING | Age: 88
End: 2023-11-14
Payer: MEDICARE

## 2023-11-14 ENCOUNTER — APPOINTMENT (OUTPATIENT)
Dept: GENERAL RADIOLOGY | Age: 88
End: 2023-11-14
Payer: MEDICARE

## 2023-11-14 DIAGNOSIS — R00.1 SYMPTOMATIC BRADYCARDIA: ICD-10-CM

## 2023-11-14 DIAGNOSIS — R55 SYNCOPE AND COLLAPSE: Primary | ICD-10-CM

## 2023-11-14 LAB
ALBUMIN SERPL-MCNC: 4.4 G/DL (ref 3.4–5)
ALBUMIN/GLOB SERPL: 1.6 {RATIO} (ref 1.1–2.2)
ALP SERPL-CCNC: 55 U/L (ref 40–129)
ALT SERPL-CCNC: 14 U/L (ref 10–40)
ANION GAP SERPL CALCULATED.3IONS-SCNC: 16 MMOL/L (ref 3–16)
AST SERPL-CCNC: 15 U/L (ref 15–37)
BACTERIA URNS QL MICRO: ABNORMAL /HPF
BASOPHILS # BLD: 0.1 K/UL (ref 0–0.2)
BASOPHILS NFR BLD: 0.9 %
BILIRUB SERPL-MCNC: 0.3 MG/DL (ref 0–1)
BILIRUB UR QL STRIP.AUTO: NEGATIVE
BUN SERPL-MCNC: 29 MG/DL (ref 7–20)
CALCIUM SERPL-MCNC: 9.8 MG/DL (ref 8.3–10.6)
CHLORIDE SERPL-SCNC: 98 MMOL/L (ref 99–110)
CLARITY UR: ABNORMAL
CO2 SERPL-SCNC: 23 MMOL/L (ref 21–32)
COLOR UR: YELLOW
CREAT SERPL-MCNC: 1.7 MG/DL (ref 0.6–1.2)
DEPRECATED RDW RBC AUTO: 14.6 % (ref 12.4–15.4)
EKG ATRIAL RATE: 67 BPM
EKG DIAGNOSIS: NORMAL
EKG P AXIS: 9 DEGREES
EKG P-R INTERVAL: 154 MS
EKG Q-T INTERVAL: 412 MS
EKG QRS DURATION: 92 MS
EKG QTC CALCULATION (BAZETT): 435 MS
EKG R AXIS: -14 DEGREES
EKG T AXIS: 18 DEGREES
EKG VENTRICULAR RATE: 67 BPM
EOSINOPHIL # BLD: 0.3 K/UL (ref 0–0.6)
EOSINOPHIL NFR BLD: 2.1 %
EPI CELLS #/AREA URNS AUTO: 2 /HPF (ref 0–5)
GFR SERPLBLD CREATININE-BSD FMLA CKD-EPI: 29 ML/MIN/{1.73_M2}
GLUCOSE SERPL-MCNC: 102 MG/DL (ref 70–99)
GLUCOSE UR STRIP.AUTO-MCNC: NEGATIVE MG/DL
HCT VFR BLD AUTO: 41.9 % (ref 36–48)
HGB BLD-MCNC: 13.5 G/DL (ref 12–16)
HGB UR QL STRIP.AUTO: NEGATIVE
HYALINE CASTS #/AREA URNS AUTO: 10 /LPF (ref 0–8)
KETONES UR STRIP.AUTO-MCNC: NEGATIVE MG/DL
LEUKOCYTE ESTERASE UR QL STRIP.AUTO: ABNORMAL
LIPASE SERPL-CCNC: 57 U/L (ref 13–60)
LYMPHOCYTES # BLD: 3.5 K/UL (ref 1–5.1)
LYMPHOCYTES NFR BLD: 25.6 %
MAGNESIUM SERPL-MCNC: 2.7 MG/DL (ref 1.8–2.4)
MCH RBC QN AUTO: 27.1 PG (ref 26–34)
MCHC RBC AUTO-ENTMCNC: 32.3 G/DL (ref 31–36)
MCV RBC AUTO: 83.7 FL (ref 80–100)
MONOCYTES # BLD: 0.6 K/UL (ref 0–1.3)
MONOCYTES NFR BLD: 4.3 %
NEUTROPHILS # BLD: 9.2 K/UL (ref 1.7–7.7)
NEUTROPHILS NFR BLD: 67.1 %
NITRITE UR QL STRIP.AUTO: NEGATIVE
NT-PROBNP SERPL-MCNC: 515 PG/ML (ref 0–449)
PH UR STRIP.AUTO: 6 [PH] (ref 5–8)
PLATELET # BLD AUTO: 352 K/UL (ref 135–450)
PMV BLD AUTO: 9.5 FL (ref 5–10.5)
POTASSIUM SERPL-SCNC: 3.9 MMOL/L (ref 3.5–5.1)
PROT SERPL-MCNC: 7.2 G/DL (ref 6.4–8.2)
PROT UR STRIP.AUTO-MCNC: ABNORMAL MG/DL
RBC # BLD AUTO: 5 M/UL (ref 4–5.2)
RBC CLUMPS #/AREA URNS AUTO: 1 /HPF (ref 0–4)
SODIUM SERPL-SCNC: 137 MMOL/L (ref 136–145)
SP GR UR STRIP.AUTO: 1.01 (ref 1–1.03)
T4 FREE SERPL-MCNC: 1.2 NG/DL (ref 0.9–1.8)
TROPONIN, HIGH SENSITIVITY: 35 NG/L (ref 0–14)
TSH SERPL DL<=0.005 MIU/L-ACNC: 6.87 UIU/ML (ref 0.27–4.2)
UA COMPLETE W REFLEX CULTURE PNL UR: YES
UA DIPSTICK W REFLEX MICRO PNL UR: YES
URN SPEC COLLECT METH UR: ABNORMAL
UROBILINOGEN UR STRIP-ACNC: 0.2 E.U./DL
WBC # BLD AUTO: 13.7 K/UL (ref 4–11)
WBC #/AREA URNS AUTO: 56 /HPF (ref 0–5)

## 2023-11-14 PROCEDURE — 36415 COLL VENOUS BLD VENIPUNCTURE: CPT

## 2023-11-14 PROCEDURE — 85025 COMPLETE CBC W/AUTO DIFF WBC: CPT

## 2023-11-14 PROCEDURE — 96360 HYDRATION IV INFUSION INIT: CPT

## 2023-11-14 PROCEDURE — 83690 ASSAY OF LIPASE: CPT

## 2023-11-14 PROCEDURE — 83735 ASSAY OF MAGNESIUM: CPT

## 2023-11-14 PROCEDURE — 74176 CT ABD & PELVIS W/O CONTRAST: CPT

## 2023-11-14 PROCEDURE — 80053 COMPREHEN METABOLIC PANEL: CPT

## 2023-11-14 PROCEDURE — 99285 EMERGENCY DEPT VISIT HI MDM: CPT

## 2023-11-14 PROCEDURE — 2580000003 HC RX 258: Performed by: STUDENT IN AN ORGANIZED HEALTH CARE EDUCATION/TRAINING PROGRAM

## 2023-11-14 PROCEDURE — 93005 ELECTROCARDIOGRAM TRACING: CPT | Performed by: EMERGENCY MEDICINE

## 2023-11-14 PROCEDURE — 83880 ASSAY OF NATRIURETIC PEPTIDE: CPT

## 2023-11-14 PROCEDURE — 87077 CULTURE AEROBIC IDENTIFY: CPT

## 2023-11-14 PROCEDURE — 87040 BLOOD CULTURE FOR BACTERIA: CPT

## 2023-11-14 PROCEDURE — 2580000003 HC RX 258: Performed by: EMERGENCY MEDICINE

## 2023-11-14 PROCEDURE — 99223 1ST HOSP IP/OBS HIGH 75: CPT | Performed by: NURSE PRACTITIONER

## 2023-11-14 PROCEDURE — 87086 URINE CULTURE/COLONY COUNT: CPT

## 2023-11-14 PROCEDURE — 93325 DOPPLER ECHO COLOR FLOW MAPG: CPT

## 2023-11-14 PROCEDURE — 84443 ASSAY THYROID STIM HORMONE: CPT

## 2023-11-14 PROCEDURE — 81001 URINALYSIS AUTO W/SCOPE: CPT

## 2023-11-14 PROCEDURE — 93320 DOPPLER ECHO COMPLETE: CPT

## 2023-11-14 PROCEDURE — 84484 ASSAY OF TROPONIN QUANT: CPT

## 2023-11-14 PROCEDURE — 99223 1ST HOSP IP/OBS HIGH 75: CPT | Performed by: INTERNAL MEDICINE

## 2023-11-14 PROCEDURE — C9113 INJ PANTOPRAZOLE SODIUM, VIA: HCPCS | Performed by: STUDENT IN AN ORGANIZED HEALTH CARE EDUCATION/TRAINING PROGRAM

## 2023-11-14 PROCEDURE — 84439 ASSAY OF FREE THYROXINE: CPT

## 2023-11-14 PROCEDURE — 93010 ELECTROCARDIOGRAM REPORT: CPT | Performed by: INTERNAL MEDICINE

## 2023-11-14 PROCEDURE — 6360000002 HC RX W HCPCS: Performed by: STUDENT IN AN ORGANIZED HEALTH CARE EDUCATION/TRAINING PROGRAM

## 2023-11-14 PROCEDURE — 87186 SC STD MICRODIL/AGAR DIL: CPT

## 2023-11-14 PROCEDURE — 71045 X-RAY EXAM CHEST 1 VIEW: CPT

## 2023-11-14 PROCEDURE — 93308 TTE F-UP OR LMTD: CPT

## 2023-11-14 PROCEDURE — 2100000000 HC CCU R&B

## 2023-11-14 RX ORDER — HYDRALAZINE HYDROCHLORIDE 20 MG/ML
5 INJECTION INTRAMUSCULAR; INTRAVENOUS EVERY 4 HOURS PRN
Status: DISCONTINUED | OUTPATIENT
Start: 2023-11-14 | End: 2023-11-25 | Stop reason: HOSPADM

## 2023-11-14 RX ORDER — ROSUVASTATIN CALCIUM 10 MG/1
10 TABLET, COATED ORAL DAILY
Status: DISCONTINUED | OUTPATIENT
Start: 2023-11-15 | End: 2023-11-25 | Stop reason: HOSPADM

## 2023-11-14 RX ORDER — CLONIDINE HYDROCHLORIDE 0.1 MG/1
0.1 TABLET ORAL 3 TIMES DAILY
Status: ON HOLD | COMMUNITY
End: 2023-11-17 | Stop reason: HOSPADM

## 2023-11-14 RX ORDER — ONDANSETRON 2 MG/ML
4 INJECTION INTRAMUSCULAR; INTRAVENOUS EVERY 6 HOURS PRN
Status: DISCONTINUED | OUTPATIENT
Start: 2023-11-14 | End: 2023-11-25 | Stop reason: HOSPADM

## 2023-11-14 RX ORDER — POTASSIUM CHLORIDE 750 MG/1
10 TABLET, EXTENDED RELEASE ORAL 2 TIMES DAILY
COMMUNITY
End: 2023-11-14

## 2023-11-14 RX ORDER — 0.9 % SODIUM CHLORIDE 0.9 %
500 INTRAVENOUS SOLUTION INTRAVENOUS ONCE
Status: COMPLETED | OUTPATIENT
Start: 2023-11-14 | End: 2023-11-14

## 2023-11-14 RX ORDER — SODIUM CHLORIDE 0.9 % (FLUSH) 0.9 %
5-40 SYRINGE (ML) INJECTION EVERY 12 HOURS SCHEDULED
Status: DISCONTINUED | OUTPATIENT
Start: 2023-11-14 | End: 2023-11-25 | Stop reason: HOSPADM

## 2023-11-14 RX ORDER — CLONIDINE HYDROCHLORIDE 0.3 MG/1
0.3 TABLET ORAL 3 TIMES DAILY
COMMUNITY
End: 2023-11-14

## 2023-11-14 RX ORDER — PANTOPRAZOLE SODIUM 40 MG/10ML
40 INJECTION, POWDER, LYOPHILIZED, FOR SOLUTION INTRAVENOUS DAILY
Status: DISCONTINUED | OUTPATIENT
Start: 2023-11-14 | End: 2023-11-23

## 2023-11-14 RX ORDER — CLONIDINE HYDROCHLORIDE 0.1 MG/1
0.1 TABLET ORAL 3 TIMES DAILY
Status: DISCONTINUED | OUTPATIENT
Start: 2023-11-14 | End: 2023-11-14

## 2023-11-14 RX ORDER — POLYETHYLENE GLYCOL 3350 17 G/17G
17 POWDER, FOR SOLUTION ORAL DAILY PRN
Status: DISCONTINUED | OUTPATIENT
Start: 2023-11-14 | End: 2023-11-25 | Stop reason: HOSPADM

## 2023-11-14 RX ORDER — FUROSEMIDE 20 MG/1
20 TABLET ORAL 2 TIMES DAILY
COMMUNITY
End: 2023-11-14

## 2023-11-14 RX ORDER — ONDANSETRON 4 MG/1
4 TABLET, ORALLY DISINTEGRATING ORAL EVERY 8 HOURS PRN
Status: DISCONTINUED | OUTPATIENT
Start: 2023-11-14 | End: 2023-11-25 | Stop reason: HOSPADM

## 2023-11-14 RX ORDER — GLUCOSAMINE/MSM/CHONDROITIN A 500-83-400
50 TABLET ORAL NIGHTLY
Status: CANCELLED | OUTPATIENT
Start: 2023-11-14

## 2023-11-14 RX ORDER — SODIUM CHLORIDE 9 MG/ML
INJECTION, SOLUTION INTRAVENOUS PRN
Status: DISCONTINUED | OUTPATIENT
Start: 2023-11-14 | End: 2023-11-25 | Stop reason: HOSPADM

## 2023-11-14 RX ORDER — ACETAMINOPHEN 325 MG/1
650 TABLET ORAL EVERY 6 HOURS PRN
Status: DISCONTINUED | OUTPATIENT
Start: 2023-11-14 | End: 2023-11-25 | Stop reason: HOSPADM

## 2023-11-14 RX ORDER — ASPIRIN 325 MG
325 TABLET, DELAYED RELEASE (ENTERIC COATED) ORAL DAILY
Status: DISCONTINUED | OUTPATIENT
Start: 2023-11-15 | End: 2023-11-14

## 2023-11-14 RX ORDER — SODIUM CHLORIDE 0.9 % (FLUSH) 0.9 %
5-40 SYRINGE (ML) INJECTION PRN
Status: DISCONTINUED | OUTPATIENT
Start: 2023-11-14 | End: 2023-11-25 | Stop reason: HOSPADM

## 2023-11-14 RX ORDER — ACETAMINOPHEN 650 MG/1
650 SUPPOSITORY RECTAL EVERY 6 HOURS PRN
Status: DISCONTINUED | OUTPATIENT
Start: 2023-11-14 | End: 2023-11-25 | Stop reason: HOSPADM

## 2023-11-14 RX ORDER — CLONIDINE HYDROCHLORIDE 0.1 MG/1
0.3 TABLET ORAL 3 TIMES DAILY
Status: DISCONTINUED | OUTPATIENT
Start: 2023-11-14 | End: 2023-11-14

## 2023-11-14 RX ADMIN — SODIUM CHLORIDE 500 ML: 9 INJECTION, SOLUTION INTRAVENOUS at 12:48

## 2023-11-14 RX ADMIN — SODIUM CHLORIDE, PRESERVATIVE FREE 10 ML: 5 INJECTION INTRAVENOUS at 20:20

## 2023-11-14 RX ADMIN — CEFTRIAXONE 1000 MG: 1 INJECTION, POWDER, FOR SOLUTION INTRAMUSCULAR; INTRAVENOUS at 16:40

## 2023-11-14 RX ADMIN — HYDROMORPHONE HYDROCHLORIDE 0.25 MG: 1 INJECTION, SOLUTION INTRAMUSCULAR; INTRAVENOUS; SUBCUTANEOUS at 17:43

## 2023-11-14 RX ADMIN — PANTOPRAZOLE SODIUM 40 MG: 40 INJECTION, POWDER, FOR SOLUTION INTRAVENOUS at 20:19

## 2023-11-14 RX ADMIN — ONDANSETRON 4 MG: 2 INJECTION INTRAMUSCULAR; INTRAVENOUS at 16:02

## 2023-11-14 ASSESSMENT — PAIN SCALES - GENERAL
PAINLEVEL_OUTOF10: 0
PAINLEVEL_OUTOF10: 8

## 2023-11-14 ASSESSMENT — PAIN DESCRIPTION - FREQUENCY: FREQUENCY: CONTINUOUS

## 2023-11-14 ASSESSMENT — PAIN DESCRIPTION - ONSET: ONSET: PROGRESSIVE

## 2023-11-14 ASSESSMENT — PAIN DESCRIPTION - PAIN TYPE: TYPE: ACUTE PAIN

## 2023-11-14 ASSESSMENT — PAIN DESCRIPTION - DESCRIPTORS: DESCRIPTORS: CRAMPING

## 2023-11-14 ASSESSMENT — PAIN - FUNCTIONAL ASSESSMENT: PAIN_FUNCTIONAL_ASSESSMENT: PREVENTS OR INTERFERES SOME ACTIVE ACTIVITIES AND ADLS

## 2023-11-14 ASSESSMENT — PAIN DESCRIPTION - LOCATION: LOCATION: ABDOMEN

## 2023-11-14 NOTE — ED PROVIDER NOTES
[address] [place] COMPLAINT  Loss of Consciousness (Pt comes from doctor's appointment with report of 2 syncopal episodes. Pt had 1 syncopal episode with EMS that lasted about 10 seconds. Pt did not fall or hit head. EMS reports pt was pale and diaphoretic upon their arrival and compaining of nausea. EMS gave 4mg zofran IV en route)       HISTORY OF PRESENT ILLNESS  Zina Russell is a 80 y.o. female with PMH incl HTN, HLD, aortic stenosis s/p aortic valve replacement (bovine, 3/9/17, Dr. Matt Blevins) who presents to the ED for evaluation after multiple syncopal episodes. Patient was at an audiologist appointment when she had two syncopal episodes. She did have prodromal symptoms of feeling lightheaded prior to passing out. This did not occur with postural change. EMS was summoned and the patient was noted to be pale and diaphoretic. She had a third syncopal episode witnessed by EMS. She was administered 4 mg of Zofran prior to arrival with resolution of her nausea thereafter. She denies chest pain, shortness of breath, or leg swelling. I have reviewed the following from the nursing documentation:    Past Medical History:   Diagnosis Date    Aortic stenosis     Cerebrovascular disease     Class 1 obesity     Hepatic steatosis     Hiatal hernia     Hyperlipidemia     Hypertension     Osteoarthritis      Past Surgical History:   Procedure Laterality Date    AORTIC VALVE REPLACEMENT  03/09/2017    19mm Lunsford pericardial Magna Ease    BLEPHAROPLASTY Bilateral 6/7/2021    BILATERAL UPPER LIDS BLEPHAROPLASTY performed by Valentina Dougherty MD at [address] Bilateral 01/2019    PRE-MALIGNANT / BENIGN SKIN LESION EXCISION Right 2014    R scalp. cystadenoma. --done under local    UPPER GASTROINTESTINAL ENDOSCOPY N/A 5/24/2023    EGD DILATION BALLOON TO 18mm performed by Sameera Marinelli MD at [address] History   Problem Relation Age of Onset Negative Negative    pH, UA 6.0 5.0 - 8.0    Protein, UA TRACE (A) Negative mg/dL    Urobilinogen, Urine 0.2 <2.0 E.U./dL    Nitrite, Urine Negative Negative    Leukocyte Esterase, Urine MODERATE (A) Negative    Microscopic Examination YES     Urine Type NotGiven     Urine Reflex to Culture Yes    T4, Free   Result Value Ref Range    T4 Free 1.2 0.9 - 1.8 ng/dL   Microscopic Urinalysis   Result Value Ref Range    Bacteria, UA 4+ (A) None Seen /HPF    Hyaline Casts, UA 10 (H) 0 - 8 /LPF    WBC, UA 56 (H) 0 - 5 /HPF    RBC, UA 1 0 - 4 /HPF    Epithelial Cells, UA 2 0 - 5 /HPF   EKG 12 Lead   Result Value Ref Range    Ventricular Rate 67 BPM    Atrial Rate 67 BPM    P-R Interval 154 ms    QRS Duration 92 ms    Q-T Interval 412 ms    QTc Calculation (Bazett) 435 ms    P Axis 9 degrees    R Axis -14 degrees    T Axis 18 degrees    Diagnosis       Normal sinus rhythmModerate voltage criteria for LVH, may be normal variant ( R in aVL , [place] product )Inferior infarct (cited on or before 22-SEP-2023)Anterolateral infarct (cited on or before 29-APR-2023)Abnormal ECGWhen compared with ECG of 22-SEP-2023 21:42,Questionable change in initial forces of Lateral leads           RADIOLOGY  I have reviewed all radiographic studies for this visit. XR CHEST PORTABLE   Final Result   No change from prior examination. No active cardiopulmonary disease. ECG  EKG interpreted independently by myself. Rate: normal  Rhythm: NSR  Axis: normal  Intervals: within normal limits  ST Segments: no acute abnormality  T waves: no acute abnormality  Comparison: Compared to 9/22/2023, there is no significant change  Impression: NSR with moderate voltage criteria for LVH, inferior and anterolateral infarct     ED COURSE/MDM    80 y.o. female presents for evaluation after several syncopal episodes that occurred earlier today. Initially the patient is slightly hypertensive with otherwise reassuring vital signs.   She is essentially

## 2023-11-14 NOTE — PROGRESS NOTES
Medication Reconciliation    List of medications patient is currently taking is complete. Source of information: 1. Conversation with patient and family at bedside                                      2. EPIC records      Allergies  Adhesive tape and Oxycontin [oxycodone hcl]     Notes regarding home medications:   1. Patient received all of her morning home medications prior to arrival to the ER today. 2. Patient no longer takes Furosemide or Potassium. 3. Sprionolactone dose is now 50 mg po daily.     Kia Peck [facility], PharmD, BCPS  11/14/2023 2:38 PM

## 2023-11-14 NOTE — ED NOTES
Pt arrived via [facility] from [facility] for c/o two syncopal episodes without fall. EMS reports pt had syncopal episode after their arrival and was pale, diaphoretic, and nauseous. EMS gave 4mg IV zofran en route. Blood collected and sent to lab for analysis. Pt awake, alert, and oriented x3. Skin warm, dry, and color appropriate for ethnicity. Respirations easy and unlabored. Pt placed on cardiac monitor. Call light within reach. Pt denies further needs at this time.        Sukhdev Dia RN  11/14/23 5712

## 2023-11-14 NOTE — ED NOTES
Pt reports feeling dizzy. HR dropped to 49 and blood pressure 70/40. Laid patient down and  See Kuhn MD aware.       Siobhan Hummel RN  11/14/23 1451

## 2023-11-14 NOTE — CONSULTS
The NP's [facility], EP NP) documentation has been prepared under my direction and personally reviewed by me in its entirety. I confirm that the consultation note created by the NP accurately reflects all work, physical examination, the discussion of treatments and procedures, and medical decision making by the NP. In addition, I have personally met with; performed a physical examination on; discussed the diagnosis (-es), treatment options including procedures, and formulated medical decisions for this patient. In brief, Remington Rodriguez 80 y.o. female h/o AS s/p SAVR 3/17, previous syncope presents with syncope x 2 episodes most likely attributed to sinus bradycardia with v-rates 40's bpm (resting typically is 60's bpm). No reversible causes for such. Meets class I indication for PPM implantation, which we will plan for tomorrow given no preclusion from ID consult standpoint (possible but not confirmed UTI). NPO p MN. LVEF noted to be 65%. Please refer to the NP's consult note for full details on the assessment and plan. Thank you for allowing us to participate in the care of your patient. If you have any questions, please do not hesitate to contact us.      Juan Manuel Ramos MD, MS, [facility] [place], [facility]  Cardiac Electrophysiology  [place]  [address] [facility] Abran Raygoza, [address] [place] [address] [address] [place], [address] [facility]  [phone]

## 2023-11-14 NOTE — CONSULTS
Cardiac Electrophysiology Consultation     Date: 11/14/2023  Admit Date:  11/14/2023  Admission Diagnosis: No admission diagnoses are documented for this encounter. Reason for Consultation: syncope  Consult Requesting Physician: Luanne Wells MD       History of Present Illness  Thien Vasquez is an 80y.o. year old female with past medical history significant for AS s/p SAVR 3/17, previous syncope, HTN and HLD who presented to the ED after having syncope x2. She admits to having syncope over the years without any clear triggers. This mostly happened prior to her aortic valve replacement which was blamed on her aortic stenosis. She had an episode back in the spring but was also septic and bacteremic at the time. She has had episodes at [employer] and him happen while sitting or standing. She denies any lightheadedness or dizziness with standing or position changes. This morning she was getting her hearing aids fitted and was sitting down when she suddenly lost consciousness. EMS was called and while they were there she went to the bathroom to urinate and again lost consciousness on the toilet. She was not on the monitor at that time. While in the emergency room her heart rate did decrease down into the 40s and she felt lightheaded which is similar to how she feels prior to having actual syncope. Her blood pressure was checked at that time and was noted to be 70/40. Her heart rate improved back to the 60s and blood pressure also improved back to her baseline without any intervention. She denies any recent changes or illnesses. He is typically very active and lives alone. She currently still drives. Denies any angina or increased dyspnea. No edema.       Past Medical History:   Diagnosis Date    Aortic stenosis     Cerebrovascular disease     Class 1 obesity     Hepatic steatosis     Hiatal hernia     Hyperlipidemia     Hypertension     Osteoarthritis         Past Surgical History:   Procedure coronary  artery, left anterior descending artery, left circumflex artery. The patient  is left dominant. 2.  The right coronary artery was normal.  JUAN PABLO 3 flow. It is a small  nondominant right coronary. 3.  Ejection fraction of 60%. SUMMARY:  Severe aortic valve stenosis with normal coronary arteries. RECOMMENDATIONS:  1. Continue postop/post-cath care. 2.  Site care. 3.  Risk factor modification. 4.   surgery for surgical aortic valve replaced verus TAVR. Assessment & Plan:    SSS   - with symptomatic bradycardia causing syncope and near syncope   - had a period where HR dropped from the 60s into the low 40s (sinu bradycardia) while in the ED that was associated with similar lightheadedness to past syncope and presyncope, also with BP drop to 70/40   - on no AV black blocking agents   - suspect recurrent syncope is secondary to this   - orthostatics negative in ED   - check TSH and Mg    - limited echo for EF   - if no reversible cause found, would meed Class I indications for permanent pacemaker which was discussed with her and her family. They would be agreeable to this    - also with leukocytosis, history of UTIs causing bacteremia in the past, will check UA and would need to be clearing/cleared of infection prior to PPM implant    LYNDA   - creatinine up from just last week   - avoid nephrotoxins   - monitor renal function   - follows with Dr. Cande Bermeo as an outpatient    Leukocytosis   - CXR without acute findings  - check UA  - afebrile     Aortic stenosis   - s/p SAVR in 2017   - TTE 5/23 with severe stenosis of prosthetic valve, ERA done which showed appropriate function   - followed by Dr. Beuford Apley HTN   - issues with high BP recently   - she was advised to increase clonidine by nephrology last week but has not adjusted it yet   - BP controlled currently, would monitor    Discussed with Dr. Jimmy Luque.     MAICOL Ang  [facility] [place] [facility]  [address] [place] [address]

## 2023-11-14 NOTE — H&P
V2.0  History and Physical      Name:  Steff Wilson /Age/Sex: 1935  (80 y.o. female)   MRN & CSN:  [id number] & [id number] Encounter Date/Time: 2023 2:53 PM EST   Location:  [address] [place] [address] PCP: Jillian Chapin MD       Hospital Day: 1    Assessment and Plan: Steff Wilson is a 80 y.o. female with a pmh of S/p SAVR 3/17 and prior symptomatic who presents with Symptomatic bradycardia    Hospital Problems             Last Modified POA    * (Principal) Symptomatic bradycardia 2023 Yes       Plan:  Suspected sick sinus syndrome  -Orthostats negative in ED;  -Patient recently increased on clonidine dosage 11/10/23. Discussed with her nephrologist, will discontinue.  -Echocardiogram today  -Monitor on telemetry  -Checking TSH, mag  -If no reversible etiology is identified, plan for pacemaker implantation  LYNDA  -Baseline Cr ~1.0.  1.7 on presentation  -Avoid nephrotoxic meds  -Monitor renal function  Leukocytosis  Possible urinary tract infection  -Leukocytosis; afebrile;  -Patient denies dysuria, but she does have some suprapubic pain  -UA shows bacteriuria and pyuria;   -Given previous history of syncopal events associated with sepsis, will check blood and urine culture and treat empirically for urinary tract infection. Constipation  -Patient reports no BMs for 2 days, which is atypical for her  -As needed GlycoLax  Hypertension  -In the setting soft pressures and LYNDA holding lisinopril and aldactone;  -PRN Hydralazine    Disposition:   Current Living situation: Home; grandson lives with son, but works during the day  Expected Disposition: TBD  Estimated D/C: 2-3 days    Diet ADULT DIET;  Regular  Diet NPO  Diet NPO Exceptions are: Sips of Water with Meds   DVT Prophylaxis [] Lovenox, []  Heparin, [x] SCDs, [] Ambulation,  [] Eliquis, [] Xarelto, [] Coumadin   Code Status  DNR/DNI   Surrogate Decision Maker/ POA fitz White     Personally reviewed Lab Studies and Imaging     History from: 07/25/2023 09:25 AM    HDL 42 07/25/2023 09:25 AM    HDL 37 11/02/2011 09:10 AM    TRIG 103 07/25/2023 09:25 AM     Hemoglobin A1C:   Lab Results   Component Value Date/Time    LABA1C 5.6 12/29/2022 09:43 AM     TSH:   Lab Results   Component Value Date/Time    TSH 2.21 07/25/2023 09:25 AM     Troponin: No results found for: \"TROPONINT\"  Lactic Acid: No results for input(s): \"LACTA\" in the last 72 hours. BNP:   Recent Labs     11/14/23  1105   PROBNP 515*     UA:  Lab Results   Component Value Date/Time    NITRU Negative 11/14/2023 12:47 PM    COLORU Yellow 11/14/2023 12:47 PM    PHUR 6.0 11/14/2023 12:47 PM    WBCUA 56 11/14/2023 12:47 PM    RBCUA 1 11/14/2023 12:47 PM    BACTERIA 4+ 11/14/2023 12:47 PM    CLARITYU CLOUDY 11/14/2023 12:47 PM    SPECGRAV 1.009 11/14/2023 12:47 PM    LEUKOCYTESUR MODERATE 11/14/2023 12:47 PM    UROBILINOGEN 0.2 11/14/2023 12:47 PM    BILIRUBINUR Negative 11/14/2023 12:47 PM    BLOODU Negative 11/14/2023 12:47 PM    GLUCOSEU Negative 11/14/2023 12:47 PM    KETUA Negative 11/14/2023 12:47 PM     Urine Cultures:   Lab Results   Component Value Date/Time    LABURIN >100,000 CFU/ml 09/22/2023 09:17 PM     Blood Cultures:   Lab Results   Component Value Date/Time    BC No Growth after 4 days of incubation. 04/30/2023 09:29 AM     Lab Results   Component Value Date/Time    BLOODCULT2 No Growth after 4 days of incubation. 04/30/2023 09:53 AM     Organism:   Lab Results   Component Value Date/Time    ORG Klebsiella oxytoca 09/22/2023 09:17 PM       Imaging/Diagnostics Last 24 Hours   XR CHEST PORTABLE    Result Date: 11/14/2023  EXAMINATION: ONE XRAY VIEW OF THE CHEST 11/14/2023 10:52 am COMPARISON: None. HISTORY: ORDERING SYSTEM PROVIDED HISTORY: syncope, nausea, vomiting TECHNOLOGIST PROVIDED HISTORY: Reason for exam:->syncope, nausea, vomiting Reason for Exam: loc FINDINGS: The lungs appear clear. There are no pulmonary infiltrates, CHF or pneumothorax. The heart is of normal size.

## 2023-11-14 NOTE — CONSULTS
Infectious Diseases Inpatient Consult Note      Reason for Consult:   Syncope and UTI and h/o AVR    Requesting Physician:  MAICOL Booker     Primary Care Physician:  Monika Martinez MD    History Obtained From:  Epic AND PT    CHIEF COMPLAINT:     Chief Complaint   Patient presents with    Loss of Consciousness     Pt comes from doctor's appointment with report of 2 syncopal episodes. Pt had 1 syncopal episode with EMS that lasted about 10 seconds. Pt did not fall or hit head. EMS reports pt was pale and diaphoretic upon their arrival and compaining of nausea. EMS gave 4mg zofran IV en route         HISTORY OF PRESENT ILLNESS:  80 y.o.  WOMAN with h/o avr, Recurrent UTI,htn, CVA history,admitted with syncopal episodes from Audiologist office, pt  passed out x 3 times and once while on the toilet, EMS was called, her HR was in 40'S on admit with hypotension 70, and now in CVICU for monitoring. She had SAVR in . She has h/o Bacteremia E coli in April when she had UTI, she had one episode of emesis and some abd pain her UA is abnormal on admit and Blood cx in process, she is in LYNDA with WBC elevation and we are consulted for recommendations. Past Medical History:    Past Medical History:   Diagnosis Date    Aortic stenosis     Cerebrovascular disease     Class 1 obesity     Hepatic steatosis     Hiatal hernia     Hyperlipidemia     Hypertension     Osteoarthritis        Past Surgical History:    Past Surgical History:   Procedure Laterality Date    AORTIC VALVE REPLACEMENT  2017    19mm [place] pericardial Magna Ease    BLEPHAROPLASTY Bilateral 2021    BILATERAL UPPER LIDS BLEPHAROPLASTY performed by Daysi Perez MD at [address] Bilateral 2019    PRE-MALIGNANT / BENIGN SKIN LESION EXCISION Right 2014    R scalp. cystadenoma. --done under local    UPPER GASTROINTESTINAL ENDOSCOPY N/A 2023    EGD DILATION BALLOON TO 18mm performed by Nahomi Lin MD at for now await Blood cx and urine cx results - if any diarrhea will check for C diff given recent antibiotic use    Labs, Microbiology, Radiology and pertinent results from current hospitalization and care every where were reviewed by me as a part of the consultation. PLAN :  Cont IV Ceftriaxone 1 gm q 24 hrs  Check UA and urine cx  Blood cx  Lactic acid  CRP  Procal   Check C diff with any diarrhea given recent abx use      Discussed with patient/Family and Nursing     Medical Decision Making: The following items were considered in medical decision making:  Discussion of patient care with other providers  Reviewed clinical lab tests  Reviewed radiology tests  Reviewed other diagnostic tests/interventions  Independent review of radiologic images  Independent review of  Microbiology cultures and other micro tests reviewed     Risk of Complications/Morbidity: High      Illness(es)/ Infection present that pose threat to bodily function. There is potential for severe exacerbation of infection/side effects of treatment. Therapy requires intensive monitoring for antimicrobial agent toxicity. Thanks for allowing me to participate in your patient's care please call me with any questions or concerns.     Dr. Millicent Field MD  [facility] Physician  Phone: [phone]   Fax : [phone]

## 2023-11-14 NOTE — PROGRESS NOTES
1509: Pt arrived to CVU Rm 1307 via stretcher on room air accompanied by ED Rn on monitor in stable condition. Moved into CVU bed. CVU monitors applied. Report received from ED RN, Alberto Selby. 4 eye skin assessment completed with Marija, see note. VSS on room air. Pt denies pain. NSR on tele.

## 2023-11-14 NOTE — ED NOTES
Assisted pt to bedside commode to obtain urine sample. Pt unable to urinate. Will try again soon or perform straight cath.      Cindy Lacy RN  11/14/23 7044

## 2023-11-15 PROBLEM — N39.0 RECURRENT UTI: Status: ACTIVE | Noted: 2023-11-15

## 2023-11-15 PROBLEM — N30.00 ACUTE CYSTITIS WITHOUT HEMATURIA: Status: ACTIVE | Noted: 2023-11-15

## 2023-11-15 PROBLEM — Z95.2 H/O AORTIC VALVE REPLACEMENT: Status: ACTIVE | Noted: 2023-11-15

## 2023-11-15 PROBLEM — N17.9 AKI (ACUTE KIDNEY INJURY) (HCC): Status: ACTIVE | Noted: 2023-11-15

## 2023-11-15 PROBLEM — D72.828 NEUTROPHILIA: Status: ACTIVE | Noted: 2023-11-15

## 2023-11-15 PROBLEM — R79.89 ELEVATED PROCALCITONIN: Status: ACTIVE | Noted: 2023-11-15

## 2023-11-15 PROBLEM — E87.20 LACTIC ACID ACIDOSIS: Status: ACTIVE | Noted: 2023-11-15

## 2023-11-15 PROBLEM — A49.8 E COLI INFECTION: Status: ACTIVE | Noted: 2023-11-15

## 2023-11-15 PROBLEM — R19.7 DIARRHEA OF PRESUMED INFECTIOUS ORIGIN: Status: ACTIVE | Noted: 2023-11-15

## 2023-11-15 PROBLEM — D72.9 NEUTROPHILIA: Status: ACTIVE | Noted: 2023-11-15

## 2023-11-15 LAB
ALBUMIN SERPL-MCNC: 3.1 G/DL (ref 3.4–5)
ALBUMIN SERPL-MCNC: 3.5 G/DL (ref 3.4–5)
ALP SERPL-CCNC: 47 U/L (ref 40–129)
ALT SERPL-CCNC: 9 U/L (ref 10–40)
ANION GAP SERPL CALCULATED.3IONS-SCNC: 21 MMOL/L (ref 3–16)
ANION GAP SERPL CALCULATED.3IONS-SCNC: 25 MMOL/L (ref 3–16)
AST SERPL-CCNC: 13 U/L (ref 15–37)
BACTERIA URNS QL MICRO: ABNORMAL /HPF
BASOPHILS # BLD: 0.4 K/UL (ref 0–0.2)
BASOPHILS NFR BLD: 1 %
BILIRUB DIRECT SERPL-MCNC: <0.2 MG/DL (ref 0–0.3)
BILIRUB INDIRECT SERPL-MCNC: ABNORMAL MG/DL (ref 0–1)
BILIRUB SERPL-MCNC: <0.2 MG/DL (ref 0–1)
BILIRUB UR QL STRIP.AUTO: ABNORMAL
BUN SERPL-MCNC: 51 MG/DL (ref 7–20)
BUN SERPL-MCNC: 52 MG/DL (ref 7–20)
C DIFF TOX A+B STL QL IA: NORMAL
CALCIUM SERPL-MCNC: 10.2 MG/DL (ref 8.3–10.6)
CALCIUM SERPL-MCNC: 9 MG/DL (ref 8.3–10.6)
CHLORIDE SERPL-SCNC: 101 MMOL/L (ref 99–110)
CHLORIDE SERPL-SCNC: 98 MMOL/L (ref 99–110)
CLARITY UR: ABNORMAL
CO2 SERPL-SCNC: 14 MMOL/L (ref 21–32)
CO2 SERPL-SCNC: 15 MMOL/L (ref 21–32)
COLOR UR: ABNORMAL
CREAT SERPL-MCNC: 3 MG/DL (ref 0.6–1.2)
CREAT SERPL-MCNC: 3.1 MG/DL (ref 0.6–1.2)
CREAT UR-MCNC: 96.2 MG/DL (ref 28–259)
CRP SERPL-MCNC: 154.7 MG/L (ref 0–5.1)
DEPRECATED RDW RBC AUTO: 15.1 % (ref 12.4–15.4)
EOSINOPHIL # BLD: 0 K/UL (ref 0–0.6)
EOSINOPHIL NFR BLD: 0 %
EPI CELLS #/AREA URNS AUTO: 4 /HPF (ref 0–5)
GFR SERPLBLD CREATININE-BSD FMLA CKD-EPI: 14 ML/MIN/{1.73_M2}
GFR SERPLBLD CREATININE-BSD FMLA CKD-EPI: 14 ML/MIN/{1.73_M2}
GLUCOSE SERPL-MCNC: 192 MG/DL (ref 70–99)
GLUCOSE SERPL-MCNC: 221 MG/DL (ref 70–99)
GLUCOSE UR STRIP.AUTO-MCNC: NEGATIVE MG/DL
HCT VFR BLD AUTO: 50.8 % (ref 36–48)
HGB BLD-MCNC: 16.3 G/DL (ref 12–16)
HGB UR QL STRIP.AUTO: ABNORMAL
HYALINE CASTS #/AREA URNS AUTO: 36 /LPF (ref 0–8)
KETONES UR STRIP.AUTO-MCNC: NEGATIVE MG/DL
LACTATE BLDV-SCNC: 3.6 MMOL/L (ref 0.4–2)
LACTATE BLDV-SCNC: 4.1 MMOL/L (ref 0.4–2)
LACTATE BLDV-SCNC: 5 MMOL/L (ref 0.4–2)
LACTOFERRIN STL QL IA: ABNORMAL
LEUKOCYTE ESTERASE UR QL STRIP.AUTO: ABNORMAL
LYMPHOCYTES # BLD: 1.8 K/UL (ref 1–5.1)
LYMPHOCYTES NFR BLD: 5 %
MAGNESIUM SERPL-MCNC: 2.5 MG/DL (ref 1.8–2.4)
MCH RBC QN AUTO: 27.3 PG (ref 26–34)
MCHC RBC AUTO-ENTMCNC: 32 G/DL (ref 31–36)
MCV RBC AUTO: 85.2 FL (ref 80–100)
MONOCYTES # BLD: 1.8 K/UL (ref 0–1.3)
MONOCYTES NFR BLD: 5 %
NEUTROPHILS # BLD: 31.8 K/UL (ref 1.7–7.7)
NEUTROPHILS NFR BLD: 78 %
NEUTS BAND NFR BLD MANUAL: 11 % (ref 0–7)
NITRITE UR QL STRIP.AUTO: NEGATIVE
PH UR STRIP.AUTO: 5 [PH] (ref 5–8)
PHOSPHATE SERPL-MCNC: 5.4 MG/DL (ref 2.5–4.9)
PLATELET # BLD AUTO: 390 K/UL (ref 135–450)
PMV BLD AUTO: 9.9 FL (ref 5–10.5)
POTASSIUM SERPL-SCNC: 4.2 MMOL/L (ref 3.5–5.1)
POTASSIUM SERPL-SCNC: 4.7 MMOL/L (ref 3.5–5.1)
PROCALCITONIN SERPL IA-MCNC: 21.72 NG/ML (ref 0–0.15)
PROT SERPL-MCNC: 5.9 G/DL (ref 6.4–8.2)
PROT UR STRIP.AUTO-MCNC: 30 MG/DL
PROT UR-MCNC: 55 MG/DL
PROT/CREAT UR-RTO: 0.6 MG/DL
RBC # BLD AUTO: 5.97 M/UL (ref 4–5.2)
RBC CLUMPS #/AREA URNS AUTO: 1 /HPF (ref 0–4)
RBC MORPH BLD: NORMAL
SODIUM SERPL-SCNC: 136 MMOL/L (ref 136–145)
SODIUM SERPL-SCNC: 138 MMOL/L (ref 136–145)
SP GR UR STRIP.AUTO: 1.02 (ref 1–1.03)
UA DIPSTICK W REFLEX MICRO PNL UR: YES
URN SPEC COLLECT METH UR: ABNORMAL
UROBILINOGEN UR STRIP-ACNC: 1 E.U./DL
WBC # BLD AUTO: 35.7 K/UL (ref 4–11)
WBC #/AREA URNS AUTO: 365 /HPF (ref 0–5)

## 2023-11-15 PROCEDURE — 87506 IADNA-DNA/RNA PROBE TQ 6-11: CPT

## 2023-11-15 PROCEDURE — 6360000002 HC RX W HCPCS: Performed by: STUDENT IN AN ORGANIZED HEALTH CARE EDUCATION/TRAINING PROGRAM

## 2023-11-15 PROCEDURE — 99233 SBSQ HOSP IP/OBS HIGH 50: CPT | Performed by: NURSE PRACTITIONER

## 2023-11-15 PROCEDURE — 82570 ASSAY OF URINE CREATININE: CPT

## 2023-11-15 PROCEDURE — 99233 SBSQ HOSP IP/OBS HIGH 50: CPT | Performed by: INTERNAL MEDICINE

## 2023-11-15 PROCEDURE — 2580000003 HC RX 258: Performed by: INTERNAL MEDICINE

## 2023-11-15 PROCEDURE — 84145 PROCALCITONIN (PCT): CPT

## 2023-11-15 PROCEDURE — 6360000002 HC RX W HCPCS: Performed by: INTERNAL MEDICINE

## 2023-11-15 PROCEDURE — 94761 N-INVAS EAR/PLS OXIMETRY MLT: CPT

## 2023-11-15 PROCEDURE — 87324 CLOSTRIDIUM AG IA: CPT

## 2023-11-15 PROCEDURE — 87449 NOS EACH ORGANISM AG IA: CPT

## 2023-11-15 PROCEDURE — 80076 HEPATIC FUNCTION PANEL: CPT

## 2023-11-15 PROCEDURE — 6370000000 HC RX 637 (ALT 250 FOR IP): Performed by: STUDENT IN AN ORGANIZED HEALTH CARE EDUCATION/TRAINING PROGRAM

## 2023-11-15 PROCEDURE — 2500000003 HC RX 250 WO HCPCS: Performed by: INTERNAL MEDICINE

## 2023-11-15 PROCEDURE — 81001 URINALYSIS AUTO W/SCOPE: CPT

## 2023-11-15 PROCEDURE — 83735 ASSAY OF MAGNESIUM: CPT

## 2023-11-15 PROCEDURE — 97530 THERAPEUTIC ACTIVITIES: CPT

## 2023-11-15 PROCEDURE — 51702 INSERT TEMP BLADDER CATH: CPT

## 2023-11-15 PROCEDURE — 87336 ENTAMOEB HIST DISPR AG IA: CPT

## 2023-11-15 PROCEDURE — 2580000003 HC RX 258: Performed by: STUDENT IN AN ORGANIZED HEALTH CARE EDUCATION/TRAINING PROGRAM

## 2023-11-15 PROCEDURE — 83605 ASSAY OF LACTIC ACID: CPT

## 2023-11-15 PROCEDURE — C9113 INJ PANTOPRAZOLE SODIUM, VIA: HCPCS | Performed by: STUDENT IN AN ORGANIZED HEALTH CARE EDUCATION/TRAINING PROGRAM

## 2023-11-15 PROCEDURE — 80069 RENAL FUNCTION PANEL: CPT

## 2023-11-15 PROCEDURE — 87328 CRYPTOSPORIDIUM AG IA: CPT

## 2023-11-15 PROCEDURE — 36415 COLL VENOUS BLD VENIPUNCTURE: CPT

## 2023-11-15 PROCEDURE — 97161 PT EVAL LOW COMPLEX 20 MIN: CPT

## 2023-11-15 PROCEDURE — 2700000000 HC OXYGEN THERAPY PER DAY

## 2023-11-15 PROCEDURE — 85025 COMPLETE CBC W/AUTO DIFF WBC: CPT

## 2023-11-15 PROCEDURE — 2100000000 HC CCU R&B

## 2023-11-15 PROCEDURE — 84156 ASSAY OF PROTEIN URINE: CPT

## 2023-11-15 PROCEDURE — 97165 OT EVAL LOW COMPLEX 30 MIN: CPT

## 2023-11-15 PROCEDURE — 86140 C-REACTIVE PROTEIN: CPT

## 2023-11-15 PROCEDURE — 83630 LACTOFERRIN FECAL (QUAL): CPT

## 2023-11-15 RX ORDER — CIPROFLOXACIN 2 MG/ML
400 INJECTION, SOLUTION INTRAVENOUS EVERY 12 HOURS
Status: DISCONTINUED | OUTPATIENT
Start: 2023-11-15 | End: 2023-11-15 | Stop reason: DRUGHIGH

## 2023-11-15 RX ORDER — SODIUM CHLORIDE 9 MG/ML
INJECTION, SOLUTION INTRAVENOUS CONTINUOUS
Status: DISCONTINUED | OUTPATIENT
Start: 2023-11-15 | End: 2023-11-15

## 2023-11-15 RX ORDER — CIPROFLOXACIN 2 MG/ML
400 INJECTION, SOLUTION INTRAVENOUS EVERY 24 HOURS
Status: DISCONTINUED | OUTPATIENT
Start: 2023-11-15 | End: 2023-11-17

## 2023-11-15 RX ORDER — 0.9 % SODIUM CHLORIDE 0.9 %
500 INTRAVENOUS SOLUTION INTRAVENOUS ONCE
Status: COMPLETED | OUTPATIENT
Start: 2023-11-15 | End: 2023-11-15

## 2023-11-15 RX ORDER — HYDRALAZINE HYDROCHLORIDE 20 MG/ML
10 INJECTION INTRAMUSCULAR; INTRAVENOUS ONCE
Status: COMPLETED | OUTPATIENT
Start: 2023-11-15 | End: 2023-11-15

## 2023-11-15 RX ORDER — 0.9 % SODIUM CHLORIDE 0.9 %
1000 INTRAVENOUS SOLUTION INTRAVENOUS ONCE
Status: DISCONTINUED | OUTPATIENT
Start: 2023-11-15 | End: 2023-11-15

## 2023-11-15 RX ORDER — METRONIDAZOLE 500 MG/100ML
500 INJECTION, SOLUTION INTRAVENOUS EVERY 8 HOURS
Status: DISCONTINUED | OUTPATIENT
Start: 2023-11-15 | End: 2023-11-17

## 2023-11-15 RX ORDER — GABAPENTIN 100 MG/1
100 CAPSULE ORAL 3 TIMES DAILY
Status: DISCONTINUED | OUTPATIENT
Start: 2023-11-15 | End: 2023-11-25 | Stop reason: HOSPADM

## 2023-11-15 RX ORDER — 0.9 % SODIUM CHLORIDE 0.9 %
1000 INTRAVENOUS SOLUTION INTRAVENOUS ONCE
Status: COMPLETED | OUTPATIENT
Start: 2023-11-15 | End: 2023-11-15

## 2023-11-15 RX ADMIN — CIPROFLOXACIN 400 MG: 400 INJECTION, SOLUTION INTRAVENOUS at 10:38

## 2023-11-15 RX ADMIN — GABAPENTIN 100 MG: 100 CAPSULE ORAL at 16:55

## 2023-11-15 RX ADMIN — ACETAMINOPHEN 650 MG: 325 TABLET ORAL at 06:52

## 2023-11-15 RX ADMIN — HYDRALAZINE HYDROCHLORIDE 10 MG: 20 INJECTION, SOLUTION INTRAMUSCULAR; INTRAVENOUS at 15:28

## 2023-11-15 RX ADMIN — SODIUM BICARBONATE: 84 INJECTION, SOLUTION INTRAVENOUS at 15:04

## 2023-11-15 RX ADMIN — SODIUM CHLORIDE, PRESERVATIVE FREE 10 ML: 5 INJECTION INTRAVENOUS at 19:59

## 2023-11-15 RX ADMIN — ACETAMINOPHEN 650 MG: 325 TABLET ORAL at 17:14

## 2023-11-15 RX ADMIN — SODIUM CHLORIDE 500 ML: 9 INJECTION, SOLUTION INTRAVENOUS at 09:55

## 2023-11-15 RX ADMIN — METRONIDAZOLE 500 MG: 500 INJECTION, SOLUTION INTRAVENOUS at 17:10

## 2023-11-15 RX ADMIN — GABAPENTIN 100 MG: 100 CAPSULE ORAL at 19:58

## 2023-11-15 RX ADMIN — SODIUM CHLORIDE: 9 INJECTION, SOLUTION INTRAVENOUS at 10:20

## 2023-11-15 RX ADMIN — METRONIDAZOLE 500 MG: 500 INJECTION, SOLUTION INTRAVENOUS at 09:29

## 2023-11-15 RX ADMIN — GABAPENTIN 100 MG: 100 CAPSULE ORAL at 10:36

## 2023-11-15 RX ADMIN — PANTOPRAZOLE SODIUM 40 MG: 40 INJECTION, POWDER, FOR SOLUTION INTRAVENOUS at 09:56

## 2023-11-15 RX ADMIN — ROSUVASTATIN CALCIUM 10 MG: 10 TABLET, FILM COATED ORAL at 19:58

## 2023-11-15 RX ADMIN — SODIUM CHLORIDE 1000 ML: 9 INJECTION, SOLUTION INTRAVENOUS at 08:42

## 2023-11-15 ASSESSMENT — PAIN DESCRIPTION - ORIENTATION: ORIENTATION: MID

## 2023-11-15 ASSESSMENT — PAIN DESCRIPTION - DESCRIPTORS: DESCRIPTORS: SHARP

## 2023-11-15 ASSESSMENT — PAIN SCALES - GENERAL
PAINLEVEL_OUTOF10: 4
PAINLEVEL_OUTOF10: 0

## 2023-11-15 ASSESSMENT — PAIN DESCRIPTION - LOCATION: LOCATION: HEAD

## 2023-11-15 NOTE — PROGRESS NOTES
Infectious Diseases Inpatient Progress Note      CHIEF COMPLAINT:     Syncope  UTI  Lynda  Wbc ELEVATION  Diarrhea  E coli infection      HISTORY OF PRESENT ILLNESS:  80 y.o.  WOMAN with h/o avr, Recurrent UTI,htn, CVA history,admitted with syncopal episodes from Audiologist office, pt  passed out x 3 times and once while on the toilet, EMS was called, her HR was in 40'S on admit with hypotension 70, and now in CVICU for monitoring. She had SAVR in 2017. She has h/o Bacteremia E coli in April when she had UTI, she had one episode of emesis and some abd pain her UA is abnormal on admit and Blood cx in process, she is in LYNDA with WBC elevation and we are consulted for recommendations. Interval History: Remains in ICU and having diarrhea and some abd pain, WBC worse and LYNDA with creat rising and making some urine+ family at bed side     Past Medical History:    Past Medical History:   Diagnosis Date    Aortic stenosis     Cerebrovascular disease     Class 1 obesity     Hepatic steatosis     Hiatal hernia     Hyperlipidemia     Hypertension     Osteoarthritis        Past Surgical History:    Past Surgical History:   Procedure Laterality Date    AORTIC VALVE REPLACEMENT  03/09/2017    19mm [place] pericardial Magna Ease    BLEPHAROPLASTY Bilateral 6/7/2021    BILATERAL UPPER LIDS BLEPHAROPLASTY performed by Aminta Moya MD at [address] Bilateral 01/2019    PRE-MALIGNANT / BENIGN SKIN LESION EXCISION Right 2014    R scalp. cystadenoma. --done under local    UPPER GASTROINTESTINAL ENDOSCOPY N/A 5/24/2023    EGD DILATION BALLOON TO 18mm performed by Alissa Frazier MD at [address]       Current Medications:    Outpatient Medications Marked as Taking for the 11/14/23 encounter [facility] Encounter)   Medication Sig Dispense Refill    cloNIDine (CATAPRES) 0.1 MG tablet Take 1 tablet by mouth in the morning, at noon, and at bedtime         Allergies:  Adhesive tape and Oxycontin

## 2023-11-15 NOTE — PROGRESS NOTES
Delayed due to patient care:    1945: Pt states the feeling like she needs to have a bowel movement. This RN assisted patient up to bedside commode. Pt is mildly tachycardic without symptoms while on commode. Pt has large brown soft BM. RN assists patient back to bed, patient states that she had another BM all the sudden. RN assists patient to clean liquid brown BM while in bed. Will continue to monitor. 2015: RN comes in room to assess patient, patient states she feels the need to have another BM, upon assessment, patient already had another brown liquid stool in bed. RN assists patient with another bed change. Patient states abdominal pain is much better. 2030: Orders for fecal management system, dignishield, obtained. Patient agreeable with plan, dignishield inserted with no complications. C-diff rule out precautions ordered, Will collect sample when available.      Electronically signed by Ron Crain RN on 11/14/2023 at 9:43 PM

## 2023-11-15 NOTE — PROGRESS NOTES
Cardiac Electrophysiology Progress Note     Admit Date: 11/14/2023     Reason for follow up: SSS    HPI and Interval History: Wagner Mcmillan is an 80y.o. year old female with past medical history significant for AS s/p SAVR 3/17, previous syncope, HTN and HLD who presented to the ED after having syncope x2. She admits to having syncope over the years without any clear triggers. This mostly happened prior to her aortic valve replacement which was blamed on her aortic stenosis. She had an episode back in the spring but was also septic and bacteremic at the time. She has had episodes at [employer] and him happen while sitting or standing. She denies any lightheadedness or dizziness with standing or position changes. This morning she was getting her hearing aids fitted and was sitting down when she suddenly lost consciousness. EMS was called and while they were there she went to the bathroom to urinate and again lost consciousness on the toilet. She was not on the monitor at that time. While in the emergency room her heart rate did decrease down into the 40s and she felt lightheaded which is similar to how she feels prior to having actual syncope. Her blood pressure was checked at that time and was noted to be 70/40. Her heart rate improved back to the 60s and blood pressure also improved back to her baseline without any intervention. She denies any recent changes or illnesses. She is typically very active and lives alone. She currently still drives. Denies any angina or increased dyspnea. No edema. Now in ST in the ICU. BP has been stable. Looking like sepsis picture, also with diarrhea. She is feeling okay today. No further syncope/near syncope.     Physical Examination:  Vitals:    11/15/23 0700   BP: (!) 151/49   Pulse: (!) 101   Resp: 25   Temp:    SpO2: 97%        Intake/Output Summary (Last 24 hours) at 11/15/2023 0806  Last data filed at 11/15/2023 0252  Gross per 24 hour   Intake 50 ml   Output 2017 04:40 AM     Lab Results   Component Value Date/Time    CHOL 130 2023 09:25 AM    HDL 42 2023 09:25 AM    HDL 37 2011 09:10 AM    TRIG 103 2023 09:25 AM       Scheduled Meds:   rosuvastatin  10 mg Oral Daily    sodium chloride flush  5-40 mL IntraVENous 2 times per day    cefTRIAXone (ROCEPHIN) IV  1,000 mg IntraVENous Q24H    pantoprazole  40 mg IntraVENous Daily     Continuous Infusions:   sodium chloride       PRN Meds:perflutren lipid microspheres, sodium chloride flush, sodium chloride, ondansetron **OR** ondansetron, polyethylene glycol, acetaminophen **OR** acetaminophen, hydrALAZINE, HYDROmorphone     EC23  SR at 67 BPM. LVH with secondary ST changes. Anterolateral infarct. Echo: 23   Mild to mod. conc. LVH with normal LV size & wall motion. EF is    60%. Grade   II diastolic dysfunction with elevated LH filling pressures. Diastolic filling parameters suggest grade II diastolic dysfunction. The left atrium is mildly dilated. Significant mitral annular calcification seen; mild mitral regurgitation. Normal right ventricular size and function. Bioprosthetic Aortic valve in stable position. By 2D, the valve does not   appear to be very stenotic. Peak gradient is 4.79 m/s which corresponds to a   mean gradient is 55 mmHg suggestive of severe stenosis. NO regurgitation. Mild Tricuspid regurgitation. Estimated pulmonary artery systolic pressure is normal at 34 mmHg assuming a   right atrial pressure of 3 mmHg. ERA: 23  No evidence of significant prosthetic aortic valve stenosis  No evidence of valvular vegetation  No evidence of TIM thrombus     Cath: 17  ANGIOGRAPHIC FINDINGS:  1. Left main coronary comes from the left coronary cusp, giving rise to left  anterior descending artery, left circumflex artery. They all are normal.   JUAN PABLO 3 flow.   No significant stenosis seen either in the left main coronary  artery, left anterior descending artery, left circumflex artery. The patient  is left dominant. 2.  The right coronary artery was normal.  JUAN PABLO 3 flow. It is a small  nondominant right coronary. 3.  Ejection fraction of 60%. SUMMARY:  Severe aortic valve stenosis with normal coronary arteries. RECOMMENDATIONS:  1. Continue postop/post-cath care. 2.  Site care. 3.  Risk factor modification. 4.   surgery for surgical aortic valve replaced verus TAVR. Assessment and Plan:     SSS              - with symptomatic bradycardia causing syncope and near syncope              - had a period where HR dropped from the 60s into the low 40s (sinu bradycardia) while in the ED that was associated with similar lightheadedness to past syncope and presyncope, also with BP drop to 70/40              - on no AV black blocking agents              - suspect recurrent syncope is secondary to this              - orthostatics negative in ED              - TSH slightly high with normal free T4              - echo with normal EF              - no reversible cause found, meets Class I indications for permanent pacemaker which was discussed with her and her family. They are agreeable to this               - needs infection cleared and cleared by ID prior to pacemaker     Sepsis              - likely from UTI versus colitis, C diff negative   - ID following    LYNDA   - likely secondary to sepsis   - nephrology consulted    Aortic stenosis              - s/p SAVR in 2017              - TTE 5/23 with severe stenosis of prosthetic valve, ERA done which showed appropriate function              - followed by Dr. Mirza Bachelor HTN              - BP controlled currently, would monitor    EP will follow peripherally until sepsis/infection improved and ready for pacemaker placement.     MAICOL Mccloud  [facility] [address] [email or web address] [facility]  [address] [place], [address]  Phone: [phone]  Fax: [phone]

## 2023-11-15 NOTE — PROGRESS NOTES
Handoff / report completed with [employer], RN to resume care. Patient in bed, VSS. Family at bedside. All questions answered. Patient denies any needs at this time.

## 2023-11-15 NOTE — CARE COORDINATION
Case Management Assessment  Initial Evaluation    Date/Time of Evaluation: 11/15/2023 11:00 AM  Assessment Completed by: Zach Cedeño RN    If patient is discharged prior to next notation, then this note serves as note for discharge by case management. Patient Name: Thien Vasquez                   YOB: 1935  Diagnosis: Syncope and collapse [R55]  Symptomatic bradycardia [R00.1]                   Date / Time: 11/14/2023 10:46 AM    Patient Admission Status: Inpatient   Readmission Risk (Low < 19, Mod (19-27), High > 27): Readmission Risk Score: 14.9    Current PCP: Severino Manrique MD  PCP verified by CM? (P) Yes    Chart Reviewed: Yes      History Provided by: (P) Patient  Patient Orientation: (P) Alert and Oriented    Patient Cognition: (P) Alert    Hospitalization in the last 30 days (Readmission):  No    If yes, Readmission Assessment in CM Navigator will be completed.     Advance Directives:      Code Status: DNR-CCA   Patient's Primary Decision Maker is: (P) Legal Next of Kin    Primary Decision Maker: Ang Boothe - Child - [phone]    Primary Decision Maker: Jessica Samson - Child - [phone]    Discharge Planning:    Patient lives with: (P) Family Members Type of Home: (P) Other (Comment) (Condo)  Primary Care Giver: (P) Self  Patient Support Systems include: (P) Children, Family Members   Current Financial resources: (P) Medicare  Current community resources: (P) None  Current services prior to admission: (P) Durable Medical Equipment            Current DME: (P) Walker            Type of Home Care services:  (P) Housekeeping    ADLS  Prior functional level: (P) Independent in ADLs/IADLs, Assistance with the following:, Housework  Current functional level: (P) Independent in ADLs/IADLs, Assistance with the following:, Housework    PT AM-PAC:   /24  OT AM-PAC: 19 /24    Family can provide assistance at DC: (P) Yes  Would you like Case Management to discuss the discharge plan with any other

## 2023-11-15 NOTE — CONSULTS
[address] [facility]     Nephrology Note         Patient ID: Owen Hendricks  Referring/ Physician: Dave Boyle MD      Summary:   Owen Hendricks is being seen by nephrology for LYNDA. Reason for admission: syncope, bradycardia. Found to have UTI and colitis. Interval Hx:     Feeling better today. Having diarrhea. Has clay   UO dropped. Was NPO yesterday and was not on IVFs. /72  96% on 2 L   cc       Lactate 5  Phos 5.4   Cr 3.1   K 4.2   Bicarb 14 BUN 52      Assessment/Plan:   - change to bicarb fluids  - strict IO   - urine sodium and p/cr        LYNDA  Suspect this is from hemodynamic insults, volume depletion. Baseline Cr 1  Cr jumped to 3.1 in a days time. UA 10 hyaline casts  56 wbc 4+ bacteria  CT showed colitis. No fluid overload. Was on RAASi and diuretics prior to admission. These are held. - IV fluids    Lactic acidosis. Bicarb 14 and lactate 5  Anion gap 21  Hemodynamically stable and not currently hypotensive.    -bicarb fluids. H/o labile HTN  Outpatient and home BP readings 313-450 systolic consistently. Was on clondine lisinopril aldactone and lasix. Not sure if she was taking the amlodipine. - holding BP meds. Bradycardia  I suspect this was from clonidine. This is stopped. No more bradycardia since stopping. UTI and colitis  On [address] Ne[facility] would like to thank you for the opportunity to serve this patient. Please call with any questions or concerns. Sacha Stephens MD  [facility]  [address] [place] [address], [facility] [address]  Fax: [phone]  Office: [phone]    [facility]  Owen Hendricks is a 80 y.o. female  with  has a past medical history of Aortic stenosis, Cerebrovascular disease, Class 1 obesity, Hepatic steatosis, Hiatal hernia, Hyperlipidemia, Hypertension, and Osteoarthritis. who presented with syncope. Patient had episode of syncope during outpatient visit prior to hospitalization.   She had a Allergen Reactions    Adhesive Tape Itching and Dermatitis    Oxycontin [Oxycodone Hcl] Other (See Comments)     \"seeing things\"         Physical Exam/Objective:   Vitals:    11/15/23 0844   BP:    Pulse: (!) 109   Resp: 28   Temp:    SpO2: 96%       Intake/Output Summary (Last 24 hours) at 11/15/2023 0911  Last data filed at 11/15/2023 0252  Gross per 24 hour   Intake 50 ml   Output --   Net 50 ml         General appearance: in no acute distress. HEENT:   Respiratory: Respiratory effort normal, bilateral equal chest rise. No wheeze, no crackles   Cardiovascular: Ausculation shows RRR and  no edema   Abdomen: abdomen is soft, non distended  Musculoskeletal:  no joint swelling, no deformity  Skin: no rashes,  no jaundice   Neuro:   Follows commands, moves all extremities spontaneously       Data:   CBC:   Recent Labs     11/14/23  1105 11/15/23  0339   WBC 13.7* 35.7*   HGB 13.5 16.3*   HCT 41.9 50.8*    390     BMP:    Recent Labs     11/14/23  1105 11/14/23  1619 11/15/23  0339     --  138   K 3.9  --  4.7   CL 98*  --  98*   CO2 23  --  15*   BUN 29*  --  51*   CREATININE 1.7*  --  3.0*   GLUCOSE 102*  --  221*   MG  --  2.70*  --      Lab Results   Component Value Date/Time    COLORU Yellow 11/14/2023 12:47 PM    NITRU Negative 11/14/2023 12:47 PM    GLUCOSEU Negative 11/14/2023 12:47 PM    KETUA Negative 11/14/2023 12:47 PM    UROBILINOGEN 0.2 11/14/2023 12:47 PM    BILIRUBINUR Negative 11/14/2023 12:47 PM

## 2023-11-15 NOTE — PROGRESS NOTES
V2.0  [facility] Hospitalist Progress Note      Name:  Yoly Millan /Age/Sex: 1935  (80 y.o. female)   MRN & CSN:  [id number] & [id number] Encounter Date/Time: 11/15/2023 8:21 AM EST    Location:  [place] [address]- PCP: Gardenia Mcduffie MD       Hospital Day: 2    Assessment and Plan: Yoly Millan is a 80 y.o. female with PMH of severe aortic stenosis s/p SAVR presenting with syncope      Plan:  Severe sepsis (tachycardia, leukocytosis, lactic acidosis) 2/2 colitis; possible UTI  -WBC count jumped from 13.7-35; some of this is hemoconcentration but not all of it  -UA shows bacteriuria and pyuria;  -Blood culture and urine culture pending  -Stool studies sent  -Antibiotics transitioned from ceftriaxone to Cipro/Flagyl for intra-abdominal infection  -1.5 L NS bolus followed by maintenance  -ID consulted appreciated input  Acute kidney injury  -Cr jumped from 1.7 to 3.0  -1 nephrotoxic meds  -IV fluids  -Nephrology consulted  Anion gap metabolic acidosis  -Lactate jump to 4.1  -1.5 L bolus followed by maintenance fluids  -Trend lactate until normalization  Syncope  -Orthostats negative  -Clonidine dose increased from 0.1 mg TID to 0.3 mg TID 4 days prior to presentation  -Previous history of syncopal events associated with sepsis. -Patient also symptomatic bradycardia in ED  -Patient is certainly at risk for sick sinus syndrome; however given the other potential etiologies, difficult to attribute this definitively  Symptomatic bradycardia  -Patient's clonidine dosage was increased 11/10/2023 with nephrologist  -Discussed with patient's nephrologist, clonidine discontinued  -EP following. Ppx: SCDs  Dispo: TBD, pending medical improvement    Subjective:     Chief Complaint: Loss of Consciousness (Pt comes from doctor's appointment with report of 2 syncopal episodes. Pt had 1 syncopal episode with EMS that lasted about 10 seconds. Pt did not fall or hit head.  EMS reports pt was pale and diaphoretic upon their Seen /HPF    Hyaline Casts, UA 10 (H) 0 - 8 /LPF    WBC, UA 56 (H) 0 - 5 /HPF    RBC, UA 1 0 - 4 /HPF    Epithelial Cells, UA 2 0 - 5 /HPF   Magnesium    Collection Time: 11/14/23  4:19 PM   Result Value Ref Range    Magnesium 2.70 (H) 1.80 - 2.40 mg/dL   Basic Metabolic Panel w/ Reflex to MG    Collection Time: 11/15/23  3:39 AM   Result Value Ref Range    Sodium 138 136 - 145 mmol/L    Potassium reflex Magnesium 4.7 3.5 - 5.1 mmol/L    Chloride 98 (L) 99 - 110 mmol/L    CO2 15 (L) 21 - 32 mmol/L    Anion Gap 25 (H) 3 - 16    Glucose 221 (H) 70 - 99 mg/dL    BUN 51 (H) 7 - 20 mg/dL    Creatinine 3.0 (H) 0.6 - 1.2 mg/dL    Est, Glom Filt Rate 14 (A) >60    Calcium 10.2 8.3 - 10.6 mg/dL   CBC with Auto Differential    Collection Time: 11/15/23  3:39 AM   Result Value Ref Range    WBC 35.7 (H) 4.0 - 11.0 K/uL    RBC 5.97 (H) 4.00 - 5.20 M/uL    Hemoglobin 16.3 (H) 12.0 - 16.0 g/dL    Hematocrit 50.8 (H) 36.0 - 48.0 %    MCV 85.2 80.0 - 100.0 fL    MCH 27.3 26.0 - 34.0 pg    MCHC 32.0 31.0 - 36.0 g/dL    RDW 15.1 12.4 - 15.4 %    Platelets 646 229 - 721 K/uL    MPV 9.9 5.0 - 10.5 fL   Lactic Acid    Collection Time: 11/15/23  6:32 AM   Result Value Ref Range    Lactic Acid 4.1 (HH) 0.4 - 2.0 mmol/L        Imaging/Diagnostics Last 24 Hours   CT ABDOMEN PELVIS WO CONTRAST Additional Contrast? Radiologist Recommendation    Result Date: 11/15/2023  EXAMINATION: CT OF THE ABDOMEN AND PELVIS WITHOUT CONTRAST 11/14/2023 11:45 pm TECHNIQUE: CT of the abdomen and pelvis was performed without the administration of intravenous contrast. Multiplanar reformatted images are provided for review. Automated exposure control, iterative reconstruction, and/or weight based adjustment of the mA/kV was utilized to reduce the radiation dose to as low as reasonably achievable. COMPARISON: None.  HISTORY: ORDERING SYSTEM PROVIDED HISTORY: worsening abdominal pain; TECHNOLOGIST PROVIDED HISTORY: Reason for exam:->worsening abdominal pain; Additional Contrast?->Radiologist Recommendation Reason for Exam: worsening abdominal pain; FINDINGS: Lower Chest:  Dense peripheral airspace changes in the left greater than right lower lobe. Cardiomegaly with evidence of prior cardiothoracic surgery. Organs: The liver, gallbladder, biliary ducts, pancreas and spleen are normal.  Incidental 2.0 cm splenic cyst.  Normal adrenal glands. Cortical thinning and perinephric stranding around the kidneys suggesting chronic medical renal disease. GI/Bowel: Hiatal hernia with reflux of fluid into the distal visualized esophagus. The stomach is mildly distended with fluid. No mucosal abnormality. Duodenum is normal.  Normal small bowel. A normal appendix is visualized. Moderate mucosal thickening observed diffusely throughout the colon with fluid in the lumen. There also diverticular changes at the sigmoid colon Pelvis: The bladder is unremarkable. The uterus and adnexa are normal. Peritoneum/Retroperitoneum: Advanced atherosclerotic calcification of normal caliber aorta. No free air or fluid. No adenopathy. Bones/Soft Tissues: Degenerative changes throughout the visualized thoracolumbar spine. 1. Diffuse mucosal thickening of the colon favoring colitis. Infectious or inflammatory etiologies may be considered. 2. Distended stomach with hiatal hernia and reflux of fluid into the distal visualized esophagus. 3. Dense peripheral airspace changes in the visualized lungs. Edema or atelectasis suspected. Correlate with any acute pulmonary symptoms. XR CHEST PORTABLE    Result Date: 11/14/2023  EXAMINATION: ONE XRAY VIEW OF THE CHEST 11/14/2023 10:52 am COMPARISON: None. HISTORY: ORDERING SYSTEM PROVIDED HISTORY: syncope, nausea, vomiting TECHNOLOGIST PROVIDED HISTORY: Reason for exam:->syncope, nausea, vomiting Reason for Exam: loc FINDINGS: The lungs appear clear. There are no pulmonary infiltrates, CHF or pneumothorax. The heart is of normal size.   Patient

## 2023-11-15 NOTE — PROGRESS NOTES
Occupational Therapy  Facility/Department: [address] CVICU  Occupational Therapy Initial Assessment and Tentative D/C      Name: Angel Bartholomew  : 1935  MRN: [id number]  Date of Service: 11/15/2023    Discharge Recommendations: Angel Bartholomew scored a 19/24 on the AM-PAC ADL Inpatient form. Current research shows that an AM-PAC score of 18 or greater is typically associated with a discharge to the patient's home setting. If patient discharges prior to next session this note will serve as a discharge summary. Please see below for the latest assessment towards goals. Home with assist PRN  OT Equipment Recommendations  Equipment Needed: No       Patient Diagnosis(es): The primary encounter diagnosis was Syncope and collapse. A diagnosis of Symptomatic bradycardia was also pertinent to this visit. Past Medical History:  has a past medical history of Aortic stenosis, Cerebrovascular disease, Class 1 obesity, Hepatic steatosis, Hiatal hernia, Hyperlipidemia, Hypertension, and Osteoarthritis. Past Surgical History:  has a past surgical history that includes pre-malignant / benign skin lesion excision (Right, ); Aortic valve replacement (2017); Cataract removal (Bilateral, 2019); blepharoplasty (Bilateral, 2021); and Upper gastrointestinal endoscopy (N/A, 2023). Assessment   Performance deficits / Impairments: Decreased functional mobility ; Decreased endurance;Decreased strength;Decreased ADL status; Decreased balance;Decreased high-level IADLs  Assessment: PTA pt from home where pt was Ind with mobility and ADLs. Pt currently requires CGA and use of RW for mobility and transfers. No overt LOB. Anticipate pt needing up to CGA/Min A for ADLs. Pt will benefit from skilled OT services at this time. Anticipate pt able to return home at time of D/C.   Prognosis: Good  Decision Making: Low Complexity  Exam: see above  Assistance / Modification: RW  REQUIRES OT FOLLOW-UP: Yes  Activity

## 2023-11-15 NOTE — CONSULTS
GASTROENTEROLOGY INPATIENT CONSULTATION        IDENTIFYING DATA/REASON FOR CONSULTATION   PATIENT:  Leta Mcdowell  MRN:  [id number]  ADMIT DATE: 11/14/2023  TIME OF EVALUATION: 11/15/2023 10:23 AM  HOSPITAL STAY:   LOS: 1 day     REASON FOR CONSULTATION:  \"severe abdominal pain with black/brown emesis\"    HISTORY OF PRESENT ILLNESS   Leta Mcdowell is a 80 y.o. female with a PMH of AS s/p AVR, HTN, HLD, CKD, osteoarthritis who presented on 11/14/2023 after experiencing 2 syncopal episodes. She was seen by cardiology/electrophysiology and suspected most likely attributed to sinus bradycardia. Additional work-up in the ED was notable for leukocytosis, LYNDA. GI consulted for \"severe abdominal pain or black/brown emesis\"    Patient reports prior to admission she was suffering from diarrhea for about a week. She was passing ~3-4 bowel movements per day. Stools were brown with mucus. She had associated abdominal pain. She took Imodium and had sudden relief. Prior to being admitted her last bowel movement was Saturday. Once admitted to ICU she had a bowel movement last night. RN reports it was medium sized and soft. Flexi-Seal was placed. She has had multiple brown watery stool output since. CT scan of the abdomen and pelvis showed diffuse mucosal thickening of the colon favoring colitis and distended stomach with hiatal hernia and reflux of fluid into the distal esophagus. White count initially 13.7 with repeat level today being 35.7. Lactic acid 4.1  .7    Stool studies for C. difficile negative. UTI showed increased WBC, +bacteria, moderate leuk esterase. Cultures sent. Blood cultures pending.         Prior Endoscopic Evaluations: no prior colonoscopy      PAST MEDICAL, SURGICAL, FAMILY, and SOCIAL HISTORY     Past Medical History:   Diagnosis Date    Aortic stenosis     Cerebrovascular disease     Class 1 obesity     Hepatic steatosis     Hiatal hernia     Hyperlipidemia     Hypertension

## 2023-11-15 NOTE — PROGRESS NOTES
Patient continues to have o2 saturation issues when asleep because of mouth breathing. Placed on Non-rebreather mask for optimal oxygenation.      Electronically signed by Selwyn Perez RN on 11/15/2023 at 5:17 AM

## 2023-11-15 NOTE — ACP (ADVANCE CARE PLANNING)
Advance Care Planning     Advance Care Planning Activator (Inpatient)  Conversation Note      Date of ACP Conversation: 11/15/2023     Conversation Conducted with: Patient with Decision Making Capacity    ACP Activator: Angie Leal RN      Health Care Decision Maker:     Current Designated Health Care Decision Maker:     Primary Decision Maker: Marlene Rosie - Child - [phone]    Primary Decision Maker: Jessica Samson - Child - [phone]    Care Preferences    Ventilation: \"If you were in your present state of health and suddenly became very ill and were unable to breathe on your own, what would your preference be about the use of a ventilator (breathing machine) if it were available to you? \"      Would the patient desire the use of ventilator (breathing machine)?: no    \"If your health worsens and it becomes clear that your chance of recovery is unlikely, what would your preference be about the use of a ventilator (breathing machine) if it were available to you? \"     Would the patient desire the use of ventilator (breathing machine)?: No      Resuscitation  \"CPR works best to restart the heart when there is a sudden event, like a heart attack, in someone who is otherwise healthy. Unfortunately, CPR does not typically restart the heart for people who have serious health conditions or who are very sick. \"    \"In the event your heart stopped as a result of an underlying serious health condition, would you want attempts to be made to restart your heart (answer \"yes\" for attempt to resuscitate) or would you prefer a natural death (answer \"no\" for do not attempt to resuscitate)? \" no       [] Yes   [x] No   Educated Patient / Mary Jane Isabel regarding differences between Advance Directives and portable DNR orders.     Length of ACP Conversation in minutes: 2     Conversation Outcomes:  ACP discussion completed and Existing advance directive reviewed with patient; no changes to patient's previously recorded wishes    Follow-up plan:    [] Schedule follow-up conversation to continue planning  [] Referred individual to Provider for additional questions/concerns   [] Advised patient/agent/surrogate to review completed ACP document and update if needed with changes in condition, patient preferences or care setting    [x] This note routed to one or more involved healthcare providers    Electronically signed by Lila Reece RN on 11/15/2023 at 10:51 AM  Ph: [phone]  Fax: [phone]

## 2023-11-15 NOTE — PROGRESS NOTES
4 Eyes Skin Assessment     NAME:  Geri Carrillo  YOB: 1935  MEDICAL RECORD NUMBER:  [id number]    The patient is being assessed for  Shift Handoff    I agree that at least one RN has performed a thorough Head to Toe Skin Assessment on the patient. ALL assessment sites listed below have been assessed. Areas assessed by both nurses:    Head, Face, Ears, Shoulders, Back, Chest, Arms, Elbows, Hands, Sacrum. Buttock, Coccyx, Ischium, Legs. Feet and Heels, and Under Medical Devices         Does the Patient have a Wound?  No noted wound(s)       Juve Prevention initiated by RN: Yes  Wound Care Orders initiated by RN: No    Pressure Injury (Stage 3,4, Unstageable, DTI, NWPT, and Complex wounds) if present, place Wound referral order by RN under : No    New Ostomies, if present place, Ostomy referral order under : No     Nurse 1 eSignature: Electronically signed by Latonya Miller RN on 11/15/23 at 7:08 AM EST    **SHARE this note so that the co-signing nurse can place an eSignature**    Nurse 2 eSignature: {Esignature:[id number]}

## 2023-11-15 NOTE — PROGRESS NOTES
Physical Therapy  [facility]  Physical Therapy Initial Assessment    Name: Angie Flanagan  : 1935  MRN: [id number]  Date of Service: 11/15/2023    Discharge Recommendations:  Continue to assess pending progress (Anticipate adequate progress and support for d/c home. )   PT Equipment Recommendations  Equipment Needed: No  Current Am-Pac . Assessment   Body Structures, Functions, Activity Limitations Requiring Skilled Therapeutic Intervention: Decreased functional mobility ; Decreased strength;Decreased endurance;Decreased balance  Assessment: 81 y/o female admit 2023 with Symptomatic Bradycardia, Syncope/Collapse, LYNDA, Sepsis. Cardio following for Pacemaker. GI Consult : Colitis. PMH as noted including : Aortic Valve Stenosis/AVR (3/2017), Spondylolisthesis, Lumbar Compress Fx. PTA pt living with grandson in [address] [place] [address], [address] setting with 1 step to enter and 1 story; independent daily care and functional mobility (without assist device). Currently, pt sourav oob, transfers/amb short distances with walker within hospital room setting with CGA. Pt reports large supportive family. At this time, anticipate adequate progress for d/c home. Will cont to monitor pt's progress. Therapy Prognosis: Good  Decision Making: Low Complexity  History: 81 y/o female admit 2023 with Symptomatic Bradycardia, Syncope/Collapse, LYNDA, Sepsis. Cardio following for Pacemaker. GI Consult : Colitis. PMH as noted including : Aortic Valve Stenosis/AVR (3/2017), Spondylolisthesis, Lumbar Compress Fx. Exam: See above. Clinical Presentation: See above. Barriers to Learning: [place].   Requires PT Follow-Up: Yes  Activity Tolerance  Activity Tolerance: Patient tolerated treatment well     Plan   Physical Therapy Plan  General Plan: 3-5 times per week  Current Treatment Recommendations: Strengthening, Therapeutic activities, Functional mobility training, Transfer training, Gait training, Safety education & training, Patient/Caregiver education & training  Safety Devices  Type of Devices: Call light within reach, Chair alarm in place, Gait belt, Left in chair, Nurse notified  Restraints  Restraints Initially in Place: No     Restrictions  Restrictions/Precautions  Restrictions/Precautions: Fall Risk, Contact Precautions  Position Activity Restriction  Other position/activity restrictions: Contact : R/O C-Diff. O2 2L via NC. Diet : Clear Liquid. Flexiseal in place. Subjective   General  Chart Reviewed: Yes  Patient assessed for rehabilitation services?: Yes  Additional Pertinent Hx: 81 y/o female admit 11/14/2023 with Symptomatic Bradycardia, Syncope/Collapse, LYNDA, Sepsis. Cardio following for Pacemaker. GI Consult : Colitis. PMH as noted including : Aortic Valve Stenosis/AVR (3/2017), Spondylolisthesis, Lumbar Compress Fx. Family / Caregiver Present: No  Follows Commands: Within Functional Limits  Subjective  Subjective: Pt agreeable to PT Eval/Rx. Denies any pain.          Social/Functional History  Social/Functional History  Lives With: Family (Grandson.)  Type of Home: [facility]  Home Layout: One level  Home Access: Stairs to enter with rails  Entrance Stairs - Number of Steps: 1 step  Bathroom Shower/Tub: Walk-in shower  Bathroom Toilet: Standard  Bathroom Equipment: Grab bars in shower, Built-in shower seat  Bathroom Accessibility: Accessible  Home Equipment: Manju Members, rolling, Hasmukh Solo, 4 wheeled  Has the patient had two or more falls in the past year or any fall with injury in the past year?: No  ADL Assistance: Independent  Homemaking Assistance: Needs assistance (Grandson assists with homemaking needs.)  Homemaking Responsibilities: Yes  Ambulation Assistance: Independent (Without assist device pta.)  Transfer Assistance: Independent  Active : Yes  Mode of Transportation: Car  Additional Comments: Pt has very supportive family to provide initial 24/7 assist at discharge. Vision/Hearing  Vision  Vision: Within Functional Limits  Hearing  Hearing: Exceptions to [facility]  Hearing Exceptions: Hard of hearing/hearing concerns;Bilateral hearing aid    Cognition   Orientation  Overall Orientation Status: Within Functional Limits  Cognition  Overall Cognitive Status: WFL     Objective           Gross Assessment  AROM: Generally decreased, functional  Strength: Generally decreased, functional                   Bed mobility  Supine to Sit: Stand by assistance (HOB elevated.)  Scooting: Stand by assistance  Transfers  Sit to Stand: Contact guard assistance (With Marnette Hives.)  Stand to Sit: Contact guard assistance (With Marnette Hives.)  Ambulation  Surface: Level tile  Device: Rolling Walker  Distance: Pt amb 20', 40' with Walker CGA. Diminished step length/clearance, slow/guarded although no specific LOB noted. Balance  Sitting - Static: Good  Sitting - Dynamic: Good  Standing - Static: Fair;+               AM-PAC Score  AM-PAC Inpatient Mobility Raw Score : 18 (11/15/23 1217)  AM-PAC Inpatient T-Scale Score : 43.63 (11/15/23 1217)  Mobility Inpatient CMS 0-100% Score: 46.58 (11/15/23 1217)  Mobility Inpatient CMS G-Code Modifier : CK (11/15/23 1217)            Goals  Short Term Goals  Time Frame for Short Term Goals: Upon d/c acute care setting. Short Term Goal 1: Bed Mob Independent. Short Term Goal 2: Transfers with/without assist device Supervision. Short Term Goal 3: Amb with/without assist device 50-75' SBA/Supervision. Patient Goals   Patient Goals : Return home with family assist/support. Education  Patient Education  Education Given To: Patient  Education Provided Comments: Role of PT, POC, Need to call for assist, Safe use of Walker.   Education Method: Verbal;Demonstration  Education Outcome: Verbalized understanding;Continued education needed      Therapy Time   Individual Concurrent Group Co-treatment   Time In 7988         Time Out 0920         Minutes 30

## 2023-11-15 NOTE — PROGRESS NOTES
Clinical Pharmacy Note  Renal Dose Adjustment     Maik Stroud is receiving cipro. This medication is renally eliminated. Based on the patient's Estimated Creatinine Clearance: 11 mL/min (A) (based on SCr of 3 mg/dL (H)). and urine output, the dose has been adjusted to 400mg q24h per protocol. Pharmacy will continue to monitor and adjust dose as needed for changes in renal function.      CAROLYN Soria [facility], 11/15/2023 8:23 AM

## 2023-11-16 LAB
ANION GAP SERPL CALCULATED.3IONS-SCNC: 10 MMOL/L (ref 3–16)
BACTERIA UR CULT: ABNORMAL
BASOPHILS # BLD: 0 K/UL (ref 0–0.2)
BASOPHILS NFR BLD: 0.1 %
BUN SERPL-MCNC: 50 MG/DL (ref 7–20)
CALCIUM SERPL-MCNC: 7.9 MG/DL (ref 8.3–10.6)
CHLORIDE SERPL-SCNC: 96 MMOL/L (ref 99–110)
CO2 SERPL-SCNC: 24 MMOL/L (ref 21–32)
CREAT SERPL-MCNC: 2 MG/DL (ref 0.6–1.2)
CRYPTOSP AG STL QL IA: NORMAL
DEPRECATED RDW RBC AUTO: 14.5 % (ref 12.4–15.4)
E HISTOLYT AG STL QL IA: NORMAL
EOSINOPHIL # BLD: 0 K/UL (ref 0–0.6)
EOSINOPHIL NFR BLD: 0 %
G LAMBLIA AG STL QL IA: NORMAL
GFR SERPLBLD CREATININE-BSD FMLA CKD-EPI: 24 ML/MIN/{1.73_M2}
GI PATHOGENS PNL STL NAA+PROBE: NORMAL
GLUCOSE SERPL-MCNC: 112 MG/DL (ref 70–99)
HCT VFR BLD AUTO: 39.1 % (ref 36–48)
HGB BLD-MCNC: 12.9 G/DL (ref 12–16)
LACTATE BLDV-SCNC: 1.3 MMOL/L (ref 0.4–2)
LACTATE BLDV-SCNC: 2 MMOL/L (ref 0.4–2)
LACTATE BLDV-SCNC: 2 MMOL/L (ref 0.4–2)
LACTATE BLDV-SCNC: 3 MMOL/L (ref 0.4–2)
LYMPHOCYTES # BLD: 1.3 K/UL (ref 1–5.1)
LYMPHOCYTES NFR BLD: 5.4 %
MCH RBC QN AUTO: 27.7 PG (ref 26–34)
MCHC RBC AUTO-ENTMCNC: 33.1 G/DL (ref 31–36)
MCV RBC AUTO: 83.6 FL (ref 80–100)
MONOCYTES # BLD: 1.2 K/UL (ref 0–1.3)
MONOCYTES NFR BLD: 4.7 %
NEUTROPHILS # BLD: 22 K/UL (ref 1.7–7.7)
NEUTROPHILS NFR BLD: 89.8 %
ORGANISM: ABNORMAL
PLATELET # BLD AUTO: 218 K/UL (ref 135–450)
PMV BLD AUTO: 9.7 FL (ref 5–10.5)
POTASSIUM SERPL-SCNC: 4 MMOL/L (ref 3.5–5.1)
RBC # BLD AUTO: 4.68 M/UL (ref 4–5.2)
SODIUM SERPL-SCNC: 130 MMOL/L (ref 136–145)
WBC # BLD AUTO: 24.6 K/UL (ref 4–11)

## 2023-11-16 PROCEDURE — 94761 N-INVAS EAR/PLS OXIMETRY MLT: CPT

## 2023-11-16 PROCEDURE — 85025 COMPLETE CBC W/AUTO DIFF WBC: CPT

## 2023-11-16 PROCEDURE — C9113 INJ PANTOPRAZOLE SODIUM, VIA: HCPCS | Performed by: STUDENT IN AN ORGANIZED HEALTH CARE EDUCATION/TRAINING PROGRAM

## 2023-11-16 PROCEDURE — 6370000000 HC RX 637 (ALT 250 FOR IP): Performed by: STUDENT IN AN ORGANIZED HEALTH CARE EDUCATION/TRAINING PROGRAM

## 2023-11-16 PROCEDURE — 2580000003 HC RX 258: Performed by: STUDENT IN AN ORGANIZED HEALTH CARE EDUCATION/TRAINING PROGRAM

## 2023-11-16 PROCEDURE — 2580000003 HC RX 258: Performed by: INTERNAL MEDICINE

## 2023-11-16 PROCEDURE — 80048 BASIC METABOLIC PNL TOTAL CA: CPT

## 2023-11-16 PROCEDURE — 6360000002 HC RX W HCPCS: Performed by: STUDENT IN AN ORGANIZED HEALTH CARE EDUCATION/TRAINING PROGRAM

## 2023-11-16 PROCEDURE — 2060000000 HC ICU INTERMEDIATE R&B

## 2023-11-16 PROCEDURE — 2500000003 HC RX 250 WO HCPCS: Performed by: INTERNAL MEDICINE

## 2023-11-16 PROCEDURE — 36415 COLL VENOUS BLD VENIPUNCTURE: CPT

## 2023-11-16 PROCEDURE — 83605 ASSAY OF LACTIC ACID: CPT

## 2023-11-16 PROCEDURE — 99233 SBSQ HOSP IP/OBS HIGH 50: CPT | Performed by: INTERNAL MEDICINE

## 2023-11-16 RX ORDER — 0.9 % SODIUM CHLORIDE 0.9 %
500 INTRAVENOUS SOLUTION INTRAVENOUS ONCE
Status: COMPLETED | OUTPATIENT
Start: 2023-11-16 | End: 2023-11-16

## 2023-11-16 RX ADMIN — SODIUM CHLORIDE, PRESERVATIVE FREE 10 ML: 5 INJECTION INTRAVENOUS at 20:43

## 2023-11-16 RX ADMIN — CIPROFLOXACIN 400 MG: 400 INJECTION, SOLUTION INTRAVENOUS at 10:17

## 2023-11-16 RX ADMIN — SODIUM CHLORIDE, PRESERVATIVE FREE 10 ML: 5 INJECTION INTRAVENOUS at 09:09

## 2023-11-16 RX ADMIN — ACETAMINOPHEN 650 MG: 325 TABLET ORAL at 14:20

## 2023-11-16 RX ADMIN — GABAPENTIN 100 MG: 100 CAPSULE ORAL at 14:20

## 2023-11-16 RX ADMIN — PANTOPRAZOLE SODIUM 40 MG: 40 INJECTION, POWDER, FOR SOLUTION INTRAVENOUS at 09:08

## 2023-11-16 RX ADMIN — METRONIDAZOLE 500 MG: 500 INJECTION, SOLUTION INTRAVENOUS at 09:07

## 2023-11-16 RX ADMIN — SODIUM BICARBONATE: 84 INJECTION, SOLUTION INTRAVENOUS at 02:04

## 2023-11-16 RX ADMIN — METRONIDAZOLE 500 MG: 500 INJECTION, SOLUTION INTRAVENOUS at 00:20

## 2023-11-16 RX ADMIN — GABAPENTIN 100 MG: 100 CAPSULE ORAL at 09:08

## 2023-11-16 RX ADMIN — METRONIDAZOLE 500 MG: 500 INJECTION, SOLUTION INTRAVENOUS at 16:23

## 2023-11-16 RX ADMIN — GABAPENTIN 100 MG: 100 CAPSULE ORAL at 20:43

## 2023-11-16 RX ADMIN — ROSUVASTATIN CALCIUM 10 MG: 10 TABLET, FILM COATED ORAL at 09:08

## 2023-11-16 RX ADMIN — SODIUM CHLORIDE 500 ML: 9 INJECTION, SOLUTION INTRAVENOUS at 16:24

## 2023-11-16 ASSESSMENT — PAIN SCALES - GENERAL
PAINLEVEL_OUTOF10: 0

## 2023-11-16 NOTE — PROGRESS NOTES
V2.0  [facility] Hospitalist Progress Note      Name:  Owen Hendricks /Age/Sex: 1935  (80 y.o. female)   MRN & CSN:  [id number] & [id number] Encounter Date/Time: 2023 8:21 AM EST    Location:  [id number]/5106-01 PCP: Erma Vidales MD       Hospital Day: 3    Assessment and Plan: Owen Hendricks is a 80 y.o. female with PMH of severe aortic stenosis s/p SAVR presenting with syncope      Plan:  Severe sepsis (tachycardia, leukocytosis, lactic acidosis) 2/2 colitis; possible UTI  -WBC count jumped from 13.7->35; today dropped to 24.6  -UA shows bacteriuria and pyuria;  -Blood cultures (2023) NGTD; UCx 23 + E. Coli, sensitive to cephalosporins and Cipro  -Stool studies sent  -Day 3 of antibiotics with Cipro Flagyl  -1.5 L NS bolus followed by maintenance  -ID consulted appreciated input  -Downgrade from ICU today  -Rectal tube removed today. GI signing off  Acute kidney injury  -Cr jumped from 1.7 to 3.0; improved to 2.0  - avoiding nephrotoxic meds  -Holding home ACE and diuretics  -IV fluids  -Nephrology consulted; feel LYNDA related to hemodynamic changes and labile blood pressure  Anion gap metabolic acidosis  -Lactate jump to 4.1  -1.5 L bolus followed by maintenance fluids. -Maintenance fluids DC'd by nephro today.   -Lactate normalized then joe again. We will give additional 500 cc bolus  Syncope  -Orthostats negative  -Clonidine dose increased from 0.1 mg TID to 0.3 mg TID 4 days prior to presentation  -Previous history of syncopal events associated with sepsis. -Patient also symptomatic bradycardia in ED  -Patient is certainly at risk for sick sinus syndrome; however given the other potential etiologies, difficult to attribute to this definitively. EP following  Symptomatic bradycardia  Suspected sick sinus syndrome  -Patient's clonidine dosage was increased 11/10/2023 with nephrologist  -Discussed with patient's nephrologist, clonidine discontinued  -EP following.   Tentative plan for PPM

## 2023-11-16 NOTE — PROGRESS NOTES
4 Eyes Skin Assessment     NAME:  Scarlet Chavez  YOB: 1935  MEDICAL RECORD NUMBER:  [id number]    The patient is being assessed for  Transfer to New Unit    I agree that at least one RN has performed a thorough Head to Toe Skin Assessment on the patient. ALL assessment sites listed below have been assessed. Areas assessed by both nurses:    Head, Face, Ears, Shoulders, Back, Chest, Arms, Elbows, Hands, Sacrum. Buttock, Coccyx, Ischium, Legs. Feet and Heels, and Under Medical Devices         Does the Patient have a Wound?  Yes skin tear to LFA        Juve Prevention initiated by RN: Yes  Wound Care Orders initiated by RN: Yes    Pressure Injury (Stage 3,4, Unstageable, DTI, NWPT, and Complex wounds) if present, place Wound referral order by RN under : No    New Ostomies, if present place, Ostomy referral order under : No     Nurse 1 eSignature: Electronically signed by Marquita Becker RN on 11/16/23 at 4:06 PM EST    **SHARE this note so that the co-signing nurse can place an eSignature**    Nurse 2 eSignature: {Esignature:[id number]}

## 2023-11-16 NOTE — PROGRESS NOTES
Will consult ID as to the timing of PPM implantation for sick sinus syndrome (sinus bradycardia, symptomatic). As per my \"curb-side\" consultation, possibly considering PPM implant on Monday, Nov 20. Cm MILLER on Sunday evening (already ordered). Will follow from [address].

## 2023-11-16 NOTE — PROGRESS NOTES
Flexiseal removed per order. Pt downgraded per verbal order by Dr. Karey Noriega. Pt A/O x4, follows commands and responds appropriately. Denies any pain at this time. Denies any needs at this time. No s/s of distress, vss. See flowsheet and eMar for additional documentation.

## 2023-11-16 NOTE — PROGRESS NOTES
[address] [facility]     Nephrology Note         Patient ID: Thien Vasquez  Referring/ Physician: Jessica Luna MD      Summary:   Thien Vasquez is being seen by nephrology for LYNDA. Reason for admission: syncope, bradycardia. Found to have UTI and colitis. Interval Hx:   Seen at bedside. Feeling better. Eating without nausea. No abdominal pain   Lactate has improved to 2.   /58  , has had fevers. 95% on room air    cc + 2.6 L for admission. On bicarb fluids. Assessment/Plan:   - LYNDA improving, UO increased. - lactate trended down. - stop bicarb fluids. Encourage PO  - BP acceptable. LYNDA  Suspect this is from hemodynamic insults, volume depletion. Baseline Cr 1  Cr jumped to 3.1 in a days time. UA 10 hyaline casts  56 wbc 4+ bacteria  CT showed colitis. No fluid overload. Was on RAASi and diuretics prior to admission. These are held. - IV fluids    Lactic acidosis. Improving  Off bicarb fluids now. H/o labile HTN  Outpatient and home BP readings [phone] systolic consistently. Was on clondine lisinopril aldactone and lasix. Not sure if she was taking the amlodipine. - holding BP meds. Bradycardia  Per EP/Cardiology     UTI and colitis  On ABX          [facility] would like to thank you for the opportunity to serve this patient. Please call with any questions or concerns. Marcia Menard MD  [facility]  [address] [facility], [address] [place] [address] Avenue  Fax: [phone]  Office: [phone]    HPI  Thien Vasquez is a 80 y.o. female  with  has a past medical history of Aortic stenosis, Cerebrovascular disease, Class 1 obesity, Hepatic steatosis, Hiatal hernia, Hyperlipidemia, Hypertension, and Osteoarthritis. who presented with syncope. Patient had episode of syncope during outpatient visit prior to hospitalization. She had a heart rate reported to be in the 30s at 1 point.   She was just seen in my office on Friday of last week and her blood pressure was very high at 271 systolic so I increased her clonidine. She is also been having diarrhea but denies any dysuria hematuria foamy frothy urine. She has not been taking NSAIDs. She has no chest pain or shortness of breath. She has been having some nausea and vomiting and some abdominal pain. She was admitted to the ICU for monitoring. Found to have UTI and colitis. She is on antibiotics. Nephrology is consulted for acute kidney injury. Review of Systems:   As above. PMH/SH/FH:    Medical Hx: reviewed and updated as appropriate  Past Medical History:   Diagnosis Date    Aortic stenosis     Cerebrovascular disease     Class 1 obesity     Hepatic steatosis     Hiatal hernia     Hyperlipidemia     Hypertension     Osteoarthritis          Surgical Hx: reviewed and updated as appropriate   has a past surgical history that includes pre-malignant / benign skin lesion excision (Right, 2014); Aortic valve replacement (03/09/2017); Cataract removal (Bilateral, 01/2019); blepharoplasty (Bilateral, 6/7/2021); and Upper gastrointestinal endoscopy (N/A, 5/24/2023). Social Hx: reviewed and updated as appropriate  Social History     Tobacco Use    Smoking status: Never    Smokeless tobacco: Never   Substance Use Topics    Alcohol use: Yes     Comment: socially once/week        Family hx: reviewed and updated as appropriate  family history includes Cancer in her mother; Diabetes in her mother; Kidney Disease in her brother; Other in her father; Parkinsonism in her brother. Medications:   metroNIDAZOLE, 500 mg, Q8H  ciprofloxacin, 400 mg, Q24H  gabapentin, 100 mg, TID  rosuvastatin, 10 mg, Daily  sodium chloride flush, 5-40 mL, 2 times per day  pantoprazole, 40 mg, Daily       Adhesive tape and Oxycontin [oxycodone hcl]    Allergies:    Allergies   Allergen Reactions    Adhesive Tape Itching and Dermatitis    Oxycontin [Oxycodone Hcl] Other (See Comments)     \"seeing things\"

## 2023-11-16 NOTE — PROGRESS NOTES
INPATIENT CONSULTATION:    IDENTIFYING DATA/REASON FOR CONSULTATION   PATIENT:  Wendy Gagnon  MRN:  [id number]  ADMIT DATE: 11/14/2023  TIME OF EVALUATION: 11/16/2023 8:20 AM  HOSPITAL STAY:   LOS: 2 days   CONSULTING PHYSICIAN: Laura Jimenez MD   REASON FOR CONSULTATION: Diarrhea    Subjective:    Patient seen and examined in follow-up. Patient reports she is feeling better. She reports resolution of diarrhea, and cramping pain has resolved. Infectious stool studies negative. MEDICATIONS   SCHEDULED:  metroNIDAZOLE, 500 mg, Q8H  ciprofloxacin, 400 mg, Q24H  gabapentin, 100 mg, TID  rosuvastatin, 10 mg, Daily  sodium chloride flush, 5-40 mL, 2 times per day  pantoprazole, 40 mg, Daily      FLUIDS/DRIPS:     sodium bicarbonate 150 mEq in dextrose 5 % 1,000 mL infusion 100 mL/hr at 11/16/23 0308    sodium chloride       PRNs: perflutren lipid microspheres, 1.5 mL, ONCE PRN  sodium chloride flush, 5-40 mL, PRN  sodium chloride, , PRN  ondansetron, 4 mg, Q8H PRN   Or  ondansetron, 4 mg, Q6H PRN  polyethylene glycol, 17 g, Daily PRN  acetaminophen, 650 mg, Q6H PRN   Or  acetaminophen, 650 mg, Q6H PRN  hydrALAZINE, 5 mg, Q4H PRN  HYDROmorphone, 0.25 mg, Q4H PRN      ALLERGIES:    Allergies   Allergen Reactions    Adhesive Tape Itching and Dermatitis    Oxycontin [Oxycodone Hcl] Other (See Comments)     \"seeing things\"         PHYSICAL EXAM     Vitals:    11/16/23 0500 11/16/23 0600 11/16/23 0700 11/16/23 0703   BP: (!) 141/68 (!) 126/57 (!) 134/50    Pulse: (!) 104 (!) 104 (!) 105    Resp: 21 17 19    Temp:       TempSrc:       SpO2: 93% 95% 96%    Weight:    66.3 kg (146 lb 2.6 oz)   Height:           I/O last 3 completed shifts: In: 3811.9 [I.V.:1671.2; IV Piggyback:2140.7]  Out: 2681 [Urine:935; Stool:200]    Physical Exam:  Gen: Resting in bed, NAD   HEENT: normocephalic, atraumatic. No scleral icterus.    CV: Tachycardic but regular no MRG   Pul: CTAB, no wheezing  Abd: Good bowel sounds throughout, soft, NT/ND, PM   Result Value Ref Range    Color, UA PITA (A) Straw/Yellow    Clarity, UA TURBID (A) Clear    Glucose, Ur Negative Negative mg/dL    Bilirubin Urine SMALL (A) Negative    Ketones, Urine Negative Negative mg/dL    Specific Gravity, UA 1.017 1.005 - 1.030    Blood, Urine TRACE (A) Negative    pH, UA 5.0 5.0 - 8.0    Protein, UA 30 (A) Negative mg/dL    Urobilinogen, Urine 1.0 <2.0 E.U./dL    Nitrite, Urine Negative Negative    Leukocyte Esterase, Urine LARGE (A) Negative    Microscopic Examination YES     Urine Type Other     Bacteria, UA None Seen None Seen /HPF    Hyaline Casts, UA 36 (H) 0 - 8 /LPF    WBC,  (H) 0 - 5 /HPF    RBC, UA 1 0 - 4 /HPF    Epithelial Cells, UA 4 0 - 5 /HPF   Protein / Creatinine Ratio, Urine    Collection Time: 11/15/23  2:59 PM   Result Value Ref Range    Protein, Ur 55.00 (H) <12 mg/dL    Creatinine, Ur 96.2 28.0 - 259.0 mg/dL    Protein/Creat Ratio 0.6 mg/dL   Lactic Acid    Collection Time: 11/15/23  7:01 PM   Result Value Ref Range    Lactic Acid 3.6 (H) 0.4 - 2.0 mmol/L   Hepatic Function Panel    Collection Time: 11/15/23  7:01 PM   Result Value Ref Range    Total Protein 5.9 (L) 6.4 - 8.2 g/dL    Albumin 3.1 (L) 3.4 - 5.0 g/dL    Alkaline Phosphatase 47 40 - 129 U/L    ALT 9 (L) 10 - 40 U/L    AST 13 (L) 15 - 37 U/L    Total Bilirubin <0.2 0.0 - 1.0 mg/dL    Bilirubin, Direct <0.2 0.0 - 0.3 mg/dL    Bilirubin, Indirect see below 0.0 - 1.0 mg/dL   Lactic Acid    Collection Time: 11/16/23 12:07 AM   Result Value Ref Range    Lactic Acid 2.0 0.4 - 2.0 mmol/L     Other Labs    Imaging  CT ABDOMEN PELVIS WO CONTRAST Additional Contrast? Radiologist Recommendation   Final Result   1. Diffuse mucosal thickening of the colon favoring colitis. Infectious or   inflammatory etiologies may be considered. 2. Distended stomach with hiatal hernia and reflux of fluid into the distal   visualized esophagus. 3. Dense peripheral airspace changes in the visualized lungs.   Edema or atelectasis suspected. Correlate with any acute pulmonary symptoms. XR CHEST PORTABLE   Final Result   No change from prior examination. No active cardiopulmonary disease. ASSESSMENT AND RECOMMENDATIONS   Angel Bartholomew is a 80-year-old female with past medical history of hypertension, hyperlipidemia, CKD, aortic stenosis with history of aortic valve replacement who presented to [facility] [place] on 11- with syncope, attributed to symptomatic bradycardia and sick sinus syndrome. Acute diarrhea: Suspect viral versus bacterial enterocolitis. C. difficile negative and infectious stool studies negative. Diarrhea resolved. Continue IV fluid support and resuscitation. Monitor electrolytes. Patient started on Cipro and Flagyl, okay to continue. Symptomatic bradycardia: Cardiology and EP following, with concern for sick sinus syndrome. Plan for pacemaker placement. Sepsis 2/2 E. coli UTI: Continue antibiotics. Will defer management to primary team.  History of aortic stenosis s/p SAVR    RECOMMENDATIONS:    -Okay to continue Cipro and Flagyl for 3-5 days duration  -Remove Flexi-Seal  -Continue supportive care    Thank you for allowing me to participate in this patient's care. No further GI work-up needed at this time. We will sign off, however please contact us with any additional questions at [phone]. Lincoln Avalos.  [address]

## 2023-11-16 NOTE — PROGRESS NOTES
Report called to [address] [place] [address] on 5W, no questions at this time. Family at bedside aware of transfer

## 2023-11-16 NOTE — PROGRESS NOTES
Infectious Diseases Inpatient Progress Note      CHIEF COMPLAINT:     Syncope  UTI  Lynda  Wbc ELEVATION  Diarrhea  E coli infection      HISTORY OF PRESENT ILLNESS:  80 y.o.  WOMAN with h/o avr, Recurrent UTI,htn, CVA history,admitted with syncopal episodes from Audiologist office, pt  passed out x 3 times and once while on the toilet, EMS was called, her HR was in 40'S on admit with hypotension 70, and now in CVICU for monitoring. She had SAVR in 2017. She has h/o Bacteremia E coli in April when she had UTI, she had one episode of emesis and some abd pain her UA is abnormal on admit and Blood cx in process, she is in LYNDA with WBC elevation and we are consulted for recommendations. Interval History: Remains in ICU and diarrhea has resolved and Mooney in place and WBC still high creat some improvement d/w      Past Medical History:    Past Medical History:   Diagnosis Date    Aortic stenosis     Cerebrovascular disease     Class 1 obesity     Hepatic steatosis     Hiatal hernia     Hyperlipidemia     Hypertension     Osteoarthritis        Past Surgical History:    Past Surgical History:   Procedure Laterality Date    AORTIC VALVE REPLACEMENT  03/09/2017    19mm [place] pericardial Magna Ease    BLEPHAROPLASTY Bilateral 6/7/2021    BILATERAL UPPER LIDS BLEPHAROPLASTY performed by Boubacar Perales MD at [address] Bilateral 01/2019    PRE-MALIGNANT / BENIGN SKIN LESION EXCISION Right 2014    R scalp. cystadenoma. --done under local    UPPER GASTROINTESTINAL ENDOSCOPY N/A 5/24/2023    EGD DILATION BALLOON TO 18mm performed by Caesar Richter MD at [address]       Current Medications:    Outpatient Medications Marked as Taking for the 11/14/23 encounter [facility] Encounter)   Medication Sig Dispense Refill    cloNIDine (CATAPRES) 0.1 MG tablet Take 1 tablet by mouth in the morning, at noon, and at bedtime         Allergies:  Adhesive tape and Oxycontin [oxycodone like her counts improve and clinically better for PPM placement may be ready by Monday d/w [address] [facility], Microbiology, Radiology and pertinent results from current hospitalization and care every where were reviewed by me as a part of the consultation. PLAN :  Cont IV Cipro x  400 mg Q 24 h  Cont IV Flagyl for anaerobes given Colitis on the ct  Check urine cx - E coli noted   Blood cx -ve  Lactic acid  trend down       Procal  21.72 repeat tomorrow   C dif testing negative  E coli is sensitive  Once WBC better and clinically improved may be ok for PPM placement on Monday      Discussed with patient/Family and Nursing     Medical Decision Making: The following items were considered in medical decision making:  Discussion of patient care with other providers  Reviewed clinical lab tests  Reviewed radiology tests  Reviewed other diagnostic tests/interventions  Independent review of radiologic images  Independent review of  Microbiology cultures and other micro tests reviewed     Risk of Complications/Morbidity: High      Illness(es)/ Infection present that pose threat to bodily function. There is potential for severe exacerbation of infection/side effects of treatment. Therapy requires intensive monitoring for antimicrobial agent toxicity. Thanks for allowing me to participate in your patient's care please call me with any questions or concerns.     Dr. Masha Santiago MD  [address] [facility] Physician  Phone: [phone]   Fax : [phone]

## 2023-11-17 LAB
ANION GAP SERPL CALCULATED.3IONS-SCNC: 9 MMOL/L (ref 3–16)
BASOPHILS # BLD: 0.1 K/UL (ref 0–0.2)
BASOPHILS NFR BLD: 0.3 %
BUN SERPL-MCNC: 33 MG/DL (ref 7–20)
CALCIUM SERPL-MCNC: 8.1 MG/DL (ref 8.3–10.6)
CHLORIDE SERPL-SCNC: 100 MMOL/L (ref 99–110)
CO2 SERPL-SCNC: 28 MMOL/L (ref 21–32)
CREAT SERPL-MCNC: 1.2 MG/DL (ref 0.6–1.2)
DEPRECATED RDW RBC AUTO: 14.8 % (ref 12.4–15.4)
EOSINOPHIL # BLD: 0.1 K/UL (ref 0–0.6)
EOSINOPHIL NFR BLD: 0.5 %
GFR SERPLBLD CREATININE-BSD FMLA CKD-EPI: 43 ML/MIN/{1.73_M2}
GLUCOSE SERPL-MCNC: 107 MG/DL (ref 70–99)
HCT VFR BLD AUTO: 36.1 % (ref 36–48)
HGB BLD-MCNC: 12.2 G/DL (ref 12–16)
LACTATE BLDV-SCNC: 1 MMOL/L (ref 0.4–2)
LACTATE BLDV-SCNC: 1.7 MMOL/L (ref 0.4–2)
LYMPHOCYTES # BLD: 1.1 K/UL (ref 1–5.1)
LYMPHOCYTES NFR BLD: 5.5 %
MCH RBC QN AUTO: 28 PG (ref 26–34)
MCHC RBC AUTO-ENTMCNC: 33.7 G/DL (ref 31–36)
MCV RBC AUTO: 82.9 FL (ref 80–100)
MONOCYTES # BLD: 0.7 K/UL (ref 0–1.3)
MONOCYTES NFR BLD: 3.5 %
NEUTROPHILS # BLD: 18.6 K/UL (ref 1.7–7.7)
NEUTROPHILS NFR BLD: 90.2 %
PLATELET # BLD AUTO: 181 K/UL (ref 135–450)
PMV BLD AUTO: 9.5 FL (ref 5–10.5)
POTASSIUM SERPL-SCNC: 3.7 MMOL/L (ref 3.5–5.1)
RBC # BLD AUTO: 4.35 M/UL (ref 4–5.2)
SODIUM SERPL-SCNC: 137 MMOL/L (ref 136–145)
WBC # BLD AUTO: 20.6 K/UL (ref 4–11)

## 2023-11-17 PROCEDURE — 97530 THERAPEUTIC ACTIVITIES: CPT | Performed by: PHYSICAL THERAPIST

## 2023-11-17 PROCEDURE — 6360000002 HC RX W HCPCS: Performed by: INTERNAL MEDICINE

## 2023-11-17 PROCEDURE — 83605 ASSAY OF LACTIC ACID: CPT

## 2023-11-17 PROCEDURE — 99233 SBSQ HOSP IP/OBS HIGH 50: CPT | Performed by: INTERNAL MEDICINE

## 2023-11-17 PROCEDURE — 80048 BASIC METABOLIC PNL TOTAL CA: CPT

## 2023-11-17 PROCEDURE — 6360000002 HC RX W HCPCS: Performed by: STUDENT IN AN ORGANIZED HEALTH CARE EDUCATION/TRAINING PROGRAM

## 2023-11-17 PROCEDURE — 97116 GAIT TRAINING THERAPY: CPT | Performed by: PHYSICAL THERAPIST

## 2023-11-17 PROCEDURE — 85025 COMPLETE CBC W/AUTO DIFF WBC: CPT

## 2023-11-17 PROCEDURE — 94760 N-INVAS EAR/PLS OXIMETRY 1: CPT

## 2023-11-17 PROCEDURE — 36415 COLL VENOUS BLD VENIPUNCTURE: CPT

## 2023-11-17 PROCEDURE — 97535 SELF CARE MNGMENT TRAINING: CPT

## 2023-11-17 PROCEDURE — C9113 INJ PANTOPRAZOLE SODIUM, VIA: HCPCS | Performed by: STUDENT IN AN ORGANIZED HEALTH CARE EDUCATION/TRAINING PROGRAM

## 2023-11-17 PROCEDURE — 6370000000 HC RX 637 (ALT 250 FOR IP): Performed by: STUDENT IN AN ORGANIZED HEALTH CARE EDUCATION/TRAINING PROGRAM

## 2023-11-17 PROCEDURE — 2580000003 HC RX 258: Performed by: STUDENT IN AN ORGANIZED HEALTH CARE EDUCATION/TRAINING PROGRAM

## 2023-11-17 PROCEDURE — 2060000000 HC ICU INTERMEDIATE R&B

## 2023-11-17 PROCEDURE — 97530 THERAPEUTIC ACTIVITIES: CPT

## 2023-11-17 RX ORDER — CIPROFLOXACIN 2 MG/ML
400 INJECTION, SOLUTION INTRAVENOUS EVERY 12 HOURS
Status: DISCONTINUED | OUTPATIENT
Start: 2023-11-17 | End: 2023-11-20

## 2023-11-17 RX ORDER — AMLODIPINE BESYLATE 5 MG/1
5 TABLET ORAL DAILY
Qty: 30 TABLET | Refills: 3 | Status: SHIPPED | OUTPATIENT
Start: 2023-11-17 | End: 2023-11-25 | Stop reason: SDUPTHER

## 2023-11-17 RX ORDER — METRONIDAZOLE 500 MG/1
500 TABLET ORAL EVERY 8 HOURS SCHEDULED
Status: DISCONTINUED | OUTPATIENT
Start: 2023-11-18 | End: 2023-11-25 | Stop reason: HOSPADM

## 2023-11-17 RX ADMIN — SODIUM CHLORIDE, PRESERVATIVE FREE 10 ML: 5 INJECTION INTRAVENOUS at 21:07

## 2023-11-17 RX ADMIN — ROSUVASTATIN CALCIUM 10 MG: 10 TABLET, FILM COATED ORAL at 10:07

## 2023-11-17 RX ADMIN — CIPROFLOXACIN 400 MG: 2 INJECTION, SOLUTION INTRAVENOUS at 10:06

## 2023-11-17 RX ADMIN — SODIUM CHLORIDE, PRESERVATIVE FREE 10 ML: 5 INJECTION INTRAVENOUS at 10:07

## 2023-11-17 RX ADMIN — METRONIDAZOLE 500 MG: 500 INJECTION, SOLUTION INTRAVENOUS at 10:07

## 2023-11-17 RX ADMIN — CIPROFLOXACIN 400 MG: 2 INJECTION, SOLUTION INTRAVENOUS at 20:55

## 2023-11-17 RX ADMIN — SODIUM CHLORIDE: 9 INJECTION, SOLUTION INTRAVENOUS at 00:27

## 2023-11-17 RX ADMIN — METRONIDAZOLE 500 MG: 500 INJECTION, SOLUTION INTRAVENOUS at 17:46

## 2023-11-17 RX ADMIN — METRONIDAZOLE 500 MG: 500 INJECTION, SOLUTION INTRAVENOUS at 00:27

## 2023-11-17 RX ADMIN — PANTOPRAZOLE SODIUM 40 MG: 40 INJECTION, POWDER, FOR SOLUTION INTRAVENOUS at 10:07

## 2023-11-17 RX ADMIN — GABAPENTIN 100 MG: 100 CAPSULE ORAL at 10:07

## 2023-11-17 RX ADMIN — GABAPENTIN 100 MG: 100 CAPSULE ORAL at 15:09

## 2023-11-17 RX ADMIN — GABAPENTIN 100 MG: 100 CAPSULE ORAL at 20:38

## 2023-11-17 ASSESSMENT — PAIN SCALES - GENERAL: PAINLEVEL_OUTOF10: 0

## 2023-11-17 NOTE — PROGRESS NOTES
Occupational Therapy  Facility/Department: [address] PROGRESSIVE CARE  Occupational Therapy Daily Treatment    Name: Richmond Oconnell  : 1935  MRN: [id number]  Date of Service: 2023    Discharge Recommendations:  Home with assist PRN  Richmond Oconnell scored a  on the [facility] ADL Inpatient form. At this time, no further OT is recommended upon discharge due to no needs. Recommend patient returns to prior setting with prior services. Patient Diagnosis(es): The primary encounter diagnosis was Syncope and collapse. A diagnosis of Symptomatic bradycardia was also pertinent to this visit. Past Medical History:  has a past medical history of Aortic stenosis, Cerebrovascular disease, Class 1 obesity, Hepatic steatosis, Hiatal hernia, Hyperlipidemia, Hypertension, and Osteoarthritis. Past Surgical History:  has a past surgical history that includes pre-malignant / benign skin lesion excision (Right, ); Aortic valve replacement (2017); Cataract removal (Bilateral, 2019); blepharoplasty (Bilateral, 2021); and Upper gastrointestinal endoscopy (N/A, 2023). Assessment   Performance deficits / Impairments: Decreased functional mobility ; Decreased endurance;Decreased strength;Decreased ADL status; Decreased balance;Decreased high-level IADLs  Assessment: PTA pt from home where pt was Ind with mobility and ADLs. Today- pt completed bed mobility w/ SBA. She attempted fxl mobility w/ and w/o RW, but was unsteady w/o RW. She required CGA-Min A w/o RW, and SBA-CGA w/ RW. Pt able to complete all fxl transfers w/ SBA-CGA, LB dressing w/ SBA, and feeding w/ Mod I. Pt has good family support at home and nearby. Pt will continue to benefit from being seen on acute to address her deficits and maximize her level of Ind. Pt is anticipated to return home w/ assist PRN, continue to assess for further OT needs.   Prognosis: Good  Exam: see above  Assistance / Modification: RW  REQUIRES OT FOLLOW-UP: Yes  Activity Tolerance  Activity Tolerance: Patient Tolerated treatment well  Activity Tolerance Comments: With amb and pt on RA. HR ranging from 115-122 and SPO2 ranging from low to mid 90s. Pt did not reported any SOB or dizziness        Plan   Occupational Therapy Plan  Times Per Week: 3-5x  Current Treatment Recommendations: Strengthening, Balance training, Functional mobility training, Endurance training, Patient/Caregiver education & training, Safety education & training, Self-Care / ADL     Restrictions  Restrictions/Precautions  Restrictions/Precautions: Fall Risk  Position Activity Restriction  Other position/activity restrictions: clay catheter    Subjective   General  Chart Reviewed: Yes  Patient assessed for rehabilitation services?: Yes  Additional Pertinent Hx: per ED note, Sneha Medley is a 80 y.o. female with PMH incl HTN, HLD, aortic stenosis s/p aortic valve replacement (bovine, 3/9/17, Dr. Meagan Reed) who presents to the ED for evaluation after multiple syncopal episodes. Patient was at an audiologist appointment when she had two syncopal episodes. She did have prodromal symptoms of feeling lightheaded prior to passing out. This did not occur with postural change. EMS was summoned and the patient was noted to be pale and diaphoretic. She had a third syncopal episode witnessed by EMS. She was administered 4 mg of Zofran prior to arrival with resolution of her nausea thereafter. She denies chest pain, shortness of breath, or leg swelling. Family / Caregiver Present: Yes (multiple family members)  Referring Practitioner: Zoila Null MD  Subjective  Subjective: Pt resting in bed, reported no pain, agreeable to OT/PT cotx  General Comment  Comments: okay for therapy per RN.      Social/Functional History  Social/Functional History  Lives With: Family (Grandson.)  Type of Home: Condo  Home Layout: One level  Home Access: Stairs to enter with rails  Entrance Stairs - Number of Steps: 1 step  Bathroom Shower/Tub: Walk-in shower  Bathroom Toilet: Standard  Bathroom Equipment: Grab bars in shower, Built-in shower seat  Bathroom Accessibility: Accessible  Home Equipment: Marimar Gee, [address], [address], 4 wheeled  Has the patient had two or more falls in the past year or any fall with injury in the past year?: No  ADL Assistance: [address] Needs assistance (Shantel assists with homemaking needs.)  Homemaking Responsibilities: Yes  Ambulation Assistance: Independent (Without assist device pta.)  Transfer Assistance: Independent  Active : Yes  Mode of Transportation: Car  Additional Comments: Pt has very supportive family to provide initial 24/7 assist at discharge. Objective   Safety Devices  Type of Devices: Call light within reach;Gait belt;Left in chair;Nurse notified  Restraints  Restraints Initially in Place: No           ADL  LE Dressing: Stand by assistance  LE Dressing Skilled Clinical Factors: Pt donned socks while seated EOB, able to cross BLE to complete task  Toileting: Dependent/Total  Toileting Skilled Clinical Factors: Mooney     Activity Tolerance  Activity Tolerance: Patient tolerated treatment well  Bed mobility  Supine to Sit: Stand by assistance  Bed Mobility Comments: from flat bed  Transfers  Sit to stand: Stand by assistance;Contact guard assistance  Stand to sit: Stand by assistance;Contact guard assistance  Transfer Comments: No device: EOB to foot of bed, CGA-Min A needed w/ x1 slight LOB. Pt then use RW to amb out into hallway and to recliner w/ SBA-CGA w/ improved balance.      Cognition  Overall Cognitive Status: WFL  Orientation  Overall Orientation Status: Within Functional Limits                  Education Given To: Patient  Education Provided: Role of Therapy;Plan of Care;Transfer Training  Education Method: Demonstration;Verbal  Barriers to Learning: None  Education Outcome: Verbalized understanding;Demonstrated understanding

## 2023-11-17 NOTE — PROGRESS NOTES
Physical Therapy  Facility/Department: [facility] PROGRESSIVE CARE  Physical Therapy Treatment Note    Name: Dionna Flores  : 1935  MRN: [id number]  Date of Service: 2023    Discharge Recommendations:  24 hour supervision or assist, Home with Home health PT (initial  assist)   PT Equipment Recommendations  Equipment Needed: No      Dionna Flores scored a 19/24 on the AM-PAC short mobility form. Current research shows that an AM-PAC score of 18 or greater is typically associated with a discharge to the patient's home setting. Based on the patient's AM-PAC score and their current functional mobility deficits, it is recommended that the patient have 2-3 sessions per week of Physical Therapy at d/c to increase the patient's independence. At this time, this patient demonstrates the endurance and safety to discharge home with Home PT and a follow up treatment frequency of 2-3x/wk. Please see assessment section for further patient specific details. If patient discharges prior to next session this note will serve as a discharge summary. Please see below for the latest assessment towards goals. Patient Diagnosis(es): The primary encounter diagnosis was Syncope and collapse. A diagnosis of Symptomatic bradycardia was also pertinent to this visit. Past Medical History:  has a past medical history of Aortic stenosis, Cerebrovascular disease, Class 1 obesity, Hepatic steatosis, Hiatal hernia, Hyperlipidemia, Hypertension, and Osteoarthritis. Past Surgical History:  has a past surgical history that includes pre-malignant / benign skin lesion excision (Right, ); Aortic valve replacement (2017); Cataract removal (Bilateral, 2019); blepharoplasty (Bilateral, 2021); and Upper gastrointestinal endoscopy (N/A, 2023). Assessment   Body Structures, Functions, Activity Limitations Requiring Skilled Therapeutic Intervention: Decreased functional mobility ; Decreased strength;Decreased Ambulation?: Yes  Ambulation 2  Surface - 2: level tile  Device 2: Rolling Walker  Assistance 2: Contact guard assistance;Stand by assistance  Quality of Gait 2: more steady with RW; heart rate maintained between 115-122 BPM and spO2 remained in low 90s throughout on RA  Distance: 100'  Comments: pt positioned in chair for comfort with all needs in reach and LEs elevated     Balance  Sitting - Static: Good  Sitting - Dynamic: Good  Standing - Static: Fair;+ (with walker)  Standing - Dynamic: Fair;+ (with walker)           OutComes Score                                                  AM-PAC Score  AM-PAC Inpatient Mobility Raw Score : 19 (11/17/23 1436)  AM-PAC Inpatient T-Scale Score : 45.44 (11/17/23 1436)  Mobility Inpatient CMS 0-100% Score: 41.77 (11/17/23 1436)  Mobility Inpatient CMS G-Code Modifier : CK (11/17/23 1436)          Tinneti Score       Goals  Short Term Goals  Time Frame for Short Term Goals: Upon d/c acute care setting. Short Term Goal 1: Bed Mob Independent. Short Term Goal 2: Transfers with/without assist device Supervision. Short Term Goal 3: Amb with/without assist device 50-75' SBA/Supervision. Patient Goals   Patient Goals : Return home with family assist/support.        Education  Patient Education  Education Given To: Patient  Education Provided Comments: reviewed need to use a RW when she first returns home and inital family assist  Education Method: Verbal;Demonstration  Education Outcome: Verbalized understanding      Therapy Time   Individual Concurrent Group Co-treatment   Time In 1400         Time Out 1438         Minutes 38                 RIVER CAMPOS PT     Electronically signed by RIVER CAMPOS PT on 11/17/2023 at 2:38 PM

## 2023-11-17 NOTE — PROGRESS NOTES
[address] [facility]     Nephrology Note         Patient ID: Jennifer Leggett  Referring/ Physician: Mounika Momin MD      Summary:   Jennifer Leggett is being seen by nephrology for LYNDA. Reason for admission: syncope, bradycardia. Found to have UTI and colitis. Interval Hx:   Seen at bedside. Out of ICU  She still is wary of doing PPM placement . No diarrhea, no abdominal pain. /63  98% on room air. Urine + E coli but blood cxs negative. Had colitiis on imaging     CR back to 1.2     Assessment/Plan:   - LYNDA improved  - at discharge, stay off clonidine. Resume lisinopril 40 and amlodipine 5.   - should stay off aldactone and las\ix , no potassium supplement. [facility] with discharge from my perspective. LYNDA  Suspect this is from hemodynamic insults, volume depletion. Baseline Cr 1  Cr jumped to 3.1 in a days time. UA 10 hyaline casts  56 wbc 4+ bacteria  CT showed colitis. No fluid overload. Was on RAASi and diuretics prior to admission. These are held. - IV fluids    Lactic acidosis. Improving  Off bicarb fluids now. H/o labile HTN  Outpatient and home BP readings 932-195 systolic consistently. Was on clondine lisinopril aldactone and lasix. Not sure if she was taking the amlodipine. - holding BP meds. Bradycardia  Per EP/Cardiology     UTI and colitis  On ABX          [facility] would like to thank you for the opportunity to serve this patient. Please call with any questions or concerns. Juan M Carlos MD  [facility]  [address] [facility] [address]  Fax: [phone]  Office: [phone]    HPI  Jennifer Leggett is a 80 y.o. female  with  has a past medical history of Aortic stenosis, Cerebrovascular disease, Class 1 obesity, Hepatic steatosis, Hiatal hernia, Hyperlipidemia, Hypertension, and Osteoarthritis. who presented with syncope. Patient had episode of syncope during outpatient visit prior to hospitalization.   She had a heart rate reported to be in the 30s at 1 point. She was just seen in my office on Friday of last week and her blood pressure was very high at 612 systolic so I increased her clonidine. She is also been having diarrhea but denies any dysuria hematuria foamy frothy urine. She has not been taking NSAIDs. She has no chest pain or shortness of breath. She has been having some nausea and vomiting and some abdominal pain. She was admitted to the ICU for monitoring. Found to have UTI and colitis. She is on antibiotics. Nephrology is consulted for acute kidney injury. Review of Systems:   As above. PMH/SH/FH:    Medical Hx: reviewed and updated as appropriate  Past Medical History:   Diagnosis Date    Aortic stenosis     Cerebrovascular disease     Class 1 obesity     Hepatic steatosis     Hiatal hernia     Hyperlipidemia     Hypertension     Osteoarthritis          Surgical Hx: reviewed and updated as appropriate   has a past surgical history that includes pre-malignant / benign skin lesion excision (Right, 2014); Aortic valve replacement (03/09/2017); Cataract removal (Bilateral, 01/2019); blepharoplasty (Bilateral, 6/7/2021); and Upper gastrointestinal endoscopy (N/A, 5/24/2023). Social Hx: reviewed and updated as appropriate  Social History     Tobacco Use    Smoking status: Never    Smokeless tobacco: Never   Substance Use Topics    Alcohol use: Yes     Comment: socially once/week        Family hx: reviewed and updated as appropriate  family history includes Cancer in her mother; Diabetes in her mother; Kidney Disease in her brother; Other in her father; Parkinsonism in her brother. Medications:   ciprofloxacin, 400 mg, Q12H  metroNIDAZOLE, 500 mg, Q8H  gabapentin, 100 mg, TID  rosuvastatin, 10 mg, Daily  sodium chloride flush, 5-40 mL, 2 times per day  pantoprazole, 40 mg, Daily       Adhesive tape and Oxycontin [oxycodone hcl]    Allergies:    Allergies   Allergen Reactions    Adhesive Tape Itching

## 2023-11-17 NOTE — CARE COORDINATION
Discharge Planning:  Pt is from home with her grandson. Family lives close and can assist if needed. Pt was independent with self care PTA. ID is following. Per ID MD and EP, pt may be medically stable for a PPM on Monday. SW will continue to follow to assist with d/c planning needs.    KARLEE Franks  [phone]  Electronically signed by KARLEE Nj on 11/17/2023 at 10:09 AM

## 2023-11-17 NOTE — PROGRESS NOTES
Received a request from family member of this patient that she wishes to receive a 2nd opinion in regards to the need for PPM implantation and that Pradeep Day should be taken off of the EP schedule for PPM implantation. The patient has been taken off the schedule for Monday. Please reconsult EP service prn.

## 2023-11-17 NOTE — PROGRESS NOTES
Pham Hyman was called in regards to EP testing that was ordered an potential Pacemaker placement on Monday. Family and Pt are questioning the reason for pacemaker and feel as though they have been told many different things by many different people and needed clarification. Dr. Sarah Archuleta called back and spoke with Pt and family in room about pacemaker and at this time, Pt and family are asking for second opinion and refusing pacemaker placement for Monday. Pham Hyman was called again and relayed message to assistant that second opinion was requested and MD was notified.

## 2023-11-17 NOTE — PROGRESS NOTES
Infectious Diseases Inpatient Progress Note      CHIEF COMPLAINT:     Syncope  UTI  Lynda  Wbc ELEVATION  Diarrhea  E coli infection      HISTORY OF PRESENT ILLNESS:  80 y.o.  WOMAN with h/o avr, Recurrent UTI,htn, CVA history,admitted with syncopal episodes from Audiologist office, pt  passed out x 3 times and once while on the toilet, EMS was called, her HR was in 40'S on admit with hypotension 70, and now in CVICU for monitoring. She had SAVR in 2017. She has h/o Bacteremia E coli in April when she had UTI, she had one episode of emesis and some abd pain her UA is abnormal on admit and Blood cx in process, she is in LYNDA with WBC elevation and we are consulted for recommendations. Interval History:  Tx to PCU , NO syncope no diarrhea tolerating IV abx ok family at bed side      Past Medical History:    Past Medical History:   Diagnosis Date    Aortic stenosis     Cerebrovascular disease     Class 1 obesity     Hepatic steatosis     Hiatal hernia     Hyperlipidemia     Hypertension     Osteoarthritis        Past Surgical History:    Past Surgical History:   Procedure Laterality Date    AORTIC VALVE REPLACEMENT  03/09/2017    19mm [place] pericardial Magna Ease    BLEPHAROPLASTY Bilateral 6/7/2021    BILATERAL UPPER LIDS BLEPHAROPLASTY performed by Miguel Angel Mane MD at [address] Bilateral 01/2019    PRE-MALIGNANT / BENIGN SKIN LESION EXCISION Right 2014    R scalp. cystadenoma. --done under local    UPPER GASTROINTESTINAL ENDOSCOPY N/A 5/24/2023    EGD DILATION BALLOON TO 18mm [facility]       Current Medications:    Outpatient Medications Marked as Taking for the 11/14/23 encounter [facility] Encounter)   Medication Sig Dispense Refill    cloNIDine (CATAPRES) 0.1 MG tablet Take 1 tablet by mouth in the morning, at noon, and at bedtime         Allergies:  Adhesive tape and Oxycontin [oxycodone hcl]    Immunizations :   Immunization History Radiology and pertinent results from current hospitalization and care every where were reviewed by me as a part of the consultation. PLAN :  Change to oral Cipro x  500 mg  Q12 hr   x 3 days at d/c   Change to oral Flagyl for anaerobes given Colitis on the ct x 500 mg Q  8hr x  3 days at d/c   Check urine cx - E coli noted   Blood cx -ve  Lactic acid  trend down       Procal  21.72 repeat tomorrow   C dif testing negative  E coli is sensitive  Once WBC better and clinically improved may be ok for PPM placement on Monday      Discussed with patient/Family and Nursing     Medical Decision Making: The following items were considered in medical decision making:  Discussion of patient care with other providers  Reviewed clinical lab tests  Reviewed radiology tests  Reviewed other diagnostic tests/interventions  Independent review of radiologic images  Independent review of  Microbiology cultures and other micro tests reviewed     Risk of Complications/Morbidity: High      Illness(es)/ Infection present that pose threat to bodily function. There is potential for severe exacerbation of infection/side effects of treatment. Therapy requires intensive monitoring for antimicrobial agent toxicity. Thanks for allowing me to participate in your patient's care please call me with any questions or concerns.     Dr. Tatiana Murcia MD  [address] [place] [address] Physician  Phone: [phone]   Fax : [phone]

## 2023-11-17 NOTE — PROGRESS NOTES
V2.0  [facility] Hospitalist Progress Note      Name:  Eleni Torres /Age/Sex: 1935  (80 y.o. female)   MRN & CSN:  [id number] & [id number] Encounter Date/Time: 2023 8:21 AM EST    Location:  [id number] PCP: Shannon Keating MD       Hospital Day: 4    Assessment and Plan: Eleni Torres is a 80 y.o. female with PMH of severe aortic stenosis s/p SAVR presenting with syncope      Plan:  Severe sepsis (tachycardia, leukocytosis, lactic acidosis) 2/2 colitis; possible UTI  -WBC continuing to downtrend to 8.6  -UA shows bacteriuria and pyuria;  -Blood cultures (2023) NGTD; UCx 23 + E. Coli, sensitive to cephalosporins and Cipro  -Stool studies sent  -Day 4 of antibiotics with Cipro Flagyl  -s/p IVF  -ID consulted appreciated input  Acute kidney injury  -Cr jumped from 1.7 to 3.0->2.0->1.2  - avoiding nephrotoxic meds  -Holding home ACE and diuretics  -IV fluids follow-up  -Nephrology consulted; feel LYNDA related to hemodynamic changes and labile blood pressure  Anion gap metabolic acidosis  -Lactate jump to 4.1  -1.5 L bolus followed by maintenance fluids. -Maintenance fluids DC'd by nephro today.   -Lactate normalized then joe again. We will give additional 500 cc bolus  Syncope  -Orthostats negative  -Clonidine dose increased from 0.1 mg TID to 0.3 mg TID 4 days prior to presentation  -Previous history of syncopal events associated with sepsis. -Patient also symptomatic bradycardia in ED  -Patient is certainly at risk for sick sinus syndrome; however given the other potential etiologies, difficult to attribute to this definitively. Symptomatic bradycardia  Suspected sick sinus syndrome  -Patient's clonidine dosage was increased 11/10/2023 with nephrologist  -Discussed with patient's nephrologist, clonidine discontinued  -Patient stating that she did not want to have pacemaker placed.   I discussed with her that while it is relatively safe procedure, we would not put her through procedure that

## 2023-11-17 NOTE — PROGRESS NOTES
CLINICAL PHARMACY NOTE: MEDS TO BEDS    Discharge medications are ready in inpatient pharmacy for weekend delivery.     11/17/23 3:17 PM Continue close follow-up with Dermatology

## 2023-11-17 NOTE — PLAN OF CARE
Problem: Pain  Goal: Verbalizes/displays adequate comfort level or baseline comfort level  Outcome: Progressing     Problem: Safety - Adult  Goal: Free from fall injury  Outcome: Progressing     Problem: Gastrointestinal - Adult  Goal: Minimal or absence of nausea and vomiting  Outcome: Progressing  Goal: Maintains or returns to baseline bowel function  Outcome: Progressing  Goal: Maintains adequate nutritional intake  Outcome: Progressing     Problem: Infection - Adult  Goal: Absence of infection at discharge  Outcome: Progressing     Problem: Cardiovascular - Adult  Goal: Maintains optimal cardiac output and hemodynamic stability  Outcome: Progressing  Goal: Absence of cardiac dysrhythmias or at baseline  Outcome: Progressing     Problem: Musculoskeletal - Adult  Goal: Return mobility to safest level of function  Outcome: Progressing  Goal: Return ADL status to a safe level of function  Outcome: Progressing     Problem: Genitourinary - Adult  Goal: Absence of urinary retention  Outcome: Progressing     Problem: Respiratory - Adult  Goal: Achieves optimal ventilation and oxygenation  Outcome: Progressing     Problem: Skin/Tissue Integrity - Adult  Goal: Skin integrity remains intact  Outcome: Progressing  Goal: Oral mucous membranes remain intact  Outcome: Progressing     Problem: Metabolic/Fluid and Electrolytes - Adult  Goal: Electrolytes maintained within normal limits  Outcome: Progressing  Goal: Hemodynamic stability and optimal renal function maintained  Outcome: Progressing  Goal: Glucose maintained within prescribed range  Outcome: Progressing     Problem: Skin/Tissue Integrity  Goal: Absence of new skin breakdown  Description: 1. Monitor for areas of redness and/or skin breakdown  2. Assess vascular access sites hourly  3. Every 4-6 hours minimum:  Change oxygen saturation probe site  4.   Every 4-6 hours:  If on nasal continuous positive airway pressure, respiratory therapy assess nares and determine

## 2023-11-17 NOTE — PLAN OF CARE
Problem: Pain  Goal: Verbalizes/displays adequate comfort level or baseline comfort level  Outcome: Progressing     Problem: Safety - Adult  Goal: Free from fall injury  Outcome: Progressing     Problem: Gastrointestinal - Adult  Goal: Minimal or absence of nausea and vomiting  Outcome: Progressing     Problem: Gastrointestinal - Adult  Goal: Maintains or returns to baseline bowel function  Outcome: Progressing     Problem: Gastrointestinal - Adult  Goal: Maintains adequate nutritional intake  Outcome: Progressing     Problem: Cardiovascular - Adult  Goal: Maintains optimal cardiac output and hemodynamic stability  Outcome: Progressing     Problem: Cardiovascular - Adult  Goal: Absence of cardiac dysrhythmias or at baseline  Outcome: Progressing     Problem: Musculoskeletal - Adult  Goal: Return mobility to safest level of function  Outcome: Progressing     Problem: Musculoskeletal - Adult  Goal: Return ADL status to a safe level of function  Outcome: Progressing     Problem: Genitourinary - Adult  Goal: Absence of urinary retention  Outcome: Progressing     Problem: Respiratory - Adult  Goal: Achieves optimal ventilation and oxygenation  Outcome: Progressing     Problem: Skin/Tissue Integrity - Adult  Goal: Skin integrity remains intact  Outcome: Progressing     Problem: Skin/Tissue Integrity - Adult  Goal: Oral mucous membranes remain intact  Outcome: Progressing     Problem: Metabolic/Fluid and Electrolytes - Adult  Goal: Electrolytes maintained within normal limits  Outcome: Progressing     Problem: Metabolic/Fluid and Electrolytes - Adult  Goal: Hemodynamic stability and optimal renal function maintained  Outcome: Progressing     Problem: Metabolic/Fluid and Electrolytes - Adult  Goal: Glucose maintained within prescribed range  Outcome: Progressing     Problem: Skin/Tissue Integrity  Goal: Absence of new skin breakdown  Description: 1. Monitor for areas of redness and/or skin breakdown  2.   Assess vascular access sites hourly  3. Every 4-6 hours minimum:  Change oxygen saturation probe site  4. Every 4-6 hours:  If on nasal continuous positive airway pressure, respiratory therapy assess nares and determine need for appliance change or resting period.   Outcome: Progressing     Problem: ABCDS Injury Assessment  Goal: Absence of physical injury  Outcome: Progressing     Problem: Discharge Planning  Goal: Discharge to home or other facility with appropriate resources  Outcome: Progressing

## 2023-11-18 LAB
ANION GAP SERPL CALCULATED.3IONS-SCNC: 9 MMOL/L (ref 3–16)
BACTERIA BLD CULT ORG #2: NORMAL
BACTERIA BLD CULT: NORMAL
BASOPHILS # BLD: 0 K/UL (ref 0–0.2)
BASOPHILS NFR BLD: 0 %
BUN SERPL-MCNC: 20 MG/DL (ref 7–20)
CALCIUM SERPL-MCNC: 8 MG/DL (ref 8.3–10.6)
CHLORIDE SERPL-SCNC: 101 MMOL/L (ref 99–110)
CO2 SERPL-SCNC: 25 MMOL/L (ref 21–32)
CREAT SERPL-MCNC: 0.8 MG/DL (ref 0.6–1.2)
DEPRECATED RDW RBC AUTO: 14.3 % (ref 12.4–15.4)
EOSINOPHIL # BLD: 1.1 K/UL (ref 0–0.6)
EOSINOPHIL NFR BLD: 6 %
GFR SERPLBLD CREATININE-BSD FMLA CKD-EPI: >60 ML/MIN/{1.73_M2}
GLUCOSE SERPL-MCNC: 92 MG/DL (ref 70–99)
HCT VFR BLD AUTO: 35.2 % (ref 36–48)
HGB BLD-MCNC: 11.5 G/DL (ref 12–16)
LYMPHOCYTES # BLD: 1.7 K/UL (ref 1–5.1)
LYMPHOCYTES NFR BLD: 7 %
MCH RBC QN AUTO: 27.6 PG (ref 26–34)
MCHC RBC AUTO-ENTMCNC: 32.8 G/DL (ref 31–36)
MCV RBC AUTO: 84.1 FL (ref 80–100)
MONOCYTES # BLD: 0.2 K/UL (ref 0–1.3)
MONOCYTES NFR BLD: 1 %
NEUTROPHILS # BLD: 15.8 K/UL (ref 1.7–7.7)
NEUTROPHILS NFR BLD: 69 %
NEUTS BAND NFR BLD MANUAL: 15 % (ref 0–7)
NEUTS VAC BLD QL SMEAR: PRESENT
PLATELET # BLD AUTO: 146 K/UL (ref 135–450)
PLATELET BLD QL SMEAR: ADEQUATE
PMV BLD AUTO: 10.4 FL (ref 5–10.5)
POTASSIUM SERPL-SCNC: 3.7 MMOL/L (ref 3.5–5.1)
PROCALCITONIN SERPL IA-MCNC: 1.09 NG/ML (ref 0–0.15)
RBC # BLD AUTO: 4.18 M/UL (ref 4–5.2)
SLIDE REVIEW: ABNORMAL
SODIUM SERPL-SCNC: 135 MMOL/L (ref 136–145)
TOXIC GRANULES BLD QL SMEAR: PRESENT
VARIANT LYMPHS NFR BLD MANUAL: 2 % (ref 0–6)
WBC # BLD AUTO: 18.8 K/UL (ref 4–11)

## 2023-11-18 PROCEDURE — 85025 COMPLETE CBC W/AUTO DIFF WBC: CPT

## 2023-11-18 PROCEDURE — 36415 COLL VENOUS BLD VENIPUNCTURE: CPT

## 2023-11-18 PROCEDURE — 84145 PROCALCITONIN (PCT): CPT

## 2023-11-18 PROCEDURE — 2580000003 HC RX 258: Performed by: STUDENT IN AN ORGANIZED HEALTH CARE EDUCATION/TRAINING PROGRAM

## 2023-11-18 PROCEDURE — 80048 BASIC METABOLIC PNL TOTAL CA: CPT

## 2023-11-18 PROCEDURE — 6370000000 HC RX 637 (ALT 250 FOR IP): Performed by: INTERNAL MEDICINE

## 2023-11-18 PROCEDURE — 2060000000 HC ICU INTERMEDIATE R&B

## 2023-11-18 PROCEDURE — 6370000000 HC RX 637 (ALT 250 FOR IP): Performed by: STUDENT IN AN ORGANIZED HEALTH CARE EDUCATION/TRAINING PROGRAM

## 2023-11-18 PROCEDURE — 94760 N-INVAS EAR/PLS OXIMETRY 1: CPT

## 2023-11-18 PROCEDURE — 6360000002 HC RX W HCPCS: Performed by: INTERNAL MEDICINE

## 2023-11-18 PROCEDURE — C9113 INJ PANTOPRAZOLE SODIUM, VIA: HCPCS | Performed by: STUDENT IN AN ORGANIZED HEALTH CARE EDUCATION/TRAINING PROGRAM

## 2023-11-18 PROCEDURE — 6360000002 HC RX W HCPCS: Performed by: STUDENT IN AN ORGANIZED HEALTH CARE EDUCATION/TRAINING PROGRAM

## 2023-11-18 RX ORDER — AMLODIPINE BESYLATE 5 MG/1
5 TABLET ORAL DAILY
Status: DISCONTINUED | OUTPATIENT
Start: 2023-11-19 | End: 2023-11-23

## 2023-11-18 RX ORDER — LISINOPRIL 40 MG/1
40 TABLET ORAL DAILY
Status: DISCONTINUED | OUTPATIENT
Start: 2023-11-18 | End: 2023-11-25 | Stop reason: HOSPADM

## 2023-11-18 RX ORDER — FUROSEMIDE 10 MG/ML
20 INJECTION INTRAMUSCULAR; INTRAVENOUS ONCE
Status: COMPLETED | OUTPATIENT
Start: 2023-11-18 | End: 2023-11-18

## 2023-11-18 RX ORDER — SPIRONOLACTONE 25 MG/1
50 TABLET ORAL DAILY
Status: DISCONTINUED | OUTPATIENT
Start: 2023-11-18 | End: 2023-11-25 | Stop reason: HOSPADM

## 2023-11-18 RX ADMIN — FUROSEMIDE 20 MG: 10 INJECTION, SOLUTION INTRAMUSCULAR; INTRAVENOUS at 11:38

## 2023-11-18 RX ADMIN — LISINOPRIL 40 MG: 40 TABLET ORAL at 18:32

## 2023-11-18 RX ADMIN — GABAPENTIN 100 MG: 100 CAPSULE ORAL at 21:09

## 2023-11-18 RX ADMIN — HYDRALAZINE HYDROCHLORIDE 5 MG: 20 INJECTION INTRAMUSCULAR; INTRAVENOUS at 16:02

## 2023-11-18 RX ADMIN — METRONIDAZOLE 500 MG: 500 TABLET ORAL at 13:37

## 2023-11-18 RX ADMIN — METRONIDAZOLE 500 MG: 500 TABLET ORAL at 21:09

## 2023-11-18 RX ADMIN — HYDRALAZINE HYDROCHLORIDE 5 MG: 20 INJECTION INTRAMUSCULAR; INTRAVENOUS at 00:10

## 2023-11-18 RX ADMIN — SPIRONOLACTONE 50 MG: 25 TABLET ORAL at 11:38

## 2023-11-18 RX ADMIN — GABAPENTIN 100 MG: 100 CAPSULE ORAL at 09:35

## 2023-11-18 RX ADMIN — GABAPENTIN 100 MG: 100 CAPSULE ORAL at 13:37

## 2023-11-18 RX ADMIN — SODIUM CHLORIDE, PRESERVATIVE FREE 10 ML: 5 INJECTION INTRAVENOUS at 09:35

## 2023-11-18 RX ADMIN — PANTOPRAZOLE SODIUM 40 MG: 40 INJECTION, POWDER, FOR SOLUTION INTRAVENOUS at 09:34

## 2023-11-18 RX ADMIN — SODIUM CHLORIDE, PRESERVATIVE FREE 10 ML: 5 INJECTION INTRAVENOUS at 21:09

## 2023-11-18 RX ADMIN — CIPROFLOXACIN 400 MG: 2 INJECTION, SOLUTION INTRAVENOUS at 21:13

## 2023-11-18 RX ADMIN — CIPROFLOXACIN 400 MG: 2 INJECTION, SOLUTION INTRAVENOUS at 09:46

## 2023-11-18 RX ADMIN — METRONIDAZOLE 500 MG: 500 TABLET ORAL at 05:14

## 2023-11-18 RX ADMIN — HYDRALAZINE HYDROCHLORIDE 5 MG: 20 INJECTION INTRAMUSCULAR; INTRAVENOUS at 23:51

## 2023-11-18 RX ADMIN — ROSUVASTATIN CALCIUM 10 MG: 10 TABLET, FILM COATED ORAL at 09:35

## 2023-11-18 ASSESSMENT — PAIN SCALES - GENERAL
PAINLEVEL_OUTOF10: 0
PAINLEVEL_OUTOF10: 0

## 2023-11-18 NOTE — PROGRESS NOTES
[place]  [facility] [place]      Name:  Steff Wilson /Age/Sex: 1935  (80 y.o. female)   MRN & CSN:  [id number] & [id number] Encounter Date/Time: 2023 8:21 AM EST    Location:  V3A-8640/5106-01 PCP: Jillian Chapin MD       Hospital Day: 5    Assessment and Plan: Steff Wilson is a 80 y.o. female with PMH of severe aortic stenosis s/p SAVR presenting with syncope    Plan:  Severe sepsis (tachycardia, leukocytosis, lactic acidosis) 2/2 colitis; possible UTI  -WBC continuing to downtrend to 8.6  -UA shows bacteriuria and pyuria;  -Blood cultures (2023) NGTD; UCx 23 + E. Coli, sensitive to cephalosporins and Cipro  -Stool studies sent  -Day 4 of antibiotics with Cipro Flagyl  -s/p IVF  -ID consulted appreciated input  Acute kidney injury - resolved  -Cr jumped from 1.7 to 3.0->2.0->1.2->0.8  -Avoiding nephrotoxic meds  -Holding home ACE and diuretics  -IV fluids off  -Nephrology consulted; feel LYNDA related to hemodynamic changes and labile blood pressure  Anion gap metabolic acidosis  -Lactate jump to 4.1  -1.5 L bolus followed by maintenance fluids. -Maintenance fluids DC'd by nephro today.   -Lactate normalized then joe again. We will give additional 500 cc bolus  Syncope  -Orthostats negative  -Clonidine dose increased from 0.1 mg TID to 0.3 mg TID 4 days prior to presentation  -Previous history of syncopal events associated with sepsis. -Patient also symptomatic bradycardia in ED  -Patient is certainly at risk for sick sinus syndrome; however given the other potential etiologies, difficult to attribute to this definitively. Symptomatic bradycardia  Suspected sick sinus syndrome  -Patient's clonidine dosage was increased 11/10/2023 with nephrologist  -Discussed with patient's nephrologist, clonidine discontinued  - - patient and family decided against pacemaker placement. I discussed the case with Dr. Dev Connelly, EP.   Per the family's request, patient was taken off of the vomiting TECHNOLOGIST PROVIDED HISTORY: Reason for exam:->syncope, nausea, vomiting Reason for Exam: loc FINDINGS: The lungs appear clear. There are no pulmonary infiltrates, CHF or pneumothorax. The heart is of normal size. Patient is status post sternotomy and there is an aortic valve prosthesis in place. Bony structures unremarkable. No change from prior examination. No active cardiopulmonary disease.      Electronically signed by Cindy Enrique MD on 11/18/2023 at 4:51 PM

## 2023-11-18 NOTE — PROGRESS NOTES
[address] [facility]     Nephrology Note         Patient ID: Rasheeda Osborne  Referring/ Physician: Shiraz Guido MD      Summary:   Rasheeda Osborne is being seen by nephrology for LYNDA. Reason for admission: syncope, bradycardia. Found to have UTI and colitis. Interval Hx:   Seen at bedside. She still is wary of doing PPM placement, now requesting a second opinion so taken off the schedule for PPM placement. No diarrhea, no abdominal pain. /72  Cr 0.8  Made 2150 mL urine yesterday    Assessment/Plan:   - LYNDA resolved. Cr 0.8  - K 3.7  - resume lisinopril 40 mg and amlodipine 5 mg daily today since BP still very high  - discussed PPM with her. She seems more amenable to it now. Family wanting to be placed back on the schedule. LYNDA  Suspect this is from hemodynamic insults, volume depletion. Baseline Cr 1  Cr jumped to 3.1 in a days time. UA 10 hyaline casts  56 wbc 4+ bacteria  CT showed colitis. No fluid overload. Was on RAASi and diuretics prior to admission. These are held. - IV fluids    Lactic acidosis. Improving  Off bicarb fluids now. H/o labile HTN  Outpatient and home BP readings 600-169 systolic consistently. Was on clondine lisinopril aldactone and lasix. Not sure if she was taking the amlodipine. - holding BP meds. Bradycardia  Per EP/Cardiology     UTI and colitis  On ABX          [facility] would like to thank you for the opportunity to serve this patient. Please call with any questions or concerns. Rose Mary Medina MD  [facility]  [address] [facility], [address] [place] [address] Avenue  Fax: [phone]  Office: [phone]    HPI  Rasheeda Osborne is a 80 y.o. female  with  has a past medical history of Aortic stenosis, Cerebrovascular disease, Class 1 obesity, Hepatic steatosis, Hiatal hernia, Hyperlipidemia, Hypertension, and Osteoarthritis. who presented with syncope. Patient had episode of syncope during outpatient visit prior to hospitalization.

## 2023-11-18 NOTE — PLAN OF CARE
Problem: Pain  Goal: Verbalizes/displays adequate comfort level or baseline comfort level  Outcome: Progressing     Problem: Safety - Adult  Goal: Free from fall injury  Outcome: Progressing     Problem: Gastrointestinal - Adult  Goal: Minimal or absence of nausea and vomiting  Outcome: Progressing     Problem: Gastrointestinal - Adult  Goal: Maintains or returns to baseline bowel function  Outcome: Progressing     Problem: Gastrointestinal - Adult  Goal: Maintains adequate nutritional intake  Outcome: Progressing     Problem: Infection - Adult  Goal: Absence of infection at discharge  Outcome: Progressing     Problem: Cardiovascular - Adult  Goal: Maintains optimal cardiac output and hemodynamic stability  Outcome: Progressing     Problem: Cardiovascular - Adult  Goal: Absence of cardiac dysrhythmias or at baseline  Outcome: Progressing     Problem: Musculoskeletal - Adult  Goal: Return mobility to safest level of function  Outcome: Progressing     Problem: Musculoskeletal - Adult  Goal: Return ADL status to a safe level of function  Outcome: Progressing     Problem: Genitourinary - Adult  Goal: Absence of urinary retention  Outcome: Progressing     Problem: Skin/Tissue Integrity  Goal: Absence of new skin breakdown  Description: 1. Monitor for areas of redness and/or skin breakdown  2. Assess vascular access sites hourly  3. Every 4-6 hours minimum:  Change oxygen saturation probe site  4. Every 4-6 hours:  If on nasal continuous positive airway pressure, respiratory therapy assess nares and determine need for appliance change or resting period.   Outcome: Progressing     Problem: ABCDS Injury Assessment  Goal: Absence of physical injury  Outcome: Progressing     Problem: Respiratory - Adult  Goal: Achieves optimal ventilation and oxygenation  Outcome: Progressing     Problem: Skin/Tissue Integrity - Adult  Goal: Skin integrity remains intact  Outcome: Progressing     Problem: Skin/Tissue Integrity -

## 2023-11-19 LAB
ANION GAP SERPL CALCULATED.3IONS-SCNC: 10 MMOL/L (ref 3–16)
BASOPHILS # BLD: 0 K/UL (ref 0–0.2)
BASOPHILS NFR BLD: 0 %
BUN SERPL-MCNC: 15 MG/DL (ref 7–20)
CALCIUM SERPL-MCNC: 8.2 MG/DL (ref 8.3–10.6)
CHLORIDE SERPL-SCNC: 97 MMOL/L (ref 99–110)
CO2 SERPL-SCNC: 25 MMOL/L (ref 21–32)
CREAT SERPL-MCNC: 0.8 MG/DL (ref 0.6–1.2)
DEPRECATED RDW RBC AUTO: 14.4 % (ref 12.4–15.4)
EOSINOPHIL # BLD: 0.4 K/UL (ref 0–0.6)
EOSINOPHIL NFR BLD: 2 %
GFR SERPLBLD CREATININE-BSD FMLA CKD-EPI: >60 ML/MIN/{1.73_M2}
GLUCOSE SERPL-MCNC: 97 MG/DL (ref 70–99)
HCT VFR BLD AUTO: 36.3 % (ref 36–48)
HGB BLD-MCNC: 11.8 G/DL (ref 12–16)
LYMPHOCYTES # BLD: 0.6 K/UL (ref 1–5.1)
LYMPHOCYTES NFR BLD: 3 %
MAGNESIUM SERPL-MCNC: 1.8 MG/DL (ref 1.8–2.4)
MCH RBC QN AUTO: 27.3 PG (ref 26–34)
MCHC RBC AUTO-ENTMCNC: 32.6 G/DL (ref 31–36)
MCV RBC AUTO: 83.7 FL (ref 80–100)
MONOCYTES # BLD: 0.6 K/UL (ref 0–1.3)
MONOCYTES NFR BLD: 3 %
NEUTROPHILS # BLD: 17.7 K/UL (ref 1.7–7.7)
NEUTROPHILS NFR BLD: 81 %
NEUTS BAND NFR BLD MANUAL: 11 % (ref 0–7)
PLATELET # BLD AUTO: 244 K/UL (ref 135–450)
PLATELET BLD QL SMEAR: ADEQUATE
PMV BLD AUTO: 10.4 FL (ref 5–10.5)
POTASSIUM SERPL-SCNC: 3.2 MMOL/L (ref 3.5–5.1)
RBC # BLD AUTO: 4.34 M/UL (ref 4–5.2)
SLIDE REVIEW: ABNORMAL
SODIUM SERPL-SCNC: 132 MMOL/L (ref 136–145)
TOXIC GRANULES BLD QL SMEAR: PRESENT
WBC # BLD AUTO: 19.2 K/UL (ref 4–11)

## 2023-11-19 PROCEDURE — 36415 COLL VENOUS BLD VENIPUNCTURE: CPT

## 2023-11-19 PROCEDURE — 6370000000 HC RX 637 (ALT 250 FOR IP): Performed by: INTERNAL MEDICINE

## 2023-11-19 PROCEDURE — 94760 N-INVAS EAR/PLS OXIMETRY 1: CPT

## 2023-11-19 PROCEDURE — C9113 INJ PANTOPRAZOLE SODIUM, VIA: HCPCS | Performed by: STUDENT IN AN ORGANIZED HEALTH CARE EDUCATION/TRAINING PROGRAM

## 2023-11-19 PROCEDURE — 6360000002 HC RX W HCPCS: Performed by: STUDENT IN AN ORGANIZED HEALTH CARE EDUCATION/TRAINING PROGRAM

## 2023-11-19 PROCEDURE — 6360000002 HC RX W HCPCS: Performed by: INTERNAL MEDICINE

## 2023-11-19 PROCEDURE — 80048 BASIC METABOLIC PNL TOTAL CA: CPT

## 2023-11-19 PROCEDURE — 2060000000 HC ICU INTERMEDIATE R&B

## 2023-11-19 PROCEDURE — 6370000000 HC RX 637 (ALT 250 FOR IP): Performed by: STUDENT IN AN ORGANIZED HEALTH CARE EDUCATION/TRAINING PROGRAM

## 2023-11-19 PROCEDURE — 83735 ASSAY OF MAGNESIUM: CPT

## 2023-11-19 PROCEDURE — 85025 COMPLETE CBC W/AUTO DIFF WBC: CPT

## 2023-11-19 PROCEDURE — 2580000003 HC RX 258: Performed by: STUDENT IN AN ORGANIZED HEALTH CARE EDUCATION/TRAINING PROGRAM

## 2023-11-19 PROCEDURE — 99231 SBSQ HOSP IP/OBS SF/LOW 25: CPT | Performed by: INTERNAL MEDICINE

## 2023-11-19 RX ORDER — FUROSEMIDE 20 MG/1
20 TABLET ORAL DAILY
Status: DISCONTINUED | OUTPATIENT
Start: 2023-11-19 | End: 2023-11-25 | Stop reason: HOSPADM

## 2023-11-19 RX ORDER — POTASSIUM CHLORIDE 20 MEQ/1
40 TABLET, EXTENDED RELEASE ORAL EVERY 4 HOURS
Status: COMPLETED | OUTPATIENT
Start: 2023-11-19 | End: 2023-11-19

## 2023-11-19 RX ADMIN — CIPROFLOXACIN 400 MG: 2 INJECTION, SOLUTION INTRAVENOUS at 20:41

## 2023-11-19 RX ADMIN — PANTOPRAZOLE SODIUM 40 MG: 40 INJECTION, POWDER, FOR SOLUTION INTRAVENOUS at 10:01

## 2023-11-19 RX ADMIN — CIPROFLOXACIN 400 MG: 2 INJECTION, SOLUTION INTRAVENOUS at 10:18

## 2023-11-19 RX ADMIN — GABAPENTIN 100 MG: 100 CAPSULE ORAL at 20:38

## 2023-11-19 RX ADMIN — FUROSEMIDE 20 MG: 20 TABLET ORAL at 16:42

## 2023-11-19 RX ADMIN — SODIUM CHLORIDE, PRESERVATIVE FREE 10 ML: 5 INJECTION INTRAVENOUS at 20:38

## 2023-11-19 RX ADMIN — METRONIDAZOLE 500 MG: 500 TABLET ORAL at 20:38

## 2023-11-19 RX ADMIN — SPIRONOLACTONE 50 MG: 25 TABLET ORAL at 10:01

## 2023-11-19 RX ADMIN — METRONIDAZOLE 500 MG: 500 TABLET ORAL at 05:33

## 2023-11-19 RX ADMIN — ROSUVASTATIN CALCIUM 10 MG: 10 TABLET, FILM COATED ORAL at 10:01

## 2023-11-19 RX ADMIN — SODIUM CHLORIDE, PRESERVATIVE FREE 10 ML: 5 INJECTION INTRAVENOUS at 10:02

## 2023-11-19 RX ADMIN — METRONIDAZOLE 500 MG: 500 TABLET ORAL at 14:44

## 2023-11-19 RX ADMIN — POTASSIUM CHLORIDE 40 MEQ: 1500 TABLET, EXTENDED RELEASE ORAL at 14:44

## 2023-11-19 RX ADMIN — POTASSIUM CHLORIDE 40 MEQ: 1500 TABLET, EXTENDED RELEASE ORAL at 10:05

## 2023-11-19 RX ADMIN — GABAPENTIN 100 MG: 100 CAPSULE ORAL at 14:44

## 2023-11-19 RX ADMIN — AMLODIPINE BESYLATE 5 MG: 5 TABLET ORAL at 10:01

## 2023-11-19 RX ADMIN — GABAPENTIN 100 MG: 100 CAPSULE ORAL at 10:01

## 2023-11-19 RX ADMIN — LISINOPRIL 40 MG: 40 TABLET ORAL at 10:01

## 2023-11-19 NOTE — PROGRESS NOTES
Cardiology progress note    Patient was recommended to have a pacemaker put in for symptomatic bradycardia  Initially she refused wanting a second opinion. However I am told that she has changed her mind and would like to proceed withit. I have informed Dr. Marquez Code, we will schedule for next week. Patient has a urinary tract infection and is undergoing treatment. Infectious disease will reevaluate her Monday for the timing of implantation.     Harjeet Galeana M.D.

## 2023-11-19 NOTE — PROGRESS NOTES
[place]2.[place]  [facility] [place]      Name:  Maik Stroud /Age/Sex: 1935  (80 y.o. female)   MRN & CSN:  [id number] & [id number] Encounter Date/Time: 2023 8:21 AM EST    Location:  [id number]-0240/5106-01 PCP: Kimberlyn Zhang MD       Hospital Day: 6    Assessment and Plan: Maik Stroud is a 80 y.o. female with PMH of severe aortic stenosis s/p SAVR presenting with syncope    Plan:  Severe sepsis (tachycardia, leukocytosis, lactic acidosis) 2/2 colitis; possible UTI  -WBC continuing to downtrend to 8.6  -UA shows bacteriuria and pyuria;  -Blood cultures (2023) NGTD; UCx 23 + E. Coli, sensitive to cephalosporins and Cipro  -Stool studies sent  -Day 5 of antibiotics with Cipro Flagyl  -s/p IVF  - spoke with infectious disease on-call regarding clearance for PPM.  Emphasized importance that decision be made early Monday morning at the latest.  Acute kidney injury - resolved  -Cr jumped from 1.7 to 3.0->2.0->1.2->0.8  -Avoiding nephrotoxic meds  -Holding home ACE and diuretics  -IV fluids off  -Nephrology consulted; feel LYNDA related to hemodynamic changes and labile blood pressure  Anion gap metabolic acidosis -resolved  -Due to lactic acidosis in setting of hemodynamic changes  -Treated with IV fluids.  -Currently off fluids  Syncope  -Orthostats negative  -Clonidine dose increased from 0.1 mg TID to 0.3 mg TID 4 days prior to presentation  -Previous history of syncopal events associated with sepsis. -Patient also symptomatic bradycardia in ED  -Patient is certainly at risk for sick sinus syndrome; however given the other potential etiologies, difficult to attribute to this definitively. Symptomatic bradycardia  Suspected sick sinus syndrome  -Patient's clonidine dosage was increased 11/10/2023 with nephrologist  -Discussed with patient's nephrologist, clonidine discontinued  - - patient and family decided against pacemaker placement. I discussed the case with Dr. Osmar Braros EP.   Per the

## 2023-11-19 NOTE — PROGRESS NOTES
[address] [facility]     Nephrology Note         Patient ID: Maik Stroud  Referring/ Physician: Garry Wilkinson MD      Summary:   Maik Stroud is being seen by nephrology for LYNDA. Reason for admission: syncope, bradycardia. Found to have UTI and colitis. Interval Hx:   Seen at bedside. /78, HR now 109  On spironolactone 50 mg daily, lisinopril 40 mg daily, and amlodipine 5 mg daily. Cr 0.8  K 3.2 today  Seems agreeable to a PPM now. Assessment/Plan:   - BP improving. Amlodipine resumed today  - replace K  - renal function at baseline      LYNDA  Suspect this is from hemodynamic insults, volume depletion. Baseline Cr 1  Cr jumped to 3.1 in a days time. UA 10 hyaline casts  56 wbc 4+ bacteria  CT showed colitis. No fluid overload. Was on RAASi and diuretics prior to admission. These are held. - IV fluids    Lactic acidosis. Improving  Off bicarb fluids now. H/o labile HTN  Outpatient and home BP readings 646-495 systolic consistently. Was on clondine lisinopril aldactone and lasix. Not sure if she was taking the amlodipine. - holding BP meds. Bradycardia  Per EP/Cardiology     UTI and colitis  On ABX          [facility] would like to thank you for the opportunity to serve this patient. Please call with any questions or concerns. Francie Councilman, MD  [facility]  [address] [place] [address], [address] [place] [address] Avenue  Fax: [phone]  Office: [phone]    HPI  Maik Stroud is a 80 y.o. female  with  has a past medical history of Aortic stenosis, Cerebrovascular disease, Class 1 obesity, Hepatic steatosis, Hiatal hernia, Hyperlipidemia, Hypertension, and Osteoarthritis. who presented with syncope. Patient had episode of syncope during outpatient visit prior to hospitalization. She had a heart rate reported to be in the 30s at 1 point.   She was just seen in my office on Friday of last week and her blood pressure was very high at 032 systolic so I increased her things\"         Physical Exam/Objective:   Vitals:    11/19/23 1205   BP:    Pulse: (!) 109   Resp: 22   Temp:    SpO2: 94%       Intake/Output Summary (Last 24 hours) at 11/19/2023 1532  Last data filed at 11/18/2023 1754  Gross per 24 hour   Intake --   Output 250 ml   Net -250 ml           General appearance: in no acute distress. HEENT:   Respiratory: Respiratory effort normal, bilateral equal chest rise. No wheeze, no crackles   Cardiovascular: Ausculation shows RRR and  no edema   Abdomen: abdomen is soft, non distended  Musculoskeletal:  no joint swelling, no deformity  Skin: no rashes,  no jaundice   Neuro:   Follows commands, moves all extremities spontaneously       Data:   CBC:   Recent Labs     11/17/23 0431 11/18/23 0438 11/19/23 0459   WBC 20.6* 18.8* 19.2*   HGB 12.2 11.5* 11.8*   HCT 36.1 35.2* 36.3    146 244       BMP:    Recent Labs     11/17/23 0431 11/18/23 0438 11/19/23 0459    135* 132*   K 3.7 3.7 3.2*    101 97*   CO2 28 25 25   BUN 33* 20 15   CREATININE 1.2 0.8 0.8   GLUCOSE 107* 92 97   MG  --   --  1.80       Lab Results   Component Value Date/Time    COLORU PITA 11/15/2023 02:30 PM    NITRU Negative 11/15/2023 02:30 PM    GLUCOSEU Negative 11/15/2023 02:30 PM    KETUA Negative 11/15/2023 02:30 PM    UROBILINOGEN 1.0 11/15/2023 02:30 PM    BILIRUBINUR SMALL 11/15/2023 02:30 PM

## 2023-11-20 LAB
ANION GAP SERPL CALCULATED.3IONS-SCNC: 9 MMOL/L (ref 3–16)
BASOPHILS # BLD: 0.1 K/UL (ref 0–0.2)
BASOPHILS NFR BLD: 0.4 %
BUN SERPL-MCNC: 10 MG/DL (ref 7–20)
CALCIUM SERPL-MCNC: 8.1 MG/DL (ref 8.3–10.6)
CHLORIDE SERPL-SCNC: 99 MMOL/L (ref 99–110)
CO2 SERPL-SCNC: 23 MMOL/L (ref 21–32)
CREAT SERPL-MCNC: 0.7 MG/DL (ref 0.6–1.2)
DEPRECATED RDW RBC AUTO: 14.6 % (ref 12.4–15.4)
EOSINOPHIL # BLD: 0.4 K/UL (ref 0–0.6)
EOSINOPHIL NFR BLD: 2.6 %
GFR SERPLBLD CREATININE-BSD FMLA CKD-EPI: >60 ML/MIN/{1.73_M2}
GLUCOSE SERPL-MCNC: 126 MG/DL (ref 70–99)
HCT VFR BLD AUTO: 33.7 % (ref 36–48)
HGB BLD-MCNC: 11.7 G/DL (ref 12–16)
LYMPHOCYTES # BLD: 1.6 K/UL (ref 1–5.1)
LYMPHOCYTES NFR BLD: 10.6 %
MCH RBC QN AUTO: 28.4 PG (ref 26–34)
MCHC RBC AUTO-ENTMCNC: 34.7 G/DL (ref 31–36)
MCV RBC AUTO: 81.8 FL (ref 80–100)
MONOCYTES # BLD: 1.4 K/UL (ref 0–1.3)
MONOCYTES NFR BLD: 9.1 %
NEUTROPHILS # BLD: 12 K/UL (ref 1.7–7.7)
NEUTROPHILS NFR BLD: 77.3 %
PLATELET # BLD AUTO: 229 K/UL (ref 135–450)
PMV BLD AUTO: 9.8 FL (ref 5–10.5)
POTASSIUM SERPL-SCNC: 4 MMOL/L (ref 3.5–5.1)
RBC # BLD AUTO: 4.12 M/UL (ref 4–5.2)
SODIUM SERPL-SCNC: 131 MMOL/L (ref 136–145)
WBC # BLD AUTO: 15.5 K/UL (ref 4–11)

## 2023-11-20 PROCEDURE — 6370000000 HC RX 637 (ALT 250 FOR IP): Performed by: STUDENT IN AN ORGANIZED HEALTH CARE EDUCATION/TRAINING PROGRAM

## 2023-11-20 PROCEDURE — 80048 BASIC METABOLIC PNL TOTAL CA: CPT

## 2023-11-20 PROCEDURE — 97530 THERAPEUTIC ACTIVITIES: CPT

## 2023-11-20 PROCEDURE — 94760 N-INVAS EAR/PLS OXIMETRY 1: CPT

## 2023-11-20 PROCEDURE — 99232 SBSQ HOSP IP/OBS MODERATE 35: CPT | Performed by: INTERNAL MEDICINE

## 2023-11-20 PROCEDURE — 6360000002 HC RX W HCPCS: Performed by: INTERNAL MEDICINE

## 2023-11-20 PROCEDURE — 85025 COMPLETE CBC W/AUTO DIFF WBC: CPT

## 2023-11-20 PROCEDURE — 97535 SELF CARE MNGMENT TRAINING: CPT

## 2023-11-20 PROCEDURE — 6370000000 HC RX 637 (ALT 250 FOR IP): Performed by: INTERNAL MEDICINE

## 2023-11-20 PROCEDURE — C9113 INJ PANTOPRAZOLE SODIUM, VIA: HCPCS | Performed by: STUDENT IN AN ORGANIZED HEALTH CARE EDUCATION/TRAINING PROGRAM

## 2023-11-20 PROCEDURE — 6360000002 HC RX W HCPCS: Performed by: STUDENT IN AN ORGANIZED HEALTH CARE EDUCATION/TRAINING PROGRAM

## 2023-11-20 PROCEDURE — 97116 GAIT TRAINING THERAPY: CPT

## 2023-11-20 PROCEDURE — 2580000003 HC RX 258: Performed by: STUDENT IN AN ORGANIZED HEALTH CARE EDUCATION/TRAINING PROGRAM

## 2023-11-20 PROCEDURE — 97110 THERAPEUTIC EXERCISES: CPT

## 2023-11-20 PROCEDURE — 2060000000 HC ICU INTERMEDIATE R&B

## 2023-11-20 RX ORDER — CIPROFLOXACIN 500 MG/1
250 TABLET, FILM COATED ORAL EVERY 12 HOURS SCHEDULED
Status: COMPLETED | OUTPATIENT
Start: 2023-11-20 | End: 2023-11-23

## 2023-11-20 RX ADMIN — ROSUVASTATIN CALCIUM 10 MG: 10 TABLET, FILM COATED ORAL at 09:39

## 2023-11-20 RX ADMIN — PANTOPRAZOLE SODIUM 40 MG: 40 INJECTION, POWDER, FOR SOLUTION INTRAVENOUS at 09:39

## 2023-11-20 RX ADMIN — METRONIDAZOLE 500 MG: 500 TABLET ORAL at 20:03

## 2023-11-20 RX ADMIN — CIPROFLOXACIN 400 MG: 2 INJECTION, SOLUTION INTRAVENOUS at 09:59

## 2023-11-20 RX ADMIN — SODIUM CHLORIDE, PRESERVATIVE FREE 10 ML: 5 INJECTION INTRAVENOUS at 20:04

## 2023-11-20 RX ADMIN — LISINOPRIL 40 MG: 40 TABLET ORAL at 09:38

## 2023-11-20 RX ADMIN — GABAPENTIN 100 MG: 100 CAPSULE ORAL at 20:03

## 2023-11-20 RX ADMIN — FUROSEMIDE 20 MG: 20 TABLET ORAL at 09:39

## 2023-11-20 RX ADMIN — GABAPENTIN 100 MG: 100 CAPSULE ORAL at 09:39

## 2023-11-20 RX ADMIN — AMLODIPINE BESYLATE 5 MG: 5 TABLET ORAL at 09:39

## 2023-11-20 RX ADMIN — METRONIDAZOLE 500 MG: 500 TABLET ORAL at 14:57

## 2023-11-20 RX ADMIN — CIPROFLOXACIN 250 MG: 500 TABLET, FILM COATED ORAL at 20:03

## 2023-11-20 RX ADMIN — SPIRONOLACTONE 50 MG: 25 TABLET ORAL at 09:39

## 2023-11-20 RX ADMIN — SODIUM CHLORIDE, PRESERVATIVE FREE 10 ML: 5 INJECTION INTRAVENOUS at 10:00

## 2023-11-20 RX ADMIN — METRONIDAZOLE 500 MG: 500 TABLET ORAL at 05:00

## 2023-11-20 RX ADMIN — GABAPENTIN 100 MG: 100 CAPSULE ORAL at 14:56

## 2023-11-20 ASSESSMENT — ENCOUNTER SYMPTOMS
ABDOMINAL PAIN: 0
COUGH: 0
CONSTIPATION: 0
WHEEZING: 0
COLOR CHANGE: 0
SINUS PRESSURE: 0
VOMITING: 0
SORE THROAT: 0
NAUSEA: 0
SHORTNESS OF BREATH: 0
DIARRHEA: 0
TROUBLE SWALLOWING: 0
BLOOD IN STOOL: 0
BACK PAIN: 0

## 2023-11-20 ASSESSMENT — PAIN SCALES - GENERAL
PAINLEVEL_OUTOF10: 0

## 2023-11-20 NOTE — PROGRESS NOTES
Occupational Therapy  Facility/Department: [facility] 4M PROGRESSIVE CARE  Occupational Therapy Daily Treatment    Name: Misti Baez  : 1935  MRN: [id number]  Date of Service: 2023    Discharge Recommendations:  Home with assist PRN  Misti Baez scored a  on the AM-PAC ADL Inpatient form. At this time, no further OT is recommended upon discharge due to no needs. Recommend patient returns to prior setting with prior services. Patient Diagnosis(es): The primary encounter diagnosis was Syncope and collapse. A diagnosis of Symptomatic bradycardia was also pertinent to this visit. Past Medical History:  has a past medical history of Aortic stenosis, Cerebrovascular disease, Class 1 obesity, Hepatic steatosis, Hiatal hernia, Hyperlipidemia, Hypertension, and Osteoarthritis. Past Surgical History:  has a past surgical history that includes pre-malignant / benign skin lesion excision (Right, ); Aortic valve replacement (2017); Cataract removal (Bilateral, 2019); blepharoplasty (Bilateral, 2021); and Upper gastrointestinal endoscopy (N/A, 2023). Assessment   Performance deficits / Impairments: Decreased functional mobility ; Decreased endurance;Decreased strength;Decreased ADL status; Decreased balance;Decreased high-level IADLs  Assessment: PTA pt from home where pt was Ind with mobility and ADLs. Today- pt completed all fxl transfers/mobility w/ RW and SBA, and facial hygiene while standing at sink w/ SBA. Pt declining all other activities this date. Pt had no questions about pacemaker preacautions she will need to follow after procedure tomorrow (per pt). Pt will continue to benefit from being seen on acute to address her deficits and maximize her level of Ind. Pt is anticipated to return home w/ assist PRN, continue to assess for further OT needs.   Prognosis: Good  Exam: see above  Assistance / Modification: RW  REQUIRES OT FOLLOW-UP: Yes  Activity Tolerance  Activity Tolerance: Patient Tolerated treatment well        Plan   Occupational Therapy Plan  Times Per Week: 3-5x  Current Treatment Recommendations: Strengthening, Balance training, Functional mobility training, Endurance training, Patient/Caregiver education & training, Safety education & training, Self-Care / ADL     Restrictions  Restrictions/Precautions  Restrictions/Precautions: Fall Risk  Position Activity Restriction  Other position/activity restrictions: clay catheter    Subjective   General  Chart Reviewed: Yes  Patient assessed for rehabilitation services?: Yes  Additional Pertinent Hx: per ED note, Geri Carrillo is a 80 y.o. female with PMH incl HTN, HLD, aortic stenosis s/p aortic valve replacement (bovine, 3/9/17, Dr. Greg Lane) who presents to the ED for evaluation after multiple syncopal episodes. Patient was at an audiologist appointment when she had two syncopal episodes. She did have prodromal symptoms of feeling lightheaded prior to passing out. This did not occur with postural change. EMS was summoned and the patient was noted to be pale and diaphoretic. She had a third syncopal episode witnessed by EMS. She was administered 4 mg of Zofran prior to arrival with resolution of her nausea thereafter. She denies chest pain, shortness of breath, or leg swelling. Family / Caregiver Present: Yes (multiple family members)  Referring Practitioner: Katy Chapa MD  Subjective  Subjective: Pt sitting in recliner, agreeable to OT session  General Comment  Comments: okay for therapy per RN.      Social/Functional History  Social/Functional History  Lives With: Family (Grandson.)  Type of Home: Condo  Home Layout: One level  Home Access: Stairs to enter with rails  Entrance Stairs - Number of Steps: 1 step  Bathroom Shower/Tub: Walk-in shower  Bathroom Toilet: Standard  Bathroom Equipment: Grab bars in shower, Built-in shower seat  Bathroom Accessibility: Accessible  Home Equipment: [place], complete grooming in stance at sink [address] Term Goals  Time Frame for Long Term Goals : STG=LTG  Patient Goals   Patient goals : return home       Therapy Time   Individual Concurrent Group Co-treatment   Time In 1616         Time Out 1631         Minutes 15         Timed Code Treatment Minutes: [address], OTR/L [id number]

## 2023-11-20 NOTE — PROGRESS NOTES
Cardiac Electrophysiology Progress Note     Admit Date: 11/14/2023     Reason for follow up: syncope    HPI: Tonya Alcantara is an 80y.o. year old female with past medical history significant for AS s/p SAVR 3/17, previous syncope, HTN and HLD who presented to the ED after having syncope x2. She admits to having syncope over the years without any clear triggers. This mostly happened prior to her aortic valve replacement which was blamed on her aortic stenosis. She had an episode back in the spring but was also septic and bacteremic at the time. She has had episodes at [employer] and him happen while sitting or standing. She denies any lightheadedness or dizziness with standing or position changes. This morning she was getting her hearing aids fitted and was sitting down when she suddenly lost consciousness. EMS was called and while they were there she went to the bathroom to urinate and again lost consciousness on the toilet. She was not on the monitor at that time. While in the emergency room her heart rate did decrease down into the 40s and she felt lightheaded which is similar to how she feels prior to having actual syncope. Her blood pressure was checked at that time and was noted to be 70/40. Her heart rate improved back to the 60s and blood pressure also improved back to her baseline without any intervention. She denies any recent changes or illnesses. She is typically very active and lives alone. She currently still drives. Denies any angina or increased dyspnea. No edema. Interval History: Patient seen and examined. Clinical notes reviewed. Telemetry reviewed. No new complaint today. No major events overnight. Denies having angina, shortness of breath, dyspnea on exertion, Orthopnea, PND at the time of this visit. Review of Systems   Constitutional:  Negative for chills, fatigue, fever and unexpected weight change.    HENT:  Negative for congestion, hearing loss, sinus pressure, sore throat General: Abdomen is flat. Bowel sounds are normal. There is no distension. Palpations: Abdomen is soft. Tenderness: There is no abdominal tenderness. Musculoskeletal:         General: No tenderness. Normal range of motion. Cervical back: Normal range of motion. No rigidity. Right lower leg: No edema. Left lower leg: No edema. Skin:     General: Skin is warm and dry. Coloration: Skin is not pale. Findings: No bruising, erythema or rash. Neurological:      General: No focal deficit present. Mental Status: She is alert and oriented to person, place, and time. Cranial Nerves: No cranial nerve deficit. Motor: No weakness. Gait: Gait normal.   Psychiatric:         Mood and Affect: Mood normal.         Behavior: Behavior normal.         Thought Content: Thought content normal.         Telemetry, labs, diagnostic and imaging results personally reviewed and interpreted. Recent Labs     11/18/23 0438 11/19/23 0459 11/20/23  0552   * 132* 131*   K 3.7 3.2* 4.0    97* 99   CO2 25 25 23   BUN 20 15 10   CREATININE 0.8 0.8 0.7     Recent Labs     11/18/23 0438 11/19/23 0459 11/20/23  0552   WBC 18.8* 19.2* 15.5*   HGB 11.5* 11.8* 11.7*   HCT 35.2* 36.3 33.7*   MCV 84.1 83.7 81.8    244 229     Lab Results   Component Value Date/Time    CKTOTAL 536 04/29/2023 06:23 PM    TROPONINI <0.01 12/15/2016 04:11 AM     Estimated Creatinine Clearance: 48 mL/min (based on SCr of 0.7 mg/dL).    No results found for: \"BNP\"  Lab Results   Component Value Date/Time    PROTIME 13.5 04/15/2017 09:49 PM    PROTIME 14.9 03/14/2017 05:30 AM    PROTIME 14.5 03/13/2017 04:40 AM    INR 1.19 04/15/2017 09:49 PM    INR 1.32 03/14/2017 05:30 AM    INR 1.28 03/13/2017 04:40 AM     Lab Results   Component Value Date/Time    CHOL 130 07/25/2023 09:25 AM    HDL 42 07/25/2023 09:25 AM    HDL 37 11/02/2011 09:10 AM    TRIG 103 07/25/2023 09:25 AM       I independently hematuria [N30.00] 11/15/2023    LYNDA (acute kidney injury) ([address]) [N17.9] 11/15/2023    Neutrophilia [D72.9] 11/15/2023    H/O aortic valve replacement [Z95.2] 11/15/2023    Recurrent UTI [N39.0] 11/15/2023    Lactic acid acidosis [E87.20] 11/15/2023    Elevated procalcitonin [R79.89] 11/15/2023    Diarrhea of presumed infectious origin [R19.7] 11/15/2023    E coli infection [A49.8] 11/15/2023    Symptomatic bradycardia [R00.1] 11/14/2023       Assessment and Plan:     Syncope  -unclear etiology but presumptively due to sinus bradycardia 40's bpm with hypotension, as what occurred in ED shortly after her presentation where the patient experienced presyncope. No reversible causes identified. The patient meets a class I indication for PPM implantation for sick sinus syndrome, specifically symptomatic sinus bradycardia 40's bpm. Npo P MN on Monday night for probable Tuesday implantation, barring any infection issue (awaiting ID consult to comment on whether the patient is non-infected). -spent much time (>45 minutes) yesterday with 7 family members and the patient discussing the reason for PPM implantation, the logistics of implantation, the risks and benefits of such. All questions were answered adequately. Will follow with you. Thank you for allowing me to participate in the care of this patient. If you have any questions, please do not hesitate to contact me.     Daren Olsen MD, MS, [facility]  Cardiac Electrophysiology  [place]  [address] [place] [address] [address], [address] [facility]  [phone]

## 2023-11-20 NOTE — PLAN OF CARE
Problem: Pain  Goal: Verbalizes/displays adequate comfort level or baseline comfort level  Outcome: Progressing     Problem: Safety - Adult  Goal: Free from fall injury  Outcome: Progressing     Problem: Gastrointestinal - Adult  Goal: Minimal or absence of nausea and vomiting  Outcome: Progressing     Problem: Gastrointestinal - Adult  Goal: Maintains or returns to baseline bowel function  Outcome: Progressing     Problem: Gastrointestinal - Adult  Goal: Maintains adequate nutritional intake  Outcome: Progressing     Problem: Infection - Adult  Goal: Absence of infection at discharge  Outcome: Progressing     Problem: Cardiovascular - Adult  Goal: Maintains optimal cardiac output and hemodynamic stability  Outcome: Progressing     Problem: Cardiovascular - Adult  Goal: Absence of cardiac dysrhythmias or at baseline  Outcome: Progressing     Problem: Musculoskeletal - Adult  Goal: Return mobility to safest level of function  Outcome: Progressing     Problem: Musculoskeletal - Adult  Goal: Return ADL status to a safe level of function  Outcome: Progressing     Problem: Genitourinary - Adult  Goal: Absence of urinary retention  Outcome: Progressing     Problem: Skin/Tissue Integrity  Goal: Absence of new skin breakdown  Description: 1. Monitor for areas of redness and/or skin breakdown  2. Assess vascular access sites hourly  3. Every 4-6 hours minimum:  Change oxygen saturation probe site  4. Every 4-6 hours:  If on nasal continuous positive airway pressure, respiratory therapy assess nares and determine need for appliance change or resting period.   Outcome: Progressing     Problem: Metabolic/Fluid and Electrolytes - Adult  Goal: Electrolytes maintained within normal limits  Outcome: Progressing     Problem: Discharge Planning  Goal: Discharge to home or other facility with appropriate resources  Outcome: Progressing

## 2023-11-20 NOTE — PROGRESS NOTES
Physical Therapy  Facility/Department: [address] PROGRESSIVE CARE  Physical Therapy Initial Assessment    Name: Betty Amaya  : 1935  MRN: [id number]  Date of Service: 2023    Discharge Recommendations:  Continue to assess pending progress (Anticipate adequate progress for d/c home. )   PT Equipment Recommendations  Other: Will monitor for potential equipt needs. Current Am-Pac . Assessment   Body Structures, Functions, Activity Limitations Requiring Skilled Therapeutic Intervention: Decreased functional mobility ; Decreased balance;Decreased strength  Assessment: 79 y/o female admit 2023 with Symptomatic Bradycardia, Syncope/Collapse, LYNDA, Sepsis. Cardio following for Pacemaker. GI Consult : Colitis. PMH as noted including : Aortic Valve Stenosis/AVR (3/2017), Spondylolisthesis, Lumbar Compress Fx. PTA pt living with grandson in [address] [place] [address], [address] setting with 1 step to enter and 1 story; independent daily care and functional mobility (without assist device). Currently, Pt sourav transfers/amb with Walker SBA/Slight CGA. Initiated pt ed re : temp UE restrictions following anticipated Pacemaker implant (tomorrow per pt report). Pt able to complete several transfers with diminished use L UE in prep. Instruct to avoid overhead mvts UE following pacemaker. Cont anticipate d/c home. Will monitor for potential home PT. Therapy Prognosis: Good  History: 79 y/o female admit 2023 with Symptomatic Bradycardia, Syncope/Collapse, LYNDA, Sepsis. Cardio following for Pacemaker. GI Consult : Colitis. PMH as noted including : Aortic Valve Stenosis/AVR (3/2017), Spondylolisthesis, Lumbar Compress Fx. Exam: See above. Clinical Presentation: See above. Barriers to Learning: [place].   Requires PT Follow-Up: Yes  Activity Tolerance  Activity Tolerance: Patient tolerated treatment well     Plan   Physical Therapy Plan  General Plan: 3-5 times per week  Current Treatment Recommendations: Strengthening, Pt has very supportive family to provide initial 24/7 assist at discharge. Vision/Hearing  Vision  Vision: Within Functional Limits  Hearing  Hearing: Exceptions to [facility]  Hearing Exceptions: Hard of hearing/hearing concerns;Bilateral hearing aid    Cognition   Orientation  Overall Orientation Status: Within Functional Limits  Cognition  Overall Cognitive Status: WFL     Objective                                Bed mobility  Bed Mobility Comments: Pt oob prior PT arrival.  Transfers  Sit to Stand: Stand by assistance (With Siva Gibson.)  Stand to Sit: Stand by assistance (With Siva Gibson.)  Comment: Initiated pt ed re : UE temp restrictions following anticipated Pacemaker implant. Ambulation  Surface: Level tile  Device: Rolling Walker  Assistance: Contact guard assistance  Distance: Pt amb 125' x 2 with Walker Slight CGA. No LE buckling/giving way; alittle unsteady, cues for safe transitional mvts. Felice well. Static Standing Balance Exercises: Transfers to/from Walker x 5 ; diminshed use UE in prep Pacemaker implant. AM-PAC Score  AM-PAC Inpatient Mobility Raw Score : 18 (11/15/23 1217)  -PAC Inpatient T-Scale Score : 43.63 (11/15/23 1217)  Mobility Inpatient CMS 0-100% Score: 46.58 (11/15/23 1217)  Mobility Inpatient CMS G-Code Modifier : CK (11/15/23 1217)          Goals  Short Term Goals  Time Frame for Short Term Goals: Upon d/c acute care setting. Short Term Goal 1: Bed Mob Independent. Short Term Goal 2: Transfers with/without assist device Supervision. Short Term Goal 3: Amb with/without assist device 50-75' SBA/Supervision. 11/20 Goal revised : Amb with/without assist device 150-200' SBA. Patient Goals   Patient Goals : Return home with family assist/support. Education  Patient Education  Education Given To: Patient  Education Provided Comments: Safe use of Walker,  Temp UE Restrictions following Pacemaker Implant.   Education Method: Verbal;Demonstration  Education Outcome: Verbalized understanding;Continued education needed      Therapy Time   Individual Concurrent Group Co-treatment   Time In 0740         Time Out 0820         Minutes 2025 [facility], PT

## 2023-11-20 NOTE — PROGRESS NOTES
Patients IV infiltrated with ciprofloxacin running. Patients IV removed. RUE red, swollen, and warm to touch. RUE elevated and cold compress applied.     Electronically signed by Ysabel Magallanes RN on 11/20/2023 at 11:53 AM

## 2023-11-20 NOTE — PROGRESS NOTES
V2.0  [facility] [place]      Name:  Corin Horn /Age/Sex: 1935  (80 y.o. female)   MRN & CSN:  [id number] & [id number] Encounter Date/Time: 2023 8:21 AM EST    Location:  [id number]-4528/5106-01 PCP: Dionte Clarke MD       Hospital Day: 7    Assessment and Plan: Corin Horn is a 80 y.o. female with PMH of severe aortic stenosis s/p SAVR presenting with syncope    Plan:  Severe sepsis (tachycardia, leukocytosis, lactic acidosis) 2/2 colitis and UTI  -WBC continuing to downtrend to 15.5  -UA 11/15 shows bacteriuria and pyuria;  -Blood cultures (2023) NGTD; UCx 23 + E. Coli, sensitive to cephalosporins and Cipro  -Stool studies sent; fecal leukocyte positive; C-Diff neg; O&P neg; GI panel neg;  -Day 6 of antibiotics with Cipro Flagyl  -s/p IVF  - spoke with infectious disease on-call regarding clearance for PPM.  - Reached out to infectious disease regarding clearance for PPM tomorrow; Okayed  Acute kidney injury - resolved  -Cr jumped from 1.7 to 3.0->2.0->1.2->0.8  -Avoiding nephrotoxic meds  -Holding home ACE and diuretics  -IV fluids off  -Nephrology consulted; feel LYNDA related to hemodynamic changes and labile blood pressure  Anion gap metabolic acidosis -resolved  -Due to lactic acidosis in setting of hemodynamic changes  -Treated with IV fluids. -Off fluids  Syncope  -Orthostats negative  -Clonidine dose increased from 0.1 mg TID to 0.3 mg TID 4 days prior to presentation  -Previous history of syncopal events associated with sepsis. -Patient also symptomatic bradycardia in ED  -Patient is certainly at risk for sick sinus syndrome; however given the other potential etiologies, difficult to attribute to this definitively.   Symptomatic bradycardia  Suspected sick sinus syndrome  -Patient's clonidine dosage was increased 11/10/2023 with nephrologist  -Discussed with patient's nephrologist, clonidine discontinued  - - patient and family decided against pacemaker as low as reasonably achievable. COMPARISON: None. HISTORY: ORDERING SYSTEM PROVIDED HISTORY: worsening abdominal pain; TECHNOLOGIST PROVIDED HISTORY: Reason for exam:->worsening abdominal pain; Additional Contrast?->Radiologist Recommendation Reason for Exam: worsening abdominal pain; FINDINGS: Lower Chest:  Dense peripheral airspace changes in the left greater than right lower lobe. Cardiomegaly with evidence of prior cardiothoracic surgery. Organs: The liver, gallbladder, biliary ducts, pancreas and spleen are normal.  Incidental 2.0 cm splenic cyst.  Normal adrenal glands. Cortical thinning and perinephric stranding around the kidneys suggesting chronic medical renal disease. GI/Bowel: Hiatal hernia with reflux of fluid into the distal visualized esophagus. The stomach is mildly distended with fluid. No mucosal abnormality. Duodenum is normal.  Normal small bowel. A normal appendix is visualized. Moderate mucosal thickening observed diffusely throughout the colon with fluid in the lumen. There also diverticular changes at the sigmoid colon Pelvis: The bladder is unremarkable. The uterus and adnexa are normal. Peritoneum/Retroperitoneum: Advanced atherosclerotic calcification of normal caliber aorta. No free air or fluid. No adenopathy. Bones/Soft Tissues: Degenerative changes throughout the visualized thoracolumbar spine. 1. Diffuse mucosal thickening of the colon favoring colitis. Infectious or inflammatory etiologies may be considered. 2. Distended stomach with hiatal hernia and reflux of fluid into the distal visualized esophagus. 3. Dense peripheral airspace changes in the visualized lungs. Edema or atelectasis suspected. Correlate with any acute pulmonary symptoms. XR CHEST PORTABLE    Result Date: 11/14/2023  EXAMINATION: ONE XRAY VIEW OF THE CHEST 11/14/2023 10:52 am COMPARISON: None.  HISTORY: ORDERING SYSTEM PROVIDED HISTORY: syncope, nausea, vomiting TECHNOLOGIST PROVIDED HISTORY: Reason for exam:->syncope, nausea, vomiting Reason for Exam: loc FINDINGS: The lungs appear clear. There are no pulmonary infiltrates, CHF or pneumothorax. The heart is of normal size. Patient is status post sternotomy and there is an aortic valve prosthesis in place. Bony structures unremarkable. No change from prior examination. No active cardiopulmonary disease.      Electronically signed by Marianna Phoenix, MD on 11/20/2023 at 3:29 PM

## 2023-11-21 ENCOUNTER — APPOINTMENT (OUTPATIENT)
Dept: GENERAL RADIOLOGY | Age: 88
End: 2023-11-21
Payer: MEDICARE

## 2023-11-21 ENCOUNTER — APPOINTMENT (OUTPATIENT)
Dept: CARDIAC CATH/INVASIVE PROCEDURES | Age: 88
End: 2023-11-21
Payer: MEDICARE

## 2023-11-21 LAB
ANION GAP SERPL CALCULATED.3IONS-SCNC: 10 MMOL/L (ref 3–16)
BASOPHILS # BLD: 0.1 K/UL (ref 0–0.2)
BASOPHILS NFR BLD: 0.5 %
BUN SERPL-MCNC: 15 MG/DL (ref 7–20)
CALCIUM SERPL-MCNC: 8.4 MG/DL (ref 8.3–10.6)
CHLORIDE SERPL-SCNC: 97 MMOL/L (ref 99–110)
CO2 SERPL-SCNC: 24 MMOL/L (ref 21–32)
CREAT SERPL-MCNC: 0.8 MG/DL (ref 0.6–1.2)
DEPRECATED RDW RBC AUTO: 14.7 % (ref 12.4–15.4)
EOSINOPHIL # BLD: 0.5 K/UL (ref 0–0.6)
EOSINOPHIL NFR BLD: 3.3 %
GFR SERPLBLD CREATININE-BSD FMLA CKD-EPI: >60 ML/MIN/{1.73_M2}
GLUCOSE SERPL-MCNC: 108 MG/DL (ref 70–99)
HCT VFR BLD AUTO: 35.1 % (ref 36–48)
HGB BLD-MCNC: 11.7 G/DL (ref 12–16)
LYMPHOCYTES # BLD: 1.7 K/UL (ref 1–5.1)
LYMPHOCYTES NFR BLD: 10.6 %
MCH RBC QN AUTO: 27.5 PG (ref 26–34)
MCHC RBC AUTO-ENTMCNC: 33.3 G/DL (ref 31–36)
MCV RBC AUTO: 82.7 FL (ref 80–100)
MONOCYTES # BLD: 1.3 K/UL (ref 0–1.3)
MONOCYTES NFR BLD: 8.2 %
NEUTROPHILS # BLD: 12.7 K/UL (ref 1.7–7.7)
NEUTROPHILS NFR BLD: 77.4 %
PLATELET # BLD AUTO: 293 K/UL (ref 135–450)
PMV BLD AUTO: 9.2 FL (ref 5–10.5)
POTASSIUM SERPL-SCNC: 4.4 MMOL/L (ref 3.5–5.1)
RBC # BLD AUTO: 4.24 M/UL (ref 4–5.2)
SODIUM SERPL-SCNC: 131 MMOL/L (ref 136–145)
WBC # BLD AUTO: 16.4 K/UL (ref 4–11)

## 2023-11-21 PROCEDURE — 6370000000 HC RX 637 (ALT 250 FOR IP): Performed by: STUDENT IN AN ORGANIZED HEALTH CARE EDUCATION/TRAINING PROGRAM

## 2023-11-21 PROCEDURE — 2709999900 HC NON-CHARGEABLE SUPPLY

## 2023-11-21 PROCEDURE — C1892 INTRO/SHEATH,FIXED,PEEL-AWAY: HCPCS

## 2023-11-21 PROCEDURE — 99232 SBSQ HOSP IP/OBS MODERATE 35: CPT | Performed by: NURSE PRACTITIONER

## 2023-11-21 PROCEDURE — 33286 RMVL SUBQ CAR RHYTHM MNTR: CPT

## 2023-11-21 PROCEDURE — 2580000003 HC RX 258: Performed by: STUDENT IN AN ORGANIZED HEALTH CARE EDUCATION/TRAINING PROGRAM

## 2023-11-21 PROCEDURE — 94760 N-INVAS EAR/PLS OXIMETRY 1: CPT

## 2023-11-21 PROCEDURE — 6360000002 HC RX W HCPCS: Performed by: STUDENT IN AN ORGANIZED HEALTH CARE EDUCATION/TRAINING PROGRAM

## 2023-11-21 PROCEDURE — 80048 BASIC METABOLIC PNL TOTAL CA: CPT

## 2023-11-21 PROCEDURE — 6370000000 HC RX 637 (ALT 250 FOR IP): Performed by: INTERNAL MEDICINE

## 2023-11-21 PROCEDURE — 6360000002 HC RX W HCPCS: Performed by: INTERNAL MEDICINE

## 2023-11-21 PROCEDURE — 97535 SELF CARE MNGMENT TRAINING: CPT

## 2023-11-21 PROCEDURE — 02H60JZ INSERTION OF PACEMAKER LEAD INTO RIGHT ATRIUM, OPEN APPROACH: ICD-10-PCS | Performed by: INTERNAL MEDICINE

## 2023-11-21 PROCEDURE — 71046 X-RAY EXAM CHEST 2 VIEWS: CPT

## 2023-11-21 PROCEDURE — 99152 MOD SED SAME PHYS/QHP 5/>YRS: CPT

## 2023-11-21 PROCEDURE — 2580000003 HC RX 258: Performed by: INTERNAL MEDICINE

## 2023-11-21 PROCEDURE — 2500000003 HC RX 250 WO HCPCS

## 2023-11-21 PROCEDURE — C1769 GUIDE WIRE: HCPCS | Performed by: INTERNAL MEDICINE

## 2023-11-21 PROCEDURE — A4216 STERILE WATER/SALINE, 10 ML: HCPCS

## 2023-11-21 PROCEDURE — 2060000000 HC ICU INTERMEDIATE R&B

## 2023-11-21 PROCEDURE — 99152 MOD SED SAME PHYS/QHP 5/>YRS: CPT | Performed by: INTERNAL MEDICINE

## 2023-11-21 PROCEDURE — C1887 CATHETER, GUIDING: HCPCS | Performed by: INTERNAL MEDICINE

## 2023-11-21 PROCEDURE — 02HK0JZ INSERTION OF PACEMAKER LEAD INTO RIGHT VENTRICLE, OPEN APPROACH: ICD-10-PCS | Performed by: INTERNAL MEDICINE

## 2023-11-21 PROCEDURE — C9113 INJ PANTOPRAZOLE SODIUM, VIA: HCPCS | Performed by: STUDENT IN AN ORGANIZED HEALTH CARE EDUCATION/TRAINING PROGRAM

## 2023-11-21 PROCEDURE — 33208 INSRT HEART PM ATRIAL & VENT: CPT

## 2023-11-21 PROCEDURE — 0JH606Z INSERTION OF PACEMAKER, DUAL CHAMBER INTO CHEST SUBCUTANEOUS TISSUE AND FASCIA, OPEN APPROACH: ICD-10-PCS | Performed by: INTERNAL MEDICINE

## 2023-11-21 PROCEDURE — 9990000010 HC NO CHARGE VISIT: Performed by: PHYSICAL THERAPIST

## 2023-11-21 PROCEDURE — 6360000002 HC RX W HCPCS

## 2023-11-21 PROCEDURE — 2580000003 HC RX 258

## 2023-11-21 PROCEDURE — 85025 COMPLETE CBC W/AUTO DIFF WBC: CPT

## 2023-11-21 PROCEDURE — C1898 LEAD, PMKR, OTHER THAN TRANS: HCPCS

## 2023-11-21 PROCEDURE — 97530 THERAPEUTIC ACTIVITIES: CPT

## 2023-11-21 PROCEDURE — 33208 INSRT HEART PM ATRIAL & VENT: CPT | Performed by: INTERNAL MEDICINE

## 2023-11-21 PROCEDURE — 99153 MOD SED SAME PHYS/QHP EA: CPT

## 2023-11-21 PROCEDURE — 36415 COLL VENOUS BLD VENIPUNCTURE: CPT

## 2023-11-21 PROCEDURE — C1785 PMKR, DUAL, RATE-RESP: HCPCS

## 2023-11-21 RX ORDER — SODIUM CHLORIDE 0.9 % (FLUSH) 0.9 %
5-40 SYRINGE (ML) INJECTION EVERY 12 HOURS SCHEDULED
Status: DISCONTINUED | OUTPATIENT
Start: 2023-11-21 | End: 2023-11-25 | Stop reason: HOSPADM

## 2023-11-21 RX ORDER — SODIUM CHLORIDE 9 MG/ML
INJECTION, SOLUTION INTRAVENOUS PRN
Status: DISCONTINUED | OUTPATIENT
Start: 2023-11-21 | End: 2023-11-21

## 2023-11-21 RX ORDER — SODIUM CHLORIDE 0.9 % (FLUSH) 0.9 %
5-40 SYRINGE (ML) INJECTION PRN
Status: DISCONTINUED | OUTPATIENT
Start: 2023-11-21 | End: 2023-11-25 | Stop reason: HOSPADM

## 2023-11-21 RX ORDER — CHLORHEXIDINE GLUCONATE 4 G/100ML
SOLUTION TOPICAL ONCE
Status: DISCONTINUED | OUTPATIENT
Start: 2023-11-21 | End: 2023-11-21

## 2023-11-21 RX ORDER — SODIUM CHLORIDE 9 MG/ML
INJECTION, SOLUTION INTRAVENOUS PRN
Status: DISCONTINUED | OUTPATIENT
Start: 2023-11-21 | End: 2023-11-25 | Stop reason: HOSPADM

## 2023-11-21 RX ORDER — SODIUM CHLORIDE 0.9 % (FLUSH) 0.9 %
5-40 SYRINGE (ML) INJECTION EVERY 12 HOURS SCHEDULED
Status: CANCELLED | OUTPATIENT
Start: 2023-11-21

## 2023-11-21 RX ORDER — SODIUM CHLORIDE 0.9 % (FLUSH) 0.9 %
5-40 SYRINGE (ML) INJECTION PRN
Status: DISCONTINUED | OUTPATIENT
Start: 2023-11-21 | End: 2023-11-21

## 2023-11-21 RX ORDER — CHLORHEXIDINE GLUCONATE 4 G/100ML
SOLUTION TOPICAL ONCE
Status: DISCONTINUED | OUTPATIENT
Start: 2023-11-21 | End: 2023-11-25 | Stop reason: HOSPADM

## 2023-11-21 RX ADMIN — SPIRONOLACTONE 50 MG: 25 TABLET ORAL at 09:04

## 2023-11-21 RX ADMIN — SODIUM CHLORIDE, PRESERVATIVE FREE 10 ML: 5 INJECTION INTRAVENOUS at 09:05

## 2023-11-21 RX ADMIN — PANTOPRAZOLE SODIUM 40 MG: 40 INJECTION, POWDER, FOR SOLUTION INTRAVENOUS at 09:04

## 2023-11-21 RX ADMIN — AMLODIPINE BESYLATE 5 MG: 5 TABLET ORAL at 09:04

## 2023-11-21 RX ADMIN — GABAPENTIN 100 MG: 100 CAPSULE ORAL at 20:13

## 2023-11-21 RX ADMIN — METRONIDAZOLE 500 MG: 500 TABLET ORAL at 20:13

## 2023-11-21 RX ADMIN — FUROSEMIDE 20 MG: 20 TABLET ORAL at 09:04

## 2023-11-21 RX ADMIN — ROSUVASTATIN CALCIUM 10 MG: 10 TABLET, FILM COATED ORAL at 09:04

## 2023-11-21 RX ADMIN — METRONIDAZOLE 500 MG: 500 TABLET ORAL at 16:35

## 2023-11-21 RX ADMIN — GABAPENTIN 100 MG: 100 CAPSULE ORAL at 09:04

## 2023-11-21 RX ADMIN — METRONIDAZOLE 500 MG: 500 TABLET ORAL at 06:20

## 2023-11-21 RX ADMIN — SODIUM CHLORIDE 2000 MG: 900 INJECTION INTRAVENOUS at 16:48

## 2023-11-21 RX ADMIN — CIPROFLOXACIN 250 MG: 500 TABLET, FILM COATED ORAL at 09:04

## 2023-11-21 RX ADMIN — GABAPENTIN 100 MG: 100 CAPSULE ORAL at 16:35

## 2023-11-21 RX ADMIN — Medication 10 ML: at 09:05

## 2023-11-21 RX ADMIN — LISINOPRIL 40 MG: 40 TABLET ORAL at 09:04

## 2023-11-21 RX ADMIN — CIPROFLOXACIN 250 MG: 500 TABLET, FILM COATED ORAL at 20:13

## 2023-11-21 NOTE — PROGRESS NOTES
Occupational Therapy  Facility/Department: [facility] 5W PROGRESSIVE CARE  Occupational Therapy Daily Note  This note to serve as discharge summary if pt d/c'd prior to next session    Name: Tonya Alcantara  : 1935  MRN: [id number]  Date of Service: 2023    Discharge Recommendations:  Home with assist PRN          Patient Diagnosis(es): The primary encounter diagnosis was Syncope and collapse. A diagnosis of Symptomatic bradycardia was also pertinent to this visit. Past Medical History:  has a past medical history of Aortic stenosis, Cerebrovascular disease, Class 1 obesity, Hepatic steatosis, Hiatal hernia, Hyperlipidemia, Hypertension, and Osteoarthritis. Past Surgical History:  has a past surgical history that includes pre-malignant / benign skin lesion excision (Right, ); Aortic valve replacement (2017); Cataract removal (Bilateral, 2019); blepharoplasty (Bilateral, 2021); and Upper gastrointestinal endoscopy (N/A, 2023). Assessment   Performance deficits / Impairments: Decreased functional mobility ; Decreased endurance;Decreased strength;Decreased ADL status; Decreased balance;Decreased high-level IADLs  Assessment: Discussed with OTR: Pt tolerated session well, 's to 126 with activity. Pt SBA for transfers, functional mob in room using RW and min cues for safety. Pt SBA for ADL's- standing for toileting, grooming and LB dressing. Pt will continue to benefit from being seen on acute to address her deficits and maximize her level of Ind. Pt is anticipated to return home w/ assist PRN, continue to assess for further OT needs. Prognosis: Good  History: PTA pt from home where pt was Ind with mobility and ADLs.   REQUIRES OT FOLLOW-UP: Yes  Activity Tolerance  Activity Tolerance: Patient Tolerated treatment well  Activity Tolerance Comments: 's to 126 bpm        Plan   Occupational Therapy Plan  Times Per Week: 3-5x  Current Treatment Recommendations: Strengthening, Assistance: Independent  Homemaking Assistance: Needs assistance (Shantel assists with homemaking needs.)  Homemaking Responsibilities: Yes  Ambulation Assistance: Independent (Without assist device pta.)  Transfer Assistance: Independent  Active : Yes  Mode of Transportation: Car  Additional Comments: Pt has very supportive family to provide initial 24/7 assist at discharge. Objective       Safety Devices  Type of Devices: Call light within reach;Gait belt;Left in chair     Toilet Transfers  Toilet - Technique: Ambulating  Equipment Used: Grab bars  Toilet Transfer: Stand by assistance  Toilet Transfers Comments: RW, cues for hand placement  Wheelchair Bed Transfers  Wheelchair/Bed - Technique: Ambulating  Equipment Used: Other (recliner and arm chair in BR)  Level of Asssistance: Stand by assistance  Wheelchair Transfers Comments: RW, cues for hand placement     ADL  Grooming: Stand by assistance  Grooming Skilled Clinical Factors: standing at sink to brush teeth, wash face, hands. LE Dressing: Stand by assistance  LE Dressing Skilled Clinical Factors: Pt changed mesh underwear, Seated, Sup, to SBA in stance. Toileting: Stand by assistance  Toileting Skilled Clinical Factors: Voided BM, urine on commode. Pt SBA to manage hygiene and clothes. Cognition  Overall Cognitive Status: WFL  Orientation  Overall Orientation Status: Within Functional Limits       Functional Mobility: Pt amb in room, ro/from BR, with RW and SBA, 20-25ft. Pt steady, no overt LOB. HR to 125 with mob/standing    Education Given To: Patient; Family  Education Provided: Role of Therapy;Plan of Care;Transfer Training;ADL Adaptive Strategies  Education Method: Demonstration;Verbal  Barriers to Learning: None  Education Outcome: Verbalized understanding;Demonstrated understanding;Continued education needed       AM-PAC Score        AM-PAC Inpatient Daily Activity Raw Score: 19 (11/21/23 1129)  AM-PAC Inpatient ADL T-Scale

## 2023-11-21 NOTE — PROCEDURES
[facility]     Electrophysiology Procedure Note       Date of Procedure: 11/21/2023  Patient's Name: Angel Bartholomew  YOB: 1935   Medical Record Number: [id number]  Procedure Performed by: Henry Yusuf MD    Procedures performed:  Insertion of MRI compatible right ventricular pacing lead under fluoroscopy. Insertion of MRI compatible right atrial lead under fluoroscopy  Insertion of a MRI compatible [dual] chamber Pacemaker. Electronic analysis of lead and device. Anesthesia: Local and Monitored Anesthesia Care  Level of sedation plan: Moderate sedation (conscious sedation) with intravenous Midazolam 2 mg and Fentanyl 50 mcg   Sedation start time: 1305 hrs  Sedation stop time: 1405 hrs  Mallampati airway assessment class: 2  ASA class: 3      Indication of the procedure: Angel Bartholomew is a 80 y.o. female who is being referred for pacemaker implantation due to non-reversible bradycardia secondary to sinus node dysfunction with symptoms of presyncope and drop in blood pressure. Details of procedure: The patient was brought to the electrophysiology laboratory in stable condition. The patient was in a fasting and non-sedated state. The risks, benefits and alternatives of the procedure were discussed with the patient. The risks including, but not limited to, the risks of vascular injury, bleeding, infection, device malfunction, lead dislodgement, radiation exposure, injury to cardiac and surrounding structures (including pneumothorax), stroke, myocardial infarction and death were discussed in detail. The discussion I have had with the patient encompassed risks, benefits, and side effects related to the alternatives and the risks related to not receiving the proposed care, treatment and services. The patient opted to proceed with the device implantation. Written informed consent was signed and placed in the chart. Prophylactic antibiotic using Ancef 1mg IV was given.      The patient was prepped and draped in sterile fashion. A timeout protocol was completed to identify the patient and the procedure being performed. An independent trained observer assumed the sole responsibility of administering IV sedation medication - Versed, Fentanyl - at my direction and closely monitored the patient. The patient was monitored continuously with ECG, pulse oximetry, blood pressure monitoring, and direct observation. An incision was made in the left upper pectoral area in a transverse line roughly 1 cm from the clavicle after administration of lidocaine/bupivicaine combination. Using electrocautery and blunt dissection, a pocket was created. Central venous access into the left axillary vein was obtained using the modified Seldinger technique. After central venous access was obtained, a sheath was placed in the axillary vein. A [employer] [id number] guide-sheath was used to deliver a [employer] Spineecure MRI SureScan 3830 69cm lead to the His-position as confirmed by observing an atrial, His, and ventricular potential. We screwed the His lead by rotating the lead in the sheath 5-7 rotations to this position and found the paced ventricular complex to have a W pattern on surface EKG with a QRS duration of 110 ms. We then used a slitting device provided by [employer] to slit and remove the [id number] guide sheath. The lead was secured to the underlying tissue using suture material to affix the lead collar. A new sheath was advanced over a second previously placed wire into the vein. The atrial lead was advanced to the right atrial appendage and actively fixated under fluoroscopic guidance. After confirming appropriate function and no diaphragmatic stimulation at maximum output, the sheath was split and removed. The lead was secured to the underlying tissue using suture. The leads were then connected to the new pulse generator which was then placed into the pocket.  Copious amount (> 200 cc) of saline

## 2023-11-21 NOTE — PLAN OF CARE
Problem: Pain  Goal: Verbalizes/displays adequate comfort level or baseline comfort level  Outcome: Progressing     Problem: Safety - Adult  Goal: Free from fall injury  Outcome: Progressing     Problem: Gastrointestinal - Adult  Goal: Minimal or absence of nausea and vomiting  Outcome: Progressing  Goal: Maintains or returns to baseline bowel function  Outcome: Progressing  Goal: Maintains adequate nutritional intake  Outcome: Progressing     Problem: Infection - Adult  Goal: Absence of infection at discharge  Outcome: Progressing     Problem: Cardiovascular - Adult  Goal: Maintains optimal cardiac output and hemodynamic stability  Outcome: Progressing  Goal: Absence of cardiac dysrhythmias or at baseline  Outcome: Progressing     Problem: Musculoskeletal - Adult  Goal: Return mobility to safest level of function  Outcome: Progressing  Goal: Return ADL status to a safe level of function  Outcome: Progressing     Problem: Genitourinary - Adult  Goal: Absence of urinary retention  Outcome: Progressing     Problem: Respiratory - Adult  Goal: Achieves optimal ventilation and oxygenation  Outcome: Progressing     Problem: Skin/Tissue Integrity - Adult  Goal: Skin integrity remains intact  Outcome: Progressing  Goal: Oral mucous membranes remain intact  Outcome: Progressing     Problem: Metabolic/Fluid and Electrolytes - Adult  Goal: Electrolytes maintained within normal limits  Outcome: Progressing  Goal: Hemodynamic stability and optimal renal function maintained  Outcome: Progressing  Goal: Glucose maintained within prescribed range  Outcome: Progressing     Problem: Skin/Tissue Integrity  Goal: Absence of new skin breakdown  Outcome: Progressing     Problem: ABCDS Injury Assessment  Goal: Absence of physical injury  Outcome: Progressing     Problem: Discharge Planning  Goal: Discharge to home or other facility with appropriate resources  Outcome: Progressing

## 2023-11-21 NOTE — DISCHARGE INSTRUCTIONS
Pacemaker Discharge Instructions    Activity: These rules help to heal the implant site and stabilize the heart lead wires. ? Do not lift greater than 10 lbs with left arm for one month. ? Do not raise the arm on the side the device was implanted over your head for              one month. ? No driving for one week until initial visit after your  procedure. Wound Care:  Pressure dressing (rolled up blue towel and large pieces of tape) should be removed once 24 hours after procedure, make sure rectangular bandage underneath stays in place   ? Do not get incision wet until after first appointment with clinic in 7-10 days. Do not submerge your incision in any water       for seven days. (i.e. tub bath, swimming pool)   ? Keep the incision dry and clean. ? Do not remove the steri strips (strips of paper over the incision). If they fall off naturally do not reapply. ? Do not rub or twist the device. ? Avoid tight clothes over the incision. Carry your card:  Jordy Chandler your temporary pacemaker card with you until your permanent card arrives in four to six weeks. An ID card should always be with you. Know the  name.(i.e. [employer], [employer], [employer])    What symptoms or health problems do you need to look out for after you leave the hospital? Call your physician if you have any of these symptoms. ? Increase swelling and/or tenderness in incision. ? Redness of incision or drainage from incision. ? If a suture works its way through the incision. ? Fever, unexplained temperature greater than 100?   ? If you receive a shock or hear a tone from the device call the I office at       [phone] to schedule an appointment in the device clinic. Nutrition:            ? Limit caffeine to 2 cups per day (coffee, tea, soda, chocolate). ? Limit alcohol intake. ? Heart failure: low salt.  You should also monitor your sodium (salt)

## 2023-11-21 NOTE — H&P
H&P Update    I have reviewed the history and physical from earlier today and examined the patient and find no relevant changes. I have reviewed with the patient and/or family the risks, benefits, and alternatives to the procedure. Pre-sedation Assessment    Patient: Allison Lai   :   1935    Intended Procedure: Dual chamber PPM     Nurses notes reviewed and agreed. Medications reviewed  Allergies: Allergies   Allergen Reactions    Adhesive Tape Itching and Dermatitis    Oxycontin [Oxycodone Hcl] Other (See Comments)     \"seeing things\"     Pre-Procedure Assessment/Plan:  ASA 3 - Patient with moderate systemic disease with functional limitations    Level of Sedation Plan: Moderate sedation    Post Procedure plan: Return to same level of care    Krystal Borjas MD  Cardiac Electrophysiology  [facility]

## 2023-11-21 NOTE — PROGRESS NOTES
PRE-PROCEDURE      DATE: 11/21/2023 ARRIVAL TO CATH LAB: 1:43 PM    ADMIT SOURCE: Inpatient    ID & ALLERGY BAND: On    CONSENT: Yes    NPO SINCE: Midnight    LABS: N/A  PREGNANCY TEST: NA      BLEEDING PROBLEMS: No  BLEEDING RISK: 3.8    Low Risk < 2%  Intermediate Risk >2% or < 6.5%  High Risk >6.5%        LAST DOSE (if applicable):  ASA: NA  I1K33 (Plavix, Effient, Brilinta): NA  Anticoagulants (Coumadin, Xarelto, Eliquis): NA  Other Blood Thinners: NA      OTHER MEDICATIONS TAKEN AT HOME:      MEDICATION COMPLIANCE: Yes  Gerardo Camacho RN, CCRN-CSC

## 2023-11-21 NOTE — PROGRESS NOTES
[address] [facility]     Nephrology Note         Patient ID: Rasheeda Osborne  Referring/ Physician: Kathleen Franco MD      Summary:   Rasheeda Osborne is being seen by nephrology for LYNDA. Reason for admission: syncope, bradycardia. Found to have UTI and colitis. Interval Hx:   Seen at bedside. Awake and alert   No complaints   Going down for pacemaker placement. /54  On room air     Assessment/Plan:   - continue amlodipine 5 mg , lisinopril 40 mg , lasix 20 mg daily , aldactone 50 mg daily   - PPM today   - ok with discharge from renal aspect on current BP regimen.   - discontinued clonidine this Admit      LYNDA  Suspect this is from hemodynamic insults, volume depletion. Baseline Cr 1  Cr jumped to 3.1 in a days time. UA 10 hyaline casts  56 wbc 4+ bacteria  CT showed colitis. No fluid overload. Was on RAASi and diuretics prior to admission. These are held. - IV fluids    Lactic acidosis. Improving  Off bicarb fluids now. H/o labile HTN  Outpatient and home BP readings 790-390 systolic consistently. Was on clondine lisinopril aldactone and lasix. Not sure if she was taking the amlodipine. - holding BP meds. Bradycardia  Per EP/Cardiology     UTI and colitis  On ABX          [facility] would like to thank you for the opportunity to serve this patient. Please call with any questions or concerns. Shelbie Salter MD  [facility]  [address] [place] [address], [address] [place] [address] Avenue  Fax: [phone]  Office: [phone]    HPI  Rasheeda Osborne is a 80 y.o. female  with  has a past medical history of Aortic stenosis, Cerebrovascular disease, Class 1 obesity, Hepatic steatosis, Hiatal hernia, Hyperlipidemia, Hypertension, and Osteoarthritis. who presented with syncope. Patient had episode of syncope during outpatient visit prior to hospitalization. She had a heart rate reported to be in the 30s at 1 point.   She was just seen in my office on Friday of last week and her blood pressure was very high at 727 systolic so I increased her clonidine. She is also been having diarrhea but denies any dysuria hematuria foamy frothy urine. She has not been taking NSAIDs. She has no chest pain or shortness of breath. She has been having some nausea and vomiting and some abdominal pain. She was admitted to the ICU for monitoring. Found to have UTI and colitis. She is on antibiotics. Nephrology is consulted for acute kidney injury. Review of Systems:   As above. PMH/SH/FH:    Medical Hx: reviewed and updated as appropriate  Past Medical History:   Diagnosis Date    Aortic stenosis     Cerebrovascular disease     Class 1 obesity     Hepatic steatosis     Hiatal hernia     Hyperlipidemia     Hypertension     Osteoarthritis          Surgical Hx: reviewed and updated as appropriate   has a past surgical history that includes pre-malignant / benign skin lesion excision (Right, 2014); Aortic valve replacement (03/09/2017); Cataract removal (Bilateral, 01/2019); blepharoplasty (Bilateral, 6/7/2021); and Upper gastrointestinal endoscopy (N/A, 5/24/2023). Social Hx: reviewed and updated as appropriate  Social History     Tobacco Use    Smoking status: Never    Smokeless tobacco: Never   Substance Use Topics    Alcohol use: Yes     Comment: socially once/week        Family hx: reviewed and updated as appropriate  family history includes Cancer in her mother; Diabetes in her mother; Kidney Disease in her brother; Other in her father; Parkinsonism in her brother.     Medications:   sodium chloride flush, 5-40 mL, 2 times per day  ceFAZolin (ANCEF) IVPB, 2,000 mg, On Call to OR  chlorhexidine, , Once  mupirocin, , Once  ciprofloxacin, 250 mg, 2 times per day  furosemide, 20 mg, Daily  spironolactone, 50 mg, Daily  lisinopril, 40 mg, Daily  amLODIPine, 5 mg, Daily  metroNIDAZOLE, 500 mg, 3 times per day  gabapentin, 100 mg, TID  rosuvastatin, 10 mg, Daily  sodium chloride flush, 5-40 mL, 2 times per day  pantoprazole, 40 mg, Daily       Adhesive tape and Oxycontin [oxycodone hcl]    Allergies: Allergies   Allergen Reactions    Adhesive Tape Itching and Dermatitis    Oxycontin [Oxycodone Hcl] Other (See Comments)     \"seeing things\"         Physical Exam/Objective:   Vitals:    11/21/23 1345   BP: (!) 151/54   Pulse: (!) 105   Resp: 23   Temp:    SpO2: 97%     No intake or output data in the 24 hours ending 11/21/23 1436      General appearance: in no acute distress. HEENT:   Respiratory: Respiratory effort normal, bilateral equal chest rise. No wheeze, no crackles   Cardiovascular: Ausculation shows RRR and  no edema   Abdomen: abdomen is soft, non distended  Musculoskeletal:  no joint swelling, no deformity  Skin: no rashes,  no jaundice   Neuro:   Follows commands, moves all extremities spontaneously       Data:   CBC:   Recent Labs     11/19/23  0459 11/20/23  0552 11/21/23  0451   WBC 19.2* 15.5* 16.4*   HGB 11.8* 11.7* 11.7*   HCT 36.3 33.7* 35.1*    229 293     BMP:    Recent Labs     11/19/23  0459 11/20/23  0552 11/21/23  0451   * 131* 131*   K 3.2* 4.0 4.4   CL 97* 99 97*   CO2 25 23 24   BUN 15 10 15   CREATININE 0.8 0.7 0.8   GLUCOSE 97 126* 108*   MG 1.80  --   --      Lab Results   Component Value Date/Time    COLORU PITA 11/15/2023 02:30 PM    NITRU Negative 11/15/2023 02:30 PM    GLUCOSEU Negative 11/15/2023 02:30 PM    KETUA Negative 11/15/2023 02:30 PM    UROBILINOGEN 1.0 11/15/2023 02:30 PM    BILIRUBINUR SMALL 11/15/2023 02:30 PM

## 2023-11-21 NOTE — CARE COORDINATION
Discharge Planning:  SW attempted to meet with pt to present pts second IMM letter. Pt is currently off the floor for a procedure. SW will attempt later as time allows.    KARLEE Manjarrez  [phone]  Electronically signed by KARLEE Corey on 11/21/2023 at 3:12 PM

## 2023-11-21 NOTE — PROGRESS NOTES
Hospitalist Progress Note  11/21/2023 10:27 AM    PCP: Sheryle Becton, MD    [phone]     Date of Admission: 11/14/2023                                                                                                                     HOSPITAL COURSE    Patient demographics:  The patient  Nelia Castañeda is a 80 y.o. female     Significant past medical history:   Patient Active Problem List   Diagnosis    Hyperlipidemia    Impaired fasting glucose    Lung nodule    Heart murmur, systolic    Fatty liver disease, nonalcoholic    Syncope and collapse    Abnormal EKG    Neoplasm of uncertain behavior    Benign essential HTN    Shortness of breath    Nonrheumatic aortic valve stenosis    History of aortic valve replacement with bioprosthetic valve    Sepsis (HCC)    Hyponatremia    Urinary tract infection in female    Sacral back pain    Compression of lumbar vertebra (HCC)    Spondylolisthesis at L4-L5 level    Symptomatic bradycardia    Acute cystitis without hematuria    LYNDA (acute kidney injury) ([address])    Neutrophilia    H/O aortic valve replacement    Recurrent UTI    Lactic acid acidosis    Elevated procalcitonin    Diarrhea of presumed infectious origin    E coli infection         Presenting symptoms:  Symptomatic bradycardia    Diagnostic workup:      CONSULTS DURING ADMISSION :   IP CONSULT TO CARDIOLOGY  IP CONSULT TO HOSPITALIST  IP CONSULT TO INFECTIOUS DISEASES  IP CONSULT TO GI  IP CONSULT TO NEPHROLOGY  IP CONSULT TO CARDIOLOGY      Patient was diagnosed with:  Severe sepsis  Acute kidney injury  Syncope      Treatment while inpatient: Nelia Castañeda is a 80 y.o. female with past medical history significant for severe stenosis status post surgical AV replacement 3/17 and previous history of syncope. patient  presented  with multiple syncopal episodes. Patient was diagnosed with Severe sepsis.

## 2023-11-21 NOTE — PROGRESS NOTES
Physical Therapy    Geri Gilbertnereyda  [id number]  [id number]    Attempted to see for PT session however pt out of room for procedure; will continue to follow   Electronically signed by RIVER CAMPOS PT on 11/21/2023 at 1:38 PM

## 2023-11-22 ENCOUNTER — TELEPHONE (OUTPATIENT)
Dept: NON INVASIVE DIAGNOSTICS | Age: 88
End: 2023-11-22

## 2023-11-22 DIAGNOSIS — I49.5 SSS (SICK SINUS SYNDROME) (HCC): ICD-10-CM

## 2023-11-22 DIAGNOSIS — R00.1 BRADYCARDIA: ICD-10-CM

## 2023-11-22 DIAGNOSIS — Z95.0 CARDIAC PACEMAKER IN SITU: Primary | ICD-10-CM

## 2023-11-22 LAB
ANION GAP SERPL CALCULATED.3IONS-SCNC: 9 MMOL/L (ref 3–16)
BASOPHILS # BLD: 0.1 K/UL (ref 0–0.2)
BASOPHILS NFR BLD: 0.7 %
BUN SERPL-MCNC: 15 MG/DL (ref 7–20)
CALCIUM SERPL-MCNC: 8.2 MG/DL (ref 8.3–10.6)
CHLORIDE SERPL-SCNC: 99 MMOL/L (ref 99–110)
CO2 SERPL-SCNC: 23 MMOL/L (ref 21–32)
CREAT SERPL-MCNC: 0.8 MG/DL (ref 0.6–1.2)
DEPRECATED RDW RBC AUTO: 14.4 % (ref 12.4–15.4)
EOSINOPHIL # BLD: 0.3 K/UL (ref 0–0.6)
EOSINOPHIL NFR BLD: 2.2 %
GFR SERPLBLD CREATININE-BSD FMLA CKD-EPI: >60 ML/MIN/{1.73_M2}
GLUCOSE BLD-MCNC: 147 MG/DL (ref 70–99)
GLUCOSE SERPL-MCNC: 98 MG/DL (ref 70–99)
HCT VFR BLD AUTO: 36.3 % (ref 36–48)
HGB BLD-MCNC: 12.2 G/DL (ref 12–16)
LYMPHOCYTES # BLD: 1.9 K/UL (ref 1–5.1)
LYMPHOCYTES NFR BLD: 12.2 %
MCH RBC QN AUTO: 27.8 PG (ref 26–34)
MCHC RBC AUTO-ENTMCNC: 33.6 G/DL (ref 31–36)
MCV RBC AUTO: 82.6 FL (ref 80–100)
MONOCYTES # BLD: 1 K/UL (ref 0–1.3)
MONOCYTES NFR BLD: 6.3 %
NEUTROPHILS # BLD: 11.9 K/UL (ref 1.7–7.7)
NEUTROPHILS NFR BLD: 78.6 %
PERFORMED ON: ABNORMAL
PLATELET # BLD AUTO: 298 K/UL (ref 135–450)
PMV BLD AUTO: 9 FL (ref 5–10.5)
POTASSIUM SERPL-SCNC: 4.2 MMOL/L (ref 3.5–5.1)
RBC # BLD AUTO: 4.39 M/UL (ref 4–5.2)
SODIUM SERPL-SCNC: 131 MMOL/L (ref 136–145)
WBC # BLD AUTO: 15.2 K/UL (ref 4–11)

## 2023-11-22 PROCEDURE — 6370000000 HC RX 637 (ALT 250 FOR IP): Performed by: STUDENT IN AN ORGANIZED HEALTH CARE EDUCATION/TRAINING PROGRAM

## 2023-11-22 PROCEDURE — C9113 INJ PANTOPRAZOLE SODIUM, VIA: HCPCS | Performed by: STUDENT IN AN ORGANIZED HEALTH CARE EDUCATION/TRAINING PROGRAM

## 2023-11-22 PROCEDURE — 2060000000 HC ICU INTERMEDIATE R&B

## 2023-11-22 PROCEDURE — 99232 SBSQ HOSP IP/OBS MODERATE 35: CPT | Performed by: NURSE PRACTITIONER

## 2023-11-22 PROCEDURE — 94760 N-INVAS EAR/PLS OXIMETRY 1: CPT

## 2023-11-22 PROCEDURE — 80048 BASIC METABOLIC PNL TOTAL CA: CPT

## 2023-11-22 PROCEDURE — 85025 COMPLETE CBC W/AUTO DIFF WBC: CPT

## 2023-11-22 PROCEDURE — 93280 PM DEVICE PROGR EVAL DUAL: CPT | Performed by: INTERNAL MEDICINE

## 2023-11-22 PROCEDURE — 97116 GAIT TRAINING THERAPY: CPT | Performed by: PHYSICAL THERAPIST

## 2023-11-22 PROCEDURE — 2580000003 HC RX 258: Performed by: INTERNAL MEDICINE

## 2023-11-22 PROCEDURE — 97530 THERAPEUTIC ACTIVITIES: CPT | Performed by: PHYSICAL THERAPIST

## 2023-11-22 PROCEDURE — 6370000000 HC RX 637 (ALT 250 FOR IP): Performed by: INTERNAL MEDICINE

## 2023-11-22 PROCEDURE — 6360000002 HC RX W HCPCS: Performed by: STUDENT IN AN ORGANIZED HEALTH CARE EDUCATION/TRAINING PROGRAM

## 2023-11-22 PROCEDURE — 36415 COLL VENOUS BLD VENIPUNCTURE: CPT

## 2023-11-22 PROCEDURE — 2580000003 HC RX 258: Performed by: STUDENT IN AN ORGANIZED HEALTH CARE EDUCATION/TRAINING PROGRAM

## 2023-11-22 RX ORDER — FUROSEMIDE 20 MG/1
20 TABLET ORAL DAILY
Qty: 60 TABLET | Refills: 3 | Status: SHIPPED | OUTPATIENT
Start: 2023-11-23 | End: 2023-11-25 | Stop reason: SDUPTHER

## 2023-11-22 RX ORDER — CIPROFLOXACIN 500 MG/1
500 TABLET, FILM COATED ORAL 2 TIMES DAILY
Qty: 6 TABLET | Refills: 0 | Status: SHIPPED | OUTPATIENT
Start: 2023-11-22 | End: 2023-11-25 | Stop reason: SDUPTHER

## 2023-11-22 RX ORDER — METRONIDAZOLE 500 MG/1
500 TABLET ORAL EVERY 8 HOURS SCHEDULED
Qty: 9 TABLET | Refills: 0 | Status: SHIPPED | OUTPATIENT
Start: 2023-11-22 | End: 2023-11-25 | Stop reason: SDUPTHER

## 2023-11-22 RX ADMIN — AMLODIPINE BESYLATE 5 MG: 5 TABLET ORAL at 08:51

## 2023-11-22 RX ADMIN — Medication 10 ML: at 08:52

## 2023-11-22 RX ADMIN — FUROSEMIDE 20 MG: 20 TABLET ORAL at 08:52

## 2023-11-22 RX ADMIN — METRONIDAZOLE 500 MG: 500 TABLET ORAL at 05:09

## 2023-11-22 RX ADMIN — METRONIDAZOLE 500 MG: 500 TABLET ORAL at 20:54

## 2023-11-22 RX ADMIN — GABAPENTIN 100 MG: 100 CAPSULE ORAL at 20:55

## 2023-11-22 RX ADMIN — SODIUM CHLORIDE, PRESERVATIVE FREE 10 ML: 5 INJECTION INTRAVENOUS at 08:53

## 2023-11-22 RX ADMIN — ROSUVASTATIN CALCIUM 10 MG: 10 TABLET, FILM COATED ORAL at 08:52

## 2023-11-22 RX ADMIN — CIPROFLOXACIN 250 MG: 500 TABLET, FILM COATED ORAL at 20:54

## 2023-11-22 RX ADMIN — GABAPENTIN 100 MG: 100 CAPSULE ORAL at 15:54

## 2023-11-22 RX ADMIN — METRONIDAZOLE 500 MG: 500 TABLET ORAL at 15:54

## 2023-11-22 RX ADMIN — CIPROFLOXACIN 250 MG: 500 TABLET, FILM COATED ORAL at 08:51

## 2023-11-22 RX ADMIN — SPIRONOLACTONE 50 MG: 25 TABLET ORAL at 08:51

## 2023-11-22 RX ADMIN — PANTOPRAZOLE SODIUM 40 MG: 40 INJECTION, POWDER, FOR SOLUTION INTRAVENOUS at 08:56

## 2023-11-22 RX ADMIN — GABAPENTIN 100 MG: 100 CAPSULE ORAL at 08:52

## 2023-11-22 RX ADMIN — LISINOPRIL 40 MG: 40 TABLET ORAL at 08:52

## 2023-11-22 NOTE — PLAN OF CARE
Problem: Pain  Goal: Verbalizes/displays adequate comfort level or baseline comfort level  11/22/2023 1317 by Azalea Bustamante RN  Outcome: Completed     Problem: Pain  Goal: Verbalizes/displays adequate comfort level or baseline comfort level  11/22/2023 1317 by Azalea Bustamante RN  Outcome: Adequate for Discharge     Problem: Safety - Adult  Goal: Free from fall injury  11/22/2023 1317 by Azalea Bustamante RN  Outcome: Completed     Problem: Safety - Adult  Goal: Free from fall injury  11/22/2023 1317 by Azalea Bustamante RN  Outcome: Adequate for Discharge     Problem: Gastrointestinal - Adult  Goal: Minimal or absence of nausea and vomiting  11/22/2023 1317 by Azalea Bustamante RN  Outcome: Completed     Problem: Gastrointestinal - Adult  Goal: Minimal or absence of nausea and vomiting  11/22/2023 1317 by Azalea Bustamante RN  Outcome: Adequate for Discharge     Problem: Gastrointestinal - Adult  Goal: Maintains or returns to baseline bowel function  11/22/2023 1317 by Azalea Bustamante RN  Outcome: Completed     Problem: Gastrointestinal - Adult  Goal: Maintains or returns to baseline bowel function  11/22/2023 1317 by Azalea Bustamante RN  Outcome: Adequate for Discharge     Problem: Gastrointestinal - Adult  Goal: Maintains adequate nutritional intake  11/22/2023 1317 by Azalea Bustamante RN  Outcome: Completed     Problem: Gastrointestinal - Adult  Goal: Maintains adequate nutritional intake  11/22/2023 1317 by Azalea Bustamante RN  Outcome: Adequate for Discharge     Problem: Infection - Adult  Goal: Absence of infection at discharge  11/22/2023 1317 by Azalea Bustamante RN  Outcome: Completed     Problem: Infection - Adult  Goal: Absence of infection at discharge  11/22/2023 1317 by Azalea Bustamante RN  Outcome: Adequate for Discharge     Problem: Cardiovascular - Adult  Goal: Maintains optimal cardiac output and hemodynamic stability  11/22/2023 1317 by Azalea Bustamante RN  Outcome: Completed     Problem: Adequate for Discharge     Problem: Skin/Tissue Integrity - Adult  Goal: Oral mucous membranes remain intact  11/22/2023 1317 by Reesa Hodgkins, RN  Outcome: Completed     Problem: Skin/Tissue Integrity - Adult  Goal: Oral mucous membranes remain intact  11/22/2023 1317 by Reesa Hodgkins, RN  Outcome: Adequate for Discharge     Problem: Metabolic/Fluid and Electrolytes - Adult  Goal: Electrolytes maintained within normal limits  11/22/2023 1317 by Reesa Hodgkins, RN  Outcome: Completed     Problem: Metabolic/Fluid and Electrolytes - Adult  Goal: Electrolytes maintained within normal limits  11/22/2023 1317 by Reesa Hodgkins, RN  Outcome: Adequate for Discharge     Problem: Metabolic/Fluid and Electrolytes - Adult  Goal: Hemodynamic stability and optimal renal function maintained  11/22/2023 1317 by Reesa Hodgkins, RN  Outcome: Completed     Problem: Metabolic/Fluid and Electrolytes - Adult  Goal: Hemodynamic stability and optimal renal function maintained  11/22/2023 1317 by Reesa Hodgkins, RN  Outcome: Adequate for Discharge     Problem: Metabolic/Fluid and Electrolytes - Adult  Goal: Glucose maintained within prescribed range  11/22/2023 1317 by Reesa Hodgkins, RN  Outcome: Completed     Problem: Metabolic/Fluid and Electrolytes - Adult  Goal: Glucose maintained within prescribed range  11/22/2023 1317 by Reesa Hodgkins, RN  Outcome: Adequate for Discharge     Problem: Skin/Tissue Integrity  Goal: Absence of new skin breakdown  11/22/2023 1317 by Reesa Hodgkins, RN  Outcome: Completed     Problem: Skin/Tissue Integrity  Goal: Absence of new skin breakdown  11/22/2023 1317 by Reesa Hodgkins, RN  Outcome: Adequate for Discharge     Problem: ABCDS Injury Assessment  Goal: Absence of physical injury  11/22/2023 1317 by Reesa Hodgkins, RN  Outcome: Completed     Problem: ABCDS Injury Assessment  Goal: Absence of physical injury  11/22/2023 1317 by Reesa Hodgkins, RN  Outcome: Adequate for Discharge

## 2023-11-22 NOTE — PROGRESS NOTES
CLINICAL PHARMACY NOTE: MEDS TO BEDS    Discharge medications are ready in inpatient pharmacy for after hours delivery.     11/22/23 3:29 PM

## 2023-11-22 NOTE — PROGRESS NOTES
Orthostatics perfect served to [employer]. Body Location Override (Optional): Right Leg Follow Up Units (Optional): 0 Wound Evaluated By (Optional): Dr. Vaughn Detail Level: Simple Add 80413 Cpt? (Important Note: In 2017 The Use Of 99226 Is Being Tracked By Cms To Determine Future Global Period Reimbursement For Global Periods): yes Patient To Follow-Up With?: our clinic

## 2023-11-22 NOTE — PROGRESS NOTES
Physical Therapy  Facility/Department: [address] PROGRESSIVE CARE  Physical Therapy Treatment Note    Name: Geri Carrillo  : 1935  MRN: [id number]  Date of Service: 2023    Discharge Recommendations:  Home with assist PRN, Home with Home health PT   PT Equipment Recommendations  Equipment Needed: No  Other: Will monitor for potential equipt needs. Geri Carrillo scored a 19/ on the AM-PAC short mobility form. Current research shows that an AM-PAC score of 18 or greater is typically associated with a discharge to the patient's home setting. Based on the patient's AM-PAC score and their current functional mobility deficits, it is recommended that the patient have 2-3 sessions per week of Physical Therapy at d/c to increase the patient's independence. At this time, this patient demonstrates the endurance and safety to discharge home with Treatment Note and a follow up treatment frequency of 2-3x/wk. Please see assessment section for further patient specific details. If patient discharges prior to next session this note will serve as a discharge summary. Please see below for the latest assessment towards goals. Patient Diagnosis(es): The primary encounter diagnosis was Syncope and collapse. A diagnosis of Symptomatic bradycardia was also pertinent to this visit. Past Medical History:  has a past medical history of Aortic stenosis, Cerebrovascular disease, Class 1 obesity, Hepatic steatosis, Hiatal hernia, Hyperlipidemia, Hypertension, and Osteoarthritis. Past Surgical History:  has a past surgical history that includes pre-malignant / benign skin lesion excision (Right, ); Aortic valve replacement (2017); Cataract removal (Bilateral, 2019); blepharoplasty (Bilateral, 2021); and Upper gastrointestinal endoscopy (N/A, 2023). Assessment   Body Structures, Functions, Activity Limitations Requiring Skilled Therapeutic Intervention: Decreased functional mobility ; Decreased

## 2023-11-22 NOTE — CARE COORDINATION
11/22/23 1525   IMM Letter   IMM Letter given to Patient/Family/Significant other/Guardian/POA/by: second letter provided to patient at bedside by Adalgisa Crane RN. Education provided and all questions answered.    IMM Letter date given: 11/22/23   IMM Letter time given: 7248     Electronically signed by Adalgisa Crane RN on 11/22/2023 at 3:26 PM

## 2023-11-22 NOTE — PROGRESS NOTES
Rapid response called as pt was in wheel chair ready for dc and experienced syncopal episode. Dr Radha Simmons at bedside, notified Dr Dannie Mace and I.      Electronically signed by Rich Jacobsen RN on 11/22/2023 at 2:03 PM

## 2023-11-22 NOTE — CARE COORDINATION
CASE MANAGEMENT DISCHARGE SUMMARY:    DISCHARGE DATE: 11/22/2023    DISCHARGED TO HOME     TRANSPORTATION: family             TIME: TBD by patient and bedside RN              COMMENTS: No discharge needs identified at this time.     Electronically signed by Matthew Rai RN on 11/22/2023 at 1:48 PM

## 2023-11-22 NOTE — PROGRESS NOTES
Comprehensive Nutrition Assessment    Type and Reason for Visit:  Initial, RD Nutrition Re-Screen/LOS    Nutrition Recommendations/Plan:   Regular diet  Protein sources encouraged both food and nutrition supplements     Malnutrition Assessment:  Malnutrition Status:  No malnutrition (11/22/23 1151)        Nutrition Assessment:    Patient admitted with symptomatic bradycardia and UTI. Pacemaker placed on 11/21. Currently on a regular diet, not recorded consistently this admission. Appetite fine just didn't like the food especially meal timing, will relay to  management. Weight stable. Patient to be discharged today per huddle. RD encouraged patient to eat protein at home and drink protein shakes as tolerated since being in hospital can lead to lean muscle loss. Patient does have Ensure at home, thinks it is too sweet. RD suggested adding peanut butter powder to it to add some flavor, patient's daughter stated will get some at the store. Nutrition Related Findings:    Na 131 this am; on 2 diuretics both Lasix and Spirolactone; negative 3 liters Wound Type:  (skin tears and redness)       Current Nutrition Intake & Therapies:    Average Meal Intake: Unable to assess  Average Supplements Intake: Unable to assess  ADULT DIET; Regular    Anthropometric Measures:  Height: 154.9 cm (5' 0.98\")  Ideal Body Weight (IBW): 105 lbs (48 kg)       Current Body Weight: 64.7 kg (142 lb 10.2 oz), 135.8 % IBW. Weight Source: Standing Scale  Current BMI (kg/m2): 27                          BMI Categories: Overweight (BMI 25.0-29. 9)    Estimated Daily Nutrient Needs:  Energy Requirements Based On: Kcal/kg  Weight Used for Energy Requirements: Current  Energy (kcal/day): 4287-6781 (25-30 kcal/64.7 kg)  Weight Used for Protein Requirements: Current  Protein (g/day): 78-97 (1.2-1.5 g/64.7 kg)  Method Used for Fluid Requirements: 1 ml/kcal  Fluid (ml/day):      Nutrition Diagnosis:   No nutrition diagnosis at this time related to   as evidenced by      Nutrition Interventions:   Food and/or Nutrient Delivery: Continue Current Diet     Coordination of Nutrition Care: Continue to monitor while inpatient       Goals:     Goals: PO intake 75% or greater       Nutrition Monitoring and Evaluation:      Food/Nutrient Intake Outcomes: Food and Nutrient Intake  Physical Signs/Symptoms Outcomes: Biochemical Data, Nutrition Focused Physical Findings    Discharge Planning: Too soon to determine     SABINA, [address], [address] [facility]  Contact: Office: [phone];  [place]: [id number]'

## 2023-11-22 NOTE — PROGRESS NOTES
Cardiac Electrophysiology Progress Note     Admit Date: 11/14/2023     Reason for follow up: SSS    HPI and Interval History: Owen Hendricks is an 80y.o. year old female with past medical history significant for AS s/p SAVR 3/17, previous syncope, HTN and HLD who presented to the ED after having syncope x2. She admits to having syncope over the years without any clear triggers. This mostly happened prior to her aortic valve replacement which was blamed on her aortic stenosis. She had an episode back in the spring but was also septic and bacteremic at the time. She has had episodes at [employer] and him happen while sitting or standing. She denies any lightheadedness or dizziness with standing or position changes. This morning she was getting her hearing aids fitted and was sitting down when she suddenly lost consciousness. EMS was called and while they were there she went to the bathroom to urinate and again lost consciousness on the toilet. She was not on the monitor at that time. While in the emergency room her heart rate did decrease down into the 40s and she felt lightheaded which is similar to how she feels prior to having actual syncope. Her blood pressure was checked at that time and was noted to be 70/40. Her heart rate improved back to the 60s and blood pressure also improved back to her baseline without any intervention. She denies any recent changes or illnesses. She is typically very active and lives alone. She currently still drives. Denies any angina or increased dyspnea. No edema. Noted to have a UTI which has been treated. Underwent implantation of a Medtronic dual chamber PPM on 11/21/23 with Dr. Elen Cavazos. Post-procedure CXR without complication, device interrogation this morning with appropriate function. Site WNL, no significant pain. Discussed activity restrictions and follow up with pt and daughter. She is hoping for discharge today.     Physical Examination:  Vitals:

## 2023-11-22 NOTE — CARE COORDINATION
Discharge Planning:    Per chart review, patient s/p PPM implantation yesterday, 11/22/2023. Patient from home with her grandson. Family lives close and can assist as needed. Patient's daughter to transport home when patient is medically ready for discharge. Will continue to follow for updates.     Electronically signed by Arthur Galarza RN on 11/22/2023 at 9:06 AM

## 2023-11-22 NOTE — PLAN OF CARE
Problem: Pain  Goal: Verbalizes/displays adequate comfort level or baseline comfort level  11/22/2023 1043 by Frandy Chavez RN  Outcome: Progressing     Problem: Safety - Adult  Goal: Free from fall injury  11/22/2023 1043 by Frandy Chavez RN  Outcome: Progressing     Problem: Gastrointestinal - Adult  Goal: Minimal or absence of nausea and vomiting  11/22/2023 1043 by Frandy Chavez RN  Outcome: Progressing     Problem: Gastrointestinal - Adult  Goal: Maintains or returns to baseline bowel function  11/22/2023 1043 by Frandy Chavez RN  Outcome: Progressing     Problem: Gastrointestinal - Adult  Goal: Maintains adequate nutritional intake  11/22/2023 1043 by Frandy Chavez RN  Outcome: Progressing     Problem: Infection - Adult  Goal: Absence of infection at discharge  11/22/2023 1043 by Frandy Chavez RN  Outcome: Progressing     Problem: Cardiovascular - Adult  Goal: Maintains optimal cardiac output and hemodynamic stability  11/22/2023 1043 by Frandy Chavez RN  Outcome: Progressing     Problem: Cardiovascular - Adult  Goal: Absence of cardiac dysrhythmias or at baseline  11/22/2023 1043 by Frandy Chavez RN  Outcome: Progressing     Problem: Musculoskeletal - Adult  Goal: Return mobility to safest level of function  11/22/2023 1043 by Frandy Chavez RN  Outcome: Progressing     Problem: Musculoskeletal - Adult  Goal: Return ADL status to a safe level of function  11/22/2023 1043 by Frandy Chavez RN  Outcome: Progressing     Problem: Genitourinary - Adult  Goal: Absence of urinary retention  11/22/2023 1043 by Frandy Chavez RN  Outcome: Progressing     Problem: Respiratory - Adult  Goal: Achieves optimal ventilation and oxygenation  11/22/2023 1043 by Frandy Chavez RN  Outcome: Progressing     Problem: Skin/Tissue Integrity - Adult  Goal: Skin integrity remains intact  11/22/2023 1043 by Frandy Chavez RN  Outcome: Progressing     Problem: Skin/Tissue Integrity - Adult  Goal: Oral mucous membranes remain intact  11/22/2023 1043 by Tyron Russell RN  Outcome: Progressing     Problem: Metabolic/Fluid and Electrolytes - Adult  Goal: Electrolytes maintained within normal limits  11/22/2023 1043 by Tyron Russell RN  Outcome: Progressing     Problem: Metabolic/Fluid and Electrolytes - Adult  Goal: Hemodynamic stability and optimal renal function maintained  11/22/2023 1043 by Tyron Russell RN  Outcome: Progressing     Problem: Metabolic/Fluid and Electrolytes - Adult  Goal: Glucose maintained within prescribed range  11/22/2023 1043 by Tyron Russell RN  Outcome: Progressing     Problem: Skin/Tissue Integrity  Goal: Absence of new skin breakdown  11/22/2023 1043 by Tyron Russell RN  Outcome: Progressing     Problem: ABCDS Injury Assessment  Goal: Absence of physical injury  11/22/2023 1043 by Tyron Russell RN  Outcome: Progressing     Problem: Discharge Planning  Goal: Discharge to home or other facility with appropriate resources  11/22/2023 1043 by Tyron Russell RN  Outcome: Progressing

## 2023-11-22 NOTE — CODE DOCUMENTATION
Patient was pale and diaphoretic during episode, per primary nurse, patient get pace maker placed yesterday. Hospitalist recs calling cardio for pacer recommendations.  Primary nurse messaged primary MD.

## 2023-11-22 NOTE — CODE DOCUMENTATION
Patient to bathroom, returned to wheelchair. While in wheel chair, patient could not follow commands, unresponsive to voice and pain. Staff lifted her into bed.

## 2023-11-22 NOTE — SIGNIFICANT EVENT
Rapid response activated after syncopal attack. Patient was on wheelchair ready for discharge when she became unresponsive. Patient transferred back to the bed and quickly Regained full consciousness. Patient is alert and oriented, did not seem to be aware of the events. Blood pressure maintained, heart rate slightly tachycardic at 106.   Will inform primary attending, cancel discharge  Update cardiology ( recent pace maker )

## 2023-11-22 NOTE — TELEPHONE ENCOUNTER
Per Dr. Haroon Skaggs, this pt needs scheduled for a ERA with him next week for aortic stenosis. Will benavides. Thanks.

## 2023-11-22 NOTE — PROGRESS NOTES
Reviewed device interrogation, device with appropriate function and not needing much pacing (<0.1% A and V). Would check orthostatics and if positive, would adjust BP meds. If orthostatics are negative, possibly secondary to severe AS versus non-cardiac etiology. Discussed with Dr. Nicolle Bell, could not see valve well on limited echo and he is suggesting a ERA which can be done outpatient next week, will arrange.     MAICOL Hutchison  [facility]

## 2023-11-22 NOTE — CARE COORDINATION
Attempted to speak with patient at bedside re: second IMM reminder. Rapid Response called on patient at this time. Will continue to monitor and re-attempt later as time allows.     Electronically signed by Johan Knox RN on 11/22/2023 at 1:55 PM

## 2023-11-23 ENCOUNTER — APPOINTMENT (OUTPATIENT)
Dept: GENERAL RADIOLOGY | Age: 88
End: 2023-11-23
Payer: MEDICARE

## 2023-11-23 LAB
ANION GAP SERPL CALCULATED.3IONS-SCNC: 9 MMOL/L (ref 3–16)
BACTERIA URNS QL MICRO: ABNORMAL /HPF
BASOPHILS # BLD: 0.1 K/UL (ref 0–0.2)
BASOPHILS NFR BLD: 0.5 %
BILIRUB UR QL STRIP.AUTO: NEGATIVE
BUN SERPL-MCNC: 19 MG/DL (ref 7–20)
CALCIUM SERPL-MCNC: 8 MG/DL (ref 8.3–10.6)
CHLORIDE SERPL-SCNC: 97 MMOL/L (ref 99–110)
CLARITY UR: ABNORMAL
CO2 SERPL-SCNC: 23 MMOL/L (ref 21–32)
COLOR UR: YELLOW
CREAT SERPL-MCNC: 1 MG/DL (ref 0.6–1.2)
DEPRECATED RDW RBC AUTO: 14.4 % (ref 12.4–15.4)
EOSINOPHIL # BLD: 0.3 K/UL (ref 0–0.6)
EOSINOPHIL NFR BLD: 1.5 %
EPI CELLS #/AREA URNS AUTO: 19 /HPF (ref 0–5)
GFR SERPLBLD CREATININE-BSD FMLA CKD-EPI: 54 ML/MIN/{1.73_M2}
GLUCOSE SERPL-MCNC: 108 MG/DL (ref 70–99)
GLUCOSE UR STRIP.AUTO-MCNC: NEGATIVE MG/DL
HCT VFR BLD AUTO: 37.4 % (ref 36–48)
HGB BLD-MCNC: 12.3 G/DL (ref 12–16)
HGB UR QL STRIP.AUTO: NEGATIVE
HYALINE CASTS #/AREA URNS AUTO: 2 /LPF (ref 0–8)
KETONES UR STRIP.AUTO-MCNC: NEGATIVE MG/DL
LEUKOCYTE ESTERASE UR QL STRIP.AUTO: ABNORMAL
LYMPHOCYTES # BLD: 1.5 K/UL (ref 1–5.1)
LYMPHOCYTES NFR BLD: 7.9 %
MCH RBC QN AUTO: 27.2 PG (ref 26–34)
MCHC RBC AUTO-ENTMCNC: 32.9 G/DL (ref 31–36)
MCV RBC AUTO: 82.7 FL (ref 80–100)
MONOCYTES # BLD: 1.1 K/UL (ref 0–1.3)
MONOCYTES NFR BLD: 5.6 %
NEUTROPHILS # BLD: 16.2 K/UL (ref 1.7–7.7)
NEUTROPHILS NFR BLD: 84.5 %
NITRITE UR QL STRIP.AUTO: NEGATIVE
PH UR STRIP.AUTO: 5.5 [PH] (ref 5–8)
PLATELET # BLD AUTO: 370 K/UL (ref 135–450)
PMV BLD AUTO: 9.1 FL (ref 5–10.5)
POTASSIUM SERPL-SCNC: 4.1 MMOL/L (ref 3.5–5.1)
PROCALCITONIN SERPL IA-MCNC: 0.21 NG/ML (ref 0–0.15)
PROT UR STRIP.AUTO-MCNC: NEGATIVE MG/DL
RBC # BLD AUTO: 4.53 M/UL (ref 4–5.2)
RBC CLUMPS #/AREA URNS AUTO: 2 /HPF (ref 0–4)
SODIUM SERPL-SCNC: 129 MMOL/L (ref 136–145)
SP GR UR STRIP.AUTO: 1.01 (ref 1–1.03)
UA COMPLETE W REFLEX CULTURE PNL UR: ABNORMAL
UA DIPSTICK W REFLEX MICRO PNL UR: YES
URN SPEC COLLECT METH UR: ABNORMAL
UROBILINOGEN UR STRIP-ACNC: 0.2 E.U./DL
WBC # BLD AUTO: 19.2 K/UL (ref 4–11)
WBC #/AREA URNS AUTO: 2 /HPF (ref 0–5)

## 2023-11-23 PROCEDURE — 99024 POSTOP FOLLOW-UP VISIT: CPT | Performed by: INTERNAL MEDICINE

## 2023-11-23 PROCEDURE — 6370000000 HC RX 637 (ALT 250 FOR IP): Performed by: HOSPITALIST

## 2023-11-23 PROCEDURE — 6370000000 HC RX 637 (ALT 250 FOR IP): Performed by: INTERNAL MEDICINE

## 2023-11-23 PROCEDURE — 80048 BASIC METABOLIC PNL TOTAL CA: CPT

## 2023-11-23 PROCEDURE — 85025 COMPLETE CBC W/AUTO DIFF WBC: CPT

## 2023-11-23 PROCEDURE — 94760 N-INVAS EAR/PLS OXIMETRY 1: CPT

## 2023-11-23 PROCEDURE — 81001 URINALYSIS AUTO W/SCOPE: CPT

## 2023-11-23 PROCEDURE — 71045 X-RAY EXAM CHEST 1 VIEW: CPT

## 2023-11-23 PROCEDURE — 2060000000 HC ICU INTERMEDIATE R&B

## 2023-11-23 PROCEDURE — 84145 PROCALCITONIN (PCT): CPT

## 2023-11-23 PROCEDURE — 87040 BLOOD CULTURE FOR BACTERIA: CPT

## 2023-11-23 PROCEDURE — 6370000000 HC RX 637 (ALT 250 FOR IP): Performed by: STUDENT IN AN ORGANIZED HEALTH CARE EDUCATION/TRAINING PROGRAM

## 2023-11-23 PROCEDURE — 36415 COLL VENOUS BLD VENIPUNCTURE: CPT

## 2023-11-23 RX ORDER — PANTOPRAZOLE SODIUM 40 MG/1
40 TABLET, DELAYED RELEASE ORAL
Status: DISCONTINUED | OUTPATIENT
Start: 2023-11-24 | End: 2023-11-25 | Stop reason: HOSPADM

## 2023-11-23 RX ORDER — CIPROFLOXACIN 500 MG/1
250 TABLET, FILM COATED ORAL EVERY 12 HOURS SCHEDULED
Status: DISCONTINUED | OUTPATIENT
Start: 2023-11-23 | End: 2023-11-24

## 2023-11-23 RX ORDER — FLECAINIDE ACETATE 100 MG/1
50 TABLET ORAL 2 TIMES DAILY
Status: COMPLETED | OUTPATIENT
Start: 2023-11-23 | End: 2023-11-23

## 2023-11-23 RX ADMIN — METRONIDAZOLE 500 MG: 500 TABLET ORAL at 14:31

## 2023-11-23 RX ADMIN — FLECAINIDE ACETATE 50 MG: 100 TABLET ORAL at 21:26

## 2023-11-23 RX ADMIN — SPIRONOLACTONE 50 MG: 25 TABLET ORAL at 09:02

## 2023-11-23 RX ADMIN — METRONIDAZOLE 500 MG: 500 TABLET ORAL at 21:27

## 2023-11-23 RX ADMIN — ROSUVASTATIN CALCIUM 10 MG: 10 TABLET, FILM COATED ORAL at 09:01

## 2023-11-23 RX ADMIN — LISINOPRIL 40 MG: 40 TABLET ORAL at 09:03

## 2023-11-23 RX ADMIN — GABAPENTIN 100 MG: 100 CAPSULE ORAL at 09:02

## 2023-11-23 RX ADMIN — CIPROFLOXACIN 250 MG: 500 TABLET, FILM COATED ORAL at 21:27

## 2023-11-23 RX ADMIN — GABAPENTIN 100 MG: 100 CAPSULE ORAL at 21:26

## 2023-11-23 RX ADMIN — CIPROFLOXACIN 250 MG: 500 TABLET, FILM COATED ORAL at 09:03

## 2023-11-23 RX ADMIN — METRONIDAZOLE 500 MG: 500 TABLET ORAL at 06:28

## 2023-11-23 RX ADMIN — AMLODIPINE BESYLATE 5 MG: 5 TABLET ORAL at 09:03

## 2023-11-23 RX ADMIN — FLECAINIDE ACETATE 50 MG: 100 TABLET ORAL at 14:31

## 2023-11-23 RX ADMIN — FUROSEMIDE 20 MG: 20 TABLET ORAL at 09:02

## 2023-11-23 RX ADMIN — GABAPENTIN 100 MG: 100 CAPSULE ORAL at 14:31

## 2023-11-23 ASSESSMENT — PAIN SCALES - GENERAL: PAINLEVEL_OUTOF10: 0

## 2023-11-23 NOTE — PROGRESS NOTES
0.8 0.8 1.0     Recent Labs     11/21/23  0451 11/22/23  0508 11/23/23  0625   WBC 16.4* 15.2* 19.2*   HGB 11.7* 12.2 12.3   HCT 35.1* 36.3 37.4   MCV 82.7 82.6 82.7    298 370     Lab Results   Component Value Date/Time    CKTOTAL 536 04/29/2023 06:23 PM    TROPONINI <0.01 12/15/2016 04:11 AM    TROPONINI <0.01 12/14/2016 09:50 PM    TROPONINI <0.01 12/14/2016 03:40 PM     Estimated Creatinine Clearance: 33 mL/min (based on SCr of 1 mg/dL). No results found for: \"BNP\"  Lab Results   Component Value Date/Time    PROTIME 13.5 04/15/2017 09:49 PM    PROTIME 14.9 03/14/2017 05:30 AM    PROTIME 14.5 03/13/2017 04:40 AM    INR 1.19 04/15/2017 09:49 PM    INR 1.32 03/14/2017 05:30 AM    INR 1.28 03/13/2017 04:40 AM     Lab Results   Component Value Date/Time    CHOL 130 07/25/2023 09:25 AM    HDL 42 07/25/2023 09:25 AM    HDL 37 11/02/2011 09:10 AM    TRIG 103 07/25/2023 09:25 AM       I independently reviewed the ECG, telemetry, and serology results.     Scheduled Meds:   [START ON 11/24/2023] pantoprazole  40 mg Oral QAM AC    ciprofloxacin  250 mg Oral 2 times per day    sodium chloride flush  5-40 mL IntraVENous 2 times per day    chlorhexidine   Topical Once    mupirocin   Each Nostril Once    sodium chloride flush  5-40 mL IntraVENous 2 times per day    furosemide  20 mg Oral Daily    spironolactone  50 mg Oral Daily    lisinopril  40 mg Oral Daily    amLODIPine  5 mg Oral Daily    metroNIDAZOLE  500 mg Oral 3 times per day    gabapentin  100 mg Oral TID    rosuvastatin  10 mg Oral Daily    sodium chloride flush  5-40 mL IntraVENous 2 times per day     Continuous Infusions:   sodium chloride      sodium chloride      sodium chloride 5 mL/hr at 11/17/23 0027     PRN Meds:sodium chloride flush, sodium chloride, sodium chloride flush, sodium chloride, perflutren lipid microspheres, sodium chloride flush, sodium chloride, ondansetron **OR** ondansetron, polyethylene glycol, acetaminophen **OR** acetaminophen, hydrALAZINE, HYDROmorphone     Patient Active Problem List    Diagnosis Date Noted    Cardiac pacemaker in situ 11/22/2023    Acute cystitis without hematuria 11/15/2023    LYNDA (acute kidney injury) ([address]) 11/15/2023    Neutrophilia 11/15/2023    H/O aortic valve replacement 11/15/2023    Recurrent UTI 11/15/2023    Lactic acid acidosis 11/15/2023    Elevated procalcitonin 11/15/2023    Diarrhea of presumed infectious origin 11/15/2023    E coli infection 11/15/2023    Symptomatic bradycardia 11/14/2023    Sacral back pain 09/26/2023    Compression of lumbar vertebra ([address]) 09/26/2023    Spondylolisthesis at L4-L5 level 09/26/2023    Hyponatremia 04/30/2023    Urinary tract infection in female 04/30/2023    Sepsis ([address]) 04/29/2023    History of aortic valve replacement with bioprosthetic valve     Nonrheumatic aortic valve stenosis 02/06/2017    Shortness of breath     Syncope and collapse 12/14/2016    Abnormal EKG 12/14/2016    Neoplasm of uncertain behavior 75/00/3920    Fatty liver disease, nonalcoholic 67/66/1268    Heart murmur, systolic 40/42/9554    Impaired fasting glucose 11/17/2011    Lung nodule 11/17/2011    Hyperlipidemia     Benign essential HTN 09/11/2009      Active Hospital Problems    Diagnosis Date Noted    Acute cystitis without hematuria [N30.00] 11/15/2023    LYNDA (acute kidney injury) ([address]) [N17.9] 11/15/2023    Neutrophilia [D72.9] 11/15/2023    H/O aortic valve replacement [Z95.2] 11/15/2023    Recurrent UTI [N39.0] 11/15/2023    Lactic acid acidosis [E87.20] 11/15/2023    Elevated procalcitonin [R79.89] 11/15/2023    Diarrhea of presumed infectious origin [R19.7] 11/15/2023    E coli infection [A49.8] 11/15/2023    Symptomatic bradycardia [R00.1] 11/14/2023       Assessment and Plan:   Persistent sinus tachycardia vs AT   - it is unusual for sinus rhythm to have no variability which raises concern for AT or unresolving infection. WBC is up today, will defer to primary team for workup.    - will give couple doses of flecainide 50 mg bid while in patient but not to be discharged on this medication. If AT from sy terminalis that can look like sinus tachycardia, may be terminated with flecainide. SSS              - with symptomatic bradycardia causing syncope and near syncope              - had a period where HR dropped from the 60s into the low 40s (sinu bradycardia) while in the ED that was associated with similar lightheadedness to past syncope and presyncope, also with BP drop to 70/40              - on no AV black blocking agents              - suspect recurrent syncope is secondary to this              - orthostatics negative in ED              - TSH slightly high with normal free T4              - echo with normal EF              - s/p implantation of a Medtronic dual chamber PPM on 11/21/23 with Dr. Marie Barajas              - post-procedure CXR without complication, interrogation this morning with appropriate function              - activity limitations discussed and placed on d/c instructions              - follow up arranged     Sepsis              - from UTI              - on antibiotics and previously seen by ID     Aortic stenosis              - s/p SAVR in 2017              - TTE 5/23 with severe stenosis of prosthetic valve, ERA done which showed appropriate function              - followed by Dr. Blaire Cox    - with recurrent syncope, not arrhythmia related, would repeat ERA as outpatient to assess the aortic valve. Essential HTN              - BP controlled currently, would monitor    Thank you for allowing me to participate in the care of this patient. If you have any questions, please do not hesitate to contact me.     Electronically signed by Cornelius Saravia MD on 11/14/2023 at 2:10 PM.    Cornelius Saravia MD  Cardiac Electrophysiology  [address] [place] [address]

## 2023-11-23 NOTE — PROGRESS NOTES
[address] [facility]     Nephrology Note         Patient ID: Maik Stroud  Referring/ Physician: Kevon Collazo MD      Summary:   Maik Stroud is being seen by nephrology for LYNDA. Reason for admission: syncope, bradycardia. Found to have UTI and colitis. Interval Hx:     The patient was seen and examined in her room with her daughter at her bedside  She was very weak, alert and conversant  She was supposed to be discharged yesterday, per patient, the discharge was canceled because of \"passing out\"  The patient had a pacemaker inserted 2 days ago  As of now, patient feels well  The patient denies of any pain or discomfort  The patient most recent set of vital signs showed temperature 98, heart rate 107 blood pressure 105/59    Lab work from today showed sodium 129, potassium 4.1, bicarb 23, BUN 19, creatinine 1, glucose 108. Assessment/Plan:   Her serum sodium level is down to 129 today, it was 131 1 day earlier  The patient no pitting edema  The patient's urine output was not documented  The patient most recent set of vital signs showed blood pressure 105/59  Asked her to limit her fluid consumption, no other intervention needed at this moment    Reviewed her medication is, did not see any concerning medications. She is taking several medications which have the potential to cause hyponatremia, including lisinopril and the spironolactone. Her blood pressure has been very labile. Per patient, her discharge was canceled because of \"Passing out\"    So I will discontinue her amlodipine t      LYNDA  Suspect this is from hemodynamic insults, volume depletion. Baseline Cr 1  Cr jumped to 3.1 in a days time. UA 10 hyaline casts  56 wbc 4+ bacteria  CT showed colitis. No fluid overload. Was on RAASi and diuretics prior to admission. These are held. - IV fluids    Lactic acidosis. Improving  Off bicarb fluids now.      H/o labile HTN  Outpatient and home BP readings 396-495 systolic

## 2023-11-24 ENCOUNTER — TELEPHONE (OUTPATIENT)
Dept: CARDIOLOGY CLINIC | Age: 88
End: 2023-11-24

## 2023-11-24 DIAGNOSIS — I35.0 NONRHEUMATIC AORTIC (VALVE) STENOSIS: Primary | ICD-10-CM

## 2023-11-24 LAB
ANION GAP SERPL CALCULATED.3IONS-SCNC: 9 MMOL/L (ref 3–16)
BACTERIA BLD CULT ORG #2: NORMAL
BACTERIA BLD CULT: NORMAL
BASOPHILS # BLD: 0.2 K/UL (ref 0–0.2)
BASOPHILS NFR BLD: 1 %
BUN SERPL-MCNC: 18 MG/DL (ref 7–20)
CALCIUM SERPL-MCNC: 8.6 MG/DL (ref 8.3–10.6)
CHLORIDE SERPL-SCNC: 102 MMOL/L (ref 99–110)
CO2 SERPL-SCNC: 24 MMOL/L (ref 21–32)
CREAT SERPL-MCNC: 1 MG/DL (ref 0.6–1.2)
DEPRECATED RDW RBC AUTO: 14.1 % (ref 12.4–15.4)
EOSINOPHIL # BLD: 0.4 K/UL (ref 0–0.6)
EOSINOPHIL NFR BLD: 2.2 %
GFR SERPLBLD CREATININE-BSD FMLA CKD-EPI: 54 ML/MIN/{1.73_M2}
GLUCOSE SERPL-MCNC: 106 MG/DL (ref 70–99)
HCT VFR BLD AUTO: 36.9 % (ref 36–48)
HGB BLD-MCNC: 12.5 G/DL (ref 12–16)
LYMPHOCYTES # BLD: 1.9 K/UL (ref 1–5.1)
LYMPHOCYTES NFR BLD: 11.5 %
MCH RBC QN AUTO: 28 PG (ref 26–34)
MCHC RBC AUTO-ENTMCNC: 33.9 G/DL (ref 31–36)
MCV RBC AUTO: 82.6 FL (ref 80–100)
MONOCYTES # BLD: 0.8 K/UL (ref 0–1.3)
MONOCYTES NFR BLD: 4.9 %
NEUTROPHILS # BLD: 13.5 K/UL (ref 1.7–7.7)
NEUTROPHILS NFR BLD: 80.4 %
PLATELET # BLD AUTO: 352 K/UL (ref 135–450)
PMV BLD AUTO: 8.1 FL (ref 5–10.5)
POTASSIUM SERPL-SCNC: 4.4 MMOL/L (ref 3.5–5.1)
PROCALCITONIN SERPL IA-MCNC: 0.19 NG/ML (ref 0–0.15)
RBC # BLD AUTO: 4.47 M/UL (ref 4–5.2)
SODIUM SERPL-SCNC: 135 MMOL/L (ref 136–145)
WBC # BLD AUTO: 16.8 K/UL (ref 4–11)

## 2023-11-24 PROCEDURE — 80048 BASIC METABOLIC PNL TOTAL CA: CPT

## 2023-11-24 PROCEDURE — 2060000000 HC ICU INTERMEDIATE R&B

## 2023-11-24 PROCEDURE — 36415 COLL VENOUS BLD VENIPUNCTURE: CPT

## 2023-11-24 PROCEDURE — 97530 THERAPEUTIC ACTIVITIES: CPT

## 2023-11-24 PROCEDURE — 94760 N-INVAS EAR/PLS OXIMETRY 1: CPT

## 2023-11-24 PROCEDURE — 6370000000 HC RX 637 (ALT 250 FOR IP): Performed by: INTERNAL MEDICINE

## 2023-11-24 PROCEDURE — 6370000000 HC RX 637 (ALT 250 FOR IP): Performed by: HOSPITALIST

## 2023-11-24 PROCEDURE — 6370000000 HC RX 637 (ALT 250 FOR IP): Performed by: STUDENT IN AN ORGANIZED HEALTH CARE EDUCATION/TRAINING PROGRAM

## 2023-11-24 PROCEDURE — 85025 COMPLETE CBC W/AUTO DIFF WBC: CPT

## 2023-11-24 PROCEDURE — 99024 POSTOP FOLLOW-UP VISIT: CPT | Performed by: INTERNAL MEDICINE

## 2023-11-24 PROCEDURE — 97110 THERAPEUTIC EXERCISES: CPT

## 2023-11-24 PROCEDURE — 99232 SBSQ HOSP IP/OBS MODERATE 35: CPT | Performed by: INTERNAL MEDICINE

## 2023-11-24 PROCEDURE — 84145 PROCALCITONIN (PCT): CPT

## 2023-11-24 RX ORDER — CIPROFLOXACIN 500 MG/1
500 TABLET, FILM COATED ORAL EVERY 12 HOURS SCHEDULED
Status: DISCONTINUED | OUTPATIENT
Start: 2023-11-24 | End: 2023-11-25 | Stop reason: HOSPADM

## 2023-11-24 RX ADMIN — LISINOPRIL 40 MG: 40 TABLET ORAL at 08:30

## 2023-11-24 RX ADMIN — GABAPENTIN 100 MG: 100 CAPSULE ORAL at 13:52

## 2023-11-24 RX ADMIN — ROSUVASTATIN CALCIUM 10 MG: 10 TABLET, FILM COATED ORAL at 08:30

## 2023-11-24 RX ADMIN — FUROSEMIDE 20 MG: 20 TABLET ORAL at 08:30

## 2023-11-24 RX ADMIN — PANTOPRAZOLE SODIUM 40 MG: 40 TABLET, DELAYED RELEASE ORAL at 06:07

## 2023-11-24 RX ADMIN — CIPROFLOXACIN 500 MG: 500 TABLET, FILM COATED ORAL at 20:14

## 2023-11-24 RX ADMIN — SPIRONOLACTONE 50 MG: 25 TABLET ORAL at 08:32

## 2023-11-24 RX ADMIN — METRONIDAZOLE 500 MG: 500 TABLET ORAL at 20:14

## 2023-11-24 RX ADMIN — METRONIDAZOLE 500 MG: 500 TABLET ORAL at 13:52

## 2023-11-24 RX ADMIN — METRONIDAZOLE 500 MG: 500 TABLET ORAL at 06:07

## 2023-11-24 RX ADMIN — GABAPENTIN 100 MG: 100 CAPSULE ORAL at 20:14

## 2023-11-24 RX ADMIN — CIPROFLOXACIN 250 MG: 500 TABLET, FILM COATED ORAL at 08:30

## 2023-11-24 RX ADMIN — GABAPENTIN 100 MG: 100 CAPSULE ORAL at 08:30

## 2023-11-24 NOTE — TELEPHONE ENCOUNTER
Please schedule Abdirizak Reina for a ERA with Dr. Izzy Chaidez for Aortic Stenosis    I have placed the order      Thanks        What is a ERA (transesophageal echocardiogram)? A standard echo is performed by passing an ultrasound probe over your heart from outside the body. By using ultrasound waves, which are not felt and are harmless, a motion picture is made of your heart as it beats in your chest. This motion picture is recorded on videotape and studied by a cardiologist.    A ERA provides clearer and more detailed pictures than a standard echo. Since your esophagus (the passageway from your mouth to your stomach) passes very close to your heart, placing the ultrasound probe down the esophagus will give a much better and more detailed picture of your heart. A ERA can tell many important things about your heart including its size, how strongly it pumps blood, and how well the valves are working. It is also very useful for identifying many of the common problems that can occur with the heart and for visualizing heart structures not seen with the standard echocardiogram. This makes it a very useful test for detecting the presence of any blood clot in the upper chambers of the heart and for providing information about your heart's overall health. Your ERA will be performed by a cardiologist who has special expertise in this procedure. The cardiologist will be assisted by a special ultrasound technician called a Sonographer. A nurse will also be present to administer medications, monitor your blood pressure and pulse, and supervise the recovery process. Instructions for your ERA:    Please notify us before the procedure if you are allergic to anything, especially nickel or any type of jewelry. This is very important. Do not eat or drink anything at all after midnight (or 8 hours) prior to the procedure.   Please take your morning medication with a small amount of water at your normally scheduled time, including blood

## 2023-11-24 NOTE — PROGRESS NOTES
Infectious Diseases Inpatient Progress Note      CHIEF COMPLAINT:     Syncope  UTI  Sal  Wbc ELEVATION  Diarrhea  E coli infection      HISTORY OF PRESENT ILLNESS:  80 y.o.  WOMAN with h/o avr, Recurrent UTI,htn, CVA history,admitted with syncopal episodes from Audiologist office, pt  passed out x 3 times and once while on the toilet, EMS was called, her HR was in 40'S on admit with hypotension 70, and now in CVICU for monitoring. She had SAVR in 2017. She has h/o Bacteremia E coli in April when she had UTI, she had one episode of emesis and some abd pain her UA is abnormal on admit and Blood cx in process, she is in SAL with WBC elevation and we are consulted for recommendations. Interval History:  no fevers no chills  s/p PPM on 11/22 and WBC elevated now trend down and no diarrhea her d/c was canceled due to an episode of unresponsiveness on 11/22  -no further episodes    Past Medical History:    Past Medical History:   Diagnosis Date    Aortic stenosis     Cerebrovascular disease     Class 1 obesity     Hepatic steatosis     Hiatal hernia     Hyperlipidemia     Hypertension     Osteoarthritis        Past Surgical History:    Past Surgical History:   Procedure Laterality Date    AORTIC VALVE REPLACEMENT  03/09/2017    19mm [place] pericardial Magna Ease    BLEPHAROPLASTY Bilateral 6/7/2021    BILATERAL UPPER LIDS BLEPHAROPLASTY performed by Shruthi Ribeiro MD at [address] Bilateral 01/2019    PRE-MALIGNANT / BENIGN SKIN LESION EXCISION Right 2014    R scalp. cystadenoma. --done under local    UPPER GASTROINTESTINAL ENDOSCOPY N/A 5/24/2023    EGD DILATION BALLOON TO 18mm performed by Carmelita Rose MD at [address] [facility]       Current Medications:    Outpatient Medications Marked as Taking for the 11/14/23 encounter [facility] Encounter)   Medication Sig Dispense Refill    furosemide (LASIX) 20 MG tablet Take 1 tablet by mouth daily 60 tablet 3    metroNIDAZOLE (FLAGYL) 500 MG Units Date/Time   Culture, Blood 1 [[id number]]    Order Status: Sent Specimen: Blood    Culture, Blood 2 [[id number]]    Order Status: Sent Specimen: Blood    Culture, Urine [[id number]] Collected: 11/14/23 1247   Order Status: No result Updated: 11/14/23 1347        GI Bacterial Pathogens By PCR [[id number]] Collected: 11/15/23 1148   Order Status: Completed Specimen: Stool Updated: 11/16/23 0211    GI Bacterial Pathogens By PCR --    No Shigella spp/EIEC DNA detected   No Shiga toxin-producing gene(s) detected   No Campylobacter spp. (jejuni and coli)DNA detected   No Salmonella spp. DNA detected   No Vibrio vulnificus/parahaemolyticus/cholerae DNA detected   No Plesiomonas shigelloides DNA detected   No Enterotoxigenic E. coli (ETEC) DNA detected   No Yersinia enterocolitica DNA detected   Normal Range:  None detected    Narrative:     ORDER#: [id number]                          ORDERED BY: Cheryle Milroy   SOURCE: Stool                              COLLECTED:  11/15/23 11:48   ANTIBIOTICS AT ANTONIETA.:                      RECEIVED :  11/15/23 11:53   Culture, Blood 1 [[id number]] Collected: 11/14/23 1619   Order Status: Completed Specimen: Blood Updated: 11/15/23 1715    Blood Culture, Routine No Growth to date. Any change in status will be called. Narrative:     ORDER#: [id number]                          ORDERED BY: Coco Lind   SOURCE: Blood                              COLLECTED:  11/14/23 16:19   ANTIBIOTICS AT ANTONIETA.:                      RECEIVED :  11/14/23 16:49   If child <=2 yrs old please draw pediatric bottle. ~Blood Culture 1   Culture, Blood 2 [[id number]] Collected: 11/14/23 1621   Order Status: Completed Specimen: Blood Updated: 11/15/23 1715    Culture, Blood 2 No Growth to date. Any change in status will be called.    Narrative:     ORDER#: [id number]                          ORDERED BY: Coco Lind   SOURCE: Blood                              COLLECTED:  11/14/23 16:21   ANTIBIOTICS AT ANTONIETA.:

## 2023-11-24 NOTE — CARE COORDINATION
Discharge Planning:    Per chart review, patient s/p PPM implantation on 11/21/2023. Patient from home with her grandson. Family lives close and can assist as needed. Patient's daughter to transport home when patient is medically ready for discharge. Will continue to follow for updates.     Electronically signed by Charbel Russell RN on 11/24/2023 at 9:10 AM

## 2023-11-24 NOTE — PROGRESS NOTES
Physical Therapy  Facility/Department: [facility] 5 PROGRESSIVE CARE  Daily Treatment Note  NAME: Yoly Millan  : 1935  MRN: [id number]    Date of Service: 2023    Discharge Recommendations:  Home with assist PRN, Home with Home health PT, Patient would benefit from continued therapy after discharge   PT Equipment Recommendations  Equipment Needed: No    Patient Diagnosis(es): The primary encounter diagnosis was Syncope and collapse. A diagnosis of Symptomatic bradycardia was also pertinent to this visit. Assessment   Assessment: Pt able to complete bed mobility with Min A d/t pacer precautions, but the remainder of mobility tasks with supervision. Discussed D/C plan. Family able to assist.  Pt agreeable to HHPT. Anticipate safe return home with this level of care. Yoly Millan scored a 18/24 on the AM-PAC short mobility form. If patient discharges prior to next session this note will serve as a discharge summary. Please see below for the latest assessment towards goals. Activity Tolerance: Patient tolerated treatment well  Equipment Needed: No     Plan    Physical Therapy Plan  General Plan: 2-3 times per week  Current Treatment Recommendations: Strengthening; Therapeutic activities; Functional mobility training;Transfer training;Gait training; Safety education & training;Patient/Caregiver education & training     Restrictions  Restrictions/Precautions  Restrictions/Precautions: Fall Risk  Position Activity Restriction  Other position/activity restrictions: 1. Bedrest x 12 hours then ambulate only if there is no evidence of failure to sense, capture or pace    2. Do not position patient on affected side during bedrest  3. Do not elevate affected arm above shoulder for 30 days    4. Instruct patient to keep wound clean and dry and no showering for one week     Subjective    Subjective  Subjective: Pleasant and agreeable to session. Wants to go home soon.   Orientation  Overall Orientation Status: Within Functional Limits     Objective     Bed Mobility Training  Bed Mobility Training: Yes  Overall Level of Assistance: Minimum assistance  Supine to Sit: Minimum assistance    Balance  Sitting: Intact  Standing: Intact  Transfer Training  Transfer Training: Yes  Overall Level of Assistance: Supervision  Sit to Stand: Supervision  Stand to Sit: Supervision    Gait Training  Gait Training: Yes  Gait  Overall Level of Assistance: Supervision  Distance (ft): 100 Feet  Assistive Device: Walker, rolling  Step Length: Right shortened;Left shortened     PT Exercises  Exercise Treatment: Sit < > Stand x 10, supervision, cues to avoid using L hand. Static stand x 5 min. with walker support, supervision, no LOB, no c/o dizziness. Safety Devices  Type of Devices: All fall risk precautions in place;Call light within reach; Left in chair  Restraints  Restraints Initially in Place: No     Goals  Short Term Goals  Time Frame for Short Term Goals: Upon d/c acute care setting. Short Term Goal 1: Bed Mob Independent. - not observed  Short Term Goal 2: Transfers with/without assist device Supervision. - met  Short Term Goal 3: Amb with/without assist device 50-75' SBA/Supervision. 11/20 Goal revised : Amb with/without assist device 150-200' SBA. - met  Patient Goals   Patient Goals : Return home with family assist/support. Education  Patient Education  Education Given To: Patient; Family  Education Provided: Role of Therapy;Plan of Care;Home Exercise Program;Fall Prevention Strategies; Energy Conservation  Education Provided Comments: reviewed pacemaker precautions, technique for bed mobility and transfers    Therapy Time   Individual Concurrent Group Co-treatment   Time In 0852         Time Out 0915         Minutes 23         Timed Code Treatment Minutes: 23 Minutes       Elijah Ny PT   Electronically signed by Elijah Ny PT [id number] on 11/24/2023 at 9:20 AM

## 2023-11-25 VITALS
OXYGEN SATURATION: 95 % | TEMPERATURE: 98.1 F | BODY MASS INDEX: 26.64 KG/M2 | WEIGHT: 141.09 LBS | HEIGHT: 61 IN | DIASTOLIC BLOOD PRESSURE: 80 MMHG | SYSTOLIC BLOOD PRESSURE: 144 MMHG | HEART RATE: 108 BPM | RESPIRATION RATE: 17 BRPM

## 2023-11-25 LAB
ANION GAP SERPL CALCULATED.3IONS-SCNC: 7 MMOL/L (ref 3–16)
BASOPHILS # BLD: 0.1 K/UL (ref 0–0.2)
BASOPHILS NFR BLD: 1.1 %
BUN SERPL-MCNC: 12 MG/DL (ref 7–20)
CALCIUM SERPL-MCNC: 8.3 MG/DL (ref 8.3–10.6)
CHLORIDE SERPL-SCNC: 103 MMOL/L (ref 99–110)
CO2 SERPL-SCNC: 25 MMOL/L (ref 21–32)
CREAT SERPL-MCNC: 0.8 MG/DL (ref 0.6–1.2)
CRP SERPL-MCNC: 11.2 MG/L (ref 0–5.1)
DEPRECATED RDW RBC AUTO: 13.9 % (ref 12.4–15.4)
EOSINOPHIL # BLD: 0.4 K/UL (ref 0–0.6)
EOSINOPHIL NFR BLD: 3.5 %
ERYTHROCYTE [SEDIMENTATION RATE] IN BLOOD BY WESTERGREN METHOD: 37 MM/HR (ref 0–30)
GFR SERPLBLD CREATININE-BSD FMLA CKD-EPI: >60 ML/MIN/{1.73_M2}
GLUCOSE SERPL-MCNC: 95 MG/DL (ref 70–99)
HCT VFR BLD AUTO: 33.1 % (ref 36–48)
HGB BLD-MCNC: 11.3 G/DL (ref 12–16)
LYMPHOCYTES # BLD: 1.5 K/UL (ref 1–5.1)
LYMPHOCYTES NFR BLD: 14 %
MCH RBC QN AUTO: 27.9 PG (ref 26–34)
MCHC RBC AUTO-ENTMCNC: 34 G/DL (ref 31–36)
MCV RBC AUTO: 81.9 FL (ref 80–100)
MONOCYTES # BLD: 0.6 K/UL (ref 0–1.3)
MONOCYTES NFR BLD: 6 %
NEUTROPHILS # BLD: 7.9 K/UL (ref 1.7–7.7)
NEUTROPHILS NFR BLD: 75.4 %
PLATELET # BLD AUTO: 322 K/UL (ref 135–450)
PMV BLD AUTO: 8.4 FL (ref 5–10.5)
POTASSIUM SERPL-SCNC: 4 MMOL/L (ref 3.5–5.1)
RBC # BLD AUTO: 4.05 M/UL (ref 4–5.2)
SODIUM SERPL-SCNC: 135 MMOL/L (ref 136–145)
WBC # BLD AUTO: 10.4 K/UL (ref 4–11)

## 2023-11-25 PROCEDURE — 36415 COLL VENOUS BLD VENIPUNCTURE: CPT

## 2023-11-25 PROCEDURE — 6370000000 HC RX 637 (ALT 250 FOR IP): Performed by: INTERNAL MEDICINE

## 2023-11-25 PROCEDURE — 6370000000 HC RX 637 (ALT 250 FOR IP): Performed by: HOSPITALIST

## 2023-11-25 PROCEDURE — 6370000000 HC RX 637 (ALT 250 FOR IP): Performed by: STUDENT IN AN ORGANIZED HEALTH CARE EDUCATION/TRAINING PROGRAM

## 2023-11-25 PROCEDURE — 86140 C-REACTIVE PROTEIN: CPT

## 2023-11-25 PROCEDURE — 80048 BASIC METABOLIC PNL TOTAL CA: CPT

## 2023-11-25 PROCEDURE — 94760 N-INVAS EAR/PLS OXIMETRY 1: CPT

## 2023-11-25 PROCEDURE — 85652 RBC SED RATE AUTOMATED: CPT

## 2023-11-25 PROCEDURE — 85025 COMPLETE CBC W/AUTO DIFF WBC: CPT

## 2023-11-25 RX ORDER — CIPROFLOXACIN 500 MG/1
500 TABLET, FILM COATED ORAL 2 TIMES DAILY
Qty: 6 TABLET | Refills: 0 | Status: CANCELLED | OUTPATIENT
Start: 2023-11-25 | End: 2023-11-28

## 2023-11-25 RX ORDER — AMLODIPINE BESYLATE 5 MG/1
5 TABLET ORAL DAILY
Qty: 30 TABLET | Refills: 3 | Status: SHIPPED | OUTPATIENT
Start: 2023-11-25

## 2023-11-25 RX ORDER — METRONIDAZOLE 500 MG/1
500 TABLET ORAL EVERY 8 HOURS SCHEDULED
Qty: 9 TABLET | Refills: 0 | Status: SHIPPED | OUTPATIENT
Start: 2023-11-25 | End: 2023-11-28

## 2023-11-25 RX ORDER — FUROSEMIDE 20 MG/1
20 TABLET ORAL DAILY
Qty: 60 TABLET | Refills: 3 | Status: SHIPPED | OUTPATIENT
Start: 2023-11-25 | End: 2023-11-30

## 2023-11-25 RX ORDER — CIPROFLOXACIN 500 MG/1
500 TABLET, FILM COATED ORAL 2 TIMES DAILY
Qty: 6 TABLET | Refills: 0 | Status: SHIPPED | OUTPATIENT
Start: 2023-11-25 | End: 2023-11-28

## 2023-11-25 RX ORDER — FUROSEMIDE 20 MG/1
20 TABLET ORAL DAILY
Qty: 60 TABLET | Refills: 0 | Status: CANCELLED | OUTPATIENT
Start: 2023-11-25

## 2023-11-25 RX ADMIN — FUROSEMIDE 20 MG: 20 TABLET ORAL at 09:30

## 2023-11-25 RX ADMIN — PANTOPRAZOLE SODIUM 40 MG: 40 TABLET, DELAYED RELEASE ORAL at 06:53

## 2023-11-25 RX ADMIN — METRONIDAZOLE 500 MG: 500 TABLET ORAL at 06:53

## 2023-11-25 RX ADMIN — GABAPENTIN 100 MG: 100 CAPSULE ORAL at 09:30

## 2023-11-25 RX ADMIN — GABAPENTIN 100 MG: 100 CAPSULE ORAL at 13:52

## 2023-11-25 RX ADMIN — LISINOPRIL 40 MG: 40 TABLET ORAL at 09:30

## 2023-11-25 RX ADMIN — ROSUVASTATIN CALCIUM 10 MG: 10 TABLET, FILM COATED ORAL at 09:30

## 2023-11-25 RX ADMIN — SPIRONOLACTONE 50 MG: 25 TABLET ORAL at 09:30

## 2023-11-25 RX ADMIN — METRONIDAZOLE 500 MG: 500 TABLET ORAL at 13:52

## 2023-11-25 RX ADMIN — CIPROFLOXACIN 500 MG: 500 TABLET, FILM COATED ORAL at 09:30

## 2023-11-25 ASSESSMENT — PAIN SCALES - GENERAL
PAINLEVEL_OUTOF10: 0

## 2023-11-25 NOTE — PROGRESS NOTES
neurologic deficits noted. PT/INR: No results for input(s): \"PROTIME\", \"INR\" in the last 72 hours. APTT: No results for input(s): \"APTT\" in the last 72 hours. CBC:   Recent Labs     11/23/23  0625 11/24/23  0547 11/25/23  0430   WBC 19.2* 16.8* 10.4   HGB 12.3 12.5 11.3*   HCT 37.4 36.9 33.1*   MCV 82.7 82.6 81.9    352 322       BMP:   Recent Labs     11/23/23  0625 11/24/23  0547 11/25/23  0430   * 135* 135*   K 4.1 4.4 4.0   CL 97* 102 103   CO2 23 24 25   BUN 19 18 12   CREATININE 1.0 1.0 0.8       LIVER PROFILE: No results for input(s): \"ALKPHOS\", \"AST\", \"ALT\", \"ALB\", \"BILIDIR\", \"BILITOT\" in the last 72 hours. No results for input(s): \"AMYLASE\" in the last 72 hours. No results for input(s): \"LIPASE\" in the last 72 hours. UA:  Recent Labs     11/23/23  1821   WBCUA 2   RBCUA 2       TROPONIN: No results for input(s): \"CKTOTAL\", \"TROPONINI\" in the last 72 hours. Lab Results   Component Value Date/Time    URRFLXCULT Not Indicated 11/23/2023 06:21 PM       No results for input(s): \"TSHREFLEX\" in the last 72 hours. No components found for: \"GZR8029\"  POC GLUCOSE:    Recent Labs     11/22/23  1352   POCGLU 147*     No results for input(s): \"LABA1C\" in the last 72 hours. Lab Results   Component Value Date/Time    LABA1C 5.6 12/29/2022 09:43 AM     Overall, left ventricular systolic function appears normal with an ejection   fraction of 65%. (11/14/2023)    ASSESSMENT AND PLAN  Leukocytosis and tachycardia  chest x-ray and urinalysis no significant abnormalities  Check procalcitonin improving  Wbc count down today.   Continue on ciprofloxacin and Flagyl  ID to recommend duration of antibiotics  Check crp and esr for am      Symptomatic bradycardia  11/22-patient is status post pacemaker placement  Patient again passed out while in the chair  Cardiology does not believe it to be a cardiac event  Continue to monitor for now        Severe sepsis (tachycardia, leukocytosis, lactic acidosis)

## 2023-11-25 NOTE — PLAN OF CARE
Problem: Safety - Adult  Goal: Free from fall injury  11/24/2023 2339 by Paris Hernández RN  Outcome: Progressing  11/24/2023 1138 by Ashanti Merchant RN  Outcome: Progressing     Problem: Discharge Planning  Goal: Discharge to home or other facility with appropriate resources  11/24/2023 2339 by Paris Hernández RN  Outcome: Progressing  11/24/2023 1138 by Ashanti Merchant RN  Outcome: Progressing

## 2023-11-25 NOTE — PLAN OF CARE
Problem: Safety - Adult  Goal: Free from fall injury  11/25/2023 1112 by Janene Gallardo RN  Outcome: Progressing     Problem: Skin/Tissue Integrity - Adult  Goal: Skin integrity remains intact  Recent Flowsheet Documentation  Taken 11/25/2023 1111 by Janene Gallardo RN  Skin Integrity Remains Intact: Monitor for areas of redness and/or skin breakdown  Taken 11/25/2023 1041 by Janene Gallardo RN  Skin Integrity Remains Intact: Monitor for areas of redness and/or skin breakdown

## 2023-11-25 NOTE — CARE COORDINATION
Received D/C order. PT/OT rec [facility]. Spoke with the patient and her daughter who advised they do not have a choice for home care and confirmed she lives in [place], [place]. They are agreeable to a referral to [address] and if they cannot accept her or her insurance, they are agreeable to any company that can take her insurance in her area. Referral made to [facility]. Will follow up Monday.   Eloisa Thorne RN, BSN, Case Management  Phone: [phone]  Electronically signed by Eloisa Thorne RN on 11/25/2023 at 5:32 PM           Case Management Discharge Note          Date / Time of Note: 11/25/2023 5:38 PM                  Patient Name: Nelia Castañeda   YOB: 1935  Diagnosis: Syncope and collapse [R55]  Symptomatic bradycardia [R00.1]   Date / Time: 11/14/2023 10:46 AM    Financial:  Payor: Beena Powers / Plan: Renate Steven PPO / Product Type: Medicare /      Pharmacy:    [address] [place] [address] [phone] - F [phone]  [place] [address]  Phone: [phone] Fax: [phone]    [address] [phone] - f [phone]  [address] [address]  Phone: [phone] Fax: [phone], [facility] [address] [phone] - f [phone]  [address] [place] [address]  Phone: [phone] Fax: [phone]      Assistance purchasing medications?: Potential Assistance Purchasing Medications: No  Assistance provided by Case Management: None at this time    DISCHARGE Disposition: Home with [address]:  Home Care ordered at discharge: Yes  [address]: [facility]  Phone: [phone]  Fax: [phone] - referral sent - will follow up Monday  Orders faxed: No    Transportation:  Transportation PLAN for

## 2023-11-25 NOTE — PROGRESS NOTES
Patient has medications at bedside delivered by pharmacy: Amlodipine, Ciprofloxacin, Metronidazole, and Furosemide. RN reviewed medications and schedule with patient and family. Patient/family verbalize understanding.

## 2023-11-25 NOTE — PROGRESS NOTES
Discharge orders acknowledged by RN . Discharge teaching completed with pt and family. AVS reviewed and all questions answered. Medication regimen reviewed and pt understands schedule. Follow up appointments also reviewed with pt and resources given for discharge. Pt was sent electronic to be filled and understands schedule. IV removed. Bedside monitor removed from pt. 60+ minutes of education completed. Required core measures completed. Pt vitals WDL. Pt discharged with all belongings to home with family and [address]. Pt transported off of unit via wheelchair. No complications.

## 2023-11-25 NOTE — DISCHARGE INSTR - COC
Continuity of Care Form    Patient Name: Misti Baez   :  1935  MRN:  [id number]    Admit date:  2023  Discharge date:  2023    Code Status Order: DNR-CCA   Advance Directives:     Admitting Physician:  Elo Renteria MD  PCP: Marcella Enrique MD    Discharging Nurse: Favian Marion Ellouise Hatchet, [address] Unit/Room#: R4Q-3277/9994-90  Discharging Unit Phone Number: [phone]    Emergency Contact:   Extended Emergency Contact Information  Primary Emergency Contact: Soo [facility] of [address] Phone: [phone]  Mobile Phone: [phone]  Relation: Child  Secondary Emergency Contact: Jessica Samson [facility] of [address] Phone: [phone]  Mobile Phone: [phone]  Relation: Child    Past Surgical History:  Past Surgical History:   Procedure Laterality Date    AORTIC VALVE REPLACEMENT  2017    19mm [place] pericardial Magna Ease    BLEPHAROPLASTY Bilateral 2021    BILATERAL UPPER LIDS BLEPHAROPLASTY performed by Aminta Moya MD at [address] Bilateral 2019    PRE-MALIGNANT / BENIGN SKIN LESION EXCISION Right 2014    R scalp. cystadenoma. --done under local    UPPER GASTROINTESTINAL ENDOSCOPY N/A 2023    EGD DILATION BALLOON TO 18mm performed by Alissa Frazier MD at [facility] ENDOSCOPY       Immunization History:   Immunization History   Administered Date(s) Administered    COVID-19, MODERNA BLUE border, Primary or Immunocompromised, (age 12y+), IM, 100 mcg/0.5mL 2021, 2021    COVID-19, MODERNA Booster BLUE border, (age 18y+), IM, 50mcg/0.25mL 2021    Influenza 2011    Influenza Virus Vaccine 2009, 2010    Influenza, FLUARIX, FLULAVAL, FLUZONE (age 10 mo+) AND AFLURIA, (age 1 y+), PF, 0.5mL 12/15/2016    Influenza, High Dose (Fluzone 65 yrs and older) 01/10/2013, 10/28/2013, 10/22/2014, 2017, 2018    Pneumococcal, PCV-13, PREVNAR 13, (age 6w+), IM, 0.5mL 2015    Pneumococcal,

## 2023-11-25 NOTE — PLAN OF CARE
Problem: Safety - Adult  Goal: Free from fall injury  11/25/2023 1633 by Tu Rendon RN  Outcome: Completed     Problem: Skin/Tissue Integrity - Adult  Goal: Skin integrity remains intact  Recent Flowsheet Documentation  Taken 11/25/2023 1111 by Tu Rendon RN  Skin Integrity Remains Intact: Monitor for areas of redness and/or skin breakdown  Taken 11/25/2023 1041 by Tu Rendon RN  Skin Integrity Remains Intact: Monitor for areas of redness and/or skin breakdown

## 2023-11-26 NOTE — CARE COORDINATION
DISCHARGE PLANNING:    Received message from [facility], they are out of network with patient's insurance. Referral sent to [facility].  Will follow up with agency. #[phone]  Electronically signed by Monika Braun RN on 11/26/2023 at 9:14 AM    [facility] is out of network with patient's insurance. Referral sent to [facility]. Will follow up with agency.     #[phone]  Electronically signed by Monika Braun RN on 11/26/2023 at 9:31 AM

## 2023-11-27 ENCOUNTER — TELEPHONE (OUTPATIENT)
Dept: FAMILY MEDICINE CLINIC | Age: 88
End: 2023-11-27

## 2023-11-27 LAB
BACTERIA BLD CULT ORG #2: NORMAL
BACTERIA BLD CULT: NORMAL

## 2023-11-27 NOTE — TELEPHONE ENCOUNTER
Care Transitions Initial Follow Up Call    Outreach made within 2 business days of discharge: Yes    Patient: Remington Rodriguez Patient : 1935   MRN: [id number]  Reason for Admission: There are no discharge diagnoses documented for the most recent discharge. Discharge Date: 23       Spoke with: Ilana Casanova (daughter, on HIPAA)     Discharge department/facility: [address] [place] [address] Interactive Patient Contact:  Was patient able to fill all prescriptions: Yes  Was patient instructed to bring all medications to the follow-up visit: Yes  Is patient taking all medications as directed in the discharge summary?  Yes  Does patient understand their discharge instructions: Yes  Does patient have questions or concerns that need addressed prior to 7-14 day follow up office visit: no    Scheduled appointment with PCP within 7-14 days    Follow Up  Future Appointments   Date Time Provider [address] [place] [address]   2023  9:00 AM SCHEDULE, [facility] DEVICE CHECK [facility]   2023 11:00 AM [facility]   2024 10:30 AM SCHEDULE, [facility], [facility]

## 2023-11-27 NOTE — CARE COORDINATION
[address] [facility] [address] will follow up with patient for home care needs.     #[phone]  Electronically signed by Manpreet Marcum RN on 11/27/2023 at 10:10 AM

## 2023-11-27 NOTE — TELEPHONE ENCOUNTER
Telephone encounter noted from 11/24 to schedule ERA for patient. Will check with  for plan. Patient has appointment 11/29/23 for 1 week device check. Messages noted in chart. A separate telephone encounter was started by India NUÑEZ. I called and spoke to María Hebert today and clarified with [facility] NP that it was for possible TAVR work up. María Hebert made aware. This encounter will be closed.

## 2023-11-27 NOTE — CARE COORDINATION
[facility]    Referral received from  to follow for home care services. Carly Alla is OON with payor, will require alternate agency, no preference, CM aware. Referral accepted by Edilson Lopez with [facility], orders faxed to [phone].     Gissel Castillo RN, BSN CTN  [facility] [phone]

## 2023-11-27 NOTE — TELEPHONE ENCOUNTER
Pts daughter called in abut scheduling the ERA. Advised something is being confirmed and she should get a call tomorrow.

## 2023-11-28 ENCOUNTER — TELEPHONE (OUTPATIENT)
Dept: FAMILY MEDICINE CLINIC | Age: 88
End: 2023-11-28

## 2023-11-28 NOTE — TELEPHONE ENCOUNTER
Gadiel Phillips with [facility] called to ask for a verbal order for a  to add to patient's care. She did PT evaluation and patient will be seen 2x a week for 4 weeks. Patient also wants to know if she can take otc advil for general pain? Please advise.

## 2023-11-28 NOTE — TELEPHONE ENCOUNTER
Date of Procedure: Friday 12/22/23 @ [facility] with Dr. Eduarda Davis     Time of arrival: 9:00 am     Procedure time: 10:00 am     Called and spoke to [facility] daughter and she is agreeable to date and time. Reviewed instructions and she verbalized understanding. Encouraged to call with any questions or concerns. Published on [employer] and e-mail to Antonia Hodge.

## 2023-11-29 ENCOUNTER — NURSE ONLY (OUTPATIENT)
Dept: CARDIOLOGY CLINIC | Age: 88
End: 2023-11-29
Payer: MEDICARE

## 2023-11-29 DIAGNOSIS — R55 SYNCOPE AND COLLAPSE: ICD-10-CM

## 2023-11-29 DIAGNOSIS — R00.1 SYMPTOMATIC BRADYCARDIA: ICD-10-CM

## 2023-11-29 DIAGNOSIS — Z95.0 CARDIAC PACEMAKER IN SITU: Primary | ICD-10-CM

## 2023-11-29 PROCEDURE — 93280 PM DEVICE PROGR EVAL DUAL: CPT | Performed by: INTERNAL MEDICINE

## 2023-11-29 NOTE — PROGRESS NOTES
Physician Progress Note      Mary Blood  CSN #:                  [id number]  :                       1935  ADMIT DATE:       2023 10:46 AM  DISCH DATE:        2023 6:12 PM  RESPONDING  PROVIDER #:        Clau Parrish MD          QUERY TEXT:    Dear Dr. Dorita Batres,  Pt admitted  with per H and P suspected SSS, LYNDA, Leukocytosis possible   UTI . Pt noted to have on 11/15- Progress note \"severe sepsis\" and was   found to have E-coli UTI. If possible, please document in progress notes and   discharge summary the present on admission status of Sepsis:    The medical record reflects the following:  Risk Factors: Hx HTN, HLD, Aortic valve replacement  Clinical Indicators:  ED for syncopal episode, WBC=13.7, Neutrophils=9.2,   UA+, creat=1.7, Trop=35, B/P dropped to 70/50. H and P notes \"1. Possible   urinary tract infection-Leukocytosis; afebrile;-Patient denies dysuria, but   she does have some suprapubic pain-UA shows bacteriuria and pyuria;\". On 11/15   PCP notes \"Severe sepsis (tachycardia, leukocytosis, lactic acidosis) 2/2   colitis; possible UTI-WBC count jumped from 13.7-35;\" later PCP and on last   entry  \"Severe sepsis (tachycardia, leukocytosis, lactic acidosis) 2/2   colitis?and UTI  Treatment: cultures, ID consult, Rocephin, Cipro, Flagyl, 1.5L fluid bolus  Thank you,  Todd Haines RN, CDS  [email or web address]  Options provided:  -- Yes, Sepsis was present at the time of the order to admit to the hospital  -- No, Sepsis was not present on admission and developed during the inpatient   stay  -- Other - I will add my own diagnosis  -- Disagree - Not applicable / Not valid  -- Disagree - Clinically unable to determine / Unknown  -- Refer to Clinical Documentation Reviewer    PROVIDER RESPONSE TEXT:    Yes, Sepsis was present at the time of the order to admit to the hospital.    Query created by:  Liban Fung on 2023 9:34

## 2023-11-30 ENCOUNTER — TELEPHONE (OUTPATIENT)
Dept: FAMILY MEDICINE CLINIC | Age: 88
End: 2023-11-30

## 2023-11-30 ENCOUNTER — OFFICE VISIT (OUTPATIENT)
Dept: CARDIOLOGY CLINIC | Age: 88
End: 2023-11-30
Payer: MEDICARE

## 2023-11-30 VITALS
WEIGHT: 135.8 LBS | SYSTOLIC BLOOD PRESSURE: 142 MMHG | OXYGEN SATURATION: 95 % | HEART RATE: 110 BPM | BODY MASS INDEX: 25.64 KG/M2 | DIASTOLIC BLOOD PRESSURE: 80 MMHG | HEIGHT: 61 IN

## 2023-11-30 DIAGNOSIS — I35.0 NONRHEUMATIC AORTIC (VALVE) STENOSIS: ICD-10-CM

## 2023-11-30 DIAGNOSIS — R55 SYNCOPE AND COLLAPSE: ICD-10-CM

## 2023-11-30 DIAGNOSIS — Z95.0 CARDIAC PACEMAKER IN SITU: ICD-10-CM

## 2023-11-30 DIAGNOSIS — I10 ESSENTIAL HYPERTENSION: ICD-10-CM

## 2023-11-30 DIAGNOSIS — I49.5 SSS (SICK SINUS SYNDROME) (HCC): Primary | ICD-10-CM

## 2023-11-30 DIAGNOSIS — N30.00 ACUTE CYSTITIS WITHOUT HEMATURIA: ICD-10-CM

## 2023-11-30 LAB
BACTERIA URNS QL MICRO: ABNORMAL /HPF
BILIRUB UR QL STRIP.AUTO: NEGATIVE
CLARITY UR: CLEAR
COLOR UR: YELLOW
EPI CELLS #/AREA URNS AUTO: 14 /HPF (ref 0–5)
GLUCOSE UR STRIP.AUTO-MCNC: NEGATIVE MG/DL
HGB UR QL STRIP.AUTO: NEGATIVE
HYALINE CASTS #/AREA URNS AUTO: 2 /LPF (ref 0–8)
KETONES UR STRIP.AUTO-MCNC: NEGATIVE MG/DL
LEUKOCYTE ESTERASE UR QL STRIP.AUTO: NEGATIVE
NITRITE UR QL STRIP.AUTO: NEGATIVE
PH UR STRIP.AUTO: 6 [PH] (ref 5–8)
PROT UR STRIP.AUTO-MCNC: ABNORMAL MG/DL
RBC CLUMPS #/AREA URNS AUTO: 3 /HPF (ref 0–4)
SP GR UR STRIP.AUTO: 1.01 (ref 1–1.03)
UA DIPSTICK W REFLEX MICRO PNL UR: YES
URN SPEC COLLECT METH UR: ABNORMAL
UROBILINOGEN UR STRIP-ACNC: 0.2 E.U./DL
WBC #/AREA URNS AUTO: 2 /HPF (ref 0–5)

## 2023-11-30 PROCEDURE — 93000 ELECTROCARDIOGRAM COMPLETE: CPT | Performed by: NURSE PRACTITIONER

## 2023-11-30 PROCEDURE — 99024 POSTOP FOLLOW-UP VISIT: CPT | Performed by: NURSE PRACTITIONER

## 2023-11-30 RX ORDER — CARVEDILOL 6.25 MG/1
6.25 TABLET ORAL 2 TIMES DAILY
Qty: 60 TABLET | Refills: 0 | Status: SHIPPED | OUTPATIENT
Start: 2023-11-30

## 2023-11-30 RX ORDER — FUROSEMIDE 20 MG/1
20 TABLET ORAL DAILY PRN
Qty: 60 TABLET | Refills: 3
Start: 2023-11-30

## 2023-11-30 ASSESSMENT — ENCOUNTER SYMPTOMS
VOMITING: 0
BLOOD IN STOOL: 0
BACK PAIN: 0
TROUBLE SWALLOWING: 0
SHORTNESS OF BREATH: 1
COLOR CHANGE: 0
WHEEZING: 0
NAUSEA: 0
SINUS PRESSURE: 0
CONSTIPATION: 0
SORE THROAT: 0
DIARRHEA: 0
COUGH: 0
ABDOMINAL PAIN: 0

## 2023-11-30 NOTE — TELEPHONE ENCOUNTER
Jose Aleger with [facility] care saw patient for homecare & PT. Patient pulse lowest was 110 and highest 120, patient is completely exhausted. Jose Alegre pacer check yesterday was set at 60 beats a minute. Jose Alegre said its defiantly not 60 beats a minute.     Please call, [phone]

## 2023-11-30 NOTE — TELEPHONE ENCOUNTER
I can not comment on this without being seen   If she is having progressive  symptoms and cp sob to the ER

## 2023-11-30 NOTE — PROGRESS NOTES
[facility]   Electrophysiology      Date: 11/30/2023    Primary Cardiologist: Mohit Pizarro MD  PCP: Kimberlyn Zhang MD     Chief Complaint:   Chief Complaint   Patient presents with    Follow-up     History of Present Illness:    I saw Maik Stroud in the office for electrophysiology follow up today. She is a 80 y.o. 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 She was recently admitted to [facility] [place] after having syncope at home. She had an episode similar in the emergency room her her heart rate decreased into the low 40s and she became hypotensive with presyncope. She underwent implantation of a Medtronic dual-chamber pacemaker on 11/21/2023 with Dr. Ada Enriquez. She did have syncope right before her initial discharge was planned while getting to the wheelchair. Device check was repeated afterwards which showed normal device function and no arrhythmias. She was in sinus tachycardia following device placement. She presents today for follow-up. She has been feeling very poorly since her hospitalization. She has a lot of fatigue and extreme dyspnea on minimal exertion. She has noticed her heart rate is up and physical therapy has not been wanting to work with her at home if her pulse is over 100. She denies any syncope or near syncope. No chest pain. Some edema mostly in her right ankle. Blood pressure has been elevated at home. Allergies: Allergies   Allergen Reactions    Adhesive Tape Itching and Dermatitis    Oxycontin [Oxycodone Hcl] Other (See Comments)     \"seeing things\"     Home Medications:  Prior to Visit Medications    Medication Sig Taking?  Authorizing Provider   furosemide (LASIX) 20 MG tablet Take 1 tablet by mouth daily as needed (for weight gain of 2 or more pounds overnight) Yes Margret Juárez APRN - CNP   carvedilol (COREG) 6.25 MG tablet Take 1 tablet by mouth 2 times daily Yes MAICOL Nice CNP
as per ems, pt intox. found drinking in bottle of harris in the parking lot.

## 2023-12-01 NOTE — TELEPHONE ENCOUNTER
Received a message from NP Lyndall Eisenmenger,     Tonie Wetzel,     I saw this pt in office today and she is having a lot of symptoms, I suspect may be from her valve. Any way we can move up her ERA? Needs to be with Dr. Jeevan Lee. Thanks,   Lyndall Eisenmenger       Per Dr. Jeevan Lee:  JOAQUIN Capellan Dr. said can move to next Wednesday 12/6      Date of Procedure: Wednesday 12/6/23    Time of arrival: 1:30 pm     Procedure time: 2:30 pm     Called and spoke to [address] [place] [address] and she is agreeable to the date and time. Encouraged to call with any questions or concerns. I updated Timo Hilario and emailed Katharine Yoder in the cath lab.

## 2023-12-03 NOTE — DISCHARGE SUMMARY
have any questions or concerns please feel free to contact me at [phone]. This note was transcribed using [facility]. Please disregard any translational errors.

## 2023-12-06 ENCOUNTER — HOSPITAL ENCOUNTER (OUTPATIENT)
Dept: CARDIAC CATH/INVASIVE PROCEDURES | Age: 88
Discharge: HOME | End: 2023-12-06
Payer: MEDICARE

## 2023-12-06 VITALS
SYSTOLIC BLOOD PRESSURE: 138 MMHG | HEIGHT: 61 IN | WEIGHT: 134 LBS | DIASTOLIC BLOOD PRESSURE: 59 MMHG | BODY MASS INDEX: 25.3 KG/M2

## 2023-12-06 PROCEDURE — 6360000002 HC RX W HCPCS

## 2023-12-06 PROCEDURE — 99152 MOD SED SAME PHYS/QHP 5/>YRS: CPT

## 2023-12-06 PROCEDURE — 93325 DOPPLER ECHO COLOR FLOW MAPG: CPT

## 2023-12-06 PROCEDURE — 93312 ECHO TRANSESOPHAGEAL: CPT

## 2023-12-06 PROCEDURE — 93321 DOPPLER ECHO F-UP/LMTD STD: CPT

## 2023-12-06 RX ORDER — SODIUM CHLORIDE 0.9 % (FLUSH) 0.9 %
5-40 SYRINGE (ML) INJECTION PRN
Status: DISCONTINUED | OUTPATIENT
Start: 2023-12-06 | End: 2023-12-07 | Stop reason: HOSPADM

## 2023-12-06 RX ORDER — SODIUM CHLORIDE 0.9 % (FLUSH) 0.9 %
5-40 SYRINGE (ML) INJECTION EVERY 12 HOURS SCHEDULED
Status: DISCONTINUED | OUTPATIENT
Start: 2023-12-06 | End: 2023-12-07 | Stop reason: HOSPADM

## 2023-12-06 RX ORDER — SODIUM CHLORIDE 9 MG/ML
INJECTION, SOLUTION INTRAVENOUS PRN
Status: DISCONTINUED | OUTPATIENT
Start: 2023-12-06 | End: 2023-12-07 | Stop reason: HOSPADM

## 2023-12-06 NOTE — DISCHARGE INSTRUCTIONS
Cardiovascular Special Procedures  Transesophageal Echocardiogram  Patient Discharge Instructions      Rest for the remainder of the day. Your throat may be sore, this is common and will go away. If persistent soreness continues, call your physician. If you have any difficulty in swallowing or drinking/eating, call your physician. Resume normal activity tomorrow. Cardiovascular Special Procedures  General Discharge Instructions    PROCEDURE: ____________transesophogeal echocardiogram___________________________    __X_ London Lundberg may be drowsy or lightheaded after receiving sedation. DO NOT operate a vehicle (automobile, bicycle, motorcycle, machinery, or power tools), make any important decisions or sign any important/legal documents, or drink alcoholic beverages for the next 24 hours  __X_ We strongly suggest that a responsible adult be with you for the next 24 hours for your protection and safety  ____ If the intravenous catheter site is painful, apply warm wet compresses on the site until the soreness is relieved and elevate the arm above the heart. Call your physician if no improvement in 2 to 3 days    DIETARY INSTRUCTIONS:    ____ Drink extra fluids over the next 24 hours (If not contraindicated by illness or by                   physician order)  ____ Start with clear liquids and progress to normal diet as you feel like eating. If you experience nausea or repeated episodes of vomiting, which persist beyond 12-24 hours, notify your doctor        __X_ Resume your previous diet      MEDICATION INSTRUCTIONS:    __X_ See Medication Reconciliation Sheet      Follow up with MD as directed    Belongings returned to patient and/or family: Yes. The Discharge Instructions have been explained to me. I understand and can verbalize these instructions.

## 2023-12-06 NOTE — H&P
CC: \"I have no energy. \"      HPI:  The patient is 80 y.o. female with a past medical history significant for hypertension and hyperlipidemia, carotid stenosis, bilateral and heart murmur. She presents today in follow up s/p s/p AVR, tissue 3/9/17 per Dr. Genevieve Marquez. Today, she is here for follow up. She has not had any energy lately. She is unsure why. She is trying to stay as active as she can. She is going to see his PCP. Patient denies exertional chest pain/pressure, dyspnea at rest, LUEVANO, PND, orthopnea, palpitations, lightheadedness, weight changes, changes in LE edema, and syncope. Past Medical History        Past Medical History:   Diagnosis Date    Aortic stenosis      Arthritis       mild hip area    Cerebrovascular disease      Diaphragm, eventration       right    Fatty liver       on CT 2012    Hiatal hernia       on CT 2012    Hyperlipidemia      Hypertension      Syncope 2016     \"twice a year since puberty\"         Past Surgical History[]Expand by Default         Past Surgical History:   Procedure Laterality Date    AORTIC VALVE REPLACEMENT   03/09/2017     19mm [place] pericardial Magna Ease    BLEPHAROPLASTY Bilateral 6/7/2021     BILATERAL UPPER LIDS BLEPHAROPLASTY performed by Olman Pascal MD at [address] Bilateral 01/2019    PRE-MALIGNANT / BENIGN SKIN LESION EXCISION Right 2014     R scalp. cystadenoma. --done under local         Family History         Family History   Problem Relation Age of Onset    Diabetes Mother      Cancer Mother           lung    Other Father           leukemia    Kidney Disease Brother      Parkinsonism Brother           Social History            Tobacco Use    Smoking status: Never    Smokeless tobacco: Never   Vaping Use    Vaping Use: Never used   Substance Use Topics    Alcohol use: Yes       Comment: socially once/week    Drug use: No       Comment: never               Allergies   Allergen Reactions    Adhesive Tape Itching and

## 2023-12-06 NOTE — PROGRESS NOTES
CATH LAB PROCEDURE LOG - TRANSESOPHAGEAL ECHOCARDIOGRAM    PRE PROCEDURE    DATE: 12/6/2023 ARRIVAL TO CATH LAB: 1:42 PM    ID & ALLERGY BAND: On    CONSENT: Yes    NPO SINCE: Midnight    IV SITE: Started in R wrist.     Pt arrived to Cath Lab. Plan of Care: Hemodynamics and cardiac rhythm will remain stable. Comfort level will be maintained. Respiratory function will remain adequate. Pt/family will verbalize understanding of the procedure. Procedure will be tolerated without complications. Patient will recover from procedure without complications. ID armband on patient and identification verified. Informed consent obtained. Non invasive blood pressure cuff applied, monitoring initiated. EKG pads and pulse oximeter applied, monitoring initiated. Instructions given. Patient and / or family verbalize understanding. H&P will be documented by physician in [address] [place] [address]. Pt has been NPO since midnight. TRANSESOPHAGEAL ECHOCARDIOGRAM    Timeout and fire safety completed. TIMEOUT TIME: 2:34 PM    CORRECT PATIENT VERIFIED. MEMBERS OF THE SURGICAL TEAM/VISITORS INTRODUCED. ALLERGIES ANNOUNCED. CORRECT PROCEDURE VERIFIED. CORRECT PROCEDURAL SITE VERIFIED. CONSENTS VERIFIED. IMPLANT EQUIPMENT, ADDITIONAL SERVICES, SPECIAL REQUIREMENTS AVAILABLE. MEDICATIONS LABELED AND AVAILABLE. APPROPRIATE PRE MEDS HAVE BEEN ADMINISTERED. FIRE SAFETY: ALCOHOL PREP SOLUTION HAD SUFFICIENT TIME TO DISSIPATE IF USED.  FIRE SAFETY: SURGICAL SITE OR INCISION ABOVE THE XIPHOID. YES=1, NO=0. FIRE SAFETY: OPEN OXYGEN SOURCE. YES=1, NO=0. FIRE SAFETY: AVAILABLE IGNITION SOURCE. YES=1, NO=0. FIRE SAFETY: SCORE TOTAL = 1.     OXYGEN, SUCTION AND EMERGENCY EQUIPMENT SET UP AND AVAILABLE AT BEDSIDE    Viscous Lidocaine administered per verbal order : No 2:34 PM     Cetacaine spray administered per verbal order: No 2:34 PM     Procedural sedation administered per verbal order protocol via Dr. Casimiro Goodman      Medications verified by Topher Tello

## 2023-12-07 ENCOUNTER — CARE COORDINATION (OUTPATIENT)
Dept: CARE COORDINATION | Age: 88
End: 2023-12-07

## 2023-12-07 NOTE — CARE COORDINATION
Ambulatory Care Coordination Note  2023    Patient Current Location:  Home: [address] [place] [address] [address] [address]     ACM contacted the patient by telephone. Verified name and  with patient as identifiers. Provided introduction to self, and explanation of the ACM role. Challenges to be reviewed by the provider   Additional needs identified to be addressed with provider: No  none               Method of communication with provider: phone. ACM: Дмитрий Hamilton RN    Spoke to patient for cc follow up. Has pcp fu 23. Follows with [facility]. Bradycardia, Medtronic dual chamber PPM on 23 with Dr. Yoni Phillips, ERA 23. Denies any symptoms at this outreach. Sending edu r/t dispatch health, bradycardia, hyper/hypotension. Will follow back up. Plan:   edu sent ana,hyper/hypotension,dispatch for understanding  Fu pcp 23  Do [facility]    Offered patient enrollment in the Remote Patient Monitoring (RPM) program for in-home monitoring: Patient is not eligible for RPM program.    Lab Results       None            Care Coordination Interventions    Referral from Primary Care Provider: No  Suggested Interventions and Community Resources  Zone Management Tools: In Process (Comment: [facility] 23 htn,ana)  Other Services or Interventions: Medtronic dual chamber PPM on 23 with Dr. Jorge Alberto Adamson                   This Visit's Progress     Conditions and Symptoms   On track     I will schedule office visits, as directed by my provider. I will keep my appointment or reschedule if I have to cancel. I will notify my provider of any barriers to my plan of care. I will follow my Zone Management tool to seek urgent or emergent care. I will notify my provider of any symptoms that indicate a worsening of my condition.     Barriers: lack of support, overwhelmed by complexity of regimen, and lack of education  Plan for overcoming my barriers: RN educ, resources, referrals,

## 2023-12-11 ENCOUNTER — TELEPHONE (OUTPATIENT)
Dept: FAMILY MEDICINE CLINIC | Age: 88
End: 2023-12-11

## 2023-12-11 ENCOUNTER — OFFICE VISIT (OUTPATIENT)
Dept: FAMILY MEDICINE CLINIC | Age: 88
End: 2023-12-11
Payer: MEDICARE

## 2023-12-11 VITALS
HEART RATE: 76 BPM | DIASTOLIC BLOOD PRESSURE: 78 MMHG | HEIGHT: 61 IN | SYSTOLIC BLOOD PRESSURE: 120 MMHG | BODY MASS INDEX: 25.3 KG/M2 | WEIGHT: 134 LBS | TEMPERATURE: 97.3 F

## 2023-12-11 DIAGNOSIS — I10 ESSENTIAL HYPERTENSION: Primary | ICD-10-CM

## 2023-12-11 DIAGNOSIS — J34.89 NASAL DRAINAGE: ICD-10-CM

## 2023-12-11 DIAGNOSIS — E78.5 HYPERLIPIDEMIA, UNSPECIFIED HYPERLIPIDEMIA TYPE: ICD-10-CM

## 2023-12-11 PROCEDURE — 1123F ACP DISCUSS/DSCN MKR DOCD: CPT | Performed by: FAMILY MEDICINE

## 2023-12-11 PROCEDURE — 99214 OFFICE O/P EST MOD 30 MIN: CPT | Performed by: FAMILY MEDICINE

## 2023-12-11 NOTE — PROGRESS NOTES
Subjective:      Patient ID: Owen Hendricks is a 80 y.o. female. Chief Complaint   Patient presents with    Follow-Up from [facility] 11- -- 11- \"Symptomatic Bradycardia\"        Patient presents with:   Follow-Up from Hospital: [facility] 11- -- 11- \"Symptomatic Bradycardia\"    Here for the above  She is well other than some fatigue     Here with her daughter   Abdirizak Reina     She has pb swallowing at times  She did have egd done   I reviewed and this was on 5/24/23 dr Black Ponce   Discussed with her daughter and her  Zay Emiliana s not sure but may be pnd is what she is experiencing   Patient tells me mucus drainage head     YOB: 1935    Date of Visit:  12/11/2023     -- Adhesive Tape -- Itching and Dermatitis   -- Oxycontin [Oxycodone Hcl] -- Other (See Comments)    --  \"seeing things\"    Current Outpatient Medications:  furosemide (LASIX) 20 MG tablet, Take 1 tablet by mouth daily as needed (for weight gain of 2 or more pounds overnight), Disp: 60 tablet, Rfl: 3  carvedilol (COREG) 6.25 MG tablet, Take 1 tablet by mouth 2 times daily, Disp: 60 tablet, Rfl: 0  amLODIPine (NORVASC) 5 MG tablet, Take 1 tablet by mouth daily, Disp: 30 tablet, Rfl: 3  rosuvastatin (CRESTOR) 10 MG tablet, TAKE 1 TABLET BY MOUTH EVERY DAY, Disp: 90 tablet, Rfl: 1  lisinopril (PRINIVIL;ZESTRIL) 40 MG tablet, TAKE 1 TABLET DAILY, Disp: 90 tablet, Rfl: 1  pantoprazole (PROTONIX) 40 MG tablet, Take 1 tablet by mouth every morning (before breakfast), Disp: 30 tablet, Rfl: 3  Coenzyme Q10 (COQ-10 PO), Take 1 capsule by mouth at bedtime, Disp: , Rfl:   GLUCOSAMINE-CHONDROITIN PO, Take 1 tablet by mouth in the morning and at bedtime, Disp: , Rfl:   aspirin 325 MG EC tablet, Take 1 tablet by mouth daily, Disp: 30 tablet, Rfl: 3  multivitamin (THERAGRAN) per tablet, Take 1 tablet by mouth daily, Disp: , Rfl:     No current facility-administered medications for this

## 2023-12-11 NOTE — PATIENT INSTRUCTIONS
Consider the RSV vaccine   See the ENT doctor for a check on this mucus buildup   See us back in the office in 4 months

## 2023-12-11 NOTE — TELEPHONE ENCOUNTER
Nurse called to let pcp know that patient fell last night at the home and there was no injuries. Her daughter was with her at the time. Any questions, please call Messi Carlson at [facility] at [phone].

## 2023-12-12 ENCOUNTER — TELEPHONE (OUTPATIENT)
Dept: CARDIOLOGY CLINIC | Age: 88
End: 2023-12-12

## 2023-12-12 DIAGNOSIS — I35.0 NONRHEUMATIC AORTIC (VALVE) STENOSIS: Primary | ICD-10-CM

## 2023-12-12 NOTE — TELEPHONE ENCOUNTER
Please schedule Fani Lozano for a Cardiac MRI    She also needs appointment with Dr. Edward Oswald after she has Cardiac MRI      Please let me and the patient know when scheduled.       Thanks

## 2023-12-13 NOTE — TELEPHONE ENCOUNTER
Called and spoke with Joseline Shahriar, daughter and informed that Joseline Bess needed to schedule an MRI. Transferred to [facility]. Will update once test has been scheduled.

## 2023-12-20 ENCOUNTER — APPOINTMENT (OUTPATIENT)
Dept: CT IMAGING | Age: 88
DRG: 312 | End: 2023-12-20
Payer: MEDICARE

## 2023-12-20 ENCOUNTER — HOSPITAL ENCOUNTER (INPATIENT)
Age: 88
LOS: 3 days | Discharge: HOME | DRG: 312 | End: 2023-12-23
Attending: EMERGENCY MEDICINE | Admitting: STUDENT IN AN ORGANIZED HEALTH CARE EDUCATION/TRAINING PROGRAM
Payer: MEDICARE

## 2023-12-20 DIAGNOSIS — N30.00 ACUTE CYSTITIS WITHOUT HEMATURIA: ICD-10-CM

## 2023-12-20 DIAGNOSIS — N39.0 URINARY TRACT INFECTION IN FEMALE: ICD-10-CM

## 2023-12-20 DIAGNOSIS — G45.9 TIA (TRANSIENT ISCHEMIC ATTACK): ICD-10-CM

## 2023-12-20 DIAGNOSIS — E87.1 HYPONATREMIA: ICD-10-CM

## 2023-12-20 DIAGNOSIS — R47.1 DYSARTHRIA: Primary | ICD-10-CM

## 2023-12-20 PROBLEM — R29.90 STROKE-LIKE SYMPTOMS: Status: ACTIVE | Noted: 2023-12-20

## 2023-12-20 LAB
ALBUMIN SERPL-MCNC: 3.6 G/DL (ref 3.4–5)
ALBUMIN/GLOB SERPL: 1.5 {RATIO} (ref 1.1–2.2)
ALP SERPL-CCNC: 46 U/L (ref 40–129)
ALT SERPL-CCNC: 12 U/L (ref 10–40)
ANION GAP SERPL CALCULATED.3IONS-SCNC: 8 MMOL/L (ref 3–16)
AST SERPL-CCNC: 13 U/L (ref 15–37)
BACTERIA URNS QL MICRO: ABNORMAL /HPF
BASOPHILS # BLD: 0.1 K/UL (ref 0–0.2)
BASOPHILS NFR BLD: 1.1 %
BILIRUB SERPL-MCNC: <0.2 MG/DL (ref 0–1)
BILIRUB UR QL STRIP.AUTO: NEGATIVE
BUN SERPL-MCNC: 14 MG/DL (ref 7–20)
CALCIUM SERPL-MCNC: 8.3 MG/DL (ref 8.3–10.6)
CHLORIDE SERPL-SCNC: 101 MMOL/L (ref 99–110)
CLARITY UR: ABNORMAL
CO2 SERPL-SCNC: 25 MMOL/L (ref 21–32)
COLOR UR: YELLOW
CREAT SERPL-MCNC: 0.8 MG/DL (ref 0.6–1.2)
DEPRECATED RDW RBC AUTO: 14.3 % (ref 12.4–15.4)
EOSINOPHIL # BLD: 0.1 K/UL (ref 0–0.6)
EOSINOPHIL NFR BLD: 1 %
EPI CELLS #/AREA URNS AUTO: 4 /HPF (ref 0–5)
GFR SERPLBLD CREATININE-BSD FMLA CKD-EPI: >60 ML/MIN/{1.73_M2}
GLUCOSE SERPL-MCNC: 104 MG/DL (ref 70–99)
GLUCOSE UR STRIP.AUTO-MCNC: NEGATIVE MG/DL
HCT VFR BLD AUTO: 34.1 % (ref 36–48)
HGB BLD-MCNC: 11.2 G/DL (ref 12–16)
HGB UR QL STRIP.AUTO: NEGATIVE
HYALINE CASTS #/AREA URNS AUTO: 4 /LPF (ref 0–8)
KETONES UR STRIP.AUTO-MCNC: ABNORMAL MG/DL
LEUKOCYTE ESTERASE UR QL STRIP.AUTO: ABNORMAL
LYMPHOCYTES # BLD: 4 K/UL (ref 1–5.1)
LYMPHOCYTES NFR BLD: 39.8 %
MCH RBC QN AUTO: 27.4 PG (ref 26–34)
MCHC RBC AUTO-ENTMCNC: 32.8 G/DL (ref 31–36)
MCV RBC AUTO: 83.6 FL (ref 80–100)
MONOCYTES # BLD: 0.5 K/UL (ref 0–1.3)
MONOCYTES NFR BLD: 5.3 %
NEUTROPHILS # BLD: 5.2 K/UL (ref 1.7–7.7)
NEUTROPHILS NFR BLD: 52.8 %
NITRITE UR QL STRIP.AUTO: NEGATIVE
PERFORMED ON: ABNORMAL
PH UR STRIP.AUTO: 6 [PH] (ref 5–8)
PLATELET # BLD AUTO: 290 K/UL (ref 135–450)
PMV BLD AUTO: 8.1 FL (ref 5–10.5)
POC CREATININE: 1 MG/DL (ref 0.6–1.2)
POC SAMPLE TYPE: ABNORMAL
POTASSIUM SERPL-SCNC: 4 MMOL/L (ref 3.5–5.1)
PROT SERPL-MCNC: 6 G/DL (ref 6.4–8.2)
PROT UR STRIP.AUTO-MCNC: NEGATIVE MG/DL
RBC # BLD AUTO: 4.07 M/UL (ref 4–5.2)
RBC CLUMPS #/AREA URNS AUTO: 2 /HPF (ref 0–4)
SODIUM SERPL-SCNC: 134 MMOL/L (ref 136–145)
SP GR UR STRIP.AUTO: 1.01 (ref 1–1.03)
TROPONIN, HIGH SENSITIVITY: 24 NG/L (ref 0–14)
TROPONIN, HIGH SENSITIVITY: 27 NG/L (ref 0–14)
UA COMPLETE W REFLEX CULTURE PNL UR: YES
UA DIPSTICK W REFLEX MICRO PNL UR: YES
URN SPEC COLLECT METH UR: ABNORMAL
UROBILINOGEN UR STRIP-ACNC: 1 E.U./DL
WBC # BLD AUTO: 9.9 K/UL (ref 4–11)
WBC #/AREA URNS AUTO: 248 /HPF (ref 0–5)

## 2023-12-20 PROCEDURE — 6360000004 HC RX CONTRAST MEDICATION: Performed by: EMERGENCY MEDICINE

## 2023-12-20 PROCEDURE — 86403 PARTICLE AGGLUT ANTBDY SCRN: CPT

## 2023-12-20 PROCEDURE — 2060000000 HC ICU INTERMEDIATE R&B

## 2023-12-20 PROCEDURE — 99285 EMERGENCY DEPT VISIT HI MDM: CPT

## 2023-12-20 PROCEDURE — 87086 URINE CULTURE/COLONY COUNT: CPT

## 2023-12-20 PROCEDURE — 6360000002 HC RX W HCPCS: Performed by: EMERGENCY MEDICINE

## 2023-12-20 PROCEDURE — 87077 CULTURE AEROBIC IDENTIFY: CPT

## 2023-12-20 PROCEDURE — 87186 SC STD MICRODIL/AGAR DIL: CPT

## 2023-12-20 PROCEDURE — 70498 CT ANGIOGRAPHY NECK: CPT

## 2023-12-20 PROCEDURE — 70450 CT HEAD/BRAIN W/O DYE: CPT

## 2023-12-20 PROCEDURE — 81001 URINALYSIS AUTO W/SCOPE: CPT

## 2023-12-20 PROCEDURE — 85025 COMPLETE CBC W/AUTO DIFF WBC: CPT

## 2023-12-20 PROCEDURE — 96365 THER/PROPH/DIAG IV INF INIT: CPT

## 2023-12-20 PROCEDURE — 84484 ASSAY OF TROPONIN QUANT: CPT

## 2023-12-20 PROCEDURE — 93005 ELECTROCARDIOGRAM TRACING: CPT | Performed by: EMERGENCY MEDICINE

## 2023-12-20 PROCEDURE — 82565 ASSAY OF CREATININE: CPT

## 2023-12-20 PROCEDURE — 87040 BLOOD CULTURE FOR BACTERIA: CPT

## 2023-12-20 PROCEDURE — 80053 COMPREHEN METABOLIC PANEL: CPT

## 2023-12-20 PROCEDURE — 6370000000 HC RX 637 (ALT 250 FOR IP): Performed by: NURSE PRACTITIONER

## 2023-12-20 PROCEDURE — 2580000003 HC RX 258: Performed by: EMERGENCY MEDICINE

## 2023-12-20 RX ORDER — POTASSIUM CHLORIDE 750 MG/1
10 CAPSULE, EXTENDED RELEASE ORAL DAILY
COMMUNITY

## 2023-12-20 RX ORDER — PREDNISONE 20 MG/1
60 TABLET ORAL ONCE
Status: DISCONTINUED | OUTPATIENT
Start: 2023-12-20 | End: 2023-12-20

## 2023-12-20 RX ORDER — SODIUM CHLORIDE 9 MG/ML
INJECTION, SOLUTION INTRAVENOUS PRN
Status: DISCONTINUED | OUTPATIENT
Start: 2023-12-20 | End: 2023-12-23 | Stop reason: HOSPADM

## 2023-12-20 RX ORDER — PANTOPRAZOLE SODIUM 40 MG/1
40 TABLET, DELAYED RELEASE ORAL
Status: DISCONTINUED | OUTPATIENT
Start: 2023-12-21 | End: 2023-12-23 | Stop reason: HOSPADM

## 2023-12-20 RX ORDER — AMLODIPINE BESYLATE 5 MG/1
5 TABLET ORAL DAILY
Status: DISCONTINUED | OUTPATIENT
Start: 2023-12-21 | End: 2023-12-23 | Stop reason: HOSPADM

## 2023-12-20 RX ORDER — ONDANSETRON 4 MG/1
4 TABLET, ORALLY DISINTEGRATING ORAL EVERY 8 HOURS PRN
Status: DISCONTINUED | OUTPATIENT
Start: 2023-12-20 | End: 2023-12-23 | Stop reason: HOSPADM

## 2023-12-20 RX ORDER — LISINOPRIL 40 MG/1
40 TABLET ORAL DAILY
Status: DISCONTINUED | OUTPATIENT
Start: 2023-12-21 | End: 2023-12-23 | Stop reason: HOSPADM

## 2023-12-20 RX ORDER — SODIUM CHLORIDE 0.9 % (FLUSH) 0.9 %
5-40 SYRINGE (ML) INJECTION PRN
Status: DISCONTINUED | OUTPATIENT
Start: 2023-12-20 | End: 2023-12-23 | Stop reason: HOSPADM

## 2023-12-20 RX ORDER — ASPIRIN 325 MG
325 TABLET, DELAYED RELEASE (ENTERIC COATED) ORAL DAILY
Status: DISCONTINUED | OUTPATIENT
Start: 2023-12-21 | End: 2023-12-23 | Stop reason: HOSPADM

## 2023-12-20 RX ORDER — ENOXAPARIN SODIUM 100 MG/ML
40 INJECTION SUBCUTANEOUS DAILY
Status: DISCONTINUED | OUTPATIENT
Start: 2023-12-21 | End: 2023-12-23 | Stop reason: HOSPADM

## 2023-12-20 RX ORDER — CARVEDILOL 6.25 MG/1
6.25 TABLET ORAL 2 TIMES DAILY WITH MEALS
Status: DISCONTINUED | OUTPATIENT
Start: 2023-12-21 | End: 2023-12-23 | Stop reason: HOSPADM

## 2023-12-20 RX ORDER — LORAZEPAM 0.5 MG/1
0.5 TABLET ORAL
Status: ACTIVE | OUTPATIENT
Start: 2023-12-20 | End: 2023-12-21

## 2023-12-20 RX ORDER — LABETALOL HYDROCHLORIDE 5 MG/ML
10 INJECTION, SOLUTION INTRAVENOUS EVERY 10 MIN PRN
Status: DISCONTINUED | OUTPATIENT
Start: 2023-12-20 | End: 2023-12-23 | Stop reason: HOSPADM

## 2023-12-20 RX ORDER — ROSUVASTATIN CALCIUM 40 MG/1
40 TABLET, COATED ORAL NIGHTLY
Status: DISCONTINUED | OUTPATIENT
Start: 2023-12-21 | End: 2023-12-23 | Stop reason: HOSPADM

## 2023-12-20 RX ORDER — SODIUM CHLORIDE 0.9 % (FLUSH) 0.9 %
5-40 SYRINGE (ML) INJECTION EVERY 12 HOURS SCHEDULED
Status: DISCONTINUED | OUTPATIENT
Start: 2023-12-21 | End: 2023-12-23 | Stop reason: HOSPADM

## 2023-12-20 RX ORDER — POLYETHYLENE GLYCOL 3350 17 G/17G
17 POWDER, FOR SOLUTION ORAL DAILY PRN
Status: DISCONTINUED | OUTPATIENT
Start: 2023-12-20 | End: 2023-12-23 | Stop reason: HOSPADM

## 2023-12-20 RX ORDER — ONDANSETRON 2 MG/ML
4 INJECTION INTRAMUSCULAR; INTRAVENOUS EVERY 6 HOURS PRN
Status: DISCONTINUED | OUTPATIENT
Start: 2023-12-20 | End: 2023-12-23 | Stop reason: HOSPADM

## 2023-12-20 RX ORDER — MULTIVITAMIN WITH IRON
1 TABLET ORAL DAILY
Status: DISCONTINUED | OUTPATIENT
Start: 2023-12-21 | End: 2023-12-23 | Stop reason: HOSPADM

## 2023-12-20 RX ORDER — IPRATROPIUM BROMIDE AND ALBUTEROL SULFATE 2.5; .5 MG/3ML; MG/3ML
2 SOLUTION RESPIRATORY (INHALATION) ONCE
Status: DISCONTINUED | OUTPATIENT
Start: 2023-12-20 | End: 2023-12-20

## 2023-12-20 RX ADMIN — ROSUVASTATIN CALCIUM 40 MG: 40 TABLET, FILM COATED ORAL at 23:48

## 2023-12-20 RX ADMIN — IOPAMIDOL 75 ML: 755 INJECTION, SOLUTION INTRAVENOUS at 17:32

## 2023-12-20 RX ADMIN — CEFTRIAXONE 1000 MG: 1 INJECTION, POWDER, FOR SOLUTION INTRAMUSCULAR; INTRAVENOUS at 18:11

## 2023-12-20 NOTE — ED PROVIDER NOTES
[address] [facility] [address] [address]      Pt Name: Allison Lai  MRN: [id number]  [address] [facility] 1935  Date of evaluation: 12/20/2023  Provider: Shirlene Hernandez  Chief Complaint   Patient presents with    OTHER     While pt was working with PT at 10am, had an episode of slurred speech and \"staring off\" that lasted 3-5 minutes. EMS was called to the house and didn't bring to ED since she was no longer symptomatic. Pt arrives with family c/o possible UTI or mini stroke. Family states BP 89/59 during episode. Takes full dose Asa daily. This patient is at risk for a communicable infection. Therefore, personal protection equipment consisting of a mask was worn for the exam.    HPI  Allison Lai is a 80 y.o. female who presents with altered mental status that occurred at 10 AM.  She had an episode of slurred speech. She was staring off in space. She does not remember anything about the incident. Her daughter-in-law is here to explain the situation. PT was present at the time. She had an episode of decreased level consciousness and staring off into space. Her speech was slurred as she tried to talk. EMS was called and they did a FAST exam that was negative but did not transport her to the hospital.  Family states that she was normal by the time EMS arrived. She remained normal throughout the day. However, she has had episodes similar to this with UTIs. She has been septic with urinary tract infections. She has been admitted on multiple occasions for UTI and sepsis. They are concerned this might be caused by sepsis. However, stroke AMI not ruled out at this time. REVIEW OF SYSTEMS  All systems negative except as noted in the HPI. Reviewed Nurses' notes and concur. History limited due to altered mental status at the time of the incident. No LMP recorded.  Patient is postmenopausal.    PAST MEDICAL HISTORY  Past Medical History:

## 2023-12-21 PROBLEM — E87.1 HYPONATREMIA: Status: RESOLVED | Noted: 2023-04-30 | Resolved: 2023-12-21

## 2023-12-21 PROBLEM — N30.00 ACUTE CYSTITIS WITHOUT HEMATURIA: Status: RESOLVED | Noted: 2023-11-15 | Resolved: 2023-12-21

## 2023-12-21 PROBLEM — R40.4 EPISODES OF STARING: Status: ACTIVE | Noted: 2023-12-21

## 2023-12-21 PROBLEM — A49.8 E COLI INFECTION: Status: RESOLVED | Noted: 2023-11-15 | Resolved: 2023-12-21

## 2023-12-21 PROBLEM — N17.9 AKI (ACUTE KIDNEY INJURY) (HCC): Status: RESOLVED | Noted: 2023-11-15 | Resolved: 2023-12-21

## 2023-12-21 PROBLEM — D72.9 NEUTROPHILIA: Status: RESOLVED | Noted: 2023-11-15 | Resolved: 2023-12-21

## 2023-12-21 PROBLEM — R79.89 ELEVATED PROCALCITONIN: Status: RESOLVED | Noted: 2023-11-15 | Resolved: 2023-12-21

## 2023-12-21 PROBLEM — N39.0 RECURRENT UTI: Status: RESOLVED | Noted: 2023-11-15 | Resolved: 2023-12-21

## 2023-12-21 PROBLEM — A41.9 SEPSIS (HCC): Status: RESOLVED | Noted: 2023-04-29 | Resolved: 2023-12-21

## 2023-12-21 PROBLEM — I35.0 NONRHEUMATIC AORTIC (VALVE) STENOSIS: Status: RESOLVED | Noted: 2017-02-06 | Resolved: 2023-12-21

## 2023-12-21 PROBLEM — E87.20 LACTIC ACID ACIDOSIS: Status: RESOLVED | Noted: 2023-11-15 | Resolved: 2023-12-21

## 2023-12-21 PROBLEM — D72.828 NEUTROPHILIA: Status: RESOLVED | Noted: 2023-11-15 | Resolved: 2023-12-21

## 2023-12-21 PROBLEM — Z95.2 H/O AORTIC VALVE REPLACEMENT: Status: RESOLVED | Noted: 2023-11-15 | Resolved: 2023-12-21

## 2023-12-21 PROBLEM — R19.7 DIARRHEA OF PRESUMED INFECTIOUS ORIGIN: Status: RESOLVED | Noted: 2023-11-15 | Resolved: 2023-12-21

## 2023-12-21 LAB
CHOLEST SERPL-MCNC: 101 MG/DL (ref 0–199)
DEPRECATED RDW RBC AUTO: 14.4 % (ref 12.4–15.4)
EKG ATRIAL RATE: 74 BPM
EKG DIAGNOSIS: NORMAL
EKG P AXIS: 37 DEGREES
EKG P-R INTERVAL: 160 MS
EKG Q-T INTERVAL: 408 MS
EKG QRS DURATION: 86 MS
EKG QTC CALCULATION (BAZETT): 452 MS
EKG R AXIS: -3 DEGREES
EKG T AXIS: 11 DEGREES
EKG VENTRICULAR RATE: 74 BPM
EST. AVERAGE GLUCOSE BLD GHB EST-MCNC: 114 MG/DL
HBA1C MFR BLD: 5.6 %
HCT VFR BLD AUTO: 33.2 % (ref 36–48)
HDLC SERPL-MCNC: 36 MG/DL (ref 40–60)
HGB BLD-MCNC: 11.2 G/DL (ref 12–16)
LDLC SERPL CALC-MCNC: 50 MG/DL
MCH RBC QN AUTO: 27.8 PG (ref 26–34)
MCHC RBC AUTO-ENTMCNC: 33.7 G/DL (ref 31–36)
MCV RBC AUTO: 82.5 FL (ref 80–100)
PLATELET # BLD AUTO: 255 K/UL (ref 135–450)
PMV BLD AUTO: 8 FL (ref 5–10.5)
RBC # BLD AUTO: 4.02 M/UL (ref 4–5.2)
TRIGL SERPL-MCNC: 76 MG/DL (ref 0–150)
VLDLC SERPL CALC-MCNC: 15 MG/DL
WBC # BLD AUTO: 10 K/UL (ref 4–11)

## 2023-12-21 PROCEDURE — 6370000000 HC RX 637 (ALT 250 FOR IP): Performed by: NURSE PRACTITIONER

## 2023-12-21 PROCEDURE — 2060000000 HC ICU INTERMEDIATE R&B

## 2023-12-21 PROCEDURE — 6370000000 HC RX 637 (ALT 250 FOR IP): Performed by: STUDENT IN AN ORGANIZED HEALTH CARE EDUCATION/TRAINING PROGRAM

## 2023-12-21 PROCEDURE — 97116 GAIT TRAINING THERAPY: CPT | Performed by: PHYSICAL THERAPIST

## 2023-12-21 PROCEDURE — 93010 ELECTROCARDIOGRAM REPORT: CPT | Performed by: INTERNAL MEDICINE

## 2023-12-21 PROCEDURE — 2580000003 HC RX 258: Performed by: NURSE PRACTITIONER

## 2023-12-21 PROCEDURE — 97161 PT EVAL LOW COMPLEX 20 MIN: CPT | Performed by: PHYSICAL THERAPIST

## 2023-12-21 PROCEDURE — 85027 COMPLETE CBC AUTOMATED: CPT

## 2023-12-21 PROCEDURE — 83036 HEMOGLOBIN GLYCOSYLATED A1C: CPT

## 2023-12-21 PROCEDURE — 6360000002 HC RX W HCPCS: Performed by: STUDENT IN AN ORGANIZED HEALTH CARE EDUCATION/TRAINING PROGRAM

## 2023-12-21 PROCEDURE — 36415 COLL VENOUS BLD VENIPUNCTURE: CPT

## 2023-12-21 PROCEDURE — 92610 EVALUATE SWALLOWING FUNCTION: CPT

## 2023-12-21 PROCEDURE — 97165 OT EVAL LOW COMPLEX 30 MIN: CPT

## 2023-12-21 PROCEDURE — 6360000002 HC RX W HCPCS: Performed by: NURSE PRACTITIONER

## 2023-12-21 PROCEDURE — 80061 LIPID PANEL: CPT

## 2023-12-21 PROCEDURE — 94760 N-INVAS EAR/PLS OXIMETRY 1: CPT

## 2023-12-21 PROCEDURE — 2580000003 HC RX 258: Performed by: STUDENT IN AN ORGANIZED HEALTH CARE EDUCATION/TRAINING PROGRAM

## 2023-12-21 PROCEDURE — 97530 THERAPEUTIC ACTIVITIES: CPT

## 2023-12-21 RX ORDER — POTASSIUM CHLORIDE 750 MG/1
10 TABLET, FILM COATED, EXTENDED RELEASE ORAL DAILY
Status: DISCONTINUED | OUTPATIENT
Start: 2023-12-21 | End: 2023-12-23 | Stop reason: HOSPADM

## 2023-12-21 RX ORDER — GLUCOSAMINE/MSM/CHONDROITIN A 500-83-400
1 TABLET ORAL NIGHTLY
Status: DISCONTINUED | OUTPATIENT
Start: 2023-12-21 | End: 2023-12-21 | Stop reason: RX

## 2023-12-21 RX ORDER — LACTOBACILLUS RHAMNOSUS GG 10B CELL
1 CAPSULE ORAL
Status: DISCONTINUED | OUTPATIENT
Start: 2023-12-21 | End: 2023-12-23 | Stop reason: HOSPADM

## 2023-12-21 RX ADMIN — CARVEDILOL 6.25 MG: 6.25 TABLET, FILM COATED ORAL at 08:47

## 2023-12-21 RX ADMIN — Medication 10 ML: at 08:49

## 2023-12-21 RX ADMIN — Medication 1 CAPSULE: at 18:45

## 2023-12-21 RX ADMIN — LISINOPRIL 40 MG: 40 TABLET ORAL at 08:48

## 2023-12-21 RX ADMIN — CEFTRIAXONE 1000 MG: 1 INJECTION, POWDER, FOR SOLUTION INTRAMUSCULAR; INTRAVENOUS at 18:44

## 2023-12-21 RX ADMIN — THERA TABS 1 TABLET: TAB at 08:48

## 2023-12-21 RX ADMIN — ENOXAPARIN SODIUM 40 MG: 100 INJECTION SUBCUTANEOUS at 08:48

## 2023-12-21 RX ADMIN — Medication 10 ML: at 20:29

## 2023-12-21 RX ADMIN — POTASSIUM CHLORIDE 10 MEQ: 750 TABLET, FILM COATED, EXTENDED RELEASE ORAL at 08:47

## 2023-12-21 RX ADMIN — CARVEDILOL 6.25 MG: 6.25 TABLET, FILM COATED ORAL at 18:57

## 2023-12-21 RX ADMIN — ASPIRIN 325 MG: 325 TABLET, COATED ORAL at 08:47

## 2023-12-21 RX ADMIN — AMLODIPINE BESYLATE 5 MG: 5 TABLET ORAL at 08:47

## 2023-12-21 RX ADMIN — PANTOPRAZOLE SODIUM 40 MG: 40 TABLET, DELAYED RELEASE ORAL at 06:03

## 2023-12-21 RX ADMIN — ROSUVASTATIN CALCIUM 40 MG: 40 TABLET, FILM COATED ORAL at 21:16

## 2023-12-21 NOTE — PROGRESS NOTES
Notified Keily Stark MD and Miroslava Khan NP of pt not being able to get MRI done due too pacer being too new. Needs to be at least 7 weeks old per MRI tech Annalyse.

## 2023-12-21 NOTE — PROGRESS NOTES
4 Eyes Skin Assessment     NAME:  Jenna Ventura  YOB: 1935  MEDICAL RECORD NUMBER:  [id number]    The patient is being assessed for  Admission    I agree that at least one RN has performed a thorough Head to Toe Skin Assessment on the patient. ALL assessment sites listed below have been assessed. Areas assessed by both nurses:    Head, Face, Ears, Shoulders, Back, Chest, Arms, Elbows, Hands, Sacrum. Buttock, Coccyx, Ischium, and Legs. Feet and Heels        Does the Patient have a Wound?  No noted wound(s)       Juve Prevention initiated by RN: No  Wound Care Orders initiated by RN: No    Pressure Injury (Stage 3,4, Unstageable, DTI, NWPT, and Complex wounds) if present, place Wound referral order by RN under : No    New Ostomies, if present place, Ostomy referral order under : No     Nurse 1 eSignature: Electronically signed by Rox Cabrera RN on 12/21/23 at 12:06 AM EST    **SHARE this note so that the co-signing nurse can place an eSignature**    Nurse 2 eSignature: Electronically signed by Cheri Perez RN on 12/21/23 at 1:40 AM EST

## 2023-12-21 NOTE — ACP (ADVANCE CARE PLANNING)
Advance Care Planning     Advance Care Planning Activator (Inpatient)  Conversation Note      Date of ACP Conversation: 12/21/2023     Conversation Conducted with: Patient with Decision Making Capacity    ACP Activator: Lizette Preciado RN    Health Care Decision Maker:     Current Designated Health Care Decision Maker:     Primary Decision Maker: Heather Davis - Child - [phone]    Primary Decision Maker: Tanika Luz - Child - [phone]    Today we documented Decision Maker(s) consistent with Legal Next of Kin hierarchy. Care Preferences    Ventilation: \"If you were in your present state of health and suddenly became very ill and were unable to breathe on your own, what would your preference be about the use of a ventilator (breathing machine) if it were available to you? \"      Would the patient desire the use of ventilator (breathing machine)?: no    \"If your health worsens and it becomes clear that your chance of recovery is unlikely, what would your preference be about the use of a ventilator (breathing machine) if it were available to you? \"     Would the patient desire the use of ventilator (breathing machine)?: No      Resuscitation  \"CPR works best to restart the heart when there is a sudden event, like a heart attack, in someone who is otherwise healthy. Unfortunately, CPR does not typically restart the heart for people who have serious health conditions or who are very sick. \"    \"In the event your heart stopped as a result of an underlying serious health condition, would you want attempts to be made to restart your heart (answer \"yes\" for attempt to resuscitate) or would you prefer a natural death (answer \"no\" for do not attempt to resuscitate)? \" no       [] Yes   [] No   Educated Patient / Shaneka Velásquez regarding differences between Advance Directives and portable DNR orders.     Length of ACP Conversation in minutes:      Conversation Outcomes:  ACP discussion completed    Follow-up plan:    []

## 2023-12-21 NOTE — CARE COORDINATION
Case Management Assessment  Initial Evaluation    Date/Time of Evaluation: 12/21/2023 10:50 AM  Assessment Completed by: Wendy Valentin RN    If patient is discharged prior to next notation, then this note serves as note for discharge by case management. Patient Name: Rasheeda Osborne                   YOB: 1935  Diagnosis: TIA (transient ischemic attack) [G45.9]  Dysarthria [R47.1]  Hyponatremia [E87.1]  Acute cystitis without hematuria [N30.00]  Stroke-like symptoms [R29.90]                   Date / Time: 12/20/2023  3:35 PM    Patient Admission Status: Inpatient   Readmission Risk (Low < 19, Mod (19-27), High > 27): Readmission Risk Score: 19.5    Current PCP: Toby Potts MD  PCP verified by CM? Yes    Chart Reviewed: Yes      History Provided by: Patient  Patient Orientation: Alert and Oriented    Patient Cognition: Alert    Hospitalization in the last 30 days (Readmission):  Yes    If yes, Readmission Assessment in  Navigator will be completed. Advance Directives:      Code Status: DNR-CCA   Patient's Primary Decision Maker is: Legal Next of Kin    Primary Decision Maker: Ang Boothe - Child - [phone]    Primary Decision Maker: Jessica Samson - [place] - [phone]    Discharge Planning:    Patient lives with: Family Members (grandson) Type of Home: Apartment  Primary Care Giver: Self  Patient Support Systems include: Children, Family Members   Current Financial resources: Medicare  Current community resources: ECF/Home Care  Current services prior to admission: Durable Medical Equipment, Home Care            Current DME: Lucretia Banks            Type of Home Care services:  PT    ADLS  Prior functional level: Independent in ADLs/IADLs, Assistance with the following:, Housework, Shopping, Mobility  Current functional level:  Independent in ADLs/IADLs, Assistance with the following:, Housework, Shopping, Mobility    PT AM-PAC: 20 /24  OT AM-PAC: 22 /24    Family can provide assistance at DC:

## 2023-12-21 NOTE — PROGRESS NOTES
Department of Pharmacy  Herbal Policy    [facility] recognizes that herbal/dietary supplements are used by the public and health care professionals. Herbal/dietary supplements fall under the definition of \"approaches to medical diagnosis and therapy that have not been developed by use of generally accepted methods of validating their effectiveness. \"  [facility] further recognizes that these products are not regulated by the Food and Drug Administration (FDA) as medications but instead viewed as dietary supplements. The Department of Pharmacy does not provide herbal/dietary supplements for use. Herbal/dietary supplements are not included on the formulary due to lack of safety and efficacy data. Due to limited and conflicting literature on herbal/dietary supplements, pharmacy is not able to review herbal/natural products for drug-natural product interactions or disease-natural product interactions. Due to the lack of safety and efficacy data, [facility] believes the risk of adverse medication events outweighs the benefit of allowing the patient to use their home supply of herbal/natural products. Per hospital policy, the following herbal/dietary supplement(s) will be held during this hospitalization: coenzyme Q10    If there are any questions or concerns related to the use of herbal/dietary supplements, please do not hesitate to contact the pharmacy.     Nanette Rivas PharmD, BCPS  12/21/2023 7:17 AM

## 2023-12-21 NOTE — PLAN OF CARE
Problem: Discharge Planning  Goal: Discharge to home or other facility with appropriate resources  Outcome: Progressing  Flowsheets (Taken 12/21/2023 0029)  Discharge to home or other facility with appropriate resources:   Identify barriers to discharge with patient and caregiver   Arrange for needed discharge resources and transportation as appropriate   Identify discharge learning needs (meds, wound care, etc)     Problem: ABCDS Injury Assessment  Goal: Absence of physical injury  Outcome: Progressing  Flowsheets (Taken 12/21/2023 0029)  Absence of Physical Injury: Implement safety measures based on patient assessment     Problem: Safety - Adult  Goal: Free from fall injury  Outcome: Progressing  Flowsheets (Taken 12/21/2023 0029)  Free From Fall Injury: Instruct family/caregiver on patient safety     Problem: Neurosensory - Adult  Goal: Achieves stable or improved neurological status  Outcome: Progressing  Flowsheets (Taken 12/21/2023 0029)  Achieves stable or improved neurological status: Assess for and report changes in neurological status

## 2023-12-21 NOTE — H&P
V2.0  History and Physical      Name:  Jenna Ventura /Age/Sex: 1935  (80 y.o. female)   MRN & CSN:  [id number] & [id number] Encounter Date/Time: 2023 8:15 PM EST   Location:  [id number] PCP: Santhosh Thacker MD       Hospital Day: 2    Assessment and Plan: Jenna Ventura is a 80 y.o. female with a past medical history of aortic stenosis, CVA, hyperlipidemia, hypertension, OA who presents with Stroke-like symptoms    Stroke like symptoms - rule out TIA/CVA-  Suspect due to her speech, episode of staring. NIHSS 0 on admission, LKW 10 AM on   CT HEAD wo:  no intracranial abnormality. CTA -75% stenosis of left carotid bulb, 60% stenosis of proximal right cervical ICA and 50% stenosis of the proximal left cervical ICA. EKG: NSR  no ST seg dep or elevations, no Twave changes. Troponin 24. Last echo (): ** EF **. 65% No prior MRI on file  -Observation  -swallow eval and if pass, can have meds and food  -neuro checks  -TSH 6.87 with reflex to T4 - 1.2 (2023)  -consult Neurology  -MRI head pending  -ASA and statin continued   -Permissive Hypertension  -Labetalol with parameters  -Stroke pathway  -PT/OT/speech eval     Acute cystitis without hematuria  UA-large leukocytes, 2+ bacteria   - urine culture pending  -Rocephin 1 g daily  -Follow blood and urine cultures  -De-escalate antibiotics pending results    Carotid stenosis-CTA head and neck-75% stenosis of the left carotid bulb-60% stenosis of the proximal right ICA, 50% stenosis of the proximal left cervical ICA    Aortic stenosis-s/p bovine valve - 3-  -Last ERA-12 6-bioprosthetic aortic valve well-seated. Mitral valve leaflets appear thickened trivial MR-  -continue aspirin 325 mg daily    Incidental finding on CTA head and neck-esophageal wall thickening-consider esophagram and/or upper endoscopy for further evaluation.   Patient denies any difficulty swallowing does report at times sometimes he will feel choke and spit up

## 2023-12-21 NOTE — PROGRESS NOTES
Occupational Therapy  Facility/Department: [address] PROGRESSIVE CARE  Occupational Therapy Initial Assessment    Name: No Rosales  : 1935  MRN: [id number]  Date of Service: 2023    Discharge Recommendations:  24 hour supervision or assist, Home with assist PRN     No Rosales scored a  on the [facility] ADL Inpatient form. At this time, no further OT is recommended upon discharge due to pt functioning near baseline and having family to support post discharge. Recommend patient returns to prior setting with prior services. Patient Diagnosis(es): The primary encounter diagnosis was Dysarthria. Diagnoses of TIA (transient ischemic attack), Hyponatremia, and Acute cystitis without hematuria were also pertinent to this visit. Past Medical History:  has a past medical history of Aortic stenosis, Cerebrovascular disease, Class 1 obesity, Hepatic steatosis, Hiatal hernia, Hyperlipidemia, Hypertension, and Osteoarthritis. Past Surgical History:  has a past surgical history that includes pre-malignant / benign skin lesion excision (Right, ); Aortic valve replacement (2017); Cataract removal (Bilateral, 2019); blepharoplasty (Bilateral, 2021); and Upper gastrointestinal endoscopy (N/A, 2023). Assessment   Assessment: Per H&P note on 23, \"Jessica Christie is a 80 y.o. female who presents with complaint of staring spells and slurred speech around 10 AM today. Patient was having physical therapy session with home health care family numbers also present. Per patient and family number patient was standing at sink practicing leg lifts when she started not to feel well and she got a sweat and felt like he had to sit down. At that time patient had what was described as a staring spell and when she tried to talk she was slurring her speech.  Having number and PT called 911 instructed by dispatch for stroke evaluation EMS arrived patient was back to normal since initial scan was shower, Built-in shower seat  Home Equipment: Shelah Furlough, rolling, Walker, 4 wheeled  Has the patient had two or more falls in the past year or any fall with injury in the past year?: No  ADL Assistance: Independent  Homemaking Assistance: Independent (pt and grandson share)  Homemaking Responsibilities: Yes  Ambulation Assistance: Independent  Transfer Assistance: Independent  Active : Yes  Mode of Transportation: Car  Additional Comments: Pt has very supportive family to provide initial 24/7 assist at discharge. Objective      Safety Devices  Type of Devices: Left in chair;Nurse notified;Call light within reach        AROM: Within functional limits  Strength: Within functional limits  Coordination: Within functional limits  Tone: Normal  Sensation: Intact  ADL  Functional Mobility: Stand by assistance  Functional Mobility Skilled Clinical Factors: SBA with RW ~80 ft without any LOB or unsteadiness throughout  Additional Comments: pt does not complete ADLs this date but anticipate pt to require SBA for all ADLs due to strength, balance, endurance and ROM observed this session. Activity Tolerance  Activity Tolerance: Patient tolerated treatment well  Bed mobility  Bed Mobility Comments: unable to assess this date; pt seated in recliner at beginning and end of session.   Transfers  Sit to stand: Stand by assistance  Stand to sit: Stand by assistance  Transfer Comments: to/from recliner with use of RW  Vision  Vision: Within Functional Limits  Hearing  Hearing: Within functional limits  Cognition  Overall Cognitive Status: WFL  Orientation  Overall Orientation Status: Within Normal Limits               Static Standing Balance Exercises: SBA with RW in prep for fxl mobility  Education Given To: Patient  Education Provided: Role of Therapy;Plan of Care;Equipment  Education Method: Verbal;Demonstration  Barriers to Learning: None  Education Outcome: Verbalized understanding;Demonstrated understanding

## 2023-12-21 NOTE — PROGRESS NOTES
Facility/Department: [address] PROGRESSIVE CARE  Initial Assessment  DYSPHAGIA BEDSIDE SWALLOW EVALUATION     Patient: Beatriz Kothari   : 1935   MRN: [id number]      Evaluation Date: 2023   Admitting Diagnosis: TIA (transient ischemic attack) [G45.9]  Dysarthria [R47.1]  Hyponatremia [E87.1]  Acute cystitis without hematuria [N30.00]  Stroke-like symptoms [R29.90]  Pain: Did not state                                                       Chart Review:   H&P:   Beatriz Kothari is a 80 y.o. female with a past medical history of aortic stenosis, CVA, hyperlipidemia, hypertension, OA who presents with Stroke-like symptoms     Stroke like symptoms - rule out TIA/CVA-  Suspect due to her speech, episode of staring. NIHSS 0 on admission, LKW 10 AM on   CT HEAD wo:  no intracranial abnormality. CTA -75% stenosis of left carotid bulb, 60% stenosis of proximal right cervical ICA and 50% stenosis of the proximal left cervical ICA. EKG: NSR  no ST seg dep or elevations, no Twave changes. Troponin 24. Last echo (): ** EF **. 65% No prior MRI on file  -Observation  -swallow eval and if pass, can have meds and food  -neuro checks  -TSH 6.87 with reflex to T4 - 1.2 (2023)  -consult Neurology  -MRI head pending  -ASA and statin continued   -Permissive Hypertension  -Labetalol with parameters  -Stroke pathway  -PT/OT/speech eval     Current Diet Level (prior to evaluation): Regular texture diet  Thin liquids    Respiratory Status:   [x]Room Air   []O2 via nasal cannula   []Other:    Imaging:  CT HEAD 23  IMPRESSION:  No CT evidence of an acute infarct. No large vessel occlusion visualized within the head or neck. >75% stenosis of the left carotid bulb. 60% stenosis of the proximal right cervical ICA and 50% stenosis of the  proximal left cervical ICA. Incidentally noted thyroid nodule, for which thyroid ultrasound correlation  is recommended.      Also incidentally noted mild esophageal wall

## 2023-12-21 NOTE — CARE COORDINATION
12/21/23 1039   Readmission Assessment   Number of Days since last admission? 8-30 days   Previous Disposition Home with [facility]  [facility] home care)   Who is being Interviewed Patient   What was the patient's/caregiver's perception as to why they think they needed to return back to the hospital? Other (Comment)  (AMS, sent in by home PT)   Did you visit your Primary Care Physician after you left the hospital, before you returned this time? Yes   Did you see a specialist, such as Cardiac, Pulmonary, Orthopedic Physician, etc. after you left the hospital? Yes  (cardiology and nephrology)   Who advised the patient to return to the hospital? [facility] Staff   Does the patient report anything that got in the way of taking their medications? No   In our efforts to provide the best possible care to you and others like you, can you think of anything that we could have done to help you after you left the hospital the first time, so that you might not have needed to return so soon?  Other (Comment)  (no)     #[phone]  Electronically signed by Carisa Barclay RN on 12/21/2023 at 10:40 AM

## 2023-12-21 NOTE — CARE COORDINATION
Patient is active with [facility]  Contact:  Ivan Vitale  Ph:  Jatin Chavez Sr.  Administrative Assist, Case Management  [phone]  Electronically signed by Brooks Giraldo on 12/21/2023 at 1:03 PM

## 2023-12-21 NOTE — PROGRESS NOTES
Physical Therapy  Facility/Department: [facility] PROGRESSIVE CARE  Physical Therapy Initial Assessment    Name: Jennifer Leggett  : 1935  MRN: [id number]  Date of Service: 2023    Discharge Recommendations:  Home with assist PRN, Home with Home health PT   PT Equipment Recommendations  Equipment Needed: No      Jennifer Leggett scored a 20/24 on the AM-PAC short mobility form. Current research shows that an AM-PAC score of 18 or greater is typically associated with a discharge to the patient's home setting. Based on the patient's AM-PAC score and their current functional mobility deficits, it is recommended that the patient have 2-3 sessions per week of Physical Therapy at d/c to increase the patient's independence. At this time, this patient demonstrates the endurance and safety to discharge home with Home PT and a follow up treatment frequency of 2-3x/wk. Please see assessment section for further patient specific details. If patient discharges prior to next session this note will serve as a discharge summary. Please see below for the latest assessment towards goals. Patient Diagnosis(es): The primary encounter diagnosis was Dysarthria. Diagnoses of TIA (transient ischemic attack), Hyponatremia, and Acute cystitis without hematuria were also pertinent to this visit. Past Medical History:  has a past medical history of Aortic stenosis, Cerebrovascular disease, Class 1 obesity, Hepatic steatosis, Hiatal hernia, Hyperlipidemia, Hypertension, and Osteoarthritis. Past Surgical History:  has a past surgical history that includes pre-malignant / benign skin lesion excision (Right, ); Aortic valve replacement (2017); Cataract removal (Bilateral, 2019); blepharoplasty (Bilateral, 2021); and Upper gastrointestinal endoscopy (N/A, 2023). Assessment   Assessment: pt is an 81 yo female who was adm to [facility] with stroke-like symptoms; pt reports she thinks its a UTI.   Pt appears to be at her recent baseline for mobility tasks and will be safe to return home with resuming home PT; pt did not use a walker prior to last hospitalizations and would like to be able to amb without it  Therapy Prognosis: Good  Decision Making: Low Complexity  Requires PT Follow-Up: Yes  Activity Tolerance  Activity Tolerance: Patient tolerated treatment well     Plan   Physical Therapy Plan  General Plan: 3-5 times per week  Current Treatment Recommendations: Functional mobility training, Transfer training, Balance training, Gait training, Endurance training  Safety Devices  Type of Devices: Left in chair, Nurse notified, Call light within reach     Restrictions  Restrictions/Precautions  Restrictions/Precautions: Up as Tolerated     Subjective   General  Chart Reviewed: Yes  Patient assessed for rehabilitation services?: Yes  Additional Pertinent Hx: Jennifer Leblanc is a 80 y.o. female with a past medical history of aortic stenosis, CVA, hyperlipidemia, hypertension, OA who presents with Stroke-like symptoms; pt adm to r/o CVA and UTI  Response To Previous Treatment: Not applicable  Family / Caregiver Present: No  Follows Commands: Within Functional Limits  Subjective  Subjective: pt very pleasant and agreeable to working with therapy         Social/Functional History  Social/Functional History  Lives With:  (grandson)  Type of Home: Condo  Home Layout: One level  Home Access: Stairs to enter with rails  Entrance Stairs - Number of Steps: 1  Bathroom Shower/Tub: Walk-in shower  Bathroom Toilet: Standard  Bathroom Equipment: Grab bars in shower, Built-in shower seat  Home Equipment: kirti Polanco Valetta Age, 4 wheeled  Has the patient had two or more falls in the past year or any fall with injury in the past year?: No  ADL Assistance: Independent  Homemaking Assistance: Independent (pt and grandson share)  Homemaking Responsibilities: Yes  Ambulation Assistance: Independent  Transfer Assistance: Independent  Active :

## 2023-12-21 NOTE — PROGRESS NOTES
Pt arrived to floor via stretcher from ED and ambulated to bed. Telemetry activated. Patient oriented to room and use of call light. Call light and personal items within reach. Admission and assessment initiated. POC and education initiated and reviewed with patient and family. Denied further needs or questions at this time.

## 2023-12-21 NOTE — PROGRESS NOTES
Pharmacy Medication Reconciliation Note     List of medications Brendan Marks is currently taking is complete. Source of information:   1. Conversation with patient and family at bedside  2. EMR    Notes regarding home medications:   1. Patient reports taking all morning medications today PTA in the ED  2. Prescribed potassium chloride 10 mEq BID --- takes once daily in the AM  3.  Denies taking spironolactone and clonidine despite recent fill history -- did not add to list     Patient denies taking any OTC or herbal medications other than those listed    Atif Chowdary, Pharmacy Intern  12/20/2023  8:15 PM

## 2023-12-22 LAB
ANION GAP SERPL CALCULATED.3IONS-SCNC: 9 MMOL/L (ref 3–16)
BACTERIA UR CULT: ABNORMAL
BASOPHILS # BLD: 0.2 K/UL (ref 0–0.2)
BASOPHILS NFR BLD: 2.2 %
BUN SERPL-MCNC: 16 MG/DL (ref 7–20)
CALCIUM SERPL-MCNC: 8.4 MG/DL (ref 8.3–10.6)
CHLORIDE SERPL-SCNC: 101 MMOL/L (ref 99–110)
CO2 SERPL-SCNC: 26 MMOL/L (ref 21–32)
CREAT SERPL-MCNC: 1 MG/DL (ref 0.6–1.2)
DEPRECATED RDW RBC AUTO: 14.3 % (ref 12.4–15.4)
EOSINOPHIL # BLD: 0.2 K/UL (ref 0–0.6)
EOSINOPHIL NFR BLD: 3 %
GFR SERPLBLD CREATININE-BSD FMLA CKD-EPI: 54 ML/MIN/{1.73_M2}
GLUCOSE SERPL-MCNC: 126 MG/DL (ref 70–99)
HCT VFR BLD AUTO: 31.1 % (ref 36–48)
HGB BLD-MCNC: 10.6 G/DL (ref 12–16)
LYMPHOCYTES # BLD: 3.3 K/UL (ref 1–5.1)
LYMPHOCYTES NFR BLD: 42.6 %
MCH RBC QN AUTO: 28.2 PG (ref 26–34)
MCHC RBC AUTO-ENTMCNC: 34 G/DL (ref 31–36)
MCV RBC AUTO: 83 FL (ref 80–100)
MONOCYTES # BLD: 0.5 K/UL (ref 0–1.3)
MONOCYTES NFR BLD: 6.4 %
NEUTROPHILS # BLD: 3.6 K/UL (ref 1.7–7.7)
NEUTROPHILS NFR BLD: 45.8 %
ORGANISM: ABNORMAL
PLATELET # BLD AUTO: 230 K/UL (ref 135–450)
PMV BLD AUTO: 8.6 FL (ref 5–10.5)
POTASSIUM SERPL-SCNC: 4.2 MMOL/L (ref 3.5–5.1)
RBC # BLD AUTO: 3.75 M/UL (ref 4–5.2)
SODIUM SERPL-SCNC: 136 MMOL/L (ref 136–145)
WBC # BLD AUTO: 7.8 K/UL (ref 4–11)

## 2023-12-22 PROCEDURE — 36415 COLL VENOUS BLD VENIPUNCTURE: CPT

## 2023-12-22 PROCEDURE — 94760 N-INVAS EAR/PLS OXIMETRY 1: CPT

## 2023-12-22 PROCEDURE — 6360000002 HC RX W HCPCS: Performed by: NURSE PRACTITIONER

## 2023-12-22 PROCEDURE — 6360000002 HC RX W HCPCS: Performed by: STUDENT IN AN ORGANIZED HEALTH CARE EDUCATION/TRAINING PROGRAM

## 2023-12-22 PROCEDURE — 6370000000 HC RX 637 (ALT 250 FOR IP): Performed by: STUDENT IN AN ORGANIZED HEALTH CARE EDUCATION/TRAINING PROGRAM

## 2023-12-22 PROCEDURE — 92526 ORAL FUNCTION THERAPY: CPT

## 2023-12-22 PROCEDURE — 2580000003 HC RX 258: Performed by: STUDENT IN AN ORGANIZED HEALTH CARE EDUCATION/TRAINING PROGRAM

## 2023-12-22 PROCEDURE — 2060000000 HC ICU INTERMEDIATE R&B

## 2023-12-22 PROCEDURE — 6370000000 HC RX 637 (ALT 250 FOR IP): Performed by: NURSE PRACTITIONER

## 2023-12-22 PROCEDURE — 80048 BASIC METABOLIC PNL TOTAL CA: CPT

## 2023-12-22 PROCEDURE — 2580000003 HC RX 258: Performed by: NURSE PRACTITIONER

## 2023-12-22 PROCEDURE — 85025 COMPLETE CBC W/AUTO DIFF WBC: CPT

## 2023-12-22 RX ADMIN — SODIUM CHLORIDE: 9 INJECTION, SOLUTION INTRAVENOUS at 12:48

## 2023-12-22 RX ADMIN — CARVEDILOL 6.25 MG: 6.25 TABLET, FILM COATED ORAL at 17:43

## 2023-12-22 RX ADMIN — CARVEDILOL 6.25 MG: 6.25 TABLET, FILM COATED ORAL at 08:33

## 2023-12-22 RX ADMIN — LISINOPRIL 40 MG: 40 TABLET ORAL at 08:33

## 2023-12-22 RX ADMIN — CEFTRIAXONE 1000 MG: 1 INJECTION, POWDER, FOR SOLUTION INTRAMUSCULAR; INTRAVENOUS at 18:48

## 2023-12-22 RX ADMIN — ROSUVASTATIN CALCIUM 40 MG: 40 TABLET, FILM COATED ORAL at 21:31

## 2023-12-22 RX ADMIN — VANCOMYCIN HYDROCHLORIDE 1000 MG: 1 INJECTION, POWDER, LYOPHILIZED, FOR SOLUTION INTRAVENOUS at 12:53

## 2023-12-22 RX ADMIN — ENOXAPARIN SODIUM 40 MG: 100 INJECTION SUBCUTANEOUS at 08:33

## 2023-12-22 RX ADMIN — Medication 1 CAPSULE: at 09:47

## 2023-12-22 RX ADMIN — Medication 10 ML: at 08:35

## 2023-12-22 RX ADMIN — Medication 10 ML: at 21:32

## 2023-12-22 RX ADMIN — POTASSIUM CHLORIDE 10 MEQ: 750 TABLET, FILM COATED, EXTENDED RELEASE ORAL at 08:33

## 2023-12-22 RX ADMIN — SODIUM CHLORIDE: 9 INJECTION, SOLUTION INTRAVENOUS at 18:44

## 2023-12-22 RX ADMIN — PANTOPRAZOLE SODIUM 40 MG: 40 TABLET, DELAYED RELEASE ORAL at 05:23

## 2023-12-22 RX ADMIN — THERA TABS 1 TABLET: TAB at 08:33

## 2023-12-22 RX ADMIN — AMLODIPINE BESYLATE 5 MG: 5 TABLET ORAL at 08:33

## 2023-12-22 RX ADMIN — ASPIRIN 325 MG: 325 TABLET, COATED ORAL at 08:33

## 2023-12-22 NOTE — PROGRESS NOTES
6/8/2023 7:52 pm TECHNIQUE: CT of the abdomen and pelvis was performed with the administration of intravenous contrast. Multiplanar reformatted images are provided for review. Automated exposure control, iterative reconstruction, and/or weight based adjustment of the mA/kV was utilized to reduce the radiation dose to as low as reasonably achievable. COMPARISON: 09/11/2022 HISTORY: ORDERING SYSTEM PROVIDED HISTORY: abd pain, diarrhea TECHNOLOGIST PROVIDED HISTORY: Reason for exam:->abd pain, diarrhea Additional Contrast?->None Decision Support Exception - unselect if not a suspected or confirmed emergency medical condition->Emergency Medical Condition (MA) Reason for Exam: abd pain, diarrhea FINDINGS: Lower Chest: Coarse interstitial findings in the lung bases are again demonstrated compatible with fibrotic lung process. Overall this appears similar compared to the prior exam. Organs: The liver, pancreas, spleen, kidneys, and adrenals reveal no acute findings. No inflammatory change identified in the gallbladder fossa. GI/Bowel: Liquid stool is present throughout the colon. The sigmoid colon takes an unusual course towards the right abdomen and there is mild swirling of the mesentery noted without twisting to suggest volvulus. Loops of colon are mildly distended with gas. No significant small bowel distension is appreciated. No wall thickening or inflammatory change. No pneumatosis or portal venous gas. Pelvis: Mild diffuse bladder wall thickening. Peritoneum/Retroperitoneum: No free air or free fluid. The aorta is normal in caliber. The visceral branches are patent. Calcified atheromatous plaque is present. No lymphadenopathy. Bones/Soft Tissues: Small fat containing umbilical hernia. Severe compression deformity of L2 is again noted. 1.  Findings compatible diarrheal process. Mild colonic distension is noted with mild mesenteric swirling but no evidence for volvulus.   This may be due to a partial obstructing process due to an underlying internal hernia. No significant small bowel distension. 2.  Additional chronic and benign findings, as described. CBC:   Recent Labs     12/20/23  1838 12/21/23  0437 12/22/23  0422   WBC 9.9 10.0 7.8   HGB 11.2* 11.2* 10.6*    255 230       BMP:    Recent Labs     12/20/23  1726 12/20/23  1838 12/22/23  0422   NA  --  134* 136   K  --  4.0 4.2   CL  --  101 101   CO2  --  25 26   BUN  --  14 16   CREATININE 1.0 0.8 1.0   GLUCOSE  --  104* 126*       Hepatic:   Recent Labs     12/20/23 1838   AST 13*   ALT 12   BILITOT <0.2   ALKPHOS 46       Lipids:   Lab Results   Component Value Date/Time    CHOL 101 12/21/2023 04:37 AM    HDL 36 12/21/2023 04:37 AM    HDL 37 11/02/2011 09:10 AM    TRIG 76 12/21/2023 04:37 AM     Hemoglobin A1C:   Lab Results   Component Value Date/Time    LABA1C 5.6 12/21/2023 04:37 AM     TSH:   Lab Results   Component Value Date/Time    TSH 2.21 07/25/2023 09:25 AM     Troponin: No results found for: \"TROPONINT\"  Lactic Acid: No results for input(s): \"LACTA\" in the last 72 hours. BNP:   No results for input(s): \"PROBNP\" in the last 72 hours.   UA:  Lab Results   Component Value Date/Time    NITRU Negative 12/20/2023 03:52 PM    COLORU Yellow 12/20/2023 03:52 PM    PHUR 6.0 12/20/2023 03:52 PM    WBCUA 248 12/20/2023 03:52 PM    RBCUA 2 12/20/2023 03:52 PM    BACTERIA 2+ 12/20/2023 03:52 PM    CLARITYU CLOUDY 12/20/2023 03:52 PM    SPECGRAV 1.013 12/20/2023 03:52 PM    LEUKOCYTESUR LARGE 12/20/2023 03:52 PM    UROBILINOGEN 1.0 12/20/2023 03:52 PM    BILIRUBINUR Negative 12/20/2023 03:52 PM    BLOODU Negative 12/20/2023 03:52 PM    GLUCOSEU Negative 12/20/2023 03:52 PM    KETUA TRACE 12/20/2023 03:52 PM     Urine Cultures:   Lab Results   Component Value Date/Time    LABURIN  12/20/2023 03:52 PM     >100,000 CFU/ml  PBP2= POSITIVE  CONTACT PRECAUTIONS INDICATED       Blood Cultures:   Lab Results   Component Value Date/Time    [facility]

## 2023-12-22 NOTE — PROGRESS NOTES
[facility]   Speech Therapy  Daily Dysphagia Treatment Note  DISCHARGE SUMMARY    Scarlet Chavez  AGE: 80 y.o. GENDER: female  : 1935  [id number]  EPISODE DATE:  2023    Patient Active Problem List   Diagnosis    Hyperlipidemia    Lung nodule    Fatty liver disease, nonalcoholic    Essential hypertension    History of aortic valve replacement with bioprosthetic valve    Urinary tract infection in female    Sacral back pain    Compression of lumbar vertebra (HCC)    Spondylolisthesis at L4-L5 level    Symptomatic bradycardia    Cardiac pacemaker in situ    SSS (sick sinus syndrome) (HCC)    Stroke-like symptoms    Episodes of staring     Allergies   Allergen Reactions    Adhesive Tape Itching and Dermatitis    Oxycontin [Oxycodone Hcl] Other (See Comments)     \"seeing things\"       Treatment Diagnosis: Dysphagia     Chart Review:   Admission H&P:   Scarlet Chavez is a 80 y.o. female with a past medical history of aortic stenosis, CVA, hyperlipidemia, hypertension, OA who presents with Stroke-like symptoms  Stroke like symptoms - rule out TIA/CVA-  Suspect due to her speech, episode of staring. NIHSS 0 on admission, LKW 10 AM on   CT HEAD wo:  no intracranial abnormality. CTA -75% stenosis of left carotid bulb, 60% stenosis of proximal right cervical ICA and 50% stenosis of the proximal left cervical ICA. EKG: NSR  no ST seg dep or elevations, no Twave changes. Troponin 24. Last echo (): ** EF **. 65% No prior MRI on file  -Observation  -swallow eval and if pass, can have meds and food  -neuro checks  -TSH 6.87 with reflex to T4 - 1.2 (2023)  -consult Neurology  -MRI head pending  -ASA and statin continued   -Permissive Hypertension  -Labetalol with parameters  -Stroke pathway  -PT/OT/speech eval      Imaging:  CT HEAD 23  IMPRESSION:  No CT evidence of an acute infarct. No large vessel occlusion visualized within the head or neck.   >75% stenosis of the left carotid bulb.  60% stenosis of the proximal right cervical ICA and 50% stenosis of the  proximal left cervical ICA. Incidentally noted thyroid nodule, for which thyroid ultrasound correlation is recommended. Also incidentally noted mild esophageal wall thickening. Consider esophagram and/or upper endoscopy for further evaluation. Modified Barium Swallow Study: 5/2/2023  MBSS Assessment Impression:  Modified Barium Swallow evaluation completed on 5/2/2023. Patient presents with mild oropharyngeal dysphagia. Oral phase of swallow appeared mildly impaired characterized by mildly prolonged mastication with delayed bolus cohesion. Mild lingual residue noted with drier/stickier consistencies was cleared with liquid wash. Swallow initiation appeared severely delayed triggering at the level of the pyriforms/UES with both thin and nectar liquids. With puree and textured solids, mild-mod delay triggering past head of epiglottis. Hyolaryngeal elevation/excursion was minimal, resulting in incomplete laryngeal closure. This resulted in flash penetration of thin liquids that appeared to be fully cleared from laryngeal vestibule. Tongue base retraction was mildly impaired resulting in mild vallecular residue. UES opening appeared adequate. In A-P view pt noted to have significant backflow into the upper esophagus with thin and puree trials. Recommended Diet:  Solid consistency: Regular  Liquid consistency: Thin  Liquid administration via: Straw;Cup  Medication administration: PO     5/24/23 EGD with esophageal dilation     History/Prior Level of Function:   Baseline diet: Regular, thin   Prior Dysphagia History: Patient seen May 2023 with MBS with recommendation for regular/thin    Subjective:     Current Diet Level: Regular texture diet ;  Thin liquids      Comments regarding tolerating Current Diet: continues to report sensation of phlegm stuck in esophagus occasionally during meals (patient reports this does not even happen

## 2023-12-22 NOTE — PLAN OF CARE
Problem: Discharge Planning  Goal: Discharge to home or other facility with appropriate resources  Outcome: Progressing     Problem: ABCDS Injury Assessment  Goal: Absence of physical injury  Outcome: Progressing     Problem: Safety - Adult  Goal: Free from fall injury  Outcome: Progressing     Problem: Neurosensory - Adult  Goal: Achieves stable or improved neurological status  Outcome: Progressing  Goal: Achieves maximal functionality and self care  Outcome: Progressing

## 2023-12-22 NOTE — PLAN OF CARE
Problem: Discharge Planning  Goal: Discharge to home or other facility with appropriate resources  Outcome: Progressing  Flowsheets (Taken 12/21/2023 0830)  Discharge to home or other facility with appropriate resources:   Identify barriers to discharge with patient and caregiver   Arrange for needed discharge resources and transportation as appropriate   Refer to discharge planning if patient needs post-hospital services based on physician order or complex needs related to functional status, cognitive ability or social support system     Problem: ABCDS Injury Assessment  Goal: Absence of physical injury  Outcome: Progressing  Flowsheets (Taken 12/21/2023 0830)  Absence of Physical Injury: Implement safety measures based on patient assessment     Problem: Safety - Adult  Goal: Free from fall injury  Outcome: Progressing  Flowsheets (Taken 12/21/2023 0830)  Free From Fall Injury: Instruct family/caregiver on patient safety     Problem: Neurosensory - Adult  Goal: Achieves stable or improved neurological status  Outcome: Progressing  Flowsheets (Taken 12/21/2023 0830)  Achieves stable or improved neurological status:   Assess for and report changes in neurological status   Maintain blood pressure and fluid volume within ordered parameters to optimize cerebral perfusion and minimize risk of hemorrhage  Goal: Achieves maximal functionality and self care  Outcome: Progressing  Flowsheets (Taken 12/21/2023 0830)  Achieves maximal functionality and self care:   Monitor swallowing and airway patency with patient fatigue and changes in neurological status   Encourage and assist patient to increase activity and self care with guidance from physical therapy/occupational therapy

## 2023-12-23 VITALS
HEART RATE: 87 BPM | OXYGEN SATURATION: 94 % | RESPIRATION RATE: 14 BRPM | TEMPERATURE: 98 F | SYSTOLIC BLOOD PRESSURE: 155 MMHG | BODY MASS INDEX: 26.32 KG/M2 | WEIGHT: 134.04 LBS | HEIGHT: 60 IN | DIASTOLIC BLOOD PRESSURE: 62 MMHG

## 2023-12-23 LAB
ANION GAP SERPL CALCULATED.3IONS-SCNC: 7 MMOL/L (ref 3–16)
BASOPHILS # BLD: 0.2 K/UL (ref 0–0.2)
BASOPHILS NFR BLD: 2.1 %
BUN SERPL-MCNC: 13 MG/DL (ref 7–20)
CALCIUM SERPL-MCNC: 8.7 MG/DL (ref 8.3–10.6)
CHLORIDE SERPL-SCNC: 104 MMOL/L (ref 99–110)
CO2 SERPL-SCNC: 26 MMOL/L (ref 21–32)
CREAT SERPL-MCNC: 0.9 MG/DL (ref 0.6–1.2)
DEPRECATED RDW RBC AUTO: 14.3 % (ref 12.4–15.4)
EOSINOPHIL # BLD: 0.3 K/UL (ref 0–0.6)
EOSINOPHIL NFR BLD: 3.3 %
GFR SERPLBLD CREATININE-BSD FMLA CKD-EPI: >60 ML/MIN/{1.73_M2}
GLUCOSE SERPL-MCNC: 96 MG/DL (ref 70–99)
HCT VFR BLD AUTO: 32 % (ref 36–48)
HGB BLD-MCNC: 10.9 G/DL (ref 12–16)
LYMPHOCYTES # BLD: 3.4 K/UL (ref 1–5.1)
LYMPHOCYTES NFR BLD: 42.1 %
MCH RBC QN AUTO: 28.4 PG (ref 26–34)
MCHC RBC AUTO-ENTMCNC: 34.2 G/DL (ref 31–36)
MCV RBC AUTO: 83.2 FL (ref 80–100)
MONOCYTES # BLD: 0.5 K/UL (ref 0–1.3)
MONOCYTES NFR BLD: 5.8 %
NEUTROPHILS # BLD: 3.7 K/UL (ref 1.7–7.7)
NEUTROPHILS NFR BLD: 46.7 %
PLATELET # BLD AUTO: 253 K/UL (ref 135–450)
PMV BLD AUTO: 8.5 FL (ref 5–10.5)
POTASSIUM SERPL-SCNC: 4.4 MMOL/L (ref 3.5–5.1)
RBC # BLD AUTO: 3.85 M/UL (ref 4–5.2)
SODIUM SERPL-SCNC: 137 MMOL/L (ref 136–145)
WBC # BLD AUTO: 8 K/UL (ref 4–11)

## 2023-12-23 PROCEDURE — 2580000003 HC RX 258: Performed by: STUDENT IN AN ORGANIZED HEALTH CARE EDUCATION/TRAINING PROGRAM

## 2023-12-23 PROCEDURE — 80048 BASIC METABOLIC PNL TOTAL CA: CPT

## 2023-12-23 PROCEDURE — 6360000002 HC RX W HCPCS: Performed by: NURSE PRACTITIONER

## 2023-12-23 PROCEDURE — 85025 COMPLETE CBC W/AUTO DIFF WBC: CPT

## 2023-12-23 PROCEDURE — 6370000000 HC RX 637 (ALT 250 FOR IP): Performed by: STUDENT IN AN ORGANIZED HEALTH CARE EDUCATION/TRAINING PROGRAM

## 2023-12-23 PROCEDURE — 99223 1ST HOSP IP/OBS HIGH 75: CPT | Performed by: INTERNAL MEDICINE

## 2023-12-23 PROCEDURE — 2580000003 HC RX 258: Performed by: NURSE PRACTITIONER

## 2023-12-23 PROCEDURE — 6370000000 HC RX 637 (ALT 250 FOR IP): Performed by: NURSE PRACTITIONER

## 2023-12-23 PROCEDURE — 36415 COLL VENOUS BLD VENIPUNCTURE: CPT

## 2023-12-23 PROCEDURE — 6360000002 HC RX W HCPCS: Performed by: STUDENT IN AN ORGANIZED HEALTH CARE EDUCATION/TRAINING PROGRAM

## 2023-12-23 RX ORDER — SULFAMETHOXAZOLE AND TRIMETHOPRIM 800; 160 MG/1; MG/1
1 TABLET ORAL 2 TIMES DAILY
Qty: 20 TABLET | Refills: 0 | Status: SHIPPED | OUTPATIENT
Start: 2023-12-23 | End: 2024-01-02

## 2023-12-23 RX ADMIN — THERA TABS 1 TABLET: TAB at 09:25

## 2023-12-23 RX ADMIN — VANCOMYCIN HYDROCHLORIDE 750 MG: 750 INJECTION, POWDER, LYOPHILIZED, FOR SOLUTION INTRAVENOUS at 09:33

## 2023-12-23 RX ADMIN — Medication 10 ML: at 09:25

## 2023-12-23 RX ADMIN — CARVEDILOL 6.25 MG: 6.25 TABLET, FILM COATED ORAL at 09:25

## 2023-12-23 RX ADMIN — PANTOPRAZOLE SODIUM 40 MG: 40 TABLET, DELAYED RELEASE ORAL at 05:20

## 2023-12-23 RX ADMIN — ASPIRIN 325 MG: 325 TABLET, COATED ORAL at 09:25

## 2023-12-23 RX ADMIN — POTASSIUM CHLORIDE 10 MEQ: 750 TABLET, FILM COATED, EXTENDED RELEASE ORAL at 09:25

## 2023-12-23 RX ADMIN — AMLODIPINE BESYLATE 5 MG: 5 TABLET ORAL at 09:25

## 2023-12-23 RX ADMIN — Medication 1 CAPSULE: at 09:25

## 2023-12-23 RX ADMIN — ENOXAPARIN SODIUM 40 MG: 100 INJECTION SUBCUTANEOUS at 09:25

## 2023-12-23 RX ADMIN — LISINOPRIL 40 MG: 40 TABLET ORAL at 09:25

## 2023-12-23 NOTE — PROGRESS NOTES
Patient is awake alert and oriented sitting up in chair. Pt is very talkative. Plan is to check bladder scan once pt voids next. Dr. Harjit Naranjo is at bedside, updated him. Call light within reach.

## 2023-12-23 NOTE — CARE COORDINATION
12/23/23 1634   IMM Letter   IMM Letter given to Patient/Family/Significant other/Guardian/POA/by: Provided to patient. Education provided to patient, patient reported no questions and verbalized understanding. Patient aware of 4 hours allotted time to determine if they choose to pursue Medicare appeal process.    IMM Letter date given: 12/23/23   IMM Letter time given: 1400

## 2023-12-23 NOTE — CARE COORDINATION
Case Management Discharge Note          Date / Time of Note: 12/23/2023 4:26 PM                  Patient Name: Massiel Vasquez   YOB: 1935  Diagnosis: TIA (transient ischemic attack) [G45.9]  Dysarthria [R47.1]  Hyponatremia [E87.1]  Acute cystitis without hematuria [N30.00]  Stroke-like symptoms [R29.90]   Date / Time: 12/20/2023  3:35 PM    Financial:  Payor: Robert Stinson / Plan: Alexnader Morgan PPO / Product Type: Medicare /      Pharmacy:    [facility] [phone] [facility] [phone]  [place] [address]  Phone: [phone] Fax: [phone]    [facility] [address] [phone] - F [phone]  [address] [address]  Phone: [phone] Fax: [address]  [address] [place] [address]  Phone: [phone] Fax: [phone]        DISCHARGE Disposition: Home with [address]:  Home Care ordered at discharge: Yes  [address]:  [facility]  Phone: [phone]  Fax: [phone]  Orders faxed: Yes    [facility]. Abhijit Espinoza, [address] [place] [address]  Phone: [phone]      Transportation:  Transportation PLAN for discharge: family   Mode of Transport: Private Car    IMM Completed:   Yes, Case management has presented and reviewed IMM letter #2. IMM Letter given to Patient/Family/Significant other/Guardian/POA/by[de-identified] Provided to patient. Education provided to patient, patient reported no questions and verbalized understanding. Patient aware of 4 hours allotted time to determine if they choose to pursue Medicare appeal process. IMM Letter date given[de-identified] 12/23/23  IMM Letter time given[de-identified] 1400.    Patient and/or family/POA verbalized understanding

## 2023-12-23 NOTE — PLAN OF CARE
Problem: Discharge Planning  Goal: Discharge to home or other facility with appropriate resources  Outcome: Progressing  Note: Prior to admission pt was living at home, her grandson also lives in the home but he does not provide care for patient. Social work/case management available for discharge needs. Problem: ABCDS Injury Assessment  Goal: Absence of physical injury  Outcome: Progressing  Note: No signs of physical injury. Problem: Safety - Adult  Goal: Free from fall injury  Outcome: Progressing  Note: Fall risk assessment completed every shift. All precautions in place. Pt has call light within reach at all times. Room clear of clutter. Pt aware to call for assistance when getting up. Pt is compliant with calling for help prior to getting out of bed or up from chair. Problem: Neurosensory - Adult  Goal: Achieves stable or improved neurological status  Outcome: Progressing  Note: NIHSS is 0. Pt is alert and oriented able to make needs known. Able to move all extremities with no difficulty.       Problem: Neurosensory - Adult  Goal: Achieves maximal functionality and self care  Outcome: Progressing  Flowsheets (Taken 12/23/2023 1213)  Achieves maximal functionality and self care:   Monitor swallowing and airway patency with patient fatigue and changes in neurological status   Encourage and assist patient to increase activity and self care with guidance from physical therapy/occupational therapy   Encourage visually impaired, hearing impaired and aphasic patients to use assistive/communication devices

## 2023-12-23 NOTE — DISCHARGE SUMMARY
distention. Trachea midline. Respiratory:  Normal respiratory effort. Clear to auscultation, bilaterally without Rales/Wheezes/Rhonchi. Cardiovascular:  Regular rate and rhythm with normal S1/S2 without murmurs, rubs or gallops. Abdomen: Soft, non-tender, non-distended with normal bowel sounds. Musculoskeletal:  No clubbing, cyanosis or edema bilaterally. Full range of motion without deformity. Skin: Skin color, texture, turgor normal.  No rashes or lesions. Neurologic:  Neurovascularly intact without any focal sensory/motor deficits. Cranial nerves: II-XII intact, grossly non-focal.  Psychiatric:  Alert and oriented, thought content appropriate, normal insight  Capillary Refill: Brisk,< 3 seconds   Peripheral Pulses: +2 palpable, equal bilaterally       Labs: For convenience and continuity at follow-up the following most recent labs are provided:      CBC:    Lab Results   Component Value Date/Time    WBC 8.0 12/23/2023 05:40 AM    HGB 10.9 12/23/2023 05:40 AM    HCT 32.0 12/23/2023 05:40 AM     12/23/2023 05:40 AM       Renal:    Lab Results   Component Value Date/Time     12/23/2023 05:40 AM    K 4.4 12/23/2023 05:40 AM    K 4.0 12/20/2023 06:38 PM     12/23/2023 05:40 AM    CO2 26 12/23/2023 05:40 AM    BUN 13 12/23/2023 05:40 AM    CREATININE 0.9 12/23/2023 05:40 AM    CALCIUM 8.7 12/23/2023 05:40 AM    PHOS 3.9 12/14/2023 11:54 AM         Significant Diagnostic Studies    Radiology:   CTA Head Neck W/Contrast   Final Result   No CT evidence of an acute infarct. No large vessel occlusion visualized within the head or neck. >75% stenosis of the left carotid bulb. 60% stenosis of the proximal right cervical ICA and 50% stenosis of the   proximal left cervical ICA. Incidentally noted thyroid nodule, for which thyroid ultrasound correlation   is recommended. Also incidentally noted mild esophageal wall thickening.   Consider esophagram   and/or upper endoscopy for capsule by mouth at bedtime      GLUCOSAMINE-CHONDROITIN PO Take 1 tablet by mouth nightly      aspirin 325 MG EC tablet Take 1 tablet by mouth daily  Qty: 30 tablet, Refills: 3      multivitamin (THERAGRAN) per tablet Take 1 tablet by mouth daily             Time Spent on discharge: *** in the examination, evaluation, counseling and review of medications and discharge plan. Signed:    Andria Patel MD   12/23/2023      Thank you Consuelo Willoughby MD for the opportunity to be involved in this patient's care. If you have any questions or concerns, please feel free to contact me at [phone]. Comment: Please note this report has been produced using speech recognition software and may contain errors related to that system including errors in grammar, punctuation, and spelling, as well as words and phrases that may be inappropriate. If there are any questions or concerns, please feel free to contact the dictating provider for clarification.

## 2023-12-24 LAB
BACTERIA BLD CULT ORG #2: NORMAL
BACTERIA BLD CULT: NORMAL

## 2023-12-26 ENCOUNTER — TELEPHONE (OUTPATIENT)
Dept: FAMILY MEDICINE CLINIC | Age: 88
End: 2023-12-26

## 2023-12-26 NOTE — TELEPHONE ENCOUNTER
Care Transitions Initial Follow Up Call    Outreach made within 2 business days of discharge: Yes    Patient: Owen Hendricks Patient : 1935   MRN: [id number]  Reason for Admission: There are no discharge diagnoses documented for the most recent discharge. Discharge Date: 23       Spoke with: Abdirizak Reina (daughter, on HIPAA)     Discharge department/facility: [address] [place] [address] Interactive Patient Contact:  Was patient able to fill all prescriptions: Yes  Was patient instructed to bring all medications to the follow-up visit: Yes  Is patient taking all medications as directed in the discharge summary?  Yes  Does patient understand their discharge instructions: Yes  Does patient have questions or concerns that need addressed prior to 7-14 day follow up office visit: no    Scheduled appointment with PCP within 7-14 days    Follow Up  Future Appointments   Date Time Provider [address] [place] [address]   2023  3:00 PM Darcie Kellogg MD [facility]   2024 11:00 AM [facility]   2024 10:30 AM Leisa Andrew MD [facility]   3/13/2024 10:30 AM SCHEDULE, [facility] DEVICE CHECK [facility]   3/13/2024 10:45 AM Deanne Lees MD [facility]   2024 10:30 AM SCHEDULE, [address] [facility], [address] [facility]

## 2023-12-27 NOTE — TELEPHONE ENCOUNTER
Last OV: 11/30/23  Next OV: 2/1/24  Last refill:11/30/23  Most recent Labs: 12/23/23  Last EKG (if needed):12/20/23

## 2023-12-28 RX ORDER — CARVEDILOL 6.25 MG/1
6.25 TABLET ORAL 2 TIMES DAILY
Qty: 180 TABLET | Refills: 3 | Status: SHIPPED | OUTPATIENT
Start: 2023-12-28

## 2024-01-02 ENCOUNTER — OFFICE VISIT (OUTPATIENT)
Dept: FAMILY MEDICINE CLINIC | Age: 89
End: 2024-01-02
Payer: MEDICARE

## 2024-01-02 VITALS
BODY MASS INDEX: 27.09 KG/M2 | SYSTOLIC BLOOD PRESSURE: 122 MMHG | HEIGHT: 60 IN | WEIGHT: 138 LBS | TEMPERATURE: 97.2 F | DIASTOLIC BLOOD PRESSURE: 70 MMHG | HEART RATE: 72 BPM

## 2024-01-02 DIAGNOSIS — E04.1 THYROID NODULE: ICD-10-CM

## 2024-01-02 DIAGNOSIS — Z87.440 RECENT URINARY TRACT INFECTION: Primary | ICD-10-CM

## 2024-01-02 DIAGNOSIS — I65.22 STENOSIS OF LEFT CAROTID ARTERY: ICD-10-CM

## 2024-01-02 PROCEDURE — 99214 OFFICE O/P EST MOD 30 MIN: CPT | Performed by: FAMILY MEDICINE

## 2024-01-02 PROCEDURE — 1123F ACP DISCUSS/DSCN MKR DOCD: CPT | Performed by: FAMILY MEDICINE

## 2024-01-02 ASSESSMENT — PATIENT HEALTH QUESTIONNAIRE - PHQ9
2. FEELING DOWN, DEPRESSED OR HOPELESS: 0
SUM OF ALL RESPONSES TO PHQ QUESTIONS 1-9: 0
SUM OF ALL RESPONSES TO PHQ9 QUESTIONS 1 & 2: 0
1. LITTLE INTEREST OR PLEASURE IN DOING THINGS: 0

## 2024-01-02 ASSESSMENT — ENCOUNTER SYMPTOMS
DIARRHEA: 0
NAUSEA: 0
SHORTNESS OF BREATH: 0
CONSTIPATION: 0
ABDOMINAL PAIN: 0
BLOOD IN STOOL: 0
VOMITING: 0

## 2024-01-02 NOTE — PROGRESS NOTES
---------------------------   BP:          122/70         Site:    Left Upper Arm     Position:     Sitting        Cuff Size:  Medium Adult      Pulse:         72           Temp:   97.2 °F (36.2 °C)   TempSrc:    Temporal        Weight: 62.6 kg (138 lb)    Height:   1.524 m (5')     ---------------------------  Body mass index is 26.95 kg/m².     Wt Readings from Last 3 Encounters:  01/02/24 : 62.6 kg (138 lb)  12/23/23 : 60.8 kg (134 lb 0.6 oz)  12/11/23 : 60.8 kg (134 lb)    BP Readings from Last 3 Encounters:  01/02/24 : 122/70  12/23/23 : (!) 155/62  12/11/23 : 120/78            Review of Systems   Constitutional:  Negative for appetite change, chills, fever and unexpected weight change.   Respiratory:  Negative for shortness of breath.    Cardiovascular:  Negative for chest pain and palpitations.   Gastrointestinal:  Negative for abdominal pain, blood in stool, constipation, diarrhea, nausea and vomiting.        No dysphagia no gerd    Genitourinary:  Negative for difficulty urinating, dysuria and hematuria.       Objective:   Physical Exam  Constitutional:       General: She is not in acute distress.     Appearance: Normal appearance. She is well-developed. She is not ill-appearing or diaphoretic.   Neck:      Thyroid: No thyroid mass or thyromegaly.      Comments: Slight bruit on right(?)  Cardiovascular:      Rate and Rhythm: Normal rate and regular rhythm.      Heart sounds: Normal heart sounds. No murmur heard.     No friction rub. No gallop.      Comments: Trace edema at the ankles both sides   Pulmonary:      Effort: Pulmonary effort is normal. No tachypnea, accessory muscle usage or respiratory distress.      Breath sounds: Normal breath sounds. No decreased breath sounds, wheezing, rhonchi or rales.   Abdominal:      Palpations: Abdomen is soft. There is no hepatomegaly, splenomegaly, mass or pulsatile mass.      Tenderness: There is no abdominal tenderness. There is

## 2024-01-02 NOTE — PATIENT INSTRUCTIONS
See dr Rose for the recurring bladder infection   See vascular offices of dr Betancourt and group for the cirucaliont concerns on the left carotid

## 2024-01-10 DIAGNOSIS — Z87.440 RECENT URINARY TRACT INFECTION: ICD-10-CM

## 2024-01-10 LAB
BACTERIA URNS QL MICRO: NORMAL /HPF
BILIRUB UR QL STRIP.AUTO: NEGATIVE
CLARITY UR: CLEAR
COLOR UR: YELLOW
EPI CELLS #/AREA URNS AUTO: 1 /HPF (ref 0–5)
GLUCOSE UR STRIP.AUTO-MCNC: NEGATIVE MG/DL
HGB UR QL STRIP.AUTO: NEGATIVE
HYALINE CASTS #/AREA URNS AUTO: 0 /LPF (ref 0–8)
KETONES UR STRIP.AUTO-MCNC: NEGATIVE MG/DL
LEUKOCYTE ESTERASE UR QL STRIP.AUTO: NEGATIVE
NITRITE UR QL STRIP.AUTO: NEGATIVE
PH UR STRIP.AUTO: 6.5 [PH] (ref 5–8)
PROT UR STRIP.AUTO-MCNC: NEGATIVE MG/DL
RBC CLUMPS #/AREA URNS AUTO: 1 /HPF (ref 0–4)
SP GR UR STRIP.AUTO: 1.01 (ref 1–1.03)
UA DIPSTICK W REFLEX MICRO PNL UR: NORMAL
URN SPEC COLLECT METH UR: NORMAL
UROBILINOGEN UR STRIP-ACNC: 0.2 E.U./DL
WBC #/AREA URNS AUTO: 1 /HPF (ref 0–5)

## 2024-01-12 ENCOUNTER — TELEPHONE (OUTPATIENT)
Dept: CARDIOLOGY CLINIC | Age: 89
End: 2024-01-12

## 2024-01-12 NOTE — TELEPHONE ENCOUNTER
Jessica SAHRA(daughter) is calling about Jessica's enclosed MRI. She is stating that Jessica is claustrophobic and would like to know if Dunlap can prescribe a Valium or some other type of medication to calm her down.    Please assist.    Jessica Samson (daughter) can be reached at [phone].

## 2024-01-15 ENCOUNTER — CARE COORDINATION (OUTPATIENT)
Dept: CARE COORDINATION | Age: 89
End: 2024-01-15

## 2024-01-15 DIAGNOSIS — Z01.810 PREOP CARDIOVASCULAR EXAM: Primary | ICD-10-CM

## 2024-01-15 RX ORDER — DIAZEPAM 5 MG/1
5 TABLET ORAL NIGHTLY PRN
Qty: 2 TABLET | Refills: 0 | Status: SHIPPED | OUTPATIENT
Start: 2024-01-15 | End: 2024-01-17

## 2024-01-15 RX ORDER — CARVEDILOL 6.25 MG/1
6.25 TABLET ORAL 2 TIMES DAILY
Qty: 180 TABLET | Refills: 3 | Status: SHIPPED | OUTPATIENT
Start: 2024-01-15

## 2024-01-15 NOTE — TELEPHONE ENCOUNTER
I returned Jessica Chapin's phone call regarding concern for anti anxiety med prior to MRI, I discussed with Earnest Jeffery ordered and Rx sent.    Jessica verbalizes understanding.

## 2024-01-15 NOTE — CARE COORDINATION
Attempted to reach patient by phone. No answer. Left brief message with name, contact number and asked for return call back.  Waiting for patient response at this time. Will update notes when callback is received. Will follow up at another date and time.

## 2024-01-17 ENCOUNTER — TELEPHONE (OUTPATIENT)
Dept: FAMILY MEDICINE CLINIC | Age: 89
End: 2024-01-17

## 2024-01-17 NOTE — TELEPHONE ENCOUNTER
Margot with [facility], she is being discharged, she has met all her goals.    Any questions [phone]

## 2024-01-18 ENCOUNTER — TELEPHONE (OUTPATIENT)
Dept: INFECTIOUS DISEASES | Age: 89
End: 2024-01-18

## 2024-01-18 NOTE — TELEPHONE ENCOUNTER
Received a call from patient's daughter Jessica, returning my phone call. I informed Jessica that patient needed to have labs drawn after abx were finished. Jessica verbalized understanding and stated they are planning to have them drawn on 1/22/24. Gave number to call for any questions.

## 2024-01-22 DIAGNOSIS — N39.0 URINARY TRACT INFECTION IN FEMALE: ICD-10-CM

## 2024-01-22 LAB
ALBUMIN SERPL-MCNC: 4.2 G/DL (ref 3.4–5)
ALBUMIN/GLOB SERPL: 1.8 {RATIO} (ref 1.1–2.2)
ALP SERPL-CCNC: 50 U/L (ref 40–129)
ALT SERPL-CCNC: 10 U/L (ref 10–40)
ANION GAP SERPL CALCULATED.3IONS-SCNC: 11 MMOL/L (ref 3–16)
AST SERPL-CCNC: 12 U/L (ref 15–37)
BASOPHILS # BLD: 0.2 K/UL (ref 0–0.2)
BASOPHILS NFR BLD: 1.9 %
BILIRUB SERPL-MCNC: 0.3 MG/DL (ref 0–1)
BUN SERPL-MCNC: 17 MG/DL (ref 7–20)
CALCIUM SERPL-MCNC: 9.5 MG/DL (ref 8.3–10.6)
CHLORIDE SERPL-SCNC: 98 MMOL/L (ref 99–110)
CO2 SERPL-SCNC: 26 MMOL/L (ref 21–32)
CREAT SERPL-MCNC: 0.8 MG/DL (ref 0.6–1.2)
DEPRECATED RDW RBC AUTO: 14.9 % (ref 12.4–15.4)
EOSINOPHIL # BLD: 0.2 K/UL (ref 0–0.6)
EOSINOPHIL NFR BLD: 1.7 %
GFR SERPLBLD CREATININE-BSD FMLA CKD-EPI: >60 ML/MIN/{1.73_M2}
GLUCOSE SERPL-MCNC: 95 MG/DL (ref 70–99)
HCT VFR BLD AUTO: 35.2 % (ref 36–48)
HGB BLD-MCNC: 11.9 G/DL (ref 12–16)
LYMPHOCYTES # BLD: 2.6 K/UL (ref 1–5.1)
LYMPHOCYTES NFR BLD: 28.5 %
MCH RBC QN AUTO: 28.7 PG (ref 26–34)
MCHC RBC AUTO-ENTMCNC: 33.7 G/DL (ref 31–36)
MCV RBC AUTO: 85 FL (ref 80–100)
MONOCYTES # BLD: 0.4 K/UL (ref 0–1.3)
MONOCYTES NFR BLD: 3.9 %
NEUTROPHILS # BLD: 5.9 K/UL (ref 1.7–7.7)
NEUTROPHILS NFR BLD: 64 %
PLATELET # BLD AUTO: 230 K/UL (ref 135–450)
PMV BLD AUTO: 9.5 FL (ref 5–10.5)
POTASSIUM SERPL-SCNC: 4.6 MMOL/L (ref 3.5–5.1)
PROT SERPL-MCNC: 6.6 G/DL (ref 6.4–8.2)
RBC # BLD AUTO: 4.14 M/UL (ref 4–5.2)
SODIUM SERPL-SCNC: 135 MMOL/L (ref 136–145)
WBC # BLD AUTO: 9.2 K/UL (ref 4–11)

## 2024-01-23 NOTE — PROGRESS NOTES
gradient of 16  mmHg.  No evidence of aortic valve regurgitation or stenosis.  The right ventricle is normal in size and function.    ECHO   3/30/2021  There is mild concentric left ventricular hypertrophy.  Ejection fraction is visually estimated to be 65-70%.  Diastolic filling parameters suggest grade II diastolic dysfunction.  Moderate calcification of the posterior leaflet of the mitral valve.  Mild thickening of anterior leaflet of mitral valve.  Mild mitral regurgitation.  No evidence of mitral stenosis.  The left atrium is moderately dilated.  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  The right ventricle is mildly dilated.  Right ventricular systolic function is normal.    ECHO  6/1/2020  Systolic pulmonary artery pressure (SPAP) is normal and estimated at 30 mmHg  (RA pressure 3 mmHg).  Left ventricular size is decreased.  There is moderate concentric left ventricular hypertrophy.  Global left ventricular function is increased (hyperdynamic) with ejection fraction estimated from 65 % to 70 %. No segmental wall motion abnormalities noted.  Moderate to heavy calcification of the posterior leaflet of the mitral  valve.  Mild calcification of the anterior leaflet of the mitral valve.  mitral leaflet movement appear mildly restricted but no obvious mitral  stenosis noted.  Trivial mitral regurgitation is present.    CT Head Neck  12/20/2023  No CT evidence of an acute infarct.  No large vessel occlusion visualized within the head or neck.  >75% stenosis of the left carotid bulb.  60% stenosis of the proximal right cervical ICA and 50% stenosis of the  proximal left cervical ICA.  Incidentally noted thyroid nodule, for which thyroid ultrasound correlation  is recommended.  Also incidentally noted mild esophageal wall thickening.  Consider esophagram  and/or upper endoscopy for further  complains of pain/discomfort

## 2024-01-24 ENCOUNTER — HOSPITAL ENCOUNTER (OUTPATIENT)
Dept: MRI IMAGING | Age: 89
Discharge: HOME | End: 2024-01-24
Attending: INTERNAL MEDICINE
Payer: MEDICARE

## 2024-01-24 ENCOUNTER — TELEPHONE (OUTPATIENT)
Dept: FAMILY MEDICINE CLINIC | Age: 89
End: 2024-01-24

## 2024-01-24 DIAGNOSIS — I35.0 NONRHEUMATIC AORTIC (VALVE) STENOSIS: ICD-10-CM

## 2024-01-24 PROCEDURE — A9585 GADOBUTROL INJECTION: HCPCS | Performed by: INTERNAL MEDICINE

## 2024-01-24 PROCEDURE — G1010 CDSM STANSON: HCPCS

## 2024-01-24 PROCEDURE — 6360000004 HC RX CONTRAST MEDICATION: Performed by: INTERNAL MEDICINE

## 2024-01-24 PROCEDURE — 75565 CARD MRI VELOC FLOW MAPPING: CPT

## 2024-01-24 RX ORDER — GADOBUTROL 604.72 MG/ML
9 INJECTION INTRAVENOUS
Status: COMPLETED | OUTPATIENT
Start: 2024-01-24 | End: 2024-01-24

## 2024-01-24 RX ORDER — SULFAMETHOXAZOLE AND TRIMETHOPRIM 800; 160 MG/1; MG/1
1 TABLET ORAL 2 TIMES DAILY
Qty: 20 TABLET | Refills: 0 | Status: SHIPPED | OUTPATIENT
Start: 2024-01-24 | End: 2024-02-03

## 2024-01-24 RX ADMIN — GADOBUTROL 9 ML: 604.72 INJECTION INTRAVENOUS at 13:06

## 2024-01-24 NOTE — TELEPHONE ENCOUNTER
Jessica complains of urinary frequency and burning started today. She is requesting something to be sent to [facility]. She states she wants to catch it early so she doesn't end up in hospital again.

## 2024-01-26 LAB
BACTERIA BLD CULT ORG #2: NORMAL
BACTERIA BLD CULT ORG #3: NORMAL

## 2024-02-01 ENCOUNTER — CARE COORDINATION (OUTPATIENT)
Dept: CARE COORDINATION | Age: 89
End: 2024-02-01

## 2024-02-01 ENCOUNTER — OFFICE VISIT (OUTPATIENT)
Dept: CARDIOLOGY CLINIC | Age: 89
End: 2024-02-01
Payer: MEDICARE

## 2024-02-01 VITALS
BODY MASS INDEX: 26.17 KG/M2 | SYSTOLIC BLOOD PRESSURE: 132 MMHG | DIASTOLIC BLOOD PRESSURE: 74 MMHG | WEIGHT: 134 LBS | HEART RATE: 68 BPM | OXYGEN SATURATION: 98 %

## 2024-02-01 DIAGNOSIS — I10 ESSENTIAL HYPERTENSION: ICD-10-CM

## 2024-02-01 DIAGNOSIS — E78.2 MIXED HYPERLIPIDEMIA: ICD-10-CM

## 2024-02-01 DIAGNOSIS — I65.23 BILATERAL CAROTID ARTERY STENOSIS: ICD-10-CM

## 2024-02-01 DIAGNOSIS — I35.0 NONRHEUMATIC AORTIC (VALVE) STENOSIS: Primary | ICD-10-CM

## 2024-02-01 PROCEDURE — 1123F ACP DISCUSS/DSCN MKR DOCD: CPT | Performed by: INTERNAL MEDICINE

## 2024-02-01 PROCEDURE — 99214 OFFICE O/P EST MOD 30 MIN: CPT | Performed by: INTERNAL MEDICINE

## 2024-02-01 RX ORDER — LISINOPRIL 40 MG/1
40 TABLET ORAL DAILY
Qty: 90 TABLET | Refills: 1 | Status: SHIPPED | OUTPATIENT
Start: 2024-02-01

## 2024-02-01 NOTE — CARE COORDINATION
Spoke to patient for cc follow up briefly. Patient states she was leaving for Dr Dunlap appointment and wants call next week. Will follow back up.

## 2024-02-02 ENCOUNTER — HOSPITAL ENCOUNTER (OUTPATIENT)
Dept: ULTRASOUND IMAGING | Age: 89
Discharge: HOME | End: 2024-02-02
Payer: MEDICARE

## 2024-02-02 DIAGNOSIS — N39.0 URINARY TRACT INFECTION WITHOUT HEMATURIA, SITE UNSPECIFIED: ICD-10-CM

## 2024-02-02 DIAGNOSIS — I10 ESSENTIAL (PRIMARY) HYPERTENSION: ICD-10-CM

## 2024-02-02 PROCEDURE — 76770 US EXAM ABDO BACK WALL COMP: CPT

## 2024-02-08 ENCOUNTER — HOSPITAL ENCOUNTER (OUTPATIENT)
Dept: VASCULAR LAB | Age: 89
Discharge: HOME | End: 2024-02-08
Payer: MEDICARE

## 2024-02-08 DIAGNOSIS — I65.23 BILATERAL CAROTID ARTERY STENOSIS: ICD-10-CM

## 2024-02-08 PROCEDURE — 93880 EXTRACRANIAL BILAT STUDY: CPT

## 2024-02-12 ENCOUNTER — TELEPHONE (OUTPATIENT)
Dept: CARDIOLOGY CLINIC | Age: 89
End: 2024-02-12

## 2024-02-12 DIAGNOSIS — Z01.810 PREOP CARDIOVASCULAR EXAM: ICD-10-CM

## 2024-02-12 DIAGNOSIS — I65.23 BILATERAL CAROTID ARTERY STENOSIS: Primary | ICD-10-CM

## 2024-02-12 NOTE — TELEPHONE ENCOUNTER
I talked with Jessica and then Dr. Dunlap talked with her, she verbalizes understanding for carotid angiogram.       I have placed the order      Carotid angiogram patient pre-procedure instructions  Do NOT eat or drink anything after midnight morning of procedure  Please complete blood work through an outpatient lab (preferably a [facility] lab) the same week of your procedure. We have placed orders.     MEDICATIONS:  Your medications have been reviewed. Please follow medication instructions below  It is okay to drink a sip of water with meds morning of procedure  Diuretics- Hold diuretics (water pill) for comfort morning of procedure  Hold Lasix the day of procedure.       Dr. Dunlap told the daughter, no rush this. .      Bilateral carotid angiogram    Bring someone to drive you home

## 2024-02-19 NOTE — TELEPHONE ENCOUNTER
"Patient: Nima Alcantara    Procedure Summary     Date: 22 Room / Location:  [facility] /  [facility]    Anesthesia Start: 1049 Anesthesia Stop: 1202    Procedure:  SECTION PRIMARY (Abdomen) Diagnosis:       Macrosomia of fetus affecting management of mother in third trimester, single or unspecified fetus      (Macrosomia of fetus affecting management of mother in third trimester, single or unspecified fetus [O36.63X0])    Surgeons: Grayson Mason MD Provider: Marvin Khan MD    Anesthesia Type: epidural ASA Status: 3          Anesthesia Type: epidural    Vitals  Vitals Value Taken Time   /80 22 1430   Temp 36.5 °C (97.7 °F) 22 1430   Pulse 84 22 1430   Resp 16 22 1430   SpO2 97 % 22 1430           Post Anesthesia Care and Evaluation    Patient location during evaluation: bedside  Patient participation: complete - patient participated  Level of consciousness: awake and alert  Pain management: adequate    Airway patency: patent  Anesthetic complications: No anesthetic complications    Cardiovascular status: acceptable  Respiratory status: acceptable  Hydration status: acceptable    Comments: /80 (BP Location: Right arm, Patient Position: Lying)   Pulse 84   Temp 36.5 °C (97.7 °F) (Oral)   Resp 16   Ht 160 cm (63\")   Wt 94.5 kg (208 lb 6.4 oz)   LMP 2022   SpO2 97%   Breastfeeding Yes   BMI 36.92 kg/m²       " Date of Procedure: Wednesday 3/20/24 @ [facility] with Dr. Dunlap     Time of arrival: 8:30 am     Procedure time: 10:00 am     Called and spoke to Jessica and she is agreeable to date and time. Reviewed instructions and she verbalized understanding. Encouraged to call with any questions or concerns.     Published on [employer] and e-mail to Kylee.

## 2024-02-21 ENCOUNTER — CARE COORDINATION (OUTPATIENT)
Dept: CARE COORDINATION | Age: 89
End: 2024-02-21

## 2024-02-21 NOTE — CARE COORDINATION
Ambulatory Care Coordination Note  2024    Patient Current Location:  Home: [address] [place] [address] [place] [address]     ACM contacted the patient by telephone. Verified name and  with patient as identifiers. Provided introduction to self, and explanation of the ACM role.     Challenges to be reviewed by the provider   Additional needs identified to be addressed with provider: No  none               Method of communication with provider: phone.    ACM: Paulina Victoria RN     Patient goals, education have been completed. Patient has verbalized understanding of Instructions/ plan. Patient has been provided with the contact information in the event the need to make an appointment. Patient knows when to call or seek emergency services. Patient was provided ACM contact information in the even further needs arise. Received education handouts and verbalized understanding. Denies any questions, concerns, additional needs. Cc graduation completed.     Lab Results       None            Care Coordination Interventions    Referral from Primary Care Provider: No  Suggested Interventions and Community Resources  Zone Management Tools: In Process (Comment: [facility] .7.23 htn,ana)  Other Services or Interventions: [employer] dual chamber PPM on 23 with Dr. Wagner          Goals Addressed                   This Visit's Progress     COMPLETED: Conditions and Symptoms        I will schedule office visits, as directed by my provider.  I will keep my appointment or reschedule if I have to cancel.  I will notify my provider of any barriers to my plan of care.  I will follow my Zone Management tool to seek urgent or emergent care.  I will notify my provider of any symptoms that indicate a worsening of my condition.    Barriers: lack of support, overwhelmed by complexity of regimen, and lack of education  Plan for overcoming my barriers: RN educ, resources, referrals, collaboration, check ins  Confidence:

## 2024-03-14 NOTE — PROGRESS NOTES
RV 3830   < 0.1%  AP 3.7%     MVP on   No AF     Assessment:   Plan:    SSS  PPM in situ   Site stable   2.   PPM in situ   Device interrogated and functioning appropriately  3.   Aortic stenosis   Hx of SAVR 2017   TTE 5/2023     Severe stenosis of valve    ERA-appropriate fx   4. Hx of LYNDA    Last Cr 0.8   5. Carotid stenosis   Doppler with significant dx   Scheduled for carotid angiogram 3/20/24    Continue medical therapy       Further evaluation will be based upon the patient's clinical course and testing results.     All questions and concerns were addressed to the patient/family. Alternatives to my treatment were discussed.     MAICOL Vigil-CNP  [facility]  Cardiology  3/14/2024  10:41 AM

## 2024-03-15 ENCOUNTER — NURSE ONLY (OUTPATIENT)
Dept: CARDIOLOGY CLINIC | Age: 89
End: 2024-03-15

## 2024-03-15 ENCOUNTER — OFFICE VISIT (OUTPATIENT)
Dept: CARDIOLOGY CLINIC | Age: 89
End: 2024-03-15

## 2024-03-15 VITALS
DIASTOLIC BLOOD PRESSURE: 78 MMHG | SYSTOLIC BLOOD PRESSURE: 128 MMHG | OXYGEN SATURATION: 99 % | HEART RATE: 84 BPM | BODY MASS INDEX: 26.83 KG/M2 | WEIGHT: 137.4 LBS

## 2024-03-15 DIAGNOSIS — R00.1 SYMPTOMATIC BRADYCARDIA: ICD-10-CM

## 2024-03-15 DIAGNOSIS — I49.5 SSS (SICK SINUS SYNDROME) (HCC): Primary | ICD-10-CM

## 2024-03-15 DIAGNOSIS — I49.5 SSS (SICK SINUS SYNDROME) (HCC): ICD-10-CM

## 2024-03-15 DIAGNOSIS — I65.23 BILATERAL CAROTID ARTERY STENOSIS: ICD-10-CM

## 2024-03-15 DIAGNOSIS — Z95.0 CARDIAC PACEMAKER IN SITU: Primary | ICD-10-CM

## 2024-03-15 DIAGNOSIS — Z95.0 CARDIAC PACEMAKER IN SITU: ICD-10-CM

## 2024-03-15 DIAGNOSIS — I35.0 NONRHEUMATIC AORTIC (VALVE) STENOSIS: ICD-10-CM

## 2024-03-15 RX ORDER — SPIRONOLACTONE 25 MG/1
12.5 TABLET ORAL DAILY
COMMUNITY
Start: 2024-03-01

## 2024-03-15 ASSESSMENT — ENCOUNTER SYMPTOMS
WHEEZING: 0
COUGH: 0
CHEST TIGHTNESS: 0
SHORTNESS OF BREATH: 0
APNEA: 0

## 2024-03-20 ENCOUNTER — HOSPITAL ENCOUNTER (OUTPATIENT)
Dept: CARDIAC CATH/INVASIVE PROCEDURES | Age: 89
Discharge: HOME | End: 2024-03-20
Payer: MEDICARE

## 2024-03-20 ENCOUNTER — TELEPHONE (OUTPATIENT)
Dept: CARDIOLOGY CLINIC | Age: 89
End: 2024-03-20

## 2024-03-20 VITALS
TEMPERATURE: 98.6 F | HEART RATE: 93 BPM | SYSTOLIC BLOOD PRESSURE: 172 MMHG | RESPIRATION RATE: 20 BRPM | DIASTOLIC BLOOD PRESSURE: 85 MMHG | OXYGEN SATURATION: 97 % | HEIGHT: 60 IN | WEIGHT: 134 LBS | BODY MASS INDEX: 26.31 KG/M2

## 2024-03-20 DIAGNOSIS — I65.23 BILATERAL CAROTID ARTERY STENOSIS: Primary | ICD-10-CM

## 2024-03-20 LAB
ANION GAP SERPL CALCULATED.3IONS-SCNC: 10 MMOL/L (ref 3–16)
BUN SERPL-MCNC: 26 MG/DL (ref 7–20)
CALCIUM SERPL-MCNC: 9.6 MG/DL (ref 8.3–10.6)
CHLORIDE SERPL-SCNC: 106 MMOL/L (ref 99–110)
CO2 SERPL-SCNC: 27 MMOL/L (ref 21–32)
CREAT SERPL-MCNC: 1 MG/DL (ref 0.6–1.2)
DEPRECATED RDW RBC AUTO: 15 % (ref 12.4–15.4)
GFR SERPLBLD CREATININE-BSD FMLA CKD-EPI: 54 ML/MIN/{1.73_M2}
GLUCOSE SERPL-MCNC: 110 MG/DL (ref 70–99)
HCT VFR BLD AUTO: 38.2 % (ref 36–48)
HGB BLD-MCNC: 12.8 G/DL (ref 12–16)
INR BLD: 1.2 (ref 0.84–1.16)
MCH RBC QN AUTO: 27.2 PG (ref 26–34)
MCHC RBC AUTO-ENTMCNC: 33.4 G/DL (ref 31–36)
MCV RBC AUTO: 81.5 FL (ref 80–100)
PLATELET # BLD AUTO: 225 K/UL (ref 135–450)
PMV BLD AUTO: 9.7 FL (ref 5–10.5)
POTASSIUM SERPL-SCNC: 4 MMOL/L (ref 3.5–5.1)
RBC # BLD AUTO: 4.69 M/UL (ref 4–5.2)
SODIUM SERPL-SCNC: 143 MMOL/L (ref 136–145)
WBC # BLD AUTO: 9.5 K/UL (ref 4–11)

## 2024-03-20 PROCEDURE — 6360000002 HC RX W HCPCS: Performed by: INTERNAL MEDICINE

## 2024-03-20 PROCEDURE — 99153 MOD SED SAME PHYS/QHP EA: CPT

## 2024-03-20 PROCEDURE — 36222 PLACE CATH CAROTID/INOM ART: CPT

## 2024-03-20 PROCEDURE — 99152 MOD SED SAME PHYS/QHP 5/>YRS: CPT

## 2024-03-20 PROCEDURE — 85027 COMPLETE CBC AUTOMATED: CPT

## 2024-03-20 PROCEDURE — C1894 INTRO/SHEATH, NON-LASER: HCPCS | Performed by: INTERNAL MEDICINE

## 2024-03-20 PROCEDURE — 2709999900 HC NON-CHARGEABLE SUPPLY: Performed by: INTERNAL MEDICINE

## 2024-03-20 PROCEDURE — 2580000003 HC RX 258: Performed by: INTERNAL MEDICINE

## 2024-03-20 PROCEDURE — 2580000003 HC RX 258

## 2024-03-20 PROCEDURE — 6370000000 HC RX 637 (ALT 250 FOR IP)

## 2024-03-20 PROCEDURE — 6360000004 HC RX CONTRAST MEDICATION: Performed by: INTERNAL MEDICINE

## 2024-03-20 PROCEDURE — 6360000002 HC RX W HCPCS

## 2024-03-20 PROCEDURE — 6370000000 HC RX 637 (ALT 250 FOR IP): Performed by: INTERNAL MEDICINE

## 2024-03-20 PROCEDURE — 2500000003 HC RX 250 WO HCPCS

## 2024-03-20 PROCEDURE — C1760 CLOSURE DEV, VASC: HCPCS | Performed by: INTERNAL MEDICINE

## 2024-03-20 PROCEDURE — C1769 GUIDE WIRE: HCPCS | Performed by: INTERNAL MEDICINE

## 2024-03-20 PROCEDURE — 80048 BASIC METABOLIC PNL TOTAL CA: CPT

## 2024-03-20 PROCEDURE — 85610 PROTHROMBIN TIME: CPT

## 2024-03-20 RX ORDER — SODIUM CHLORIDE 0.9 % (FLUSH) 0.9 %
5-40 SYRINGE (ML) INJECTION PRN
Status: DISCONTINUED | OUTPATIENT
Start: 2024-03-20 | End: 2024-03-21 | Stop reason: HOSPADM

## 2024-03-20 RX ORDER — SODIUM CHLORIDE 9 MG/ML
INJECTION, SOLUTION INTRAVENOUS PRN
Status: DISCONTINUED | OUTPATIENT
Start: 2024-03-20 | End: 2024-03-21 | Stop reason: HOSPADM

## 2024-03-20 RX ORDER — ACETAMINOPHEN 325 MG/1
650 TABLET ORAL EVERY 4 HOURS PRN
Status: DISCONTINUED | OUTPATIENT
Start: 2024-03-20 | End: 2024-03-21 | Stop reason: HOSPADM

## 2024-03-20 RX ORDER — SODIUM CHLORIDE 0.9 % (FLUSH) 0.9 %
5-40 SYRINGE (ML) INJECTION EVERY 12 HOURS SCHEDULED
Status: DISCONTINUED | OUTPATIENT
Start: 2024-03-20 | End: 2024-03-21 | Stop reason: HOSPADM

## 2024-03-20 RX ORDER — ASPIRIN 325 MG
325 TABLET ORAL ONCE
Status: COMPLETED | OUTPATIENT
Start: 2024-03-20 | End: 2024-03-20

## 2024-03-20 RX ORDER — DIPHENHYDRAMINE HYDROCHLORIDE 50 MG/ML
25 INJECTION INTRAMUSCULAR; INTRAVENOUS ONCE
Status: COMPLETED | OUTPATIENT
Start: 2024-03-20 | End: 2024-03-20

## 2024-03-20 RX ORDER — METHYLPREDNISOLONE SODIUM SUCCINATE 125 MG/2ML
125 INJECTION, POWDER, LYOPHILIZED, FOR SOLUTION INTRAMUSCULAR; INTRAVENOUS ONCE
Status: COMPLETED | OUTPATIENT
Start: 2024-03-20 | End: 2024-03-20

## 2024-03-20 RX ADMIN — IOPAMIDOL 130 ML: 755 INJECTION, SOLUTION INTRAVENOUS at 10:03

## 2024-03-20 RX ADMIN — SODIUM CHLORIDE: 9 INJECTION, SOLUTION INTRAVENOUS at 08:48

## 2024-03-20 RX ADMIN — METHYLPREDNISOLONE SODIUM SUCCINATE 125 MG: 125 INJECTION INTRAMUSCULAR; INTRAVENOUS at 08:49

## 2024-03-20 RX ADMIN — ASPIRIN 325 MG: 325 TABLET ORAL at 08:54

## 2024-03-20 RX ADMIN — DIPHENHYDRAMINE HYDROCHLORIDE 25 MG: 50 INJECTION INTRAMUSCULAR; INTRAVENOUS at 08:49

## 2024-03-20 NOTE — PROGRESS NOTES
PRE-PROCEDURE      DATE: 3/20/2024 ARRIVAL TO CATH LAB: 8:35 AM    ADMIT SOURCE: Outpatient    ID & ALLERGY BAND: On    CONSENT: Yes    NPO SINCE: Midnight    PREGNANCY TEST: NA    PULSES - see flowsheet    BLEEDING PROBLEMS: No    LAST DOSE (if applicable):  ASA: 03/20/24  P2Y12 (Plavix, Effient, Brilinta): NA  Anticoagulants (Coumadin, Xarelto, Eliquis): NA  Other Blood Thinners: NA      OTHER MEDICATIONS TAKEN AT HOME:Solumedrol, Benadryl      MEDICATION COMPLIANCE: Yes

## 2024-03-20 NOTE — PROGRESS NOTES
Discharge instructions reviewed with patient and family member.  Patient and family verbalized understanding.All medication side effects reviewed and patient and family verbalized understanding.  Patient given discharge instructions, and appointment times.

## 2024-03-20 NOTE — DISCHARGE INSTRUCTIONS
PERIPHERAL ANGIOGRAM    Care of your puncture site:  Remove bandage 24 hours after the procedure.  24  May shower in 24 hours but do not sit in a bathtub/pool of water for 5 days or until the wound is healed.  Gently clean groin using soap and water.  Dry thoroughly and apply a Band-Aid that covers the entire site. Use Band-Aid until skin heals over in about 3-5 days.  Do not apply powder or lotion.      Normal Observations:  Soreness or tenderness which may last one week.  Mild oozing from the incision site.  Possible bruising that could last 2 weeks.    Activity:  You may resume driving 24 hours following the procedure.  Do not make important / legal decisions within 24 hours after procedure.  You may resume normal activity in 5 days or after the wound heals.  Avoid lifting more than 10 pounds for 5 days or until the wound heals.  Avoid strenuous exercise or activity for 1 week.    Nutrition:  Regular diet or previous diet.  Drink at least 8 to 10 glasses of decaffeinated, non-alcoholic fluid for the next 24 hours to flush the x-ray dye used for your angiogram out of your body.    Call your doctor immediately if your condition worsens, for any other concerns, for a follow-up appointment or if you experience any of the [phone]  Increased swelling on the groin or leg.  Unusual pain, numbness, or tingling of the groin or down the leg.  Any signs of infection such as: redness, yellow drainage at the site, swelling or pain.    IF GROIN STARTS BLEEDING SIGNIFICANTLY:   LAY FLAT, HOLD FIRM DIRECT PRESSURE, AND CALL 911

## 2024-03-20 NOTE — FLOWSHEET NOTE
Patient received post procedure in stable condition.   Post-cath handoff/site assessment performed to Right femoral   Pt is alert and oriented x4, No distress noted. VSS-see flowsheet.   Post-cath restrictions/instructions provided  Patient provided with snack/drink.   No further needs at this time, call light within reach.   Dr. Dunlap in and spoke with daughter regarding procedure

## 2024-03-20 NOTE — FLOWSHEET NOTE
Patient was placed on bedpan and then she stated she didn't need to use it.  Patient incontinent of urine.  Patient cleansed and sheets changed.  Patient tolerated well

## 2024-03-20 NOTE — TELEPHONE ENCOUNTER
Received a call from Dr Staples that patient needs to be scheduled for left carotid intervention.  Please call to schedule.      The morning of your procedure you will park in the hospital parking lot and report directly to the cath lab to check in.     Pre-Procedure Instructions   You will need to fast for at least 8 hours prior to procedure. No caffeine the morning of.   Hold your diuretic, Lasix the morning of procedure.   All other medications can be taken in the morning with sips of water.   Do not use any lotions, creams or perfume the morning of procedure.   Pre-procedure lab work will need to be completed 5-7 days prior to procedure.   Please have a responsible adult to drive you home after procedure. We advise you have someone to stay with you for 24 hours following procedure for precautionary measures. Depending on procedure you may require an overnight stay.   Cath lab will provide you with all post procedure instructions.     If you have any questions regarding the procedure itself or medications, please call [phone] and ask to speak with a nurse.

## 2024-03-20 NOTE — PROGRESS NOTES
Taken to discharge bridge via wheelchair.  Patient discharged to home with family via private car.

## 2024-03-20 NOTE — PROGRESS NOTES
1300:  Patient ambulated around department and to BR.  Patient voided and had BM.  Patient ambulated back to room.  Right groin remains stable  and soft.

## 2024-03-22 NOTE — H&P
Facility-Administered Medications          Current Outpatient Medications   Medication Sig Dispense Refill    sulfamethoxazole-trimethoprim (BACTRIM DS) 800-160 MG per tablet Take 1 tablet by mouth 2 times daily for 10 days 20 tablet 0    carvedilol (COREG) 6.25 MG tablet Take 1 tablet by mouth 2 times daily 180 tablet 3    potassium chloride (MICRO-K) 10 MEQ extended release capsule Take 1 capsule by mouth daily        rosuvastatin (CRESTOR) 10 MG tablet TAKE 1 TABLET BY MOUTH EVERY DAY 90 tablet 1    furosemide (LASIX) 20 MG tablet Take 1 tablet by mouth daily as needed (for weight gain of 2 or more pounds overnight) 60 tablet 3    amLODIPine (NORVASC) 5 MG tablet Take 1 tablet by mouth daily 30 tablet 3    lisinopril (PRINIVIL;ZESTRIL) 40 MG tablet TAKE 1 TABLET DAILY 90 tablet 1    pantoprazole (PROTONIX) 40 MG tablet Take 1 tablet by mouth every morning (before breakfast) 30 tablet 3    Coenzyme Q10 (COQ-10 PO) Take 1 capsule by mouth at bedtime        GLUCOSAMINE-CHONDROITIN PO Take 1 tablet by mouth nightly        aspirin 325 MG EC tablet Take 1 tablet by mouth daily 30 tablet 3    multivitamin (THERAGRAN) per tablet Take 1 tablet by mouth daily          No current facility-administered medications for this visit.            Review of Systems: all reviewed and refer to HPI   Constitutional: negative  Eyes: negative  Ears, nose, mouth, throat, and face: negative  Respiratory: negative  Cardiovascular: negative  Gastrointestinal: negative  Genitourinary:negative  Integument/breast: negative  Hematologic/lymphatic: negative  Musculoskeletal:negative  Neurological: negative  Behavioral/Psych: negative  Endocrine: negative  Allergic/Immunologic: negative      Physical Exam:   Vitals       Vitals:     02/01/24 1052   BP: 132/74   Site: Right Upper Arm   Position: Sitting   Cuff Size: Medium Adult   Pulse: 68   SpO2: 98%   Weight: 60.8 kg (134 lb)                   Wt Readings from Last 3 Encounters:   02/01/24 60.8

## 2024-03-26 PROBLEM — I65.22 STENOSIS OF LEFT CAROTID ARTERY: Status: ACTIVE | Noted: 2024-03-15

## 2024-03-27 NOTE — TELEPHONE ENCOUNTER
Date of Procedure: Wednesday 4/10/24 @ [facility] with Dr. Dunlap & Dr. Staples     Time of arrival: 7:00 am     Procedure time: 8:30 am     Spoke to Jessica and she is agreeable to date and time. Reviewed instructions and she verbalized understanding. Encouraged to call with any questions or concerns.     Published on [employer] and e-mail to Kylee.     Danielle Morgan (rep) confirmed.

## 2024-03-28 NOTE — TELEPHONE ENCOUNTER
Discussed with Dr Dunlap and patient should decrease Asa to 81 mg daily and start Plavix 75 mg daily. Left message for return call.

## 2024-04-03 RX ORDER — CLOPIDOGREL BISULFATE 75 MG/1
75 TABLET ORAL DAILY
Qty: 90 TABLET | Refills: 0 | Status: SHIPPED | OUTPATIENT
Start: 2024-04-03

## 2024-04-03 RX ORDER — ASPIRIN 81 MG/1
81 TABLET ORAL DAILY
Qty: 90 TABLET | Refills: 0 | Status: SHIPPED | OUTPATIENT
Start: 2024-04-03

## 2024-04-03 NOTE — TELEPHONE ENCOUNTER
Called and spoke to Jessica (daughter) and made her aware of the recommendations. She is agreeable and will make the patient aware. I let her know that I will try to call the patient again later this afternoon.

## 2024-04-04 DIAGNOSIS — I65.23 BILATERAL CAROTID ARTERY STENOSIS: ICD-10-CM

## 2024-04-04 LAB
ANION GAP SERPL CALCULATED.3IONS-SCNC: 13 MMOL/L (ref 3–16)
BUN SERPL-MCNC: 27 MG/DL (ref 7–20)
CALCIUM SERPL-MCNC: 10 MG/DL (ref 8.3–10.6)
CHLORIDE SERPL-SCNC: 100 MMOL/L (ref 99–110)
CO2 SERPL-SCNC: 26 MMOL/L (ref 21–32)
CREAT SERPL-MCNC: 1 MG/DL (ref 0.6–1.2)
DEPRECATED RDW RBC AUTO: 15 % (ref 12.4–15.4)
GFR SERPLBLD CREATININE-BSD FMLA CKD-EPI: 54 ML/MIN/{1.73_M2}
GLUCOSE SERPL-MCNC: 86 MG/DL (ref 70–99)
HCT VFR BLD AUTO: 36.9 % (ref 36–48)
HGB BLD-MCNC: 12.4 G/DL (ref 12–16)
MCH RBC QN AUTO: 27.8 PG (ref 26–34)
MCHC RBC AUTO-ENTMCNC: 33.7 G/DL (ref 31–36)
MCV RBC AUTO: 82.4 FL (ref 80–100)
PLATELET # BLD AUTO: 237 K/UL (ref 135–450)
PMV BLD AUTO: 10 FL (ref 5–10.5)
POTASSIUM SERPL-SCNC: 4.7 MMOL/L (ref 3.5–5.1)
RBC # BLD AUTO: 4.48 M/UL (ref 4–5.2)
SODIUM SERPL-SCNC: 139 MMOL/L (ref 136–145)
WBC # BLD AUTO: 8.8 K/UL (ref 4–11)

## 2024-04-04 RX ORDER — CLOPIDOGREL BISULFATE 75 MG/1
75 TABLET ORAL DAILY
Qty: 90 TABLET | Refills: 0 | OUTPATIENT
Start: 2024-04-04

## 2024-04-04 RX ORDER — ASPIRIN 81 MG/1
81 TABLET, COATED ORAL DAILY
Qty: 90 TABLET | Refills: 0 | OUTPATIENT
Start: 2024-04-04

## 2024-04-10 ENCOUNTER — HOSPITAL ENCOUNTER (INPATIENT)
Dept: CARDIAC CATH/INVASIVE PROCEDURES | Age: 89
LOS: 1 days | Discharge: HOME | DRG: 036 | End: 2024-04-11
Attending: INTERNAL MEDICINE | Admitting: INTERNAL MEDICINE
Payer: MEDICARE

## 2024-04-10 PROBLEM — I65.22 STENOSIS OF LEFT INTERNAL CAROTID ARTERY: Status: ACTIVE | Noted: 2024-04-10

## 2024-04-10 LAB — POC ACT LR: 254 SEC

## 2024-04-10 PROCEDURE — C1725 CATH, TRANSLUMIN NON-LASER: HCPCS | Performed by: INTERNAL MEDICINE

## 2024-04-10 PROCEDURE — 037L3DZ DILATION OF LEFT INTERNAL CAROTID ARTERY WITH INTRALUMINAL DEVICE, PERCUTANEOUS APPROACH: ICD-10-PCS | Performed by: INTERNAL MEDICINE

## 2024-04-10 PROCEDURE — B3171ZZ FLUOROSCOPY OF LEFT INTERNAL CAROTID ARTERY USING LOW OSMOLAR CONTRAST: ICD-10-PCS | Performed by: INTERNAL MEDICINE

## 2024-04-10 PROCEDURE — C1769 GUIDE WIRE: HCPCS | Performed by: INTERNAL MEDICINE

## 2024-04-10 PROCEDURE — C1884 EMBOLIZATION PROTECT SYST: HCPCS | Performed by: INTERNAL MEDICINE

## 2024-04-10 PROCEDURE — 6370000000 HC RX 637 (ALT 250 FOR IP): Performed by: INTERNAL MEDICINE

## 2024-04-10 PROCEDURE — 2100000000 HC CCU R&B

## 2024-04-10 PROCEDURE — C1894 INTRO/SHEATH, NON-LASER: HCPCS | Performed by: INTERNAL MEDICINE

## 2024-04-10 PROCEDURE — 2709999900 HC NON-CHARGEABLE SUPPLY: Performed by: INTERNAL MEDICINE

## 2024-04-10 PROCEDURE — 37215 TRANSCATH STENT CCA W/EPS: CPT

## 2024-04-10 PROCEDURE — 99153 MOD SED SAME PHYS/QHP EA: CPT

## 2024-04-10 PROCEDURE — 2580000003 HC RX 258

## 2024-04-10 PROCEDURE — C1876 STENT, NON-COA/NON-COV W/DEL: HCPCS | Performed by: INTERNAL MEDICINE

## 2024-04-10 PROCEDURE — 85347 COAGULATION TIME ACTIVATED: CPT

## 2024-04-10 PROCEDURE — 2500000003 HC RX 250 WO HCPCS

## 2024-04-10 PROCEDURE — 6360000004 HC RX CONTRAST MEDICATION: Performed by: INTERNAL MEDICINE

## 2024-04-10 PROCEDURE — 6370000000 HC RX 637 (ALT 250 FOR IP)

## 2024-04-10 PROCEDURE — 2580000003 HC RX 258: Performed by: INTERNAL MEDICINE

## 2024-04-10 PROCEDURE — 6360000002 HC RX W HCPCS: Performed by: INTERNAL MEDICINE

## 2024-04-10 PROCEDURE — 99152 MOD SED SAME PHYS/QHP 5/>YRS: CPT

## 2024-04-10 PROCEDURE — 6360000002 HC RX W HCPCS

## 2024-04-10 RX ORDER — CLOPIDOGREL BISULFATE 75 MG/1
75 TABLET ORAL ONCE
Status: COMPLETED | OUTPATIENT
Start: 2024-04-10 | End: 2024-04-10

## 2024-04-10 RX ORDER — POTASSIUM CHLORIDE 750 MG/1
10 TABLET, FILM COATED, EXTENDED RELEASE ORAL DAILY
Status: DISCONTINUED | OUTPATIENT
Start: 2024-04-10 | End: 2024-04-11 | Stop reason: HOSPADM

## 2024-04-10 RX ORDER — SODIUM CHLORIDE 0.9 % (FLUSH) 0.9 %
5-40 SYRINGE (ML) INJECTION EVERY 12 HOURS SCHEDULED
Status: DISCONTINUED | OUTPATIENT
Start: 2024-04-10 | End: 2024-04-11 | Stop reason: HOSPADM

## 2024-04-10 RX ORDER — PANTOPRAZOLE SODIUM 40 MG/1
40 TABLET, DELAYED RELEASE ORAL
Status: DISCONTINUED | OUTPATIENT
Start: 2024-04-11 | End: 2024-04-11 | Stop reason: HOSPADM

## 2024-04-10 RX ORDER — ONDANSETRON 2 MG/ML
4 INJECTION INTRAMUSCULAR; INTRAVENOUS EVERY 6 HOURS PRN
Status: DISCONTINUED | OUTPATIENT
Start: 2024-04-10 | End: 2024-04-11 | Stop reason: HOSPADM

## 2024-04-10 RX ORDER — SODIUM CHLORIDE 9 MG/ML
INJECTION, SOLUTION INTRAVENOUS PRN
Status: DISCONTINUED | OUTPATIENT
Start: 2024-04-10 | End: 2024-04-11 | Stop reason: HOSPADM

## 2024-04-10 RX ORDER — METHYLPREDNISOLONE SODIUM SUCCINATE 125 MG/2ML
125 INJECTION, POWDER, LYOPHILIZED, FOR SOLUTION INTRAMUSCULAR; INTRAVENOUS ONCE
Status: COMPLETED | OUTPATIENT
Start: 2024-04-10 | End: 2024-04-10

## 2024-04-10 RX ORDER — 0.9 % SODIUM CHLORIDE 0.9 %
1000 INTRAVENOUS SOLUTION INTRAVENOUS ONCE
Status: COMPLETED | OUTPATIENT
Start: 2024-04-10 | End: 2024-04-10

## 2024-04-10 RX ORDER — ASPIRIN 325 MG
325 TABLET ORAL ONCE
Status: COMPLETED | OUTPATIENT
Start: 2024-04-10 | End: 2024-04-10

## 2024-04-10 RX ORDER — CARVEDILOL 6.25 MG/1
6.25 TABLET ORAL 2 TIMES DAILY WITH MEALS
Status: DISCONTINUED | OUTPATIENT
Start: 2024-04-10 | End: 2024-04-11 | Stop reason: HOSPADM

## 2024-04-10 RX ORDER — ACETAMINOPHEN 325 MG/1
650 TABLET ORAL EVERY 4 HOURS PRN
Status: DISCONTINUED | OUTPATIENT
Start: 2024-04-10 | End: 2024-04-11 | Stop reason: HOSPADM

## 2024-04-10 RX ORDER — ROSUVASTATIN CALCIUM 10 MG/1
10 TABLET, COATED ORAL DAILY
Status: DISCONTINUED | OUTPATIENT
Start: 2024-04-10 | End: 2024-04-11 | Stop reason: HOSPADM

## 2024-04-10 RX ORDER — SODIUM CHLORIDE 0.9 % (FLUSH) 0.9 %
5-40 SYRINGE (ML) INJECTION PRN
Status: DISCONTINUED | OUTPATIENT
Start: 2024-04-10 | End: 2024-04-11 | Stop reason: HOSPADM

## 2024-04-10 RX ORDER — CARVEDILOL 6.25 MG/1
6.25 TABLET ORAL 2 TIMES DAILY
Status: DISCONTINUED | OUTPATIENT
Start: 2024-04-10 | End: 2024-04-10

## 2024-04-10 RX ORDER — SPIRONOLACTONE 25 MG/1
12.5 TABLET ORAL DAILY
Status: DISCONTINUED | OUTPATIENT
Start: 2024-04-10 | End: 2024-04-11 | Stop reason: HOSPADM

## 2024-04-10 RX ORDER — SODIUM CHLORIDE 9 MG/ML
INJECTION, SOLUTION INTRAVENOUS CONTINUOUS
Status: DISCONTINUED | OUTPATIENT
Start: 2024-04-10 | End: 2024-04-11 | Stop reason: HOSPADM

## 2024-04-10 RX ORDER — LISINOPRIL 40 MG/1
40 TABLET ORAL DAILY
Status: DISCONTINUED | OUTPATIENT
Start: 2024-04-10 | End: 2024-04-11 | Stop reason: HOSPADM

## 2024-04-10 RX ORDER — CLOPIDOGREL BISULFATE 75 MG/1
75 TABLET ORAL DAILY
Status: DISCONTINUED | OUTPATIENT
Start: 2024-04-10 | End: 2024-04-11 | Stop reason: HOSPADM

## 2024-04-10 RX ORDER — ASPIRIN 81 MG/1
81 TABLET ORAL DAILY
Status: DISCONTINUED | OUTPATIENT
Start: 2024-04-10 | End: 2024-04-11 | Stop reason: HOSPADM

## 2024-04-10 RX ORDER — DIPHENHYDRAMINE HYDROCHLORIDE 50 MG/ML
25 INJECTION INTRAMUSCULAR; INTRAVENOUS ONCE
Status: COMPLETED | OUTPATIENT
Start: 2024-04-10 | End: 2024-04-10

## 2024-04-10 RX ADMIN — CLOPIDOGREL BISULFATE 75 MG: 75 TABLET ORAL at 07:28

## 2024-04-10 RX ADMIN — POTASSIUM CHLORIDE 10 MEQ: 750 TABLET, EXTENDED RELEASE ORAL at 14:20

## 2024-04-10 RX ADMIN — SODIUM CHLORIDE: 9 INJECTION, SOLUTION INTRAVENOUS at 07:32

## 2024-04-10 RX ADMIN — DIPHENHYDRAMINE HYDROCHLORIDE 25 MG: 50 INJECTION, SOLUTION INTRAMUSCULAR; INTRAVENOUS at 07:32

## 2024-04-10 RX ADMIN — ROSUVASTATIN CALCIUM 10 MG: 10 TABLET, FILM COATED ORAL at 14:20

## 2024-04-10 RX ADMIN — SODIUM CHLORIDE 1000 ML: 9 INJECTION, SOLUTION INTRAVENOUS at 10:07

## 2024-04-10 RX ADMIN — METHYLPREDNISOLONE SODIUM SUCCINATE 125 MG: 125 INJECTION INTRAMUSCULAR; INTRAVENOUS at 07:32

## 2024-04-10 RX ADMIN — IOPAMIDOL 100 ML: 755 INJECTION, SOLUTION INTRAVENOUS at 09:07

## 2024-04-10 RX ADMIN — Medication 10 ML: at 14:20

## 2024-04-10 RX ADMIN — SODIUM CHLORIDE: 9 INJECTION, SOLUTION INTRAVENOUS at 18:44

## 2024-04-10 RX ADMIN — ASPIRIN 325 MG: 325 TABLET ORAL at 07:31

## 2024-04-10 NOTE — FLOWSHEET NOTE
Cath lab charge nurse informed of BP, and patient is asymptomatic with Normal saline infusing at 250 ml/hr.. charge nurse to inform Dr. Dunlap, he is in a procedure at this time

## 2024-04-10 NOTE — FLOWSHEET NOTE
Patient arrived from PACU to ICU room 1303. Patient is alert and oriented. Neuro assessment WDL. Attached to bedside monitor. Oriented to room and call light. Skin assessment complete. Patient educated she is to remain flat until 1600, verbalizes understanding. Patient's family at bedside. Will continue plan of care.      04/10/24 1335   Vitals   Pulse 62   Respirations 18   BP 99/60   MAP (Calculated) 73   MAP (mmHg) 74   Cardiac Rhythm Atrial paced   Pain Assessment   Pain Assessment None - Denies Pain   Opioid-Induced Sedation   POSS Score 1   RASS   Holly Agitation Sedation Scale (RASS) 0   Oxygen Therapy   SpO2 99 %   Pulse via Oximetry 64 beats per minute   O2 Device None (Room air)

## 2024-04-10 NOTE — PROCEDURES
[facility]   Procedure Note    CLINICAL HISTORY AND INDICATIONS:          INFORMED CONSENT:      Informed consent was obtained from the patient and the family members.  I explained to them risks and benefits of the procedure.  I explained to them we will do this from either the common femoral artery access or radial access. I explained to the patient that we will use lidocaine for local anaesthesia and moderate sedation.  I explained to them any risk of perforation of the vessel, stroke, heart attack, bleeding, death and myocardial infarction during the procedure.  The patient understood the risks and benefits and gave informed consent and would like to go ahead with the procedure.    Patient agreed to use dual antiplatelet therapy.      PROCEDURES PERFORMED:     Left carotid angiogram and stent LICA with filter   Left internal carotid artery stent 8-10 mm XACT BMS with distal filter protection     PROCEDURE TECHNIQUE:        Femoral Access: Using modified Seldinger technique we approached common femoral artery using 6 Fr sheath access  6 [place] arterial femoral shuttle sheath placed in left common carotid artery over a Glidewire advantage.  Distal filter from Abbott placed in left internal carotid artery predilated with internal carotid artery with a 5 mm balloon and the stent placed  8/10 mm SES BMS    Post dilating with a 6 mm balloon.    EBL: 25 cc    Moderate Sedation:    ASA 2  Mallampati 2    An independent trained observer pushed medications at my direction. We monitored the patient's level of consciousness and vital signs/physiologic status throughout the procedure duration (see start and start times above).           ANGIOGRAM/CORONARY ARTERIOGRAM:         LICA  80% stenosis   Left internal carotid artery self-expanding bare-metal stent placement from Abbott 8/10 mm with distal filter protection.      RECOMMENDATION:  .    1. Recommend beta blockade, high potency statin, DAPT      Oskar Dunlap

## 2024-04-10 NOTE — FLOWSHEET NOTE
Patient received post procedure in stable condition.   Post-cath handoff/site assessment performed to Right femoral   Pt is alert and oriented x4, No distress noted. VSS-see flowsheet.   Post-cath restrictions/instructions provided  Patient provided with snack/drink.   No further needs at this time, call light within reach.   Dr. Dunlap speaking with patient and family

## 2024-04-10 NOTE — PLAN OF CARE
VSS. Atrial paced on monitor. Patient is alert and oriented, denies any pain. Patient on regular diet, tolerating well. Turns self well for pressure ulcer prevention. Free from any injury. Patient and family updated on plan of care. Will continue plan of care.      Problem: Discharge Planning  Goal: Discharge to home or other facility with appropriate resources  Outcome: Progressing  Flowsheets (Taken 4/10/2024 0411)  Discharge to home or other facility with appropriate resources:   Identify barriers to discharge with patient and caregiver   Arrange for needed discharge resources and transportation as appropriate   Identify discharge learning needs (meds, wound care, etc)     Problem: ABCDS Injury Assessment  Goal: Absence of physical injury  Outcome: Progressing     Problem: Safety - Adult  Goal: Free from fall injury  Outcome: Progressing     Problem: Chronic Conditions and Co-morbidities  Goal: Patient's chronic conditions and co-morbidity symptoms are monitored and maintained or improved  Outcome: Progressing

## 2024-04-10 NOTE — FLOWSHEET NOTE
1150:  ACT drawn and resulted 177.   Patient instructed on removal procedure.  Right femoral Arterial sheath site with small hematoma and ecchymosis. Arterial sheath removed per policy without difficulty. Integrity of sheath inspected upon removal and no abnormalities noted.  Manual pressure held X 20 minutes.  Dry, sterile tegaderm applied.  Pressure dressing applied.  Patient tolerated well.  Vital signs, groin checks, and pedal pulses will be completed per protocol (every 15 minutes X 4, every 30 minutes X 2, and every hour X 2 per protocol).    Sheath removed by  Cornelia Verdin RN, CCRN-CSC .

## 2024-04-10 NOTE — H&P
CC: \"I am doing good\"     HPI:  The patient is 88 y.o. 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 Flannery.  Today, she is here for follow up,she reports she is doing good.   Patient denies exertional chest pain/pressure, dyspnea at rest, LUEVANO, PND, orthopnea, palpitations, lightheadedness, weight changes, changes in LE edema, and syncope.      Past Medical History        Past Medical History:   Diagnosis Date    Aortic stenosis      Cerebrovascular disease      Class 1 obesity      Hepatic steatosis      Hiatal hernia      Hyperlipidemia      Hypertension      Osteoarthritis           Past Surgical History         Past Surgical History:   Procedure Laterality Date    AORTIC VALVE REPLACEMENT   03/09/2017     19mm [place] pericardial Magna Ease    BLEPHAROPLASTY Bilateral 6/7/2021     BILATERAL UPPER LIDS BLEPHAROPLASTY performed by Brianna Dubon MD at [facility]    CATARACT REMOVAL Bilateral 01/2019    PRE-MALIGNANT / BENIGN SKIN LESION EXCISION Right 2014     R scalp. cystadenoma.--done under local    UPPER GASTROINTESTINAL ENDOSCOPY N/A 5/24/2023     EGD DILATION BALLOON TO 18mm performed by Tra Horton MD at [facility]         Family History         Family History   Problem Relation Age of Onset    Diabetes Mother      Cancer Mother           lung    Other Father           leukemia    Kidney Disease Brother      Parkinsonism Brother           Social History            Tobacco Use    Smoking status: Never    Smokeless tobacco: Never   Vaping Use    Vaping Use: Never used   Substance Use Topics    Alcohol use: Not Currently       Comment: socially once/week    Drug use: No       Comment: never               Allergies   Allergen Reactions    Adhesive Tape Itching and Dermatitis    Oxycontin [Oxycodone Hcl] Other (See Comments)       \"seeing things\"      Current

## 2024-04-11 VITALS
WEIGHT: 141.4 LBS | DIASTOLIC BLOOD PRESSURE: 56 MMHG | TEMPERATURE: 98 F | OXYGEN SATURATION: 98 % | HEART RATE: 61 BPM | SYSTOLIC BLOOD PRESSURE: 129 MMHG | RESPIRATION RATE: 16 BRPM | BODY MASS INDEX: 27.76 KG/M2 | HEIGHT: 60 IN

## 2024-04-11 LAB
ANION GAP SERPL CALCULATED.3IONS-SCNC: 11 MMOL/L (ref 3–16)
BUN SERPL-MCNC: 24 MG/DL (ref 7–20)
CALCIUM SERPL-MCNC: 8.4 MG/DL (ref 8.3–10.6)
CHLORIDE SERPL-SCNC: 107 MMOL/L (ref 99–110)
CHOLEST SERPL-MCNC: 115 MG/DL (ref 0–199)
CO2 SERPL-SCNC: 21 MMOL/L (ref 21–32)
CREAT SERPL-MCNC: 1.1 MG/DL (ref 0.6–1.2)
GFR SERPLBLD CREATININE-BSD FMLA CKD-EPI: 48 ML/MIN/{1.73_M2}
GLUCOSE SERPL-MCNC: 152 MG/DL (ref 70–99)
HDLC SERPL-MCNC: 40 MG/DL (ref 40–60)
LDLC SERPL CALC-MCNC: 64 MG/DL
POTASSIUM SERPL-SCNC: 4.6 MMOL/L (ref 3.5–5.1)
SODIUM SERPL-SCNC: 139 MMOL/L (ref 136–145)
TRIGL SERPL-MCNC: 56 MG/DL (ref 0–150)
VLDLC SERPL CALC-MCNC: 11 MG/DL

## 2024-04-11 PROCEDURE — 2580000003 HC RX 258: Performed by: INTERNAL MEDICINE

## 2024-04-11 PROCEDURE — 80061 LIPID PANEL: CPT

## 2024-04-11 PROCEDURE — 36415 COLL VENOUS BLD VENIPUNCTURE: CPT

## 2024-04-11 PROCEDURE — 6370000000 HC RX 637 (ALT 250 FOR IP): Performed by: INTERNAL MEDICINE

## 2024-04-11 PROCEDURE — 80048 BASIC METABOLIC PNL TOTAL CA: CPT

## 2024-04-11 PROCEDURE — 99239 HOSP IP/OBS DSCHRG MGMT >30: CPT | Performed by: NURSE PRACTITIONER

## 2024-04-11 RX ADMIN — SPIRONOLACTONE 12.5 MG: 25 TABLET ORAL at 08:14

## 2024-04-11 RX ADMIN — LISINOPRIL 40 MG: 40 TABLET ORAL at 08:14

## 2024-04-11 RX ADMIN — PANTOPRAZOLE SODIUM 40 MG: 40 TABLET, DELAYED RELEASE ORAL at 08:14

## 2024-04-11 RX ADMIN — POTASSIUM CHLORIDE 10 MEQ: 750 TABLET, EXTENDED RELEASE ORAL at 08:14

## 2024-04-11 RX ADMIN — CARVEDILOL 6.25 MG: 6.25 TABLET, FILM COATED ORAL at 08:14

## 2024-04-11 RX ADMIN — CLOPIDOGREL BISULFATE 75 MG: 75 TABLET ORAL at 08:14

## 2024-04-11 RX ADMIN — SODIUM CHLORIDE: 9 INJECTION, SOLUTION INTRAVENOUS at 08:19

## 2024-04-11 RX ADMIN — ASPIRIN 81 MG: 81 TABLET, COATED ORAL at 08:14

## 2024-04-11 RX ADMIN — ROSUVASTATIN CALCIUM 10 MG: 10 TABLET, FILM COATED ORAL at 08:14

## 2024-04-11 RX ADMIN — Medication 10 ML: at 08:14

## 2024-04-11 NOTE — PROGRESS NOTES
PRE-PROCEDURE      DATE: 4/10/2024 ARRIVAL TO CATH LAB: 7:08 AM    ADMIT SOURCE: Outpatient    ID & ALLERGY BAND: On    CONSENT: Yes    NPO SINCE: Midnight    LABS: N/A  PREGNANCY TEST: NA    PULSES - see flowsheet    BLEEDING PROBLEMS: No    LAST DOSE (if applicable):  ASA: 04/10/24  P2Y12 (Plavix, Effient, Brilinta): 04/10/24  Anticoagulants (Coumadin, Xarelto, Eliquis): NA  Other Blood Thinners: NA      OTHER MEDICATIONS TAKEN AT HOME:None      MEDICATION COMPLIANCE: Yes   
4 Eyes Skin Assessment     The patient is being assess for  Admission    I agree that 2 RN's have performed a thorough Head to Toe Skin Assessment on the patient. ALL assessment sites listed below have been assessed.       Areas assessed by both nurses: Michelle and Luba NUÑEZ    [x]   Head, Face, and Ears   [x]   Shoulders, Back, and Chest  [x]   Arms, Elbows, and Hands   [x]   Coccyx, Sacrum, and Ischum  [x]   Legs, Feet, and Heels        Does the Patient have Skin Breakdown?  No         Juve Prevention initiated:  NA   Wound Care Orders initiated:  NA      [facility] nurse consulted for Pressure Injury (Stage 3,4, Unstageable, DTI, NWPT, and Complex wounds):  NA      Nurse 1 eSignature: Electronically signed by Michelle Buchanan RN on 4/10/24 at 2:13 PM EDT    **SHARE this note so that the co-signing nurse is able to place an eSignature**    Nurse 2 eSignature: Electronically signed by Luba Mcfarlane RN on 4/10/24 at 5:01 PM EDT            
Shift Summary: patient admitted today Left Carotid with stent placement with Dr. Dunlap. Right groin site WDL.neuro assessment negative. ambulates well with standby assist to restroom. Up to chair at 1700 when off bedrest.         Family updated: yes - daughter and son at bedside    Most recent vitals: BP (!) 105/39   Pulse 62   Temp 97.7 °F (36.5 °C) (Axillary)   Resp 21   Ht 1.524 m (5')   Wt 62.1 kg (137 lb)   SpO2 99%   BMI 26.76 kg/m²      Rhythm: Paced atrial     NC/HFNC- 0 lpm  Respiratory support: - No ventilator support    Vent days: Day 0    Admission weight Weight - Scale: 62.1 kg (137 lb)  Today's weight   Wt Readings from Last 1 Encounters:   04/10/24 62.1 kg (137 lb)         UOP >30ml/hr: yes -          Restraints: no  Order current and documentation up to date?    Lines/Drains reviewed @ bedside.  Peripheral IV 04/10/24 Left Antecubital (Active)   Number of days: 0     Mooney need assessed each shift: no      Drip rates at handoff:    sodium chloride 900 mL/hr at 04/10/24 0933    sodium chloride 75 mL/hr at 04/10/24 1844    sodium chloride         Lab Data:   CBC: No results for input(s): \"WBC\", \"HGB\", \"HCT\", \"MCV\", \"PLT\" in the last 72 hours.  BMP:  No results for input(s): \"NA\", \"K\", \"CO2\", \"BUN\", \"CREATININE\", \"CA\" in the last 72 hours.    Invalid input(s): \"MAGNESIUM\"  LIVR: No results for input(s): \"AST\", \"ALT\" in the last 72 hours.  PT/INR: No results for input(s): \"PROT\", \"INR\" in the last 72 hours.  APTT: No results for input(s): \"APTT\" in the last 72 hours.  ABG: No results for input(s): \"PHART\", \"QLA8PDC\", \"PO2ART\" in the last 72 hours.    Any consults during the shift? no    Any signed and held orders to be released?  no        4 Eyes Skin Assessment     NAME:  Jessica Boothe  YOB: 1935  MEDICAL RECORD NUMBER:  [id number]    The patient is being assessed for  Shift Handoff    I agree that at least one RN has performed a thorough Head to Toe Skin Assessment on the patient. 
sheath access  6 [place] arterial femoral shuttle sheath placed in left common carotid artery over a Glidewire advantage.  Distal filter from Abbott placed in left internal carotid artery predilated with internal carotid artery with a 5 mm balloon and the stent placed  8/10 mm SES BMS    Post dilating with a 6 mm balloon.    Objective:   Vitals: BP (!) 141/55   Pulse 63   Temp 98.1 °F (36.7 °C) (Oral)   Resp 16   Ht 1.524 m (5')   Wt 64.1 kg (141 lb 6.4 oz)   SpO2 96%   BMI 27.62 kg/m²   General appearance: alert, appears stated age and cooperative, No acute distress   Skin: Skin color, texture, turgor normal. No rashes or ecchymosis.  HEENT: Head: Normal, normocephalic, atraumatic.  Neck: no carotid bruit  Lungs: clear to auscultation bilaterally, no accessory muscle use, no respiratory distress, on RA  Heart: regular rate and rhythm, S1, S2 normal, no murmur, click, rub or gallop  Abdomen: soft, non-tender; bowel sounds normal; no masses,  no organomegaly  Extremities:  right groin stable-mild ecchymosis  , pulses: DP +2/+2, PT +2/+2,   Neurologic: Mental status: Alert, oriented, thought content appropriate, no tremors, no gross sensory motor deficit,   Psychiatric: normal insight and affect      Assessment & Plan:      Plan:  1. Carotid dx   L ICA 80% stenosis   S/p BMS     On statin, DAPT with ASA and plavix and Coreg    BP stable/mildly elevated    Continue current medications     Pt education on home BP monitoring   2. Sick sinus syndrome   PPM in situ     3. Aortic stenosis   Hx of SAVR 2017  4. Hx of LYNDA   Cr stable @ 1.1     Time spent in discharge process, planning, patient education and coordination of care >31 minutes.       Xuan Edmonds, APRN-CNP   [facility]  Cardiology   4/11/2024  9:27 AM

## 2024-04-11 NOTE — CARE COORDINATION
Met with patient to complete assessment and provide initial IM letter.    Case Management Assessment  Initial Evaluation    Date/Time of Evaluation: 4/11/2024 12:30 PM  Assessment Completed by: RENEE Zeng    If patient is discharged prior to next notation, then this note serves as note for discharge by case management.    Patient Name: Jessica Boothe                   YOB: 1935  Diagnosis: Stenosis of left internal carotid artery [I65.22]                   Date / Time: 4/10/2024  7:46 AM    Patient Admission Status: Inpatient   Readmission Risk (Low < 19, Mod (19-27), High > 27): Readmission Risk Score: 13.8    Current PCP: Lobito Lindsey MD  PCP verified by CM? Yes (Lobito Lindsey   last visit 1-2-2024)    Chart Reviewed: Yes      History Provided by: Patient  Patient Orientation: Alert and Oriented    Patient Cognition: Alert    Hospitalization in the last 30 days (Readmission):  No    If yes, Readmission Assessment in CM Navigator will be completed.    Advance Directives:      Code Status: Full Code   Patient's Primary Decision Maker is: Named in Scanned ACP Document (Dgr, Jessica Samson and then son, Ang Boothe)    Primary Decision Maker: Ang Boothe - [place] - [phone]    Primary Decision Maker: Jessica Samson - [place] - [phone]    Discharge Planning:    Patient lives with: Family Members, Other (Comment) (20 yr old grandson lives with her) Type of Home: House  Primary Care Giver: Self  Patient Support Systems include: Family Members (grandson 20 years old lives with her)   Current Financial resources: Medicare  Current community resources: None  Current services prior to admission: Durable Medical Equipment            Current DME: Walker, Other (Comment), Shower Chair (private homemaker, rollator, shower chair)            Type of Home Care services:  None    ADLS  Prior functional level: Independent in ADLs/IADLs  Current functional level: Independent in ADLs/IADLs    PT AM-PAC:   /24  OT

## 2024-04-11 NOTE — DISCHARGE INSTRUCTIONS
Please monitor your blood pressure at home  Your Norvasc has been held.    If your systolic blood pressure is over 150 (top number) please call office to let us know and to likely restart this medication    
none

## 2024-04-11 NOTE — CARE COORDINATION
04/11/24 1226   Service Assessment   Patient Orientation Alert and Oriented   Cognition Alert   History Provided By Patient   Primary Caregiver Self   Support Systems Family Members  (grandson 20 years old lives with her)   Patient's Healthcare Decision Maker is: Named in Scanned ACP Document  (Dgr, Jessica Samson and then son, Ang Boothe)   PCP Verified by CM Yes  (Lobito Lindsey   last visit 1-2-2024)   Last Visit to PCP Within last 3 months   Prior Functional Level Independent in ADLs/IADLs   Current Functional Level Independent in ADLs/IADLs   Can patient return to prior living arrangement Yes   Ability to make needs known: Good   Family able to assist with home care needs: Yes   Would you like for me to discuss the discharge plan with any other family members/significant others, and if so, who? No   Financial Resources Medicare   Community Resources None   Discharge Planning   Type of Residence House   Living Arrangements Family Members;Other (Comment)  (20 yr old grandson lives with her)   Current Services Prior To Admission Durable Medical Equipment   Current DME Prior to Arrival Walker;Other (Comment);Shower Chair  (private homemaker, rollator, shower chair)   Potential Assistance Needed N/A   DME Ordered? No   Potential Assistance Purchasing Medications No   Type of Home Care Services None   Patient expects to be discharged to: House   One/Two Story Residence One story   History of falls? 0   Services At/After Discharge   Transition of Care Consult (CM Consult) N/A   Services At/After Discharge None    Resource Information Provided? No   Mode of Transport at Discharge Other (see comment)  (son will transport)   Confirm Follow Up Transport Family

## 2024-04-11 NOTE — FLOWSHEET NOTE
Nursing Discharge Note    Discharge instructions reviewed with patient/ family  After visit Ninoska provided with the following Educational handouts given via "[employer].     Related to Dx:  Carotid Angiogram: Post-OP    LDA at dischage:  - IV/tele discontinued.     Pt discharged to home with family. Taken from room via 1303 by wheelchair, personal belongings taken with.      04/11/24 1132   Belongings   Dental Appliances None   Vision - Corrective Lenses None   Hearing Aid Sent home  (With daughter at 1000 AM)   Clothing Shirt;Pants;Undergarments;Socks;Footwear   Jewelry None   Body Piercings Removed No   Electronic Devices Cell Phone;       Instructed Patient/ Family to make the following follow up appointment:  PCP in 1 weeks.  Dr. Germán MD on April 25, 2024 at  11:00 AM.    Patient/ or responsible party confirms all questions regarding discharge have been answered and AVS has been provided.     Patient left facility at 12:55 PM

## 2024-04-11 NOTE — PLAN OF CARE
Problem: Discharge Planning  Goal: Discharge to home or other facility with appropriate resources  4/11/2024 0826 by Dionne Cortez RN  Outcome: Progressing  Flowsheets (Taken 4/11/2024 0735)  Discharge to home or other facility with appropriate resources:   Identify barriers to discharge with patient and caregiver   Arrange for needed discharge resources and transportation as appropriate  4/10/2024 1850 by Michelle Buchanan RN  Outcome: Progressing  Flowsheets (Taken 4/10/2024 1335)  Discharge to home or other facility with appropriate resources:   Identify barriers to discharge with patient and caregiver   Arrange for needed discharge resources and transportation as appropriate   Identify discharge learning needs (meds, wound care, etc)     Problem: ABCDS Injury Assessment  Goal: Absence of physical injury  4/11/2024 0826 by Dionne Cortez RN  Outcome: Progressing  4/10/2024 1850 by Michelle Buchanan RN  Outcome: Progressing     Problem: Safety - Adult  Goal: Free from fall injury  4/11/2024 0826 by Dionne Cortez RN  Outcome: Progressing  4/10/2024 1850 by Michelle Buchanan RN  Outcome: Progressing     Problem: Chronic Conditions and Co-morbidities  Goal: Patient's chronic conditions and co-morbidity symptoms are monitored and maintained or improved  4/11/2024 0826 by Dionne Cortez RN  Outcome: Progressing  Flowsheets (Taken 4/11/2024 0735)  Care Plan - Patient's Chronic Conditions and Co-Morbidity Symptoms are Monitored and Maintained or Improved:   Monitor and assess patient's chronic conditions and comorbid symptoms for stability, deterioration, or improvement   Collaborate with multidisciplinary team to address chronic and comorbid conditions and prevent exacerbation or deterioration  4/10/2024 1850 by Michelle Buchanan RN  Outcome: Progressing

## 2024-04-12 ENCOUNTER — TELEPHONE (OUTPATIENT)
Dept: FAMILY MEDICINE CLINIC | Age: 89
End: 2024-04-12

## 2024-04-12 LAB
POC ACT LR: 177 SEC
POC ACT LR: 199 SEC
POC ACT LR: 257 SEC

## 2024-04-12 NOTE — DISCHARGE SUMMARY
effects: dizziness, fatigue, headache, diarrhea, slow heartbeat, impotence  Take with food.      clopidogrel 75 MG tablet  Commonly known as: PLAVIX  Take 1 tablet by mouth daily  Notes to patient: Lowers your risk of having a stroke, blood clot, or serious heart problem after you've had a heart attack, severe chest pain (angina), or circulation problems    Side effects:  Bleeding, nosebleeds, pale skin, easy bruising, fast heartbeats, shortness of breath; headache, fever, weakness, feeling tired;  little or no urination; seizure; low blood sugar and blood clots     COQ-10 PO     furosemide 20 MG tablet  Commonly known as: LASIX  Take 1 tablet by mouth daily as needed (for weight gain of 2 or more pounds overnight)  Notes to patient: For fluid management    Side effects: low potassium, increased urination, increased dry mouth/thirst     GLUCOSAMINE-CHONDROITIN PO     lisinopril 40 MG tablet  Commonly known as: PRINIVIL;ZESTRIL  Take 1 tablet by mouth daily  Notes to patient: Lisinopril is used to treat high blood pressure. Lowering high blood pressure helps prevent strokes, heart attacks, and kidney problems.  Common lisinopril side effects include low blood pressure, dizziness, and headache. Some people may also experience a dry cough that requires them to stop taking it.     multivitamin per tablet     pantoprazole 40 MG tablet  Commonly known as: PROTONIX  Take 1 tablet by mouth every morning (before breakfast)  Notes to patient: Pantoprozole/ Protonix  Use: reduces stomach acid, protects the digestive tract  Side effects: headache or diarrhea     potassium chloride 10 MEQ extended release capsule  Commonly known as: MICRO-K  Notes to patient: Potassium chloride/ Klor-Con  Use: restore potassium in your body  Side effects: stomach upset     rosuvastatin 10 MG tablet  Commonly known as: CRESTOR  TAKE 1 TABLET BY MOUTH EVERY DAY  Notes to patient: Rosuvastatin is used together with a proper diet to lower bad

## 2024-04-22 NOTE — PROGRESS NOTES
[facility]      Cardiology Progress Note    Jessica Boothe  1935 April 22, 2024    CC: \"I am okay.\"    HPI:  The patient is 89 y.o. 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 Flannery.  Today, she is here for follow up s/p carotid intervention. She has been doing well since the intervention.     Past Medical History:   Diagnosis Date    Aortic stenosis     Cerebrovascular disease     Class 1 obesity     Hepatic steatosis     Hiatal hernia     Hyperlipidemia     Hypertension     Osteoarthritis      Past Surgical History:   Procedure Laterality Date    AORTIC VALVE REPLACEMENT  03/09/2017    19mm [place] pericardial Magna Ease    BLEPHAROPLASTY Bilateral 6/7/2021    BILATERAL UPPER LIDS BLEPHAROPLASTY performed by Brianna Dubon MD at [facility]    CATARACT REMOVAL Bilateral 01/2019    PRE-MALIGNANT / BENIGN SKIN LESION EXCISION Right 2014    R scalp. cystadenoma.--done under local    UPPER GASTROINTESTINAL ENDOSCOPY N/A 5/24/2023    EGD DILATION BALLOON TO 18mm performed by Tra Horton MD at [facility] ENDOS[facility]     Family History   Problem Relation Age of Onset    Diabetes Mother     Cancer Mother         lung    Other Father         leukemia    Kidney Disease Brother     Parkinsonism Brother      Social History     Tobacco Use    Smoking status: Never    Smokeless tobacco: Never   Vaping Use    Vaping Use: Never used   Substance Use Topics    Alcohol use: Not Currently     Comment: socially once/week    Drug use: No     Comment: never       Allergies   Allergen Reactions    Adhesive Tape Itching and Dermatitis    Oxycontin [Oxycodone Hcl] Other (See Comments)     \"seeing things\"     Current Outpatient Medications   Medication Sig Dispense Refill    clopidogrel (PLAVIX) 75 MG tablet Take 1 tablet by mouth daily 90 tablet 0    aspirin 81 MG EC tablet Take 1 tablet

## 2024-04-25 ENCOUNTER — OFFICE VISIT (OUTPATIENT)
Dept: CARDIOLOGY CLINIC | Age: 89
End: 2024-04-25
Payer: MEDICARE

## 2024-04-25 VITALS
WEIGHT: 138 LBS | OXYGEN SATURATION: 98 % | HEART RATE: 59 BPM | SYSTOLIC BLOOD PRESSURE: 136 MMHG | BODY MASS INDEX: 27.09 KG/M2 | DIASTOLIC BLOOD PRESSURE: 72 MMHG | HEIGHT: 60 IN

## 2024-04-25 DIAGNOSIS — I10 ESSENTIAL HYPERTENSION: ICD-10-CM

## 2024-04-25 DIAGNOSIS — I65.23 BILATERAL CAROTID ARTERY STENOSIS: Primary | ICD-10-CM

## 2024-04-25 DIAGNOSIS — E78.2 MIXED HYPERLIPIDEMIA: ICD-10-CM

## 2024-04-25 DIAGNOSIS — I35.0 NONRHEUMATIC AORTIC (VALVE) STENOSIS: ICD-10-CM

## 2024-04-25 PROCEDURE — 99214 OFFICE O/P EST MOD 30 MIN: CPT | Performed by: INTERNAL MEDICINE

## 2024-04-25 PROCEDURE — 1123F ACP DISCUSS/DSCN MKR DOCD: CPT | Performed by: INTERNAL MEDICINE

## 2024-04-26 RX ORDER — ASPIRIN 81 MG/1
81 TABLET, COATED ORAL DAILY
Qty: 90 TABLET | Refills: 3 | Status: SHIPPED | OUTPATIENT
Start: 2024-04-26

## 2024-04-26 RX ORDER — CLOPIDOGREL BISULFATE 75 MG/1
75 TABLET ORAL DAILY
Qty: 90 TABLET | Refills: 3 | Status: SHIPPED | OUTPATIENT
Start: 2024-04-26

## 2024-04-26 NOTE — TELEPHONE ENCOUNTER
Last OV: 4/25/24  Next OV: 10/31/24  Last refill: 4/3/24 #90  Most recent Labs: 4/12/24  Last EKG (if needed): 4/10/24

## 2024-05-15 NOTE — PLAN OF CARE
Problem: Discharge Planning  Goal: Discharge to home or other facility with appropriate resources  Outcome: Progressing     Problem: Skin/Tissue Integrity  Goal: Absence of new skin breakdown  Description: 1. Monitor for areas of redness and/or skin breakdown  2. Assess vascular access sites hourly  3. Every 4-6 hours minimum:  Change oxygen saturation probe site  4. Every 4-6 hours:  If on nasal continuous positive airway pressure, respiratory therapy assess nares and determine need for appliance change or resting period.   Outcome: Progressing     Problem: Safety - Adult  Goal: Free from fall injury  Outcome: Progressing     Problem: ABCDS Injury Assessment  Goal: Absence of physical injury  Outcome: Progressing Yissel Wright LMSW

## 2024-06-24 RX ORDER — LISINOPRIL 40 MG/1
40 TABLET ORAL DAILY
Qty: 90 TABLET | Refills: 1 | Status: SHIPPED | OUTPATIENT
Start: 2024-06-24

## 2024-07-15 RX ORDER — ROSUVASTATIN CALCIUM 10 MG/1
TABLET, COATED ORAL
Qty: 90 TABLET | Refills: 1 | Status: SHIPPED | OUTPATIENT
Start: 2024-07-15

## 2024-08-09 ENCOUNTER — OFFICE VISIT (OUTPATIENT)
Dept: FAMILY MEDICINE CLINIC | Age: 89
End: 2024-08-09

## 2024-08-09 ENCOUNTER — OFFICE VISIT (OUTPATIENT)
Dept: FAMILY MEDICINE CLINIC | Age: 89
End: 2024-08-09
Payer: MEDICARE

## 2024-08-09 VITALS
HEART RATE: 76 BPM | BODY MASS INDEX: 28.07 KG/M2 | HEIGHT: 60 IN | DIASTOLIC BLOOD PRESSURE: 80 MMHG | TEMPERATURE: 97.2 F | SYSTOLIC BLOOD PRESSURE: 138 MMHG | WEIGHT: 143 LBS

## 2024-08-09 DIAGNOSIS — E78.5 HYPERLIPIDEMIA, UNSPECIFIED HYPERLIPIDEMIA TYPE: Primary | ICD-10-CM

## 2024-08-09 DIAGNOSIS — Z00.00 MEDICARE ANNUAL WELLNESS VISIT, SUBSEQUENT: Primary | ICD-10-CM

## 2024-08-09 DIAGNOSIS — I10 ESSENTIAL HYPERTENSION: ICD-10-CM

## 2024-08-09 PROCEDURE — 99213 OFFICE O/P EST LOW 20 MIN: CPT | Performed by: FAMILY MEDICINE

## 2024-08-09 PROCEDURE — 1123F ACP DISCUSS/DSCN MKR DOCD: CPT | Performed by: FAMILY MEDICINE

## 2024-08-09 RX ORDER — SPIRONOLACTONE 25 MG/1
25 TABLET ORAL DAILY
Qty: 30 TABLET | Refills: 0
Start: 2024-08-09

## 2024-08-09 SDOH — ECONOMIC STABILITY: FOOD INSECURITY: WITHIN THE PAST 12 MONTHS, YOU WORRIED THAT YOUR FOOD WOULD RUN OUT BEFORE YOU GOT MONEY TO BUY MORE.: NEVER TRUE

## 2024-08-09 SDOH — ECONOMIC STABILITY: FOOD INSECURITY: WITHIN THE PAST 12 MONTHS, THE FOOD YOU BOUGHT JUST DIDN'T LAST AND YOU DIDN'T HAVE MONEY TO GET MORE.: NEVER TRUE

## 2024-08-09 SDOH — ECONOMIC STABILITY: INCOME INSECURITY: HOW HARD IS IT FOR YOU TO PAY FOR THE VERY BASICS LIKE FOOD, HOUSING, MEDICAL CARE, AND HEATING?: NOT HARD AT ALL

## 2024-08-09 ASSESSMENT — PATIENT HEALTH QUESTIONNAIRE - PHQ9
SUM OF ALL RESPONSES TO PHQ9 QUESTIONS 1 & 2: 0
SUM OF ALL RESPONSES TO PHQ QUESTIONS 1-9: 0
2. FEELING DOWN, DEPRESSED OR HOPELESS: NOT AT ALL
SUM OF ALL RESPONSES TO PHQ QUESTIONS 1-9: 0
1. LITTLE INTEREST OR PLEASURE IN DOING THINGS: NOT AT ALL
SUM OF ALL RESPONSES TO PHQ QUESTIONS 1-9: 0
SUM OF ALL RESPONSES TO PHQ QUESTIONS 1-9: 0

## 2024-08-09 ASSESSMENT — ENCOUNTER SYMPTOMS
ABDOMINAL PAIN: 0
SHORTNESS OF BREATH: 0
CONSTIPATION: 0
DIARRHEA: 0
BLOOD IN STOOL: 0

## 2024-08-09 NOTE — PATIENT INSTRUCTIONS
to screen for glaucoma; cataracts, macular degeneration, and other eye disorders.  A preventive dental visit is recommended every 6 months.  Try to get at least 150 minutes of exercise per week or 10,000 steps per day on a pedometer .  Order or download the FREE \"Exercise & Physical Activity: Your Everyday Guide\" from [employer]. Call 1-599.987.4416 or search [employer] online.  You need 8624-2097 mg of calcium and 3675-2713 IU of vitamin D per day. It is possible to meet your calcium requirement with diet alone, but a vitamin D supplement is usually necessary to meet this goal.  When exposed to the sun, use a sunscreen that protects against both UVA and UVB radiation with an SPF of 30 or greater. Reapply every 2 to 3 hours or after sweating, drying off with a towel, or swimming.  Always wear a seat belt when traveling in a car. Always wear a helmet when riding a bicycle or motorcycle.

## 2024-08-09 NOTE — PROGRESS NOTES
Medicare Annual Wellness Visit    Jessica Boothe is here for Medicare AWV    Assessment & Plan   Medicare annual wellness visit, subsequent  Recommendations for Preventive Services Due: see orders and patient instructions/AVS.  Recommended screening schedule for the next 5-10 years is provided to the patient in written form: see Patient Instructions/AVS.     Return in 1 year (on 8/9/2025) for Medicare AWV.     Subjective   Medicare AWV    Patient's complete Health Risk Assessment and screening values have been reviewed and are found in Flowsheets. The following problems were reviewed today and where indicated follow up appointments were made and/or referrals ordered.    No Positive Risk Factors identified today.                                    Objective   There were no vitals filed for this visit.   There is no height or weight on file to calculate BMI.                    Allergies   Allergen Reactions    Adhesive Tape Itching and Dermatitis    Oxycontin [Oxycodone Hcl] Other (See Comments)     \"seeing things\"     Prior to Visit Medications    Medication Sig Taking? Authorizing Provider   spironolactone (ALDACTONE) 25 MG tablet Take 1 tablet by mouth daily Yes Lobito Lindsey MD   rosuvastatin (CRESTOR) 10 MG tablet TAKE 1 TABLET BY MOUTH EVERY DAY Yes Lobito Lindsey MD   lisinopril (PRINIVIL;ZESTRIL) 40 MG tablet TAKE 1 TABLET DAILY Yes Lobito Lindsey MD   clopidogrel (PLAVIX) 75 MG tablet TAKE 1 TABLET BY MOUTH EVERY DAY Yes Oskar Dunlap MD   ASPIRIN LOW DOSE 81 MG EC tablet TAKE 1 TABLET BY MOUTH EVERY DAY Yes Oskar Dunlap MD   carvedilol (COREG) 6.25 MG tablet Take 1 tablet by mouth 2 times daily Yes Oskar Dunlap MD   furosemide (LASIX) 20 MG tablet Take 1 tablet by mouth daily as needed (for weight gain of 2 or more pounds overnight) Yes Margret Juárez, APRN - CNP   pantoprazole (PROTONIX) 40 MG tablet Take 1 tablet by mouth every morning (before breakfast) Yes Jessica Rhoades MD   Coenzyme Q10 (COQ-10 PO) Take 1

## 2024-08-09 NOTE — PATIENT INSTRUCTIONS
Continue to stay with the diet and the medicines  Remember to do the test on the thyroid    See in about march of next year

## 2024-08-09 NOTE — PROGRESS NOTES
Subjective:      Patient ID: Jessica Boothe is a 89 y.o. female.    Chief Complaint   Patient presents with    Follow-up     Hypertension, lipids        Patient presents with:  Follow-up: Hypertension, lipids    Feeling ok   She has no c/o    Meds updated and reconciled and reviewed with her today   Some have been changed    Her bp log is brought in and numbers are good for the last month     YOB: 1935    Date of Visit:  8/9/2024     -- Adhesive Tape -- Itching and Dermatitis   -- Oxycontin [Oxycodone Hcl] -- Other (See Comments)    --  \"seeing things\"    Current Outpatient Medications:  spironolactone (ALDACTONE) 25 MG tablet, Take 1 tablet by mouth daily, Disp: 30 tablet, Rfl: 0  rosuvastatin (CRESTOR) 10 MG tablet, TAKE 1 TABLET BY MOUTH EVERY DAY, Disp: 90 tablet, Rfl: 1  lisinopril (PRINIVIL;ZESTRIL) 40 MG tablet, TAKE 1 TABLET DAILY, Disp: 90 tablet, Rfl: 1  clopidogrel (PLAVIX) 75 MG tablet, TAKE 1 TABLET BY MOUTH EVERY DAY, Disp: 90 tablet, Rfl: 3  ASPIRIN LOW DOSE 81 MG EC tablet, TAKE 1 TABLET BY MOUTH EVERY DAY, Disp: 90 tablet, Rfl: 3  carvedilol (COREG) 6.25 MG tablet, Take 1 tablet by mouth 2 times daily, Disp: 180 tablet, Rfl: 3  furosemide (LASIX) 20 MG tablet, Take 1 tablet by mouth daily as needed (for weight gain of 2 or more pounds overnight), Disp: 60 tablet, Rfl: 3  pantoprazole (PROTONIX) 40 MG tablet, Take 1 tablet by mouth every morning (before breakfast), Disp: 30 tablet, Rfl: 3  Coenzyme Q10 (COQ-10 PO), Take 1 capsule by mouth at bedtime, Disp: , Rfl:   GLUCOSAMINE-CHONDROITIN PO, Take 1 tablet by mouth nightly, Disp: , Rfl:   multivitamin (THERAGRAN) per tablet, Take 1 tablet by mouth daily, Disp: , Rfl:     No current facility-administered medications for this visit.      ---------------------------               08/09/24                      1100         ---------------------------   BP:          138/80         Site:    Left Upper Arm     Position:

## 2024-08-15 ENCOUNTER — HOSPITAL ENCOUNTER (OUTPATIENT)
Age: 89
Discharge: HOME | End: 2024-08-17
Attending: INTERNAL MEDICINE
Payer: MEDICARE

## 2024-08-15 ENCOUNTER — HOSPITAL ENCOUNTER (OUTPATIENT)
Dept: VASCULAR LAB | Age: 89
Discharge: HOME | End: 2024-08-17
Attending: INTERNAL MEDICINE
Payer: MEDICARE

## 2024-08-15 DIAGNOSIS — I35.0 NONRHEUMATIC AORTIC (VALVE) STENOSIS: ICD-10-CM

## 2024-08-15 DIAGNOSIS — I65.23 BILATERAL CAROTID ARTERY STENOSIS: ICD-10-CM

## 2024-08-15 LAB
ECHO AO ASC DIAM: 3 CM
ECHO AO ROOT DIAM: 2.7 CM
ECHO AV AREA PEAK VELOCITY: 1.1 CM2
ECHO AV AREA VTI: 1.1 CM2
ECHO AV MEAN GRADIENT: 16 MMHG
ECHO AV MEAN VELOCITY: 1.9 M/S
ECHO AV PEAK GRADIENT: 29 MMHG
ECHO AV PEAK VELOCITY: 2.7 M/S
ECHO AV VELOCITY RATIO: 0.37
ECHO AV VTI: 54.7 CM
ECHO EST RA PRESSURE: 8 MMHG
ECHO IVC EXP: 1.7 CM
ECHO IVC INSP: 1.2 CM
ECHO LA AREA 2C: 23.8 CM2
ECHO LA AREA 4C: 22.4 CM2
ECHO LA MAJOR AXIS: 5.9 CM
ECHO LA MINOR AXIS: 5.8 CM
ECHO LA VOL BP: 73 ML (ref 22–52)
ECHO LA VOL MOD A2C: 78 ML (ref 22–52)
ECHO LA VOL MOD A4C: 67 ML (ref 22–52)
ECHO LV E' LATERAL VELOCITY: 5 CM/S
ECHO LV E' SEPTAL VELOCITY: 4 CM/S
ECHO LV EDV A2C: 67 ML
ECHO LV EDV A4C: 56 ML
ECHO LV EJECTION FRACTION A2C: 61 %
ECHO LV EJECTION FRACTION A4C: 61 %
ECHO LV EJECTION FRACTION BIPLANE: 61 % (ref 55–100)
ECHO LV ESV A2C: 26 ML
ECHO LV ESV A4C: 22 ML
ECHO LV FRACTIONAL SHORTENING: 35 % (ref 28–44)
ECHO LV INTERNAL DIMENSION DIASTOLIC: 3.1 CM (ref 3.9–5.3)
ECHO LV INTERNAL DIMENSION SYSTOLIC: 2 CM
ECHO LV IVSD: 1.9 CM (ref 0.6–0.9)
ECHO LV MASS 2D: 193.9 G (ref 67–162)
ECHO LV POSTERIOR WALL DIASTOLIC: 1.4 CM (ref 0.6–0.9)
ECHO LV RELATIVE WALL THICKNESS RATIO: 0.9
ECHO LVOT AREA: 3.1 CM2
ECHO LVOT AV VTI INDEX: 0.37
ECHO LVOT DIAM: 2 CM
ECHO LVOT MEAN GRADIENT: 2 MMHG
ECHO LVOT PEAK GRADIENT: 4 MMHG
ECHO LVOT PEAK VELOCITY: 1 M/S
ECHO LVOT SV: 64.1 ML
ECHO LVOT VTI: 20.4 CM
ECHO MV A VELOCITY: 1.66 M/S
ECHO MV AREA VTI: 2.1 CM2
ECHO MV E DECELERATION TIME (DT): 267 MS
ECHO MV E VELOCITY: 0.96 M/S
ECHO MV E/A RATIO: 0.58
ECHO MV E/E' LATERAL: 19.2
ECHO MV E/E' RATIO (AVERAGED): 21.6
ECHO MV E/E' SEPTAL: 24
ECHO MV LVOT VTI INDEX: 1.47
ECHO MV MAX VELOCITY: 1.7 M/S
ECHO MV MEAN GRADIENT: 4 MMHG
ECHO MV MEAN VELOCITY: 1 M/S
ECHO MV PEAK GRADIENT: 11 MMHG
ECHO MV REGURGITANT PEAK GRADIENT: 58 MMHG
ECHO MV REGURGITANT PEAK VELOCITY: 3.8 M/S
ECHO MV VTI: 29.9 CM
ECHO PULMONARY ARTERY END DIASTOLIC PRESSURE: 6 MMHG
ECHO PV ACCELERATION TIME (AT): 37 MS
ECHO PV MAX VELOCITY: 0.9 M/S
ECHO PV MEAN GRADIENT: 2 MMHG
ECHO PV MEAN VELOCITY: 0.6 M/S
ECHO PV PEAK GRADIENT: 3 MMHG
ECHO PV REGURGITANT MAX VELOCITY: 1.2 M/S
ECHO PV VTI: 16 CM
ECHO RA AREA 4C: 12.1 CM2
ECHO RA VOLUME: 24 ML
ECHO RIGHT VENTRICULAR SYSTOLIC PRESSURE (RVSP): 34 MMHG
ECHO RV BASAL DIMENSION: 3.5 CM
ECHO RV FREE WALL PEAK S': 11 CM/S
ECHO RV LONGITUDINAL DIMENSION: 6.5 CM
ECHO RV MID DIMENSION: 3 CM
ECHO RV TAPSE: 1.4 CM (ref 1.7–?)
ECHO TV REGURGITANT MAX VELOCITY: 2.57 M/S
ECHO TV REGURGITANT PEAK GRADIENT: 26 MMHG
VAS LEFT CCA DIST EDV: 15.8 CM/S
VAS LEFT CCA DIST PSV: 67.4 CM/S
VAS LEFT CCA MID EDV: 13.4 CM/S
VAS LEFT CCA MID PSV: 68.6 CM/S
VAS LEFT CCA PROX EDV: 14.4 CM/S
VAS LEFT CCA PROX PSV: 94.8 CM/S
VAS LEFT ECA EDV: 10.8 CM/S
VAS LEFT ECA PSV: 89.3 CM/S
VAS LEFT ICA DIST EDV: 19.9 CM/S
VAS LEFT ICA DIST PSV: 107.5 CM/S
VAS LEFT ICA MID EDV: 29 CM/S
VAS LEFT ICA MID PSV: 105.7 CM/S
VAS LEFT ICA PROX EDV: 25.4 CM/S
VAS LEFT ICA PROX PSV: 102 CM/S
VAS LEFT ICA/CCA PSV: 1.6 NO UNITS
VAS LEFT VERTEBRAL EDV: 11.2 CM/S
VAS LEFT VERTEBRAL PSV: 38.6 CM/S
VAS RIGHT CCA DIST EDV: 11.6 CM/S
VAS RIGHT CCA DIST PSV: 45.5 CM/S
VAS RIGHT CCA MID EDV: 11.6 CM/S
VAS RIGHT CCA MID PSV: 54.6 CM/S
VAS RIGHT CCA PROX EDV: 12.7 CM/S
VAS RIGHT CCA PROX PSV: 82.7 CM/S
VAS RIGHT ECA EDV: 11.3 CM/S
VAS RIGHT ECA PSV: 82.9 CM/S
VAS RIGHT ICA DIST EDV: 43.8 CM/S
VAS RIGHT ICA DIST PSV: 168.1 CM/S
VAS RIGHT ICA MID EDV: 34.2 CM/S
VAS RIGHT ICA MID PSV: 141.4 CM/S
VAS RIGHT ICA PROX EDV: 30.4 CM/S
VAS RIGHT ICA PROX PSV: 114.6 CM/S
VAS RIGHT ICA/CCA PSV: 3.7 NO UNITS
VAS RIGHT VERTEBRAL EDV: 16.3 CM/S
VAS RIGHT VERTEBRAL PSV: 52.8 CM/S

## 2024-08-15 PROCEDURE — 93306 TTE W/DOPPLER COMPLETE: CPT

## 2024-08-15 PROCEDURE — 93880 EXTRACRANIAL BILAT STUDY: CPT | Performed by: SURGERY

## 2024-08-15 PROCEDURE — 93880 EXTRACRANIAL BILAT STUDY: CPT

## 2024-08-21 ENCOUNTER — HOSPITAL ENCOUNTER (OUTPATIENT)
Dept: ULTRASOUND IMAGING | Age: 89
Discharge: HOME | End: 2024-08-21
Payer: MEDICARE

## 2024-08-21 DIAGNOSIS — E04.1 THYROID NODULE: ICD-10-CM

## 2024-08-21 PROCEDURE — 76536 US EXAM OF HEAD AND NECK: CPT

## 2024-08-26 DIAGNOSIS — I73.9 PERIPHERAL ARTERY DISEASE (HCC): Primary | ICD-10-CM

## 2024-08-26 NOTE — PROGRESS NOTES
Received abnormal screening test done by insurance co at home. These are typically false positive but will need to evaluate. Ma will call

## 2024-09-16 ENCOUNTER — NURSE ONLY (OUTPATIENT)
Dept: CARDIOLOGY CLINIC | Age: 89
End: 2024-09-16

## 2024-09-16 DIAGNOSIS — R00.1 SYMPTOMATIC BRADYCARDIA: ICD-10-CM

## 2024-09-16 DIAGNOSIS — Z95.0 CARDIAC PACEMAKER IN SITU: Primary | ICD-10-CM

## 2024-09-16 DIAGNOSIS — I49.5 SSS (SICK SINUS SYNDROME) (HCC): ICD-10-CM

## 2024-09-25 NOTE — CARE COORDINATION
Discharge Planning:  Per Electrophysiologist, PPM implantation planned for 11/21. Pt is from home with her grandson. Family lives close and can assist as needed.   ID is following and recommending oral abx upon d/c.  Dgtr to transport pt home at d/c.  KARLEE Quintana  [phone]  Electronically signed by KARLEE Mason on 11/20/2023 at 10:55 AM normal/regular rate and rhythm/S1 S2 present/no gallops/no rub/no murmur

## 2024-10-28 NOTE — PROGRESS NOTES
artery.    Assessment/Plan:      Aortic valve stenosis:   --s/p AVR bovine valve, tissue 3/9/17 per Dr. Flannery  --Normal coronary arteries 2/6/17  staying active-Continues dancing at home during COVID-19 pandemic   Asymptomatic.  Continue Asa. Repeat echocardiogram to assess heart and valve function.     Bilateral carotid stenosis  S/p Left internal carotid artery stent 8-10 mm XACT BMS with distal filter protection Asymptomatic.  Continue Asa, Plavix and statin therapy.      Hypertension, essential:   Controlled. Continue current medical management.     Hyperlipidemia  Continue statin therapy.     Follow up in 6 months.     Thank you very much for allowing me to participate in the care of your patient. Please do not hesitate to contact me if you have any questions.    Sincerely,    Oskar Dunlap MD, MPH      [place] [id number] [facility], [address]   [place] [address]  Ph: [phone]  Fax: [phone]      This note was scribed in the presence of Dr Dunlap, by Mansi Foy RN  Physician Attestation:  The scribes documentation has been prepared under my direction and personally reviewed by me in its entirety.     I confirm that the note above accurately reflects all work, treatment, procedures, and medical decision making performed by me.

## 2024-10-31 ENCOUNTER — OFFICE VISIT (OUTPATIENT)
Dept: CARDIOLOGY CLINIC | Age: 89
End: 2024-10-31

## 2024-10-31 VITALS
BODY MASS INDEX: 28.12 KG/M2 | SYSTOLIC BLOOD PRESSURE: 118 MMHG | HEART RATE: 84 BPM | DIASTOLIC BLOOD PRESSURE: 64 MMHG | WEIGHT: 144 LBS | OXYGEN SATURATION: 94 %

## 2024-10-31 DIAGNOSIS — E78.2 MIXED HYPERLIPIDEMIA: ICD-10-CM

## 2024-10-31 DIAGNOSIS — I10 ESSENTIAL HYPERTENSION: ICD-10-CM

## 2024-10-31 DIAGNOSIS — I35.0 NONRHEUMATIC AORTIC (VALVE) STENOSIS: Primary | ICD-10-CM

## 2024-10-31 DIAGNOSIS — I65.23 BILATERAL CAROTID ARTERY STENOSIS: ICD-10-CM

## 2024-11-27 RX ORDER — ROSUVASTATIN CALCIUM 10 MG/1
TABLET, COATED ORAL
Qty: 90 TABLET | Refills: 1 | Status: SHIPPED | OUTPATIENT
Start: 2024-11-27

## 2024-12-12 RX ORDER — CARVEDILOL 6.25 MG/1
6.25 TABLET ORAL 2 TIMES DAILY
Qty: 180 TABLET | Refills: 3 | Status: SHIPPED | OUTPATIENT
Start: 2024-12-12

## 2025-01-31 ENCOUNTER — HOSPITAL ENCOUNTER (OUTPATIENT)
Age: 89
Discharge: HOME | End: 2025-01-31
Attending: INTERNAL MEDICINE
Payer: MEDICARE

## 2025-01-31 VITALS
BODY MASS INDEX: 28.27 KG/M2 | DIASTOLIC BLOOD PRESSURE: 90 MMHG | SYSTOLIC BLOOD PRESSURE: 153 MMHG | HEIGHT: 60 IN | WEIGHT: 144 LBS

## 2025-01-31 DIAGNOSIS — I35.0 NONRHEUMATIC AORTIC (VALVE) STENOSIS: ICD-10-CM

## 2025-01-31 LAB
ECHO AO ROOT DIAM: 1.8 CM
ECHO AO ROOT INDEX: 1.11 CM/M2
ECHO AV AREA PEAK VELOCITY: 0.5 CM2
ECHO AV AREA VTI: 0.5 CM2
ECHO AV AREA/BSA PEAK VELOCITY: 0.3 CM2/M2
ECHO AV AREA/BSA VTI: 0.3 CM2/M2
ECHO AV MEAN GRADIENT: 20 MMHG
ECHO AV MEAN VELOCITY: 2.1 M/S
ECHO AV PEAK GRADIENT: 37 MMHG
ECHO AV PEAK VELOCITY: 3.1 M/S
ECHO AV VELOCITY RATIO: 0.32
ECHO AV VTI: 73.5 CM
ECHO BSA: 1.66 M2
ECHO LA AREA 2C: 20 CM2
ECHO LA AREA 4C: 23.3 CM2
ECHO LA DIAMETER INDEX: 2.84 CM/M2
ECHO LA DIAMETER: 4.6 CM
ECHO LA MAJOR AXIS: 6.1 CM
ECHO LA MINOR AXIS: 5.7 CM
ECHO LA TO AORTIC ROOT RATIO: 2.56
ECHO LA VOL BP: 67 ML (ref 22–52)
ECHO LA VOL MOD A2C: 58 ML (ref 22–52)
ECHO LA VOL MOD A4C: 73 ML (ref 22–52)
ECHO LA VOL/BSA BIPLANE: 41 ML/M2 (ref 16–34)
ECHO LA VOLUME INDEX MOD A2C: 36 ML/M2 (ref 16–34)
ECHO LA VOLUME INDEX MOD A4C: 45 ML/M2 (ref 16–34)
ECHO LV E' LATERAL VELOCITY: 7.83 CM/S
ECHO LV E' SEPTAL VELOCITY: 3.85 CM/S
ECHO LV EF PHYSICIAN: 60 %
ECHO LV FRACTIONAL SHORTENING: 58 % (ref 28–44)
ECHO LV INTERNAL DIMENSION DIASTOLE INDEX: 2.35 CM/M2
ECHO LV INTERNAL DIMENSION DIASTOLIC: 3.8 CM (ref 3.9–5.3)
ECHO LV INTERNAL DIMENSION SYSTOLIC INDEX: 0.99 CM/M2
ECHO LV INTERNAL DIMENSION SYSTOLIC: 1.6 CM
ECHO LV IVSD: 1.5 CM (ref 0.6–0.9)
ECHO LV MASS 2D: 216.6 G (ref 67–162)
ECHO LV MASS INDEX 2D: 133.7 G/M2 (ref 43–95)
ECHO LV POSTERIOR WALL DIASTOLIC: 1.5 CM (ref 0.6–0.9)
ECHO LV RELATIVE WALL THICKNESS RATIO: 0.79
ECHO LVOT AREA: 1.5 CM2
ECHO LVOT AV VTI INDEX: 0.31
ECHO LVOT DIAM: 1.4 CM
ECHO LVOT MEAN GRADIENT: 2 MMHG
ECHO LVOT PEAK GRADIENT: 4 MMHG
ECHO LVOT PEAK VELOCITY: 1 M/S
ECHO LVOT STROKE VOLUME INDEX: 21.6 ML/M2
ECHO LVOT SV: 34.9 ML
ECHO LVOT VTI: 22.7 CM
ECHO MV A VELOCITY: 1.55 M/S
ECHO MV AREA PHT: 1.6 CM2
ECHO MV AREA VTI: 0.8 CM2
ECHO MV E DECELERATION TIME (DT): 305 MS
ECHO MV E VELOCITY: 1.06 M/S
ECHO MV E/A RATIO: 0.68
ECHO MV E/E' LATERAL: 13.54
ECHO MV E/E' RATIO (AVERAGED): 20.54
ECHO MV E/E' SEPTAL: 27.53
ECHO MV LVOT VTI INDEX: 1.86
ECHO MV MAX VELOCITY: 1.5 M/S
ECHO MV MEAN GRADIENT: 3 MMHG
ECHO MV MEAN VELOCITY: 0.8 M/S
ECHO MV PEAK GRADIENT: 9 MMHG
ECHO MV PRESSURE HALF TIME (PHT): 136 MS
ECHO MV VTI: 42.3 CM
ECHO PULMONARY ARTERY END DIASTOLIC PRESSURE: 2 MMHG
ECHO PV MAX VELOCITY: 1 M/S
ECHO PV PEAK GRADIENT: 4 MMHG
ECHO PV REGURGITANT MAX VELOCITY: 0.8 M/S
ECHO RA AREA 4C: 11.3 CM2
ECHO RA END SYSTOLIC VOLUME APICAL 4 CHAMBER INDEX BSA: 13 ML/M2
ECHO RA VOLUME: 21 ML
ECHO RV FREE WALL PEAK S': 9.9 CM/S
ECHO RV INTERNAL DIMENSION: 3.3 CM
ECHO RV TAPSE: 2.1 CM (ref 1.7–?)
ECHO TV REGURGITANT MAX VELOCITY: 2.41 M/S
ECHO TV REGURGITANT PEAK GRADIENT: 23 MMHG

## 2025-01-31 PROCEDURE — 93306 TTE W/DOPPLER COMPLETE: CPT

## 2025-01-31 PROCEDURE — 93306 TTE W/DOPPLER COMPLETE: CPT | Performed by: INTERNAL MEDICINE

## 2025-02-08 RX ORDER — LISINOPRIL 40 MG/1
40 TABLET ORAL DAILY
Qty: 90 TABLET | Refills: 1 | Status: SHIPPED | OUTPATIENT
Start: 2025-02-08

## 2025-03-06 ENCOUNTER — OFFICE VISIT (OUTPATIENT)
Dept: FAMILY MEDICINE CLINIC | Age: 89
End: 2025-03-06

## 2025-03-06 VITALS
DIASTOLIC BLOOD PRESSURE: 78 MMHG | HEIGHT: 60 IN | TEMPERATURE: 97 F | WEIGHT: 144 LBS | SYSTOLIC BLOOD PRESSURE: 122 MMHG | BODY MASS INDEX: 28.27 KG/M2 | HEART RATE: 72 BPM

## 2025-03-06 DIAGNOSIS — E78.5 HYPERLIPIDEMIA, UNSPECIFIED HYPERLIPIDEMIA TYPE: ICD-10-CM

## 2025-03-06 DIAGNOSIS — Z00.00 MEDICARE ANNUAL WELLNESS VISIT, SUBSEQUENT: Primary | ICD-10-CM

## 2025-03-06 DIAGNOSIS — I10 ESSENTIAL HYPERTENSION: ICD-10-CM

## 2025-03-06 LAB
ALBUMIN SERPL-MCNC: 4.5 G/DL (ref 3.4–5)
ALBUMIN/GLOB SERPL: 1.9 {RATIO} (ref 1.1–2.2)
ALP SERPL-CCNC: 54 U/L (ref 40–129)
ALT SERPL-CCNC: 17 U/L (ref 10–40)
ANION GAP SERPL CALCULATED.3IONS-SCNC: 12 MMOL/L (ref 3–16)
AST SERPL-CCNC: 17 U/L (ref 15–37)
BILIRUB SERPL-MCNC: 0.4 MG/DL (ref 0–1)
BUN SERPL-MCNC: 27 MG/DL (ref 7–20)
CALCIUM SERPL-MCNC: 10.1 MG/DL (ref 8.3–10.6)
CHLORIDE SERPL-SCNC: 98 MMOL/L (ref 99–110)
CHOLEST SERPL-MCNC: 127 MG/DL (ref 0–199)
CO2 SERPL-SCNC: 29 MMOL/L (ref 21–32)
CREAT SERPL-MCNC: 1.3 MG/DL (ref 0.6–1.2)
GFR SERPLBLD CREATININE-BSD FMLA CKD-EPI: 39 ML/MIN/{1.73_M2}
GLUCOSE SERPL-MCNC: 115 MG/DL (ref 70–99)
HDLC SERPL-MCNC: 37 MG/DL (ref 40–60)
LDLC SERPL CALC-MCNC: 67 MG/DL
POTASSIUM SERPL-SCNC: 4.3 MMOL/L (ref 3.5–5.1)
PROT SERPL-MCNC: 6.9 G/DL (ref 6.4–8.2)
SODIUM SERPL-SCNC: 139 MMOL/L (ref 136–145)
TRIGL SERPL-MCNC: 117 MG/DL (ref 0–150)
VLDLC SERPL CALC-MCNC: 23 MG/DL

## 2025-03-06 RX ORDER — AMLODIPINE BESYLATE 5 MG/1
5 TABLET ORAL DAILY
COMMUNITY
Start: 2025-02-16

## 2025-03-06 SDOH — ECONOMIC STABILITY: FOOD INSECURITY: WITHIN THE PAST 12 MONTHS, YOU WORRIED THAT YOUR FOOD WOULD RUN OUT BEFORE YOU GOT MONEY TO BUY MORE.: NEVER TRUE

## 2025-03-06 SDOH — ECONOMIC STABILITY: FOOD INSECURITY: WITHIN THE PAST 12 MONTHS, THE FOOD YOU BOUGHT JUST DIDN'T LAST AND YOU DIDN'T HAVE MONEY TO GET MORE.: NEVER TRUE

## 2025-03-06 ASSESSMENT — ENCOUNTER SYMPTOMS
ABDOMINAL PAIN: 0
CONSTIPATION: 0
SHORTNESS OF BREATH: 0
BLOOD IN STOOL: 0
DIARRHEA: 0

## 2025-03-06 ASSESSMENT — LIFESTYLE VARIABLES
HOW MANY STANDARD DRINKS CONTAINING ALCOHOL DO YOU HAVE ON A TYPICAL DAY: 1 OR 2
HOW OFTEN DO YOU HAVE A DRINK CONTAINING ALCOHOL: MONTHLY OR LESS

## 2025-03-06 ASSESSMENT — PATIENT HEALTH QUESTIONNAIRE - PHQ9
1. LITTLE INTEREST OR PLEASURE IN DOING THINGS: NOT AT ALL
SUM OF ALL RESPONSES TO PHQ QUESTIONS 1-9: 0
2. FEELING DOWN, DEPRESSED OR HOPELESS: NOT AT ALL

## 2025-03-06 NOTE — PATIENT INSTRUCTIONS
Continue the medicines and the low fat diet   See us as needed and in 6 months           A Healthy Heart: Care Instructions  Overview     Coronary artery disease, also called heart disease, occurs when a substance called plaque builds up in the vessels that supply oxygen-rich blood to your heart muscle. This can narrow the blood vessels and reduce blood flow. A heart attack happens when blood flow is completely blocked. A high-fat diet, smoking, and other factors increase the risk of heart disease.  Your doctor has found that you have a chance of having heart disease. A heart-healthy lifestyle can help keep your heart healthy and prevent heart disease. This lifestyle includes eating healthy, being active, staying at a weight that's healthy for you, and not smoking or using tobacco. It also includes taking medicines as directed, managing other health conditions, and trying to get a healthy amount of sleep.  Follow-up care is a key part of your treatment and safety. Be sure to make and go to all appointments, and call your doctor if you are having problems. It's also a good idea to know your test results and keep a list of the medicines you take.  How can you care for yourself at home?  Diet    Use less salt when you cook and eat. This helps lower your blood pressure. Taste food before salting. Add only a little salt when you think you need it. With time, your taste buds will adjust to less salt.     Eat fewer snack items, fast foods, canned soups, and other high-salt, high-fat, processed foods.     Read food labels and try to avoid saturated and trans fats. They increase your risk of heart disease by raising cholesterol levels.     Limit the amount of solid fat--butter, margarine, and shortening--you eat. Use olive, peanut, or canola oil when you cook. Bake, broil, and steam foods instead of frying them.     Eat a variety of fruit and vegetables every day. Dark green, deep orange, red, or yellow fruits and

## 2025-03-06 NOTE — PROGRESS NOTES
Subjective:      Patient ID: Jessica Boothe is a 89 y.o. female.    Chief Complaint   Patient presents with    Check-Up     Lipids, hypertension - pt is fasting        Patient presents with:  Check-Up: Lipids, hypertension - pt is fasting    Here for the above     She is well   No c/o  Meds same    She has log of bp and they are well     YOB: 1935    Date of Visit:  3/6/2025     -- Adhesive Tape -- Itching and Dermatitis   -- Oxycontin [Oxycodone Hcl] -- Other (See Comments)    --  \"seeing things\"    Current Outpatient Medications:  amLODIPine (NORVASC) 5 MG tablet, Take 1 tablet by mouth daily, Disp: , Rfl:   Probiotic Product (PROBIOTIC-10 PO), Take by mouth, Disp: , Rfl:   lisinopril (PRINIVIL;ZESTRIL) 40 MG tablet, Take 1 tablet by mouth daily, Disp: 90 tablet, Rfl: 1  carvedilol (COREG) 6.25 MG tablet, TAKE 1 TABLET BY MOUTH TWICE A DAY, Disp: 180 tablet, Rfl: 3  rosuvastatin (CRESTOR) 10 MG tablet, TAKE 1 TABLET BY MOUTH EVERY DAY, Disp: 90 tablet, Rfl: 1  spironolactone (ALDACTONE) 25 MG tablet, Take 1 tablet by mouth daily, Disp: 30 tablet, Rfl: 0  clopidogrel (PLAVIX) 75 MG tablet, TAKE 1 TABLET BY MOUTH EVERY DAY, Disp: 90 tablet, Rfl: 3  ASPIRIN LOW DOSE 81 MG EC tablet, TAKE 1 TABLET BY MOUTH EVERY DAY, Disp: 90 tablet, Rfl: 3  furosemide (LASIX) 20 MG tablet, Take 1 tablet by mouth daily as needed (for weight gain of 2 or more pounds overnight), Disp: 60 tablet, Rfl: 3  pantoprazole (PROTONIX) 40 MG tablet, Take 1 tablet by mouth every morning (before breakfast), Disp: 30 tablet, Rfl: 3  Coenzyme Q10 (COQ-10 PO), Take 1 capsule by mouth at bedtime, Disp: , Rfl:   multivitamin (THERAGRAN) per tablet, Take 1 tablet by mouth daily, Disp: , Rfl:   GLUCOSAMINE-CHONDROITIN PO, Take 1 tablet by mouth nightly (Patient not taking: Reported on 3/6/2025), Disp: , Rfl:     No current facility-administered medications for this visit.      --------------------------               03/06/25           
DOSE 81 MG EC tablet TAKE 1 TABLET BY MOUTH EVERY DAY Yes Oskar Dunlap MD   furosemide (LASIX) 20 MG tablet Take 1 tablet by mouth daily as needed (for weight gain of 2 or more pounds overnight) Yes Margret Juárez APRN - CNP   pantoprazole (PROTONIX) 40 MG tablet Take 1 tablet by mouth every morning (before breakfast) Yes Jessica Rhoades MD   Coenzyme Q10 (COQ-10 PO) Take 1 capsule by mouth at bedtime Yes ProviderJj MD   multivitamin (THERAGRAN) per tablet Take 1 tablet by mouth daily Yes Jj Flanagan MD   GLUCOSAMINE-CHONDROITIN PO Take 1 tablet by mouth nightly  Patient not taking: Reported on 3/6/2025  ProviderJj MD       CareTeam (Including outside providers/suppliers regularly involved in providing care):   Patient Care Team:  Lobito Lindsey MD as PCP - General (Family Medicine)  Lobito Lindsey MD as PCP - Empaneled Provider  Oskar Dunlap MD as Consulting Physician (Cardiology)     Recommendations for Preventive Services Due: see orders and patient instructions/AVS.  Recommended screening schedule for the next 5-10 years is provided to the patient in written form: see Patient Instructions/AVS.     Reviewed and updated this visit:  Tobacco  Allergies  Meds  Med Hx  Surg Hx  Fam Hx  Sexual Hx            I, Laury Mercado LPN, 3/6/2025, performed the documented evaluation under the direct supervision of the attending physician.   This encounter was performed under Lobito leo MD’s, direct supervision, 3/6/2025.

## 2025-05-01 ENCOUNTER — OFFICE VISIT (OUTPATIENT)
Dept: CARDIOLOGY CLINIC | Age: 89
End: 2025-05-01
Payer: MEDICARE

## 2025-05-01 ENCOUNTER — CLINICAL SUPPORT (OUTPATIENT)
Dept: CARDIOLOGY CLINIC | Age: 89
End: 2025-05-01

## 2025-05-01 VITALS
WEIGHT: 145 LBS | SYSTOLIC BLOOD PRESSURE: 128 MMHG | BODY MASS INDEX: 28.47 KG/M2 | RESPIRATION RATE: 15 BRPM | OXYGEN SATURATION: 95 % | HEIGHT: 60 IN | DIASTOLIC BLOOD PRESSURE: 58 MMHG | HEART RATE: 75 BPM

## 2025-05-01 DIAGNOSIS — I10 ESSENTIAL HYPERTENSION: ICD-10-CM

## 2025-05-01 DIAGNOSIS — I35.0 NONRHEUMATIC AORTIC (VALVE) STENOSIS: Primary | ICD-10-CM

## 2025-05-01 DIAGNOSIS — I65.23 BILATERAL CAROTID ARTERY STENOSIS: ICD-10-CM

## 2025-05-01 DIAGNOSIS — R53.83 OTHER FATIGUE: ICD-10-CM

## 2025-05-01 DIAGNOSIS — E78.2 MIXED HYPERLIPIDEMIA: ICD-10-CM

## 2025-05-01 PROCEDURE — 99214 OFFICE O/P EST MOD 30 MIN: CPT | Performed by: INTERNAL MEDICINE

## 2025-05-01 PROCEDURE — 1123F ACP DISCUSS/DSCN MKR DOCD: CPT | Performed by: INTERNAL MEDICINE

## 2025-05-01 PROCEDURE — 1159F MED LIST DOCD IN RCRD: CPT | Performed by: INTERNAL MEDICINE

## 2025-05-01 RX ORDER — CLOPIDOGREL BISULFATE 75 MG/1
75 TABLET ORAL DAILY
Qty: 90 TABLET | Refills: 3 | Status: SHIPPED | OUTPATIENT
Start: 2025-05-01

## 2025-05-01 NOTE — PROGRESS NOTES
[facility]      Cardiology Progress Note    Jessica Boothe  4/22/1935    May 1, 2025    CC: \"I dont have as much energy as I want. \"    HPI:  The patient is 89 y.o. 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 Flannery.  Today, she is here for follow up. She says that she wants to do more but she does not have energy. Patient denies exertional chest pain/pressure, dyspnea at rest, LUEVANO, PND, orthopnea, palpitations, lightheadedness, weight changes, changes in LE edema, and syncope.       Past Medical History:   Diagnosis Date    Aortic stenosis     Cerebrovascular disease     Class 1 obesity     Hepatic steatosis     Hiatal hernia     Hyperlipidemia     Hypertension     Osteoarthritis      Past Surgical History:   Procedure Laterality Date    AORTIC VALVE REPLACEMENT  03/09/2017    19mm [place] pericardial Magna Ease    BLEPHAROPLASTY Bilateral 06/07/2021    BILATERAL UPPER LIDS BLEPHAROPLASTY performed by Brianna Dubon MD at [facility]    CAROTID STENT Left 04/12/2024    CATARACT REMOVAL Bilateral 01/2019    PRE-MALIGNANT / BENIGN SKIN LESION EXCISION Right 2014    R scalp. cystadenoma.--done under local    UPPER GASTROINTESTINAL ENDOSCOPY N/A 05/24/2023    EGD DILATION BALLOON TO 18mm performed by Tra Horton MD at [facility] ENDOSCOPY     Family History   Problem Relation Age of Onset    Diabetes Mother     Cancer Mother         lung    Other Father         leukemia    Kidney Disease Brother     Parkinsonism Brother      Social History     Tobacco Use    Smoking status: Never    Smokeless tobacco: Never   Vaping Use    Vaping status: Never Used   Substance Use Topics    Alcohol use: Not Currently     Comment: socially once/week    Drug use: No     Comment: never       Allergies   Allergen Reactions    Adhesive Tape Itching and Dermatitis    Oxycontin [Oxycodone Hcl]

## 2025-05-06 NOTE — PROGRESS NOTES
[employer] transmission received and sent to [facility] for review via [facility].

## 2025-05-13 DIAGNOSIS — R53.83 OTHER FATIGUE: ICD-10-CM

## 2025-05-14 ENCOUNTER — RESULTS FOLLOW-UP (OUTPATIENT)
Dept: CARDIOLOGY CLINIC | Age: 89
End: 2025-05-14

## 2025-05-14 LAB
ANION GAP SERPL CALCULATED.3IONS-SCNC: 11 MMOL/L (ref 3–16)
BUN SERPL-MCNC: 22 MG/DL (ref 7–20)
CALCIUM SERPL-MCNC: 9.7 MG/DL (ref 8.3–10.6)
CHLORIDE SERPL-SCNC: 103 MMOL/L (ref 99–110)
CO2 SERPL-SCNC: 25 MMOL/L (ref 21–32)
CREAT SERPL-MCNC: 1.2 MG/DL (ref 0.6–1.2)
DEPRECATED RDW RBC AUTO: 13.6 % (ref 12.4–15.4)
GFR SERPLBLD CREATININE-BSD FMLA CKD-EPI: 43 ML/MIN/{1.73_M2}
GLUCOSE SERPL-MCNC: 108 MG/DL (ref 70–99)
HCT VFR BLD AUTO: 37.2 % (ref 36–48)
HGB BLD-MCNC: 12.4 G/DL (ref 12–16)
MCH RBC QN AUTO: 27.9 PG (ref 26–34)
MCHC RBC AUTO-ENTMCNC: 33.2 G/DL (ref 31–36)
MCV RBC AUTO: 83.9 FL (ref 80–100)
PLATELET # BLD AUTO: 249 K/UL (ref 135–450)
PMV BLD AUTO: 10.3 FL (ref 5–10.5)
POTASSIUM SERPL-SCNC: 4.6 MMOL/L (ref 3.5–5.1)
RBC # BLD AUTO: 4.43 M/UL (ref 4–5.2)
SODIUM SERPL-SCNC: 139 MMOL/L (ref 136–145)
TSH SERPL DL<=0.005 MIU/L-ACNC: 2.57 UIU/ML (ref 0.27–4.2)
WBC # BLD AUTO: 6.6 K/UL (ref 4–11)

## 2025-05-15 RX ORDER — CLOPIDOGREL BISULFATE 75 MG/1
75 TABLET ORAL DAILY
Qty: 90 TABLET | Refills: 3 | OUTPATIENT
Start: 2025-05-15

## 2025-05-15 NOTE — TELEPHONE ENCOUNTER
Last OV: 5/1/25  Next OV: X due yearly  Last refill: 4/26/24 #90 3 R/F  Most recent Labs: 5/13/25  Last EKG (if needed): 10/31/24

## 2025-05-18 RX ORDER — ASPIRIN 81 MG/1
81 TABLET, COATED ORAL DAILY
Qty: 90 TABLET | Refills: 3 | Status: SHIPPED | OUTPATIENT
Start: 2025-05-18

## 2025-08-14 RX ORDER — LISINOPRIL 40 MG/1
40 TABLET ORAL DAILY
Qty: 90 TABLET | Refills: 0 | Status: SHIPPED | OUTPATIENT
Start: 2025-08-14

## 2025-08-18 RX ORDER — ROSUVASTATIN CALCIUM 10 MG/1
10 TABLET, COATED ORAL DAILY
Qty: 90 TABLET | Refills: 0 | Status: SHIPPED | OUTPATIENT
Start: 2025-08-18

## (undated) DEVICE — SOLUTION IRRIG 250ML STRL H2O PLAS POUR BTL USP

## (undated) DEVICE — HOLDER RESTRAINT LIMB UNIV FOAM PR D RNG SINGLE STRP LF

## (undated) DEVICE — ENDOSCOPY KIT: Brand: MEDLINE INDUSTRIES, INC.

## (undated) DEVICE — ENT I-LF: Brand: MEDLINE INDUSTRIES, INC.

## (undated) DEVICE — SUTURE PROL 7-0 L18IN NONABSORBABLE BLU P-1 L11MM 3/8 CIR 8696G

## (undated) DEVICE — COVER LT HNDL BLU PLAS

## (undated) DEVICE — Z DISCONTINUED USE 2131664 WIPE INSTR W3XL3IN NONLINTING

## (undated) DEVICE — BITE BLOCK ENDOSCP AD 60 FR W/ ADJ STRP PLAS GRN BLOX

## (undated) DEVICE — GAUZE SPONGES,12 PLY: Brand: CURITY

## (undated) DEVICE — CORD ES L12FT BPLR FRCP

## (undated) DEVICE — APPLICATOR,COTTON-TIP,WOOD,3,STRL: Brand: MEDLINE

## (undated) DEVICE — 6 ML SYRINGE LUER-LOCK TIP: Brand: MONOJECT

## (undated) DEVICE — ESOPHAGEAL/PYLORIC/COLONIC/BILIARY WIREGUIDED BALLOON DILATATION CATHETER: Brand: CRE™ PRO

## (undated) DEVICE — INTENDED FOR TISSUE SEPARATION, AND OTHER PROCEDURES THAT REQUIRE A SHARP SURGICAL BLADE TO PUNCTURE OR CUT.: Brand: BARD-PARKER ® STAINLESS STEEL BLADES

## (undated) DEVICE — GLOVE SURG UNDERGLOVE 6.5 PF BLU BIOGEL PI MIC LF

## (undated) DEVICE — SOLUTION IV IRRIG 250ML ST LF 0.9% SODIUM 2F7122

## (undated) DEVICE — INSTRUMENT COVER: Brand: CONVERTORS

## (undated) DEVICE — DEVICE INFL 60ML 15ATM DISPOSABLE STERIFLATE CRE

## (undated) DEVICE — NEEDLE HYPO 30GA L0.5IN BGE POLYPR HUB S STL REG BVL STR